# Patient Record
Sex: FEMALE | Race: WHITE | Employment: OTHER | URBAN - METROPOLITAN AREA
[De-identification: names, ages, dates, MRNs, and addresses within clinical notes are randomized per-mention and may not be internally consistent; named-entity substitution may affect disease eponyms.]

---

## 2017-04-19 PROCEDURE — 80175 DRUG SCREEN QUAN LAMOTRIGINE: CPT | Performed by: OTHER

## 2017-04-19 PROCEDURE — 82607 VITAMIN B-12: CPT | Performed by: OTHER

## 2017-04-19 PROCEDURE — 82746 ASSAY OF FOLIC ACID SERUM: CPT | Performed by: OTHER

## 2017-04-20 ENCOUNTER — APPOINTMENT (OUTPATIENT)
Dept: LAB | Facility: HOSPITAL | Age: 47
End: 2017-04-20
Payer: MEDICARE

## 2017-04-20 DIAGNOSIS — K51.80 OTHER ULCERATIVE COLITIS WITHOUT COMPLICATION (HCC): ICD-10-CM

## 2017-04-20 PROCEDURE — 93010 ELECTROCARDIOGRAM REPORT: CPT | Performed by: INTERNAL MEDICINE

## 2017-04-20 PROCEDURE — 93005 ELECTROCARDIOGRAM TRACING: CPT

## 2017-04-25 ENCOUNTER — ANESTHESIA EVENT (OUTPATIENT)
Dept: ENDOSCOPY | Facility: HOSPITAL | Age: 47
End: 2017-04-25

## 2017-04-25 ENCOUNTER — ANESTHESIA (OUTPATIENT)
Dept: ENDOSCOPY | Facility: HOSPITAL | Age: 47
End: 2017-04-25

## 2017-04-25 ENCOUNTER — HOSPITAL ENCOUNTER (OUTPATIENT)
Facility: HOSPITAL | Age: 47
Setting detail: HOSPITAL OUTPATIENT SURGERY
Discharge: HOME | End: 2017-04-25
Attending: INTERNAL MEDICINE | Admitting: INTERNAL MEDICINE
Payer: MEDICARE

## 2017-04-25 ENCOUNTER — SURGERY (OUTPATIENT)
Age: 47
End: 2017-04-25

## 2017-04-25 VITALS
TEMPERATURE: 98 F | WEIGHT: 130 LBS | SYSTOLIC BLOOD PRESSURE: 103 MMHG | BODY MASS INDEX: 23.04 KG/M2 | RESPIRATION RATE: 16 BRPM | DIASTOLIC BLOOD PRESSURE: 58 MMHG | HEART RATE: 72 BPM | OXYGEN SATURATION: 99 % | HEIGHT: 63 IN

## 2017-04-25 DIAGNOSIS — K51.80 OTHER ULCERATIVE COLITIS WITHOUT COMPLICATION (HCC): Primary | ICD-10-CM

## 2017-04-25 PROCEDURE — 88305 TISSUE EXAM BY PATHOLOGIST: CPT | Performed by: INTERNAL MEDICINE

## 2017-04-25 PROCEDURE — 0DBE8ZX EXCISION OF LARGE INTESTINE, VIA NATURAL OR ARTIFICIAL OPENING ENDOSCOPIC, DIAGNOSTIC: ICD-10-PCS | Performed by: INTERNAL MEDICINE

## 2017-04-25 PROCEDURE — 81025 URINE PREGNANCY TEST: CPT | Performed by: INTERNAL MEDICINE

## 2017-04-25 RX ORDER — SODIUM CHLORIDE, SODIUM LACTATE, POTASSIUM CHLORIDE, CALCIUM CHLORIDE 600; 310; 30; 20 MG/100ML; MG/100ML; MG/100ML; MG/100ML
INJECTION, SOLUTION INTRAVENOUS CONTINUOUS
Status: DISCONTINUED | OUTPATIENT
Start: 2017-04-25 | End: 2017-04-25

## 2017-04-25 RX ORDER — NALOXONE HYDROCHLORIDE 0.4 MG/ML
80 INJECTION, SOLUTION INTRAMUSCULAR; INTRAVENOUS; SUBCUTANEOUS AS NEEDED
Status: DISCONTINUED | OUTPATIENT
Start: 2017-04-25 | End: 2017-04-25

## 2017-04-25 RX ORDER — DEXTROSE MONOHYDRATE 25 G/50ML
50 INJECTION, SOLUTION INTRAVENOUS
Status: DISCONTINUED | OUTPATIENT
Start: 2017-04-25 | End: 2017-04-25

## 2017-04-25 NOTE — ANESTHESIA PREPROCEDURE EVALUATION
PRE-OP EVALUATION    Patient Name: Renato Walker    Pre-op Diagnosis: LEFT SIDED ULCERATIVE COLITIS    Procedure(s):  COLONOSCOPY    Surgeon(s) and Role:     Lee Ann Cm MD - Primary    Pre-op vitals reviewed.         Body mass index is 23.03 60 mg by mouth every morning. Disp:  Rfl:    mesalamine (LIALDA) 1.2 G Oral Tab EC Take 2.4 g by mouth 2 (two) times daily. Disp:  Rfl:    BuPROPion HCl ER, XL, (WELLBUTRIN XL) 300 MG Oral Tablet 24 Hr Take 300 mg by mouth daily.  In addition to 150 mg status: Former Smoker     Smokeless tobacco: Never Used    Comment: quit in 2003    Alcohol Use: No       Drug Use: Yes   Special: Cannabis   Comment: Heroin in past     Available pre-op labs reviewed.     Lab Results  Component Value Date   WBC 7.66 04/19/

## 2017-04-25 NOTE — INTERVAL H&P NOTE
Pre-op Diagnosis: LEFT SIDED ULCERATIVE COLITIS    The above referenced H&P was reviewed by Adrian Huerta MD on 4/25/2017, the patient was examined and no significant changes have occurred in the patient's condition since the H&P was performed.   I disc

## 2017-04-25 NOTE — OPERATIVE REPORT
Sary Horner Patient Status:  Hospital Outpatient Surgery    1970 MRN [id number]   [facility] ENDOSCOPY Attending Susan Fermin MD   Hosp Day # 0 PCP Charles Gomez MD     PREOPERATIVE DIAGNOSIS/INDICATION: H/o UC/IBD, precautions, watch for bleeding, infection, perforation, adverse drug reactions   - Follow biopsies.  - Repeat colonoscopy in 1 years.     Eren Aguillon  4/25/2017  11:27 AM

## 2017-04-25 NOTE — INTERVAL H&P NOTE
Pre-op Diagnosis: LEFT SIDED ULCERATIVE COLITIS    The above referenced H&P was reviewed by Wes Butterfield MD on 4/25/2017, the patient was examined and no significant changes have occurred in the patient's condition since the H&P was performed.   I disc

## 2017-04-25 NOTE — ANESTHESIA POSTPROCEDURE EVALUATION
[address] Patient Status:  Hospital Outpatient Surgery   Age/Gender 50year old female MRN [id number]   Location [facility]. Attending Hoang Flores MD   Hosp Day # 0 PCP Tete Price MD       Anesthesia

## 2017-04-26 RX ORDER — RANITIDINE 150 MG/1
150 TABLET ORAL 2 TIMES DAILY
COMMUNITY
End: 2018-03-10

## 2017-04-26 RX ORDER — SODIUM CHLORIDE, SODIUM LACTATE, POTASSIUM CHLORIDE, CALCIUM CHLORIDE 600; 310; 30; 20 MG/100ML; MG/100ML; MG/100ML; MG/100ML
INJECTION, SOLUTION INTRAVENOUS CONTINUOUS
Status: CANCELLED | OUTPATIENT
Start: 2017-04-26

## 2017-04-26 RX ORDER — FLUTICASONE PROPIONATE 50 MCG
2 SPRAY, SUSPENSION (ML) NASAL DAILY
COMMUNITY
End: 2018-03-21

## 2017-05-12 NOTE — IMAGING NOTE
Phoned patient to review plan of care for Monday's procedure.  Pt aware to be NPO for 8 hours, will need to have a  to be released due to anesthesia, , can and should take routine morning medications with sips of water, and to expect recovery for 4-6

## 2017-05-15 ENCOUNTER — ANESTHESIA (OUTPATIENT)
Dept: MRI IMAGING | Facility: HOSPITAL | Age: 47
End: 2017-05-15
Payer: MEDICARE

## 2017-05-15 ENCOUNTER — ANESTHESIA EVENT (OUTPATIENT)
Dept: MRI IMAGING | Facility: HOSPITAL | Age: 47
End: 2017-05-15
Payer: MEDICARE

## 2017-05-15 ENCOUNTER — APPOINTMENT (OUTPATIENT)
Dept: LAB | Facility: HOSPITAL | Age: 47
End: 2017-05-15
Attending: Other
Payer: MEDICARE

## 2017-05-15 ENCOUNTER — HOSPITAL ENCOUNTER (OUTPATIENT)
Dept: MRI IMAGING | Facility: HOSPITAL | Age: 47
Discharge: HOME | End: 2017-05-15
Attending: Other
Payer: MEDICARE

## 2017-05-15 VITALS
WEIGHT: 131 LBS | TEMPERATURE: 98 F | DIASTOLIC BLOOD PRESSURE: 85 MMHG | BODY MASS INDEX: 23.21 KG/M2 | OXYGEN SATURATION: 100 % | HEIGHT: 63 IN | HEART RATE: 66 BPM | SYSTOLIC BLOOD PRESSURE: 132 MMHG | RESPIRATION RATE: 15 BRPM

## 2017-05-15 DIAGNOSIS — R93.0 ABNORMAL MRI OF HEAD: ICD-10-CM

## 2017-05-15 DIAGNOSIS — R53.1 WEAKNESS: ICD-10-CM

## 2017-05-15 PROCEDURE — 70553 MRI BRAIN STEM W/O & W/DYE: CPT | Performed by: OTHER

## 2017-05-15 PROCEDURE — A9575 INJ GADOTERATE MEGLUMI 0.1ML: HCPCS

## 2017-05-15 RX ORDER — NALOXONE HYDROCHLORIDE 0.4 MG/ML
80 INJECTION, SOLUTION INTRAMUSCULAR; INTRAVENOUS; SUBCUTANEOUS AS NEEDED
Status: ACTIVE | OUTPATIENT
Start: 2017-05-15 | End: 2017-05-15

## 2017-05-15 RX ORDER — HYDROMORPHONE HYDROCHLORIDE 1 MG/ML
0.4 INJECTION, SOLUTION INTRAMUSCULAR; INTRAVENOUS; SUBCUTANEOUS EVERY 5 MIN PRN
Status: ACTIVE | OUTPATIENT
Start: 2017-05-15 | End: 2017-05-15

## 2017-05-15 RX ORDER — MIDAZOLAM HYDROCHLORIDE 1 MG/ML
1 INJECTION INTRAMUSCULAR; INTRAVENOUS EVERY 5 MIN PRN
Status: ACTIVE | OUTPATIENT
Start: 2017-05-15 | End: 2017-05-15

## 2017-05-15 RX ORDER — DEXTROSE MONOHYDRATE 25 G/50ML
50 INJECTION, SOLUTION INTRAVENOUS
Status: DISCONTINUED | OUTPATIENT
Start: 2017-05-15 | End: 2017-05-19

## 2017-05-15 RX ORDER — ONDANSETRON 2 MG/ML
4 INJECTION INTRAMUSCULAR; INTRAVENOUS AS NEEDED
Status: ACTIVE | OUTPATIENT
Start: 2017-05-15 | End: 2017-05-15

## 2017-05-15 RX ORDER — DEXAMETHASONE SODIUM PHOSPHATE 4 MG/ML
4 VIAL (ML) INJECTION AS NEEDED
Status: ACTIVE | OUTPATIENT
Start: 2017-05-15 | End: 2017-05-15

## 2017-05-15 RX ORDER — ALBUTEROL SULFATE 2.5 MG/3ML
2.5 SOLUTION RESPIRATORY (INHALATION) AS NEEDED
Status: DISPENSED | OUTPATIENT
Start: 2017-05-15 | End: 2017-05-15

## 2017-05-15 RX ORDER — SODIUM CHLORIDE, SODIUM LACTATE, POTASSIUM CHLORIDE, CALCIUM CHLORIDE 600; 310; 30; 20 MG/100ML; MG/100ML; MG/100ML; MG/100ML
INJECTION, SOLUTION INTRAVENOUS CONTINUOUS
Status: DISCONTINUED | OUTPATIENT
Start: 2017-05-15 | End: 2017-05-19

## 2017-05-15 RX ORDER — METOCLOPRAMIDE HYDROCHLORIDE 5 MG/ML
10 INJECTION INTRAMUSCULAR; INTRAVENOUS AS NEEDED
Status: DISPENSED | OUTPATIENT
Start: 2017-05-15 | End: 2017-05-15

## 2017-05-15 NOTE — ANESTHESIA PREPROCEDURE EVALUATION
PRE-OP EVALUATION    Patient Name[place] [facility]    Pre-op Diagnosis: * No surgery found *    * No surgery found *    * Surgery not found *    Pre-op vitals reviewed. Body mass index is 23.21 kg/(m^2). Current medications reviewed.   Hospita 300 MG Oral Capsule SR 24 Hr Take 1 capsule (300 mg total) by mouth daily.  Disp: 90 capsule Rfl: 1   spironolactone 50 MG Oral Tab Take 2 tabs QAM, 1 tab QPM (Patient taking differently: Take 2 tabs QAM, 1 tab QPM ) Disp: 270 tablet Rfl: 1   Ketoconazole 2 Externally Gel Apply 1 Application topically 2 (two) times daily. Apply to affected area of the face Disp:  Rfl:    acetaminophen (TYLENOL EXTRA STRENGTH) 500 MG Oral Tab Take 1,000 mg by mouth every 4 (four) hours as needed.  Indications: Fever, Pain Disp: Amy Veras MD;  Location: [facility] ENDOSCOPY        Smoking status: Former Smoker     Smokeless tobacco: Never Used    Comment: quit in 2003    Alcohol Use: No       Drug Use: Yes   Special: Cannabis   Comment: Heroin in past     Available pre-op labs re

## 2017-05-15 NOTE — ANESTHESIA POSTPROCEDURE EVALUATION
[address] Patient Status:  Outpatient   Age/Gender 50year old female MRN [id number]   [facility] MRI Attending Erika Gloria MD   [facility] Day # 0 PCP Keyona Hess MD       Anesthesia Post-op Note    * No

## 2017-05-19 PROBLEM — M70.61 TROCHANTERIC BURSITIS, RIGHT HIP: Status: ACTIVE | Noted: 2017-05-19

## 2017-06-22 PROCEDURE — 87624 HPV HI-RISK TYP POOLED RSLT: CPT | Performed by: FAMILY MEDICINE

## 2017-06-22 PROCEDURE — 88175 CYTOPATH C/V AUTO FLUID REDO: CPT | Performed by: FAMILY MEDICINE

## 2017-07-10 PROCEDURE — 82570 ASSAY OF URINE CREATININE: CPT | Performed by: INTERNAL MEDICINE

## 2017-07-10 PROCEDURE — 85610 PROTHROMBIN TIME: CPT | Performed by: INTERNAL MEDICINE

## 2017-07-10 PROCEDURE — 86160 COMPLEMENT ANTIGEN: CPT | Performed by: INTERNAL MEDICINE

## 2017-07-10 PROCEDURE — 86147 CARDIOLIPIN ANTIBODY EA IG: CPT | Performed by: INTERNAL MEDICINE

## 2017-07-10 PROCEDURE — 81003 URINALYSIS AUTO W/O SCOPE: CPT | Performed by: INTERNAL MEDICINE

## 2017-07-10 PROCEDURE — 84156 ASSAY OF PROTEIN URINE: CPT | Performed by: INTERNAL MEDICINE

## 2017-07-10 PROCEDURE — 85613 RUSSELL VIPER VENOM DILUTED: CPT | Performed by: INTERNAL MEDICINE

## 2017-07-10 PROCEDURE — 86376 MICROSOMAL ANTIBODY EACH: CPT | Performed by: INTERNAL MEDICINE

## 2017-07-10 PROCEDURE — 86800 THYROGLOBULIN ANTIBODY: CPT | Performed by: INTERNAL MEDICINE

## 2017-07-10 PROCEDURE — 86146 BETA-2 GLYCOPROTEIN ANTIBODY: CPT | Performed by: INTERNAL MEDICINE

## 2017-08-11 PROCEDURE — 85384 FIBRINOGEN ACTIVITY: CPT | Performed by: INTERNAL MEDICINE

## 2017-11-11 ENCOUNTER — HOSPITAL ENCOUNTER (EMERGENCY)
Facility: HOSPITAL | Age: 47
Discharge: HOME | End: 2017-11-11
Attending: EMERGENCY MEDICINE
Payer: MEDICARE

## 2017-11-11 VITALS
OXYGEN SATURATION: 100 % | BODY MASS INDEX: 22.86 KG/M2 | TEMPERATURE: 98 F | HEIGHT: 63 IN | WEIGHT: 129 LBS | DIASTOLIC BLOOD PRESSURE: 76 MMHG | HEART RATE: 91 BPM | RESPIRATION RATE: 18 BRPM | SYSTOLIC BLOOD PRESSURE: 123 MMHG

## 2017-11-11 DIAGNOSIS — R51.9 NONINTRACTABLE EPISODIC HEADACHE, UNSPECIFIED HEADACHE TYPE: Primary | ICD-10-CM

## 2017-11-11 DIAGNOSIS — S01.81XA CHIN LACERATION, INITIAL ENCOUNTER: ICD-10-CM

## 2017-11-11 PROCEDURE — 12011 RPR F/E/E/N/L/M 2.5 CM/<: CPT

## 2017-11-11 PROCEDURE — 99284 EMERGENCY DEPT VISIT MOD MDM: CPT

## 2017-11-11 PROCEDURE — 96361 HYDRATE IV INFUSION ADD-ON: CPT

## 2017-11-11 PROCEDURE — 96375 TX/PRO/DX INJ NEW DRUG ADDON: CPT

## 2017-11-11 PROCEDURE — 96374 THER/PROPH/DIAG INJ IV PUSH: CPT

## 2017-11-11 RX ORDER — METOCLOPRAMIDE HYDROCHLORIDE 5 MG/ML
10 INJECTION INTRAMUSCULAR; INTRAVENOUS ONCE
Status: COMPLETED | OUTPATIENT
Start: 2017-11-11 | End: 2017-11-11

## 2017-11-11 RX ORDER — SODIUM CHLORIDE 9 MG/ML
125 INJECTION, SOLUTION INTRAVENOUS CONTINUOUS
Status: DISCONTINUED | OUTPATIENT
Start: 2017-11-11 | End: 2017-11-11

## 2017-11-11 RX ORDER — DIPHENHYDRAMINE HYDROCHLORIDE 50 MG/ML
25 INJECTION INTRAMUSCULAR; INTRAVENOUS ONCE
Status: COMPLETED | OUTPATIENT
Start: 2017-11-11 | End: 2017-11-11

## 2017-11-11 NOTE — ED INITIAL ASSESSMENT (HPI)
Patient states she has had vestibular migraines that have been worse than usual the last 3 days. Also slipped about 12:30 pm today and busted her chin open. Patient unable to get here until now because she has had to stay with her mother.

## 2017-11-11 NOTE — ED PROVIDER NOTES
Patient Seen in: [facility] Emergency Department    History   Patient presents with:  Headache (neurologic)  Laceration Abrasion (integumentary)    Stated Complaint: headache and lac     HPI    67-year-old female presents emergency room for evaluati • High cholesterol    • HYPOTHYROIDISM    • Migraine, hemiplegic    • MIGRAINES    • Migraines    • Myalgia and myositis, unspecified    • Organic hypersomnia, unspecified PSG 6-29-14    AHI 2 RDI 2 REM AHI 2 SaO2 doroteo 88 %   • OTHER DISEASES     Crohn external auditory canal swelling, there is no mastoid erythema or tenderness. Scalp is atraumatic. There is a 1.5 cm laceration to the submental chin. No facial bone tenderness or step-off. No mandible tenderness. Oropharynx clear, no dental trauma. symptoms and was discharged good condition.           Disposition and Plan     Clinical Impression:  Nonintractable episodic headache, unspecified headache type  (primary encounter diagnosis)  Chin laceration, initial encounter    Disposition:  Discharge  1

## 2017-12-20 VITALS — HEIGHT: 63 IN | BODY MASS INDEX: 23.57 KG/M2 | WEIGHT: 133 LBS

## 2017-12-20 RX ORDER — LEVOTHYROXINE SODIUM 175 UG/1
175 TABLET ORAL
Status: ON HOLD | COMMUNITY
End: 2018-10-07

## 2017-12-20 RX ORDER — SODIUM CHLORIDE, SODIUM LACTATE, POTASSIUM CHLORIDE, CALCIUM CHLORIDE 600; 310; 30; 20 MG/100ML; MG/100ML; MG/100ML; MG/100ML
INJECTION, SOLUTION INTRAVENOUS CONTINUOUS
Status: CANCELLED | OUTPATIENT
Start: 2017-12-20

## 2018-01-05 ENCOUNTER — HOSPITAL ENCOUNTER (OUTPATIENT)
Dept: MRI IMAGING | Facility: HOSPITAL | Age: 48
Discharge: HOME | End: 2018-01-05
Attending: ORTHOPAEDIC SURGERY
Payer: MEDICARE

## 2018-01-05 ENCOUNTER — APPOINTMENT (OUTPATIENT)
Dept: LAB | Facility: HOSPITAL | Age: 48
End: 2018-01-05
Attending: ORTHOPAEDIC SURGERY
Payer: MEDICARE

## 2018-01-05 ENCOUNTER — APPOINTMENT (OUTPATIENT)
Dept: MRI IMAGING | Facility: HOSPITAL | Age: 48
End: 2018-01-05
Attending: ORTHOPAEDIC SURGERY
Payer: MEDICARE

## 2018-01-09 ENCOUNTER — ANESTHESIA EVENT (OUTPATIENT)
Dept: MRI IMAGING | Facility: HOSPITAL | Age: 48
DRG: 092 | End: 2018-01-09
Payer: MEDICARE

## 2018-01-09 RX ORDER — SODIUM CHLORIDE, SODIUM LACTATE, POTASSIUM CHLORIDE, CALCIUM CHLORIDE 600; 310; 30; 20 MG/100ML; MG/100ML; MG/100ML; MG/100ML
INJECTION, SOLUTION INTRAVENOUS CONTINUOUS
Status: CANCELLED | OUTPATIENT
Start: 2018-01-09

## 2018-01-10 NOTE — IMAGING NOTE
Patient verbalized understanding to instructions given for tomorrow's anesthesia: Arrive 1 hour early, NPO x 8 hours,  to take patient home and recovery time may take up to 4 hours.

## 2018-01-11 ENCOUNTER — HOSPITAL ENCOUNTER (OUTPATIENT)
Dept: MRI IMAGING | Facility: HOSPITAL | Age: 48
Discharge: HOME | DRG: 092 | End: 2018-01-11
Attending: ORTHOPAEDIC SURGERY
Payer: MEDICARE

## 2018-01-11 ENCOUNTER — ANESTHESIA (OUTPATIENT)
Dept: MRI IMAGING | Facility: HOSPITAL | Age: 48
DRG: 092 | End: 2018-01-11
Payer: MEDICARE

## 2018-01-11 VITALS
DIASTOLIC BLOOD PRESSURE: 80 MMHG | HEART RATE: 87 BPM | RESPIRATION RATE: 16 BRPM | OXYGEN SATURATION: 100 % | TEMPERATURE: 98 F | SYSTOLIC BLOOD PRESSURE: 130 MMHG | BODY MASS INDEX: 23.86 KG/M2 | WEIGHT: 133 LBS | HEIGHT: 62.75 IN

## 2018-01-11 DIAGNOSIS — M41.25 OTHER IDIOPATHIC SCOLIOSIS, THORACOLUMBAR REGION: ICD-10-CM

## 2018-01-11 PROCEDURE — 72141 MRI NECK SPINE W/O DYE: CPT | Performed by: ORTHOPAEDIC SURGERY

## 2018-01-11 PROCEDURE — BR30ZZZ MAGNETIC RESONANCE IMAGING (MRI) OF CERVICAL SPINE: ICD-10-PCS | Performed by: RADIOLOGY

## 2018-01-11 RX ORDER — DEXTROSE MONOHYDRATE 25 G/50ML
50 INJECTION, SOLUTION INTRAVENOUS
Status: DISCONTINUED | OUTPATIENT
Start: 2018-01-11 | End: 2018-01-12

## 2018-01-11 RX ORDER — SODIUM CHLORIDE, SODIUM LACTATE, POTASSIUM CHLORIDE, CALCIUM CHLORIDE 600; 310; 30; 20 MG/100ML; MG/100ML; MG/100ML; MG/100ML
INJECTION, SOLUTION INTRAVENOUS CONTINUOUS
Status: DISCONTINUED | OUTPATIENT
Start: 2018-01-11 | End: 2018-01-12

## 2018-01-11 RX ORDER — METOCLOPRAMIDE HYDROCHLORIDE 5 MG/ML
10 INJECTION INTRAMUSCULAR; INTRAVENOUS AS NEEDED
Status: ACTIVE | OUTPATIENT
Start: 2018-01-11 | End: 2018-01-11

## 2018-01-11 RX ORDER — ONDANSETRON 2 MG/ML
4 INJECTION INTRAMUSCULAR; INTRAVENOUS ONCE AS NEEDED
Status: ACTIVE | OUTPATIENT
Start: 2018-01-11 | End: 2018-01-11

## 2018-01-11 RX ORDER — NALOXONE HYDROCHLORIDE 0.4 MG/ML
80 INJECTION, SOLUTION INTRAMUSCULAR; INTRAVENOUS; SUBCUTANEOUS AS NEEDED
Status: ACTIVE | OUTPATIENT
Start: 2018-01-11 | End: 2018-01-11

## 2018-01-11 NOTE — ANESTHESIA POSTPROCEDURE EVALUATION
[address] Patient Status:  Outpatient   Age/Gender 50year old female MRN [id number]   [facility] MRI Attending Virgilio Cuellar MD   Hosp Day # 0 PCP Tayla MD Alexey       Anesthesia Post-op Note    * No pr

## 2018-01-11 NOTE — ANESTHESIA PREPROCEDURE EVALUATION
PRE-OP EVALUATION    Patient Name: [facility]    Pre-op Diagnosis: * No pre-op diagnosis entered *    * No procedures listed *    * No surgeons found in log *    Pre-op vitals reviewed.   Temp: 98 °F (36.7 °C)  Pulse: 87  Resp: 18  BP: 138/94  SpO2 Activated INHALE 1 PUFF INTO THE LUNGS EVERY 12 HOURS Disp: 1 each Rfl: 8   Carisoprodol 350 MG Oral Tab TAKE 1 TABLET BY MOUTH THREE TIMES DAILY AS NEEDED FOR MUSCLE SPASMS Disp: 270 tablet Rfl: 1   VERAPAMIL HCL  MG Oral Capsule SR 24 Hr TAKE 1 CAP MG Oral Tab Take 81 mg by mouth daily. Indications: daily Disp:  Rfl:    DiphenhydrAMINE HCl (BENADRYL) 50 MG Oral Cap Take 50 mg by mouth every 6 (six) hours as needed.  Indications: Migraine Disp:  Rfl:    QUEtiapine Fumarate (SEROQUEL) 300 MG Oral Tab Ta Former tobacco abuse    (+) asthma                     Neuro/Psych  Comment: Bipolar    (+) depression  (+) anxiety  (+) CVA and residual deficits    (+) seizures    (+) headaches (Migraines)  (+) psychiatric history               Past Surgical History:  3

## 2018-01-12 ENCOUNTER — APPOINTMENT (OUTPATIENT)
Dept: GENERAL RADIOLOGY | Facility: HOSPITAL | Age: 48
DRG: 092 | End: 2018-01-12
Attending: EMERGENCY MEDICINE
Payer: MEDICARE

## 2018-01-12 ENCOUNTER — HOSPITAL ENCOUNTER (EMERGENCY)
Facility: HOSPITAL | Age: 48
Discharge: HOME | DRG: 092 | End: 2018-01-12
Attending: EMERGENCY MEDICINE
Payer: MEDICARE

## 2018-01-12 ENCOUNTER — APPOINTMENT (OUTPATIENT)
Dept: CT IMAGING | Facility: HOSPITAL | Age: 48
DRG: 092 | End: 2018-01-12
Attending: EMERGENCY MEDICINE
Payer: MEDICARE

## 2018-01-12 VITALS
HEIGHT: 62 IN | SYSTOLIC BLOOD PRESSURE: 132 MMHG | DIASTOLIC BLOOD PRESSURE: 81 MMHG | RESPIRATION RATE: 16 BRPM | TEMPERATURE: 99 F | OXYGEN SATURATION: 96 % | HEART RATE: 91 BPM | WEIGHT: 133 LBS | BODY MASS INDEX: 24.48 KG/M2

## 2018-01-12 DIAGNOSIS — Z86.59 MENTAL STATUS CHANGE RESOLVED: ICD-10-CM

## 2018-01-12 DIAGNOSIS — E86.0 DEHYDRATION: Primary | ICD-10-CM

## 2018-01-12 LAB
ALBUMIN SERPL-MCNC: 4 G/DL (ref 3.5–4.8)
ALP LIVER SERPL-CCNC: 61 U/L (ref 39–100)
ALT SERPL-CCNC: 26 U/L (ref 14–54)
AST SERPL-CCNC: 19 U/L (ref 15–41)
BASOPHILS # BLD AUTO: 0.06 X10(3) UL (ref 0–0.1)
BASOPHILS NFR BLD AUTO: 0.5 %
BILIRUB SERPL-MCNC: 0.4 MG/DL (ref 0.1–2)
BUN BLD-MCNC: 17 MG/DL (ref 8–20)
CALCIUM BLD-MCNC: 9.6 MG/DL (ref 8.3–10.3)
CHLORIDE: 106 MMOL/L (ref 101–111)
CO2: 23 MMOL/L (ref 22–32)
CREAT BLD-MCNC: 0.93 MG/DL (ref 0.55–1.02)
EOSINOPHIL # BLD AUTO: 0.05 X10(3) UL (ref 0–0.3)
EOSINOPHIL NFR BLD AUTO: 0.4 %
ERYTHROCYTE [DISTWIDTH] IN BLOOD BY AUTOMATED COUNT: 13.1 % (ref 11.5–16)
GLUCOSE BLD-MCNC: 83 MG/DL (ref 70–99)
HCT VFR BLD AUTO: 41.3 % (ref 34–50)
HGB BLD-MCNC: 13.7 G/DL (ref 12–16)
IMMATURE GRANULOCYTE COUNT: 0.06 X10(3) UL (ref 0–1)
IMMATURE GRANULOCYTE RATIO %: 0.5 %
LACTIC ACID: 0.7 MMOL/L (ref 0.5–2)
LYMPHOCYTES # BLD AUTO: 1.21 X10(3) UL (ref 0.9–4)
LYMPHOCYTES NFR BLD AUTO: 10.5 %
M PROTEIN MFR SERPL ELPH: 7 G/DL (ref 6.1–8.3)
MCH RBC QN AUTO: 30.3 PG (ref 27–33.2)
MCHC RBC AUTO-ENTMCNC: 33.2 G/DL (ref 31–37)
MCV RBC AUTO: 91.4 FL (ref 81–100)
MONOCYTES # BLD AUTO: 0.84 X10(3) UL (ref 0.1–0.6)
MONOCYTES NFR BLD AUTO: 7.3 %
NEUTROPHIL ABS PRELIM: 9.28 X10 (3) UL (ref 1.3–6.7)
NEUTROPHILS # BLD AUTO: 9.28 X10(3) UL (ref 1.3–6.7)
NEUTROPHILS NFR BLD AUTO: 80.8 %
PLATELET # BLD AUTO: 362 10(3)UL (ref 150–450)
POTASSIUM SERPL-SCNC: 4.4 MMOL/L (ref 3.6–5.1)
RBC # BLD AUTO: 4.52 X10(6)UL (ref 3.8–5.1)
RED CELL DISTRIBUTION WIDTH-SD: 43.8 FL (ref 35.1–46.3)
SODIUM SERPL-SCNC: 138 MMOL/L (ref 136–144)
WBC # BLD AUTO: 11.5 X10(3) UL (ref 4–13)

## 2018-01-12 PROCEDURE — 96360 HYDRATION IV INFUSION INIT: CPT

## 2018-01-12 PROCEDURE — 96361 HYDRATE IV INFUSION ADD-ON: CPT

## 2018-01-12 PROCEDURE — 70450 CT HEAD/BRAIN W/O DYE: CPT | Performed by: EMERGENCY MEDICINE

## 2018-01-12 PROCEDURE — 85025 COMPLETE CBC W/AUTO DIFF WBC: CPT | Performed by: EMERGENCY MEDICINE

## 2018-01-12 PROCEDURE — 93010 ELECTROCARDIOGRAM REPORT: CPT

## 2018-01-12 PROCEDURE — 93005 ELECTROCARDIOGRAM TRACING: CPT

## 2018-01-12 PROCEDURE — 99285 EMERGENCY DEPT VISIT HI MDM: CPT

## 2018-01-12 PROCEDURE — 83605 ASSAY OF LACTIC ACID: CPT | Performed by: EMERGENCY MEDICINE

## 2018-01-12 PROCEDURE — 71045 X-RAY EXAM CHEST 1 VIEW: CPT | Performed by: EMERGENCY MEDICINE

## 2018-01-12 PROCEDURE — 80053 COMPREHEN METABOLIC PANEL: CPT | Performed by: EMERGENCY MEDICINE

## 2018-01-13 ENCOUNTER — HOSPITAL ENCOUNTER (INPATIENT)
Facility: HOSPITAL | Age: 48
LOS: 6 days | Discharge: HOME HEALTH | DRG: 092 | End: 2018-01-19
Attending: EMERGENCY MEDICINE | Admitting: INTERNAL MEDICINE
Payer: MEDICARE

## 2018-01-13 DIAGNOSIS — E87.2 METABOLIC ACIDOSIS: Primary | ICD-10-CM

## 2018-01-13 DIAGNOSIS — E87.2 STARVATION KETOACIDOSIS: ICD-10-CM

## 2018-01-13 DIAGNOSIS — Z79.899 POLYPHARMACY: ICD-10-CM

## 2018-01-13 DIAGNOSIS — R41.82 ALTERED MENTAL STATUS, UNSPECIFIED ALTERED MENTAL STATUS TYPE: ICD-10-CM

## 2018-01-13 DIAGNOSIS — E86.0 DEHYDRATION: ICD-10-CM

## 2018-01-13 PROBLEM — E87.29 STARVATION KETOACIDOSIS: Status: ACTIVE | Noted: 2018-01-13

## 2018-01-13 PROBLEM — E87.20 METABOLIC ACIDOSIS: Status: ACTIVE | Noted: 2018-01-13

## 2018-01-13 PROBLEM — T73.0XXA STARVATION KETOACIDOSIS: Status: ACTIVE | Noted: 2018-01-13

## 2018-01-13 LAB
ALBUMIN SERPL-MCNC: 4.2 G/DL (ref 3.5–4.8)
ALLENS TEST: POSITIVE
ALP LIVER SERPL-CCNC: 62 U/L (ref 39–100)
ALT SERPL-CCNC: 23 U/L (ref 14–54)
AMMONIA: 14 UMOL/L (ref 11–32)
AMPHETAMINE URINE: NEGATIVE
ARTERIAL BLD GAS O2 SATURATION: 95 % (ref 92–100)
ARTERIAL BLOOD GAS BASE EXCESS: -13.1
ARTERIAL BLOOD GAS HCO3: 11.8 MEQ/L (ref 22–26)
ARTERIAL BLOOD GAS PCO2: 25 MM HG (ref 35–45)
ARTERIAL BLOOD GAS PH: 7.29 (ref 7.35–7.45)
ARTERIAL BLOOD GAS PO2: 93 MM HG (ref 80–105)
AST SERPL-CCNC: 14 U/L (ref 15–41)
ATRIAL RATE: 90 BPM
BASOPHILS # BLD AUTO: 0.08 X10(3) UL (ref 0–0.1)
BASOPHILS NFR BLD AUTO: 0.6 %
BILIRUB SERPL-MCNC: 0.4 MG/DL (ref 0.1–2)
BILIRUB UR QL STRIP.AUTO: NEGATIVE
BUN BLD-MCNC: 18 MG/DL (ref 8–20)
CALCIUM BLD-MCNC: 9.3 MG/DL (ref 8.3–10.3)
CALCULATED O2 SATURATION: 96 % (ref 92–100)
CARBOXYHEMOGLOBIN: 0.7 % SAT (ref 0–3)
CHLORIDE: 110 MMOL/L (ref 101–111)
CLARITY UR REFRACT.AUTO: CLEAR
CO2: 16 MMOL/L (ref 22–32)
COCAINE URINE: NEGATIVE
COLOR UR AUTO: YELLOW
CREAT BLD-MCNC: 0.98 MG/DL (ref 0.55–1.02)
EOSINOPHIL # BLD AUTO: 0.01 X10(3) UL (ref 0–0.3)
EOSINOPHIL NFR BLD AUTO: 0.1 %
ERYTHROCYTE [DISTWIDTH] IN BLOOD BY AUTOMATED COUNT: 13.2 % (ref 11.5–16)
ETHYL ALCOHOL, QUALITATIVE: NEGATIVE
ETHYL ALCOHOL: <3 MG/DL (ref ?–3)
FREE T4: 1.2 NG/DL (ref 0.9–1.8)
GLUCOSE BLD-MCNC: 106 MG/DL (ref 70–99)
GLUCOSE UR STRIP.AUTO-MCNC: NEGATIVE MG/DL
HCT VFR BLD AUTO: 44.1 % (ref 34–50)
HGB BLD-MCNC: 14.2 G/DL (ref 12–16)
HYALINE CASTS #/AREA URNS AUTO: PRESENT /LPF
IMMATURE GRANULOCYTE COUNT: 0.07 X10(3) UL (ref 0–1)
IMMATURE GRANULOCYTE RATIO %: 0.5 %
IONIZED CALCIUM: 1.22 MMOL/L (ref 1.12–1.32)
KETONES UR STRIP.AUTO-MCNC: 80 MG/DL
LACTIC ACID ARTERIAL: <1.3 MMOL/L (ref 0.5–2)
LEUKOCYTE ESTERASE UR QL STRIP.AUTO: NEGATIVE
LITHIUM: 0.9 MMOL/L (ref 0.5–1.3)
LYMPHOCYTES # BLD AUTO: 1.17 X10(3) UL (ref 0.9–4)
LYMPHOCYTES NFR BLD AUTO: 8.3 %
M PROTEIN MFR SERPL ELPH: 7.2 G/DL (ref 6.1–8.3)
MCH RBC QN AUTO: 29.9 PG (ref 27–33.2)
MCHC RBC AUTO-ENTMCNC: 32.2 G/DL (ref 31–37)
MCV RBC AUTO: 92.8 FL (ref 81–100)
METHEMOGLOBIN: 0.7 % SAT (ref 0.4–1.5)
MONOCYTES # BLD AUTO: 0.74 X10(3) UL (ref 0.1–0.6)
MONOCYTES NFR BLD AUTO: 5.2 %
NEUTROPHIL ABS PRELIM: 12.08 X10 (3) UL (ref 1.3–6.7)
NEUTROPHILS # BLD AUTO: 12.08 X10(3) UL (ref 1.3–6.7)
NEUTROPHILS NFR BLD AUTO: 85.3 %
NITRITE UR QL STRIP.AUTO: NEGATIVE
P AXIS: 65 DEGREES
P-R INTERVAL: 136 MS
P/F RATIO: 441.7 MMHG
PATIENT TEMPERATURE: 98.6 F
PCP URINE: NEGATIVE
PH UR STRIP.AUTO: 6 [PH] (ref 4.5–8)
PLATELET # BLD AUTO: 423 10(3)UL (ref 150–450)
POTASSIUM BLOOD GAS: 3.6 MMOL/L (ref 3.6–5.1)
POTASSIUM SERPL-SCNC: 3.9 MMOL/L (ref 3.6–5.1)
PROT UR STRIP.AUTO-MCNC: 30 MG/DL
Q-T INTERVAL: 370 MS
QRS DURATION: 94 MS
QTC CALCULATION (BEZET): 452 MS
R AXIS: 31 DEGREES
RBC # BLD AUTO: 4.75 X10(6)UL (ref 3.8–5.1)
RBC UR QL AUTO: NEGATIVE
RED CELL DISTRIBUTION WIDTH-SD: 45.1 FL (ref 35.1–46.3)
SODIUM BLOOD GAS: 142 MMOL/L (ref 136–144)
SODIUM SERPL-SCNC: 141 MMOL/L (ref 136–144)
SP GR UR STRIP.AUTO: 1.02 (ref 1–1.03)
T AXIS: 26 DEGREES
TOTAL HEMOGLOBIN: 12.2 G/DL (ref 11.7–16)
TSI SER-ACNC: 0.02 MIU/ML (ref 0.35–5.5)
UROBILINOGEN UR STRIP.AUTO-MCNC: <2 MG/DL
VENTRICULAR RATE: 90 BPM
WBC # BLD AUTO: 14.2 X10(3) UL (ref 4–13)

## 2018-01-13 RX ORDER — BUPROPION HYDROCHLORIDE 150 MG/1
150 TABLET, EXTENDED RELEASE ORAL
Status: DISCONTINUED | OUTPATIENT
Start: 2018-01-14 | End: 2018-01-15

## 2018-01-13 RX ORDER — ONDANSETRON 4 MG/1
8 TABLET, ORALLY DISINTEGRATING ORAL EVERY 8 HOURS PRN
Status: DISCONTINUED | OUTPATIENT
Start: 2018-01-13 | End: 2018-01-19

## 2018-01-13 RX ORDER — SODIUM CHLORIDE 9 MG/ML
INJECTION, SOLUTION INTRAVENOUS ONCE
Status: COMPLETED | OUTPATIENT
Start: 2018-01-13 | End: 2018-01-13

## 2018-01-13 RX ORDER — ALBUTEROL SULFATE 2.5 MG/3ML
2.5 SOLUTION RESPIRATORY (INHALATION) EVERY 6 HOURS PRN
Status: DISCONTINUED | OUTPATIENT
Start: 2018-01-13 | End: 2018-01-19

## 2018-01-13 RX ORDER — PRAMIPEXOLE DIHYDROCHLORIDE 0.12 MG/1
0.12 TABLET ORAL 3 TIMES DAILY
Status: DISCONTINUED | OUTPATIENT
Start: 2018-01-13 | End: 2018-01-19

## 2018-01-13 RX ORDER — MONTELUKAST SODIUM 10 MG/1
10 TABLET ORAL NIGHTLY
Status: DISCONTINUED | OUTPATIENT
Start: 2018-01-13 | End: 2018-01-19

## 2018-01-13 RX ORDER — BUPROPION HYDROCHLORIDE 150 MG/1
150 TABLET, EXTENDED RELEASE ORAL DAILY
Status: DISCONTINUED | OUTPATIENT
Start: 2018-01-14 | End: 2018-01-15

## 2018-01-13 RX ORDER — FAMOTIDINE 20 MG/1
20 TABLET ORAL 2 TIMES DAILY
Status: DISCONTINUED | OUTPATIENT
Start: 2018-01-13 | End: 2018-01-17

## 2018-01-13 RX ORDER — ASPIRIN 81 MG/1
81 TABLET ORAL DAILY
Status: DISCONTINUED | OUTPATIENT
Start: 2018-01-13 | End: 2018-01-16 | Stop reason: SDUPTHER

## 2018-01-13 RX ORDER — FLUTICASONE PROPIONATE 50 MCG
2 SPRAY, SUSPENSION (ML) NASAL DAILY
Status: DISCONTINUED | OUTPATIENT
Start: 2018-01-13 | End: 2018-01-19

## 2018-01-13 RX ORDER — ACETAMINOPHEN 325 MG/1
650 TABLET ORAL EVERY 6 HOURS PRN
Status: DISCONTINUED | OUTPATIENT
Start: 2018-01-13 | End: 2018-01-19

## 2018-01-13 RX ORDER — DIPHENHYDRAMINE HCL 50 MG
50 CAPSULE ORAL EVERY 6 HOURS PRN
Status: DISCONTINUED | OUTPATIENT
Start: 2018-01-13 | End: 2018-01-19

## 2018-01-13 RX ORDER — SODIUM CHLORIDE 9 MG/ML
INJECTION, SOLUTION INTRAVENOUS CONTINUOUS
Status: DISCONTINUED | OUTPATIENT
Start: 2018-01-13 | End: 2018-01-16

## 2018-01-13 RX ORDER — QUETIAPINE 100 MG/1
300 TABLET, FILM COATED ORAL NIGHTLY
Status: DISCONTINUED | OUTPATIENT
Start: 2018-01-13 | End: 2018-01-15

## 2018-01-13 RX ORDER — ONDANSETRON 2 MG/ML
4 INJECTION INTRAMUSCULAR; INTRAVENOUS EVERY 6 HOURS PRN
Status: DISCONTINUED | OUTPATIENT
Start: 2018-01-13 | End: 2018-01-19

## 2018-01-13 RX ORDER — DEXTROSE AND SODIUM CHLORIDE 5; .9 G/100ML; G/100ML
INJECTION, SOLUTION INTRAVENOUS ONCE
Status: COMPLETED | OUTPATIENT
Start: 2018-01-13 | End: 2018-01-13

## 2018-01-13 RX ORDER — DIPHENHYDRAMINE HYDROCHLORIDE 50 MG/ML
50 INJECTION INTRAMUSCULAR; INTRAVENOUS ONCE
Status: COMPLETED | OUTPATIENT
Start: 2018-01-13 | End: 2018-01-13

## 2018-01-13 RX ORDER — HYDROMORPHONE HYDROCHLORIDE 2 MG/1
2 TABLET ORAL EVERY 6 HOURS PRN
Status: DISCONTINUED | OUTPATIENT
Start: 2018-01-13 | End: 2018-01-19

## 2018-01-13 RX ORDER — LITHIUM CARBONATE 450 MG
900 TABLET, EXTENDED RELEASE ORAL EVERY EVENING
Status: DISCONTINUED | OUTPATIENT
Start: 2018-01-13 | End: 2018-01-15

## 2018-01-13 RX ORDER — QUETIAPINE 25 MG/1
50 TABLET, FILM COATED ORAL NIGHTLY
Status: DISCONTINUED | OUTPATIENT
Start: 2018-01-13 | End: 2018-01-15

## 2018-01-13 RX ORDER — METOCLOPRAMIDE 10 MG/1
10 TABLET ORAL
Status: DISCONTINUED | OUTPATIENT
Start: 2018-01-13 | End: 2018-01-15

## 2018-01-13 RX ORDER — CLONAZEPAM 0.5 MG/1
0.5 TABLET ORAL NIGHTLY
Status: DISCONTINUED | OUTPATIENT
Start: 2018-01-13 | End: 2018-01-19

## 2018-01-13 RX ORDER — METHYLPHENIDATE HYDROCHLORIDE 10 MG/1
40 TABLET ORAL 2 TIMES DAILY
Status: DISCONTINUED | OUTPATIENT
Start: 2018-01-14 | End: 2018-01-15

## 2018-01-13 RX ORDER — CLONAZEPAM 0.5 MG/1
0.5 TABLET ORAL DAILY
Status: DISCONTINUED | OUTPATIENT
Start: 2018-01-14 | End: 2018-01-15

## 2018-01-13 NOTE — ED PROVIDER NOTES
Patient Seen in: [facility] Emergency Department    History   Patient presents with:  Dizziness (neurologic)    Stated Complaint: foggy    HPI    13-year-old female presents to the emerge department because she is feeling \"cloudy\" patient defers t reflux    • Extrinsic asthma, unspecified    • Gestational diabetes    • HYPOTHYROIDISM    • Migraine, hemiplegic     left side walking deficit, drooping face   • MIGRAINES    • Migraines    • Myalgia and myositis, unspecified    • Organic hypersomnia, uns are normal. Pupils are equal, round, and reactive to light. Neck: Normal range of motion. Neck supple. Cardiovascular: Normal rate, regular rhythm, normal heart sounds and intact distal pulses.     Pulmonary/Chest: Effort normal and breath sounds normal components within normal limits   CBC W/ DIFFERENTIAL - Abnormal; Notable for the following:     WBC 14.2 (*)     Neutrophil Absolute Prelim 12.08 (*)     Neutrophil Absolute 12.08 (*)     Monocyte Absolute 0.74 (*)     All other components within normal l fluid resuscitation and reassessment of her medications. Her drug screen was positive for virtually all drugs.   Further care and treatment will be per the [facility] hospitalist      Admission disposition: 1/13/2018  2:24 PM           Disposition and Plan     Cl

## 2018-01-13 NOTE — ED NOTES
Mother is at the bedside the patient lives with her older son.  Mom said after the MRI she never really came out of it yesterday was pain and disoriented and today the same pain is all over like her body is on fire   She has a mild case of epilepsy and sees

## 2018-01-13 NOTE — ED INITIAL ASSESSMENT (HPI)
Patient was discharge from [facility] last night with a DX of Dehydration per Dr Mary Dejesus today she is here because she feels out of it  Ambulance brought her in She is not answering question she is confused.

## 2018-01-13 NOTE — ED INITIAL ASSESSMENT (HPI)
Patient presents from home by EMS for altered mental status. Per family she had an MRI done yesterday with anesthesia and has been lethargic and altered since yesterday at 1730.  On arrival she knows she is at [facility] but is unsure of why and does not know d

## 2018-01-13 NOTE — ED NOTES
Multiple bruises noted in different stages of healing  She lives with her mother I will be contacting the family

## 2018-01-13 NOTE — ED PROVIDER NOTES
Patient Seen in: [facility] Emergency Department    History   Patient presents with:  Altered Mental Status (neurologic)    Stated Complaint: lethargy, ams    HPI    78-year-old female presents emergency department who has been a little altered sinc Packs/day: 2.00      Years: 10.00        Quit date: 12/20/2003  Smokeless tobacco: Never Used                      Alcohol use:  No                Review of Systems    Positive for stated complaint: let Normal   LACTIC ACID, PLASMA - Normal   CBC WITH DIFFERENTIAL WITH PLATELET    Narrative: The following orders were created for panel order CBC WITH DIFFERENTIAL WITH PLATELET.   Procedure                               Abnormality         Status DO Landon            Xr Chest Ap Portable  (cpt=71045)    Result Date: 1/12/2018  CONCLUSION:  Exam is within normal limits. Dictated by: Daryl Oconnor DO on 1/12/2018 at 20:29     Approved by:  Daryl Oconnor DO              Patient does appear just to ha

## 2018-01-13 NOTE — ED NOTES
Patient went to see Dr Ihsan Shaw on the 7th he increased her Lithium and Lamictal and added a new drug pramipexole.  She was fine when she left She was normal when she had her MRI yesterday on the 12th she was altered as well as today

## 2018-01-13 NOTE — ED NOTES
I informed Dr Jeffry Kaminski that I observed the patient to be some what distonic PLAN Benadryl ordered fluid are infusing assessment in process

## 2018-01-14 LAB
BASOPHILS # BLD AUTO: 0.06 X10(3) UL (ref 0–0.1)
BASOPHILS NFR BLD AUTO: 0.3 %
BUN BLD-MCNC: 9 MG/DL (ref 8–20)
CALCIUM BLD-MCNC: 8.4 MG/DL (ref 8.3–10.3)
CHLORIDE: 118 MMOL/L (ref 101–111)
CO2: 16 MMOL/L (ref 22–32)
CREAT BLD-MCNC: 0.69 MG/DL (ref 0.55–1.02)
EOSINOPHIL # BLD AUTO: 0 X10(3) UL (ref 0–0.3)
EOSINOPHIL NFR BLD AUTO: 0 %
ERYTHROCYTE [DISTWIDTH] IN BLOOD BY AUTOMATED COUNT: 13.5 % (ref 11.5–16)
GLUCOSE BLD-MCNC: 137 MG/DL (ref 70–99)
HCT VFR BLD AUTO: 38.3 % (ref 34–50)
HGB BLD-MCNC: 12.6 G/DL (ref 12–16)
IMMATURE GRANULOCYTE COUNT: 0.11 X10(3) UL (ref 0–1)
IMMATURE GRANULOCYTE RATIO %: 0.6 %
LYMPHOCYTES # BLD AUTO: 1.23 X10(3) UL (ref 0.9–4)
LYMPHOCYTES NFR BLD AUTO: 6.4 %
MCH RBC QN AUTO: 30.6 PG (ref 27–33.2)
MCHC RBC AUTO-ENTMCNC: 32.9 G/DL (ref 31–37)
MCV RBC AUTO: 93 FL (ref 81–100)
MONOCYTES # BLD AUTO: 1.15 X10(3) UL (ref 0.1–0.6)
MONOCYTES NFR BLD AUTO: 6 %
NEUTROPHIL ABS PRELIM: 16.75 X10 (3) UL (ref 1.3–6.7)
NEUTROPHILS # BLD AUTO: 16.75 X10(3) UL (ref 1.3–6.7)
NEUTROPHILS NFR BLD AUTO: 86.7 %
PHENYTOIN (DILANTIN): 20.2 UG/ML (ref 10–20)
PLATELET # BLD AUTO: 348 10(3)UL (ref 150–450)
POTASSIUM SERPL-SCNC: 3.2 MMOL/L (ref 3.6–5.1)
POTASSIUM SERPL-SCNC: 4.1 MMOL/L (ref 3.6–5.1)
RBC # BLD AUTO: 4.12 X10(6)UL (ref 3.8–5.1)
RED CELL DISTRIBUTION WIDTH-SD: 46.3 FL (ref 35.1–46.3)
SODIUM SERPL-SCNC: 146 MMOL/L (ref 136–144)
WBC # BLD AUTO: 19.3 X10(3) UL (ref 4–13)

## 2018-01-14 RX ORDER — DIPHENHYDRAMINE HYDROCHLORIDE 50 MG/ML
25 INJECTION INTRAMUSCULAR; INTRAVENOUS ONCE
Status: COMPLETED | OUTPATIENT
Start: 2018-01-14 | End: 2018-01-14

## 2018-01-14 RX ORDER — METOCLOPRAMIDE HYDROCHLORIDE 5 MG/ML
10 INJECTION INTRAMUSCULAR; INTRAVENOUS ONCE
Status: COMPLETED | OUTPATIENT
Start: 2018-01-14 | End: 2018-01-14

## 2018-01-14 RX ORDER — METOCLOPRAMIDE HYDROCHLORIDE 5 MG/ML
10 INJECTION INTRAMUSCULAR; INTRAVENOUS EVERY 6 HOURS PRN
Status: DISCONTINUED | OUTPATIENT
Start: 2018-01-14 | End: 2018-01-19

## 2018-01-14 RX ORDER — DIPHENHYDRAMINE HYDROCHLORIDE 50 MG/ML
25 INJECTION INTRAMUSCULAR; INTRAVENOUS EVERY 6 HOURS PRN
Status: DISCONTINUED | OUTPATIENT
Start: 2018-01-14 | End: 2018-01-19

## 2018-01-14 RX ORDER — POTASSIUM CHLORIDE 14.9 MG/ML
20 INJECTION INTRAVENOUS ONCE
Status: COMPLETED | OUTPATIENT
Start: 2018-01-14 | End: 2018-01-14

## 2018-01-14 NOTE — PLAN OF CARE
NURSING ADMISSION NOTE      Patient admitted via Cart  Oriented to room. Safety precautions initiated. Bed in low position. Call light in reach. Admission navigators completed. Patient disoriented. Unable to state name, , location, and date.

## 2018-01-14 NOTE — PROGRESS NOTES
Assumed care of patient at [address]. Tele NSR and . VSS  Seizure prec. Unknown if pt is having seizure activity at this time. Neuro consulted.   1x dose of Dilantin IV ordered  Pt is alert and oriented to herself  Cannot remember birthday but can state her n

## 2018-01-14 NOTE — H&P
.  CC: Patient presents with:  Dizziness (neurologic)       PCP: Reyna Valiente MD    History of Present Illness: Patient is a 50year old female with PMH sig for migraines, depression/bipolar, h/o seizures who presented with AMS.  She had a c-spine MRI reading glasses        [address]  Past Surgical History:  3/10: CHOLECYSTECTOMY  2013: COLONOSCOPY      Comment: ulcerative colitis  4/25/2017: COLONOSCOPY N/A      Comment: Procedure: COLONOSCOPY;  Surgeon: Daya Bradley MD;  Location: [facility] EN for Pain.  Disp: 90 tablet Rfl: 1   METOCLOPRAMIDE HCL 10 MG Oral Tab TAKE 1 TABLET BY MOUTH THREE TIMES DAILY AS NEEDED Disp: 30 tablet Rfl: 0   PROMETHAZINE HCL 25 MG Oral Tab TAKE 1 TABLET BY MOUTH EVERY 4 HOURS AS NEEDED FOR NAUSEA Disp: 120 tablet Rfl: ClonazePAM 0.5 MG Oral Tab Take 0.5 mg by mouth daily as needed for Anxiety. Disp:  Rfl:    BuPROPion HCl ER, XL, (WELLBUTRIN XL) 150 MG Oral Tablet 24 Hr Take 150 mg by mouth daily.  In addition to 300 mg  Disp:  Rfl:    Methylphenidate HCl ER, CD, (ME Relation Age of Onset   • Breast Cancer Maternal Grandmother      dx in [de-identified]   • Breast Cancer Other      dx post menopausal   • High Blood Pressure Father    • Colon Cancer Father    • High Blood Pressure Mother    • High Cholesterol Mother        Review performed through the brain. Dose reduction techniques were used. Dose information is transmitted to the ACR [facility] [employer] [facility]) NRDR ([address]) which includes the Dose Index Registry.   PATIENT STATED HISTORY: (As transc There is a mild broad-based disk bulge/osteophyte complex causing mild effacement of the ventral thecal sac without significant central canal stenosis.   There is however evidence of mild to moderate bilateral axillary recess stenosis and neural foraminal s time.    FINDINGS:  The lungs are clear. Cardiomediastinal silhouette and vascularity are unremarkable. No significant osseous abnormalities. CONCLUSION:  Exam is within normal limits. Dictated by:  Laura Velásquez DO on 1/12/2018 at 20:29     Approv TBD.      Slim Jean-Baptiste MD  [facility] Hospitalist  Pager [phone]  Answering Service number: [phone]

## 2018-01-14 NOTE — PLAN OF CARE
Problem: Impaired Swallowing  Goal: Minimize aspiration risk  Interventions:  NPO  SLP to reassess  Outcome: Not Progressing  Initial BSSE completed. Pt unable to initiate a swallow with all po presented. Rec: NPO with re-evaluation as able.  RN and family

## 2018-01-14 NOTE — SLP NOTE
ADULT SWALLOWING EVALUATION    ASSESSMENT    ASSESSMENT/OVERALL IMPRESSION:  Pt seen for BSSE. Pt seen in May of 2016 for an informal communication evaluation:  Order for communication assessment received.   Patient in process of preparing for discharge xochitl without really understanding the question.  reports she has been twitching/tremulous. She was given benadryl in ER due to some concern for dystonia. She had been seen in ER also on 1/12 and it was felt she was dehydrated at that time.        Pt's R have any clear abn.  - cont to monitor kuldeep for signs of airway involvement.     Metabolic acidosis, +mild anion gap- likely 2/2 starvation ketoacidosis  - will monitor, hopefully pt can start to take PO.  Give IVF     Mild leukocytosis- unclear significance liquids  Precautions: Seizure    Patient/Family Goals: For pt to eat    SWALLOWING HISTORY  Current Diet Consistency: NPO  Dysphagia History: none  Imaging Results: CT:  CONCLUSION:  No acute intracranial process.  If there is clinical concern for acute isc

## 2018-01-14 NOTE — CONSULTS
Neuro Consult Note                                                          History of Present Illness: Patient is a 50year old  Female wit dep bipolar hx of seizures admitted with mental status change after [facility]) injection 4 mg 4 mg Intravenous Q6H PRN   Fluticasone Furoate-Vilanterol (BREO ELLIPTA) 200-25 MCG/INH inhaler 1 puff 1 puff Inhalation Daily   albuterol sulfate (VENTOLIN) (2.5 MG/3ML) 0.083% nebulizer solution 2.5 mg 2.5 mg Nebulization Q6H PRN vomiting  Sumatriptan Succina*    Hallucinations    Comment:imitrex  Toradol                     Comment:Gastritis & UC  Triptans                Hallucinations    Comment:Lesions in brain       Social History  Social History   Marital status:   Spo Results  Component Value Date   PT 20.3 (H) 02/06/2014   INR <0.9 (L) 08/11/2017   INR 0.9 04/19/2017   INR 1.03 05/14/2016         ASSESSMENT: Agitation after gen anesthesia for MRI c spine, the latter ok, her  CT brain nl, she has elevated WBC this am on

## 2018-01-14 NOTE — PHYSICAL THERAPY NOTE
PHYSICAL THERAPY EVALUATION - INPATIENT     Room Number: 0641/9760-C  Evaluation Date: 1/14/2018  Type of Evaluation: Initial  Physician Order: PT Eval and Treat    Presenting Problem: Metablic Acidosis  Reason for Therapy: Mobility Dysfunction and Dis Starvation ketoacidosis    Altered mental status, unspecified altered mental status type    Polypharmacy      Past Medical History  Past Medical History:   Diagnosis Date   • Acne    • ALLERGIC RHINITIS    • Anxiety state    • ASTHMA    • Asthma    • Back walkercane. She was driving. She liked to workout. She was working but was unable to state what she does. She did not need help with any ADLs, IADLs. She does not have children but is  and lives with her .      SUBJECTIVE  \"I don't know\"- t bedclothes, sheets and blankets)?: None   -   Sitting down on and standing up from a chair with arms (e.g., wheelchair, bedside commode, etc.): A Little   -   Moving from lying on back to sitting on the side of the bed?: A Little   How much help from [facility] chair;Needs met;Call light within reach;RN aware of session/findings; All patient questions and concerns addressed; Alarm set; Family present; Other (Comment) (Discussed recommendations with RN)    ASSESSMENT   Patient is a 50year old female admitted on 1/13/ conservation;Patient education; Family education;Gait training;Neuromuscular re-educate;Range of motion;Strengthening;Stoop training;Stair training;Transfer training;Balance training  Rehab Potential : Good  Frequency (Obs): 5x/week  Number of Visits to [facility]

## 2018-01-14 NOTE — PROGRESS NOTES
[facility] Hospitalist note    PCP: Tereza Gutierrez MD    Chief Complaint:  Confusion/difficulty swallowing. SUBJECTIVE:  Much better today. More alert, more conversational and appropriate.  However, still having difficulty swallowing (although less tongue Once   • Fluticasone Furoate-Vilanterol  1 puff Inhalation Daily   • aspirin  81 mg Oral Daily   • BuPROPion HCl ER (SR)  150 mg Oral Daily   • BuPROPion HCl ER (SR)  150 mg Oral 2 times per day   • ClonazePAM  0.5 mg Oral Nightly   • ClonazePAM  0.5 mg Or Hospitalist  Pager: [phone]

## 2018-01-15 ENCOUNTER — APPOINTMENT (OUTPATIENT)
Dept: GENERAL RADIOLOGY | Facility: HOSPITAL | Age: 48
DRG: 092 | End: 2018-01-15
Attending: HOSPITALIST
Payer: MEDICARE

## 2018-01-15 ENCOUNTER — APPOINTMENT (OUTPATIENT)
Dept: GENERAL RADIOLOGY | Facility: HOSPITAL | Age: 48
DRG: 092 | End: 2018-01-15
Attending: Other
Payer: MEDICARE

## 2018-01-15 ENCOUNTER — APPOINTMENT (OUTPATIENT)
Dept: CT IMAGING | Facility: HOSPITAL | Age: 48
DRG: 092 | End: 2018-01-15
Attending: Other
Payer: MEDICARE

## 2018-01-15 LAB
BASOPHILS # BLD AUTO: 0.08 X10(3) UL (ref 0–0.1)
BASOPHILS NFR BLD AUTO: 0.6 %
BUN BLD-MCNC: 11 MG/DL (ref 8–20)
CALCIUM BLD-MCNC: 8.1 MG/DL (ref 8.3–10.3)
CHLORIDE: 119 MMOL/L (ref 101–111)
CO2: 15 MMOL/L (ref 22–32)
CREAT BLD-MCNC: 0.6 MG/DL (ref 0.55–1.02)
EOSINOPHIL # BLD AUTO: 0.01 X10(3) UL (ref 0–0.3)
EOSINOPHIL NFR BLD AUTO: 0.1 %
ERYTHROCYTE [DISTWIDTH] IN BLOOD BY AUTOMATED COUNT: 14 % (ref 11.5–16)
GLUCOSE BLD-MCNC: 100 MG/DL (ref 70–99)
HCT VFR BLD AUTO: 39.8 % (ref 34–50)
HGB BLD-MCNC: 13 G/DL (ref 12–16)
IMMATURE GRANULOCYTE COUNT: 0.26 X10(3) UL (ref 0–1)
IMMATURE GRANULOCYTE RATIO %: 1.9 %
LYMPHOCYTES # BLD AUTO: 1.66 X10(3) UL (ref 0.9–4)
LYMPHOCYTES NFR BLD AUTO: 12.2 %
MCH RBC QN AUTO: 30.4 PG (ref 27–33.2)
MCHC RBC AUTO-ENTMCNC: 32.7 G/DL (ref 31–37)
MCV RBC AUTO: 93.2 FL (ref 81–100)
MONOCYTES # BLD AUTO: 0.88 X10(3) UL (ref 0.1–0.6)
MONOCYTES NFR BLD AUTO: 6.5 %
NEUTROPHIL ABS PRELIM: 10.7 X10 (3) UL (ref 1.3–6.7)
NEUTROPHILS # BLD AUTO: 10.7 X10(3) UL (ref 1.3–6.7)
NEUTROPHILS NFR BLD AUTO: 78.7 %
PHENYTOIN (DILANTIN): 20.2 UG/ML (ref 10–20)
PLATELET # BLD AUTO: 338 10(3)UL (ref 150–450)
POTASSIUM SERPL-SCNC: 3.5 MMOL/L (ref 3.6–5.1)
RBC # BLD AUTO: 4.27 X10(6)UL (ref 3.8–5.1)
RED CELL DISTRIBUTION WIDTH-SD: 47.5 FL (ref 35.1–46.3)
SODIUM SERPL-SCNC: 148 MMOL/L (ref 136–144)
WBC # BLD AUTO: 13.6 X10(3) UL (ref 4–13)

## 2018-01-15 PROCEDURE — 70450 CT HEAD/BRAIN W/O DYE: CPT | Performed by: OTHER

## 2018-01-15 PROCEDURE — 71045 X-RAY EXAM CHEST 1 VIEW: CPT | Performed by: OTHER

## 2018-01-15 PROCEDURE — 71045 X-RAY EXAM CHEST 1 VIEW: CPT | Performed by: HOSPITALIST

## 2018-01-15 PROCEDURE — 90792 PSYCH DIAG EVAL W/MED SRVCS: CPT | Performed by: OTHER

## 2018-01-15 RX ORDER — DIPHENHYDRAMINE HYDROCHLORIDE 50 MG/ML
25 INJECTION INTRAMUSCULAR; INTRAVENOUS ONCE
Status: COMPLETED | OUTPATIENT
Start: 2018-01-15 | End: 2018-01-15

## 2018-01-15 RX ORDER — BUPROPION HYDROCHLORIDE 100 MG/1
200 TABLET, EXTENDED RELEASE ORAL 2 TIMES DAILY
Status: DISCONTINUED | OUTPATIENT
Start: 2018-01-15 | End: 2018-01-19

## 2018-01-15 RX ORDER — PREGABALIN 100 MG/1
200 CAPSULE ORAL 2 TIMES DAILY
Status: DISCONTINUED | OUTPATIENT
Start: 2018-01-15 | End: 2018-01-19

## 2018-01-15 RX ORDER — LITHIUM CARBONATE 300 MG/1
600 TABLET, FILM COATED, EXTENDED RELEASE ORAL EVERY EVENING
Status: DISCONTINUED | OUTPATIENT
Start: 2018-01-15 | End: 2018-01-19

## 2018-01-15 RX ORDER — QUETIAPINE 100 MG/1
100 TABLET, FILM COATED ORAL NIGHTLY
Status: DISCONTINUED | OUTPATIENT
Start: 2018-01-15 | End: 2018-01-17

## 2018-01-15 RX ORDER — DIPHENHYDRAMINE HYDROCHLORIDE 50 MG/ML
50 INJECTION INTRAMUSCULAR; INTRAVENOUS
Status: COMPLETED | OUTPATIENT
Start: 2018-01-15 | End: 2018-01-15

## 2018-01-15 NOTE — SLP NOTE
ADULT SWALLOWING EVALUATION    ASSESSMENT    ASSESSMENT/OVERALL IMPRESSION:  Pt seen at bedside this AM for repeat swallow evaluation. Per RN, pt removed NG and requesting evaluation prior to replacing. No family or caregivers present at bedside.  Upon ente absent seizures   • STROKE     2003+ mild R.sided weakness   • Ulcerative colitis, unspecified    • Visual impairment     reading glasses       Prior Living Situation: Independent living  Diet Prior to Admission: Regular; Thin liquids  Precautions: Seizure

## 2018-01-15 NOTE — PROGRESS NOTES
Verbal order from Dr. Nino Saunders to place dobhoff to administer meds. dobhoff placed after multiple attempts and pt uncooperative. Awaiting xray confirmation of placement. Restraint order obtained from Dr. Rohith Matamoros.

## 2018-01-15 NOTE — PROGRESS NOTES
PSYCH CONSULT    Date of Admission: 1/13/18  Date of Consult: 1/15/18  Reason for Consultation: Altered mental status    ADDENDUM: She got the Benadryl 25mg IV x 1 this afternoon.  No significant change regarding her tongue feeling swollen and involuntarily on her psych regimen for over 5 yrs, so Depakote is not needed unless recommended by neurology. Full note to follow.     Dr. Rhonda Conklin

## 2018-01-15 NOTE — PLAN OF CARE
Impaired Swallowing    • Minimize aspiration risk Progressing        NEUROLOGICAL - ADULT    • Achieves stable or improved neurological status Progressing         Alert,responding only to \" yes and no, thank you, I dont know \"  Iv fluids @ 100 ml/hr  Inc

## 2018-01-15 NOTE — CONSULTS
[facility]  Report of Psychiatric Consultation    Maite Collins Patient Status:  Inpatient    1970 MRN [id number]   [facility] 3NE-A Attending Mihai Gates MD   Hosp Day # 2 PCP Keyona Hess MD     Date of Admission: 1 increase Seroquel dose. 3) Monitor for opioid withdrawal since she takes Dialudid 2mg po daily prn pain. 4) AED's per neurology. She has been stable on her psych regimen for over 5 yrs, so Depakote is not needed unless recommended by neurology. she nods Yes. Per mother, she has been stable on her psych regimen for at least 5 yrs with no inpatient psych hospitalizations. Past Psychiatric History: Bipolar disorder, treated by Dr. Khadar Lake at [facility].      Substance Use History: Ex-cigarette smoker Pain    Comment:Tendinitis in multiple joints  Mold                      Mushrooms               Nausea and vomiting  Penicillin G Potass*    Nausea and vomiting  Sulfa Antibiotics       Nausea and vomiting  Sumatriptan Succina*    Hallucinations    Commen tab 10 mg, 10 mg, Oral, TID AC and HS  •  ondansetron (ZOFRAN-ODT) disintegrating tab 8 mg, 8 mg, Oral, Q8H PRN  •  Pramipexole Dihydrochloride (MIRAPEX) tab 0.125 mg, 0.125 mg, Oral, TID  •  QUEtiapine Fumarate (SEROQUEL) tab 300 mg, 300 mg, Oral, Nightly

## 2018-01-15 NOTE — PROGRESS NOTES
Pt pulled dobhoff out for the third time while xray prepared. This rn inserted new dobhoff, xray completed and sitter at bedside.  Xray showed dobhoff needs to be advanced slightly

## 2018-01-15 NOTE — PROGRESS NOTES
Xray was on floor to check tube placement and it was found that pt pulled dobhoff out despite wrist restraints. Sitter at bedside ordered but will not be available until this afternoon. Speech re-evaluated pt and no change since yesterday.  this rn updated

## 2018-01-15 NOTE — PLAN OF CARE
Problem: Impaired Swallowing  Goal: Minimize aspiration risk  Interventions:  NPO  SLP to reassess   Outcome: Not Progressing

## 2018-01-15 NOTE — CM/SW NOTE
SW found pt thru casefinding. HERMINIO l/m for pt's son Rasta Sin and spoke with pt's mother Karley Escoto for assessment and d/c planning. Pt is a 50 y.o female admitted on 01/13 with AMS after anesthesia for MRI.  Pt's extension medical history lists mental health h/o B Independent with ADLs and Mobility Yes   Discharge Needs   Anticipated D/C needs To be determined

## 2018-01-15 NOTE — PROGRESS NOTES
Laurie Murray is a 50year old female.    Patient presents with:  Dizziness (neurologic)    HPI:    Neurology fu note    Pt admitted with agitation after anesthesia  Agitated to the point she was unable to take her po meds for seizure  Awake and able Cancer Maternal Grandmother      dx in [de-identified]   • Breast Cancer Other      dx post menopausal   • High Blood Pressure Father    • Colon Cancer Father    • High Blood Pressure Mother    • High Cholesterol Mother          No current facility-administered medic Powder, Breath Activated INHALE 1 PUFF INTO THE LUNGS EVERY 12 HOURS Disp: 1 each Rfl: 8   Carisoprodol 350 MG Oral Tab TAKE 1 TABLET BY MOUTH THREE TIMES DAILY AS NEEDED FOR MUSCLE SPASMS Disp: 270 tablet Rfl: 1   VERAPAMIL HCL  MG Oral Capsule SR 2 aspirin 81 MG Oral Tab Take 81 mg by mouth daily. Indications: daily Disp:  Rfl:    DiphenhydrAMINE HCl (BENADRYL) 50 MG Oral Cap Take 50 mg by mouth every 6 (six) hours as needed.  Indications: Migraine Disp:  Rfl:    QUEtiapine Fumarate (SEROQUEL) 300 M denies chest pain or RAMIRES; no palpitations   GI: denies nausea, vomiting, constipation, diarrhea; no rectal bleeding; no heartburn  GENITAL/: no dysuria, urgency or frequency;  MUSCULOSKELETAL: no joint complaints upper or lower extremities  PSYCHE:no dep restart her home medications and follow  Seizure precautions  Pt on LTM EEG      EEG reviewed. Though improved mildly, there is still seizure activity  Multiple doses of dilantin do not appear to have made much difference. keppra has made some difference.

## 2018-01-15 NOTE — PROGRESS NOTES
[facility] Hospitalist note    PCP: Tayla Blackburn MD    Chief Complaint:  Confusion/difficulty swallowing. SUBJECTIVE:  Remains alert but confused in conversation. Able to follow commands.      OBJECTIVE:  Temp:  [98.6 °F (37 °C)-98.9 °F (37.2 °C)] 98.6 hours.      Meds:     • potassium chloride 40mEq IVPB (peripheral line)  40 mEq Intravenous Once   • QUEtiapine Fumarate  100 mg Oral Nightly   • Lithium Carbonate ER  600 mg Oral QPM   • BuPROPion HCl ER (SR)  200 mg Oral BID   • levETIRAcetam  1,000 mg I exam. Cont to trend, improving today. Seizures- as above, neuro following.  Getting further EEG today    Bipolar disorder- typically stable on psych regimen per Dr. Silvia Rcih notes.  Keri Ellis MD  [facility] Hospitalist  Pager: [phone]

## 2018-01-16 LAB
BUN BLD-MCNC: 9 MG/DL (ref 8–20)
CALCIUM BLD-MCNC: 7.9 MG/DL (ref 8.3–10.3)
CHLORIDE: 119 MMOL/L (ref 101–111)
CO2: 17 MMOL/L (ref 22–32)
CREAT BLD-MCNC: 0.54 MG/DL (ref 0.55–1.02)
GLUCOSE BLD-MCNC: 70 MG/DL (ref 70–99)
LEVETIRACETAM (KEPPRA): 34 UG/ML
POTASSIUM SERPL-SCNC: 2.9 MMOL/L (ref 3.6–5.1)
POTASSIUM SERPL-SCNC: 3.5 MMOL/L (ref 3.6–5.1)
SODIUM SERPL-SCNC: 148 MMOL/L (ref 136–144)

## 2018-01-16 PROCEDURE — 99232 SBSQ HOSP IP/OBS MODERATE 35: CPT | Performed by: OTHER

## 2018-01-16 RX ORDER — HYDROMORPHONE HYDROCHLORIDE 1 MG/ML
0.5 INJECTION, SOLUTION INTRAMUSCULAR; INTRAVENOUS; SUBCUTANEOUS
Status: DISCONTINUED | OUTPATIENT
Start: 2018-01-16 | End: 2018-01-19

## 2018-01-16 RX ORDER — ASPIRIN 81 MG/1
81 TABLET, CHEWABLE ORAL DAILY
Status: DISCONTINUED | OUTPATIENT
Start: 2018-01-17 | End: 2018-01-19

## 2018-01-16 RX ORDER — BUTALBITAL, ACETAMINOPHEN AND CAFFEINE 50; 325; 40 MG/1; MG/1; MG/1
1 TABLET ORAL EVERY 4 HOURS PRN
Status: DISCONTINUED | OUTPATIENT
Start: 2018-01-16 | End: 2018-01-19

## 2018-01-16 RX ORDER — POTASSIUM CHLORIDE 14.9 MG/ML
20 INJECTION INTRAVENOUS ONCE
Status: COMPLETED | OUTPATIENT
Start: 2018-01-16 | End: 2018-01-16

## 2018-01-16 RX ORDER — DEXTROSE AND SODIUM CHLORIDE 5; .9 G/100ML; G/100ML
INJECTION, SOLUTION INTRAVENOUS CONTINUOUS
Status: DISCONTINUED | OUTPATIENT
Start: 2018-01-16 | End: 2018-01-19

## 2018-01-16 RX ORDER — HYDROMORPHONE HYDROCHLORIDE 2 MG/1
2 TABLET ORAL EVERY 2 HOUR PRN
Status: DISCONTINUED | OUTPATIENT
Start: 2018-01-16 | End: 2018-01-19

## 2018-01-16 NOTE — PLAN OF CARE
Problem: Impaired Swallowing  Goal: Minimize aspiration risk  Pureed and thin liquids  Outcome: Progressing

## 2018-01-16 NOTE — PROGRESS NOTES
[facility] hospitalist daily note  Seen/examined on 1/16/18    S; no chest pain, seen by neuro.  + migraine    medicaitons in EPIC    PE;   01/16/18  0806   BP: 148/91   Pulse: 85   Resp: 20   Temp: 100 °F (37.8 °C)     Gen: awake, alert, no respiratory distress

## 2018-01-16 NOTE — PROGRESS NOTES
Christine Newberry is a 50year old female.    Patient presents with:  Dizziness (neurologic)    HPI:    Neurology fu note    Pt admitted with agitation after anesthesia  Was unable to take her oral meds including AED's  Subsequently was found to be in non 2.00 Packs/day  For 10.00 Years     Quit date: 12/20/2003    Smokeless tobacco: Never Used    Alcohol use No    Drug use: Yes     Other Topics Concern   None on file     Social History Narrative   None on file       Family History   Problem Relation Age of DIAZEPAM 2 MG Oral Tab TAKE 1/2 TABLET BY MOUTH THREE TIMES DAILY AS NEEDED FOR DIZZINESS Disp: 20 tablet Rfl: 0   MECLIZINE HCL 12.5 MG Oral Tab TAKE 1 TABLET BY MOUTH THREE TIMES DAILY AS NEEDED Disp: 30 tablet Rfl: 0   ADVAIR DISKUS 250-50 MCG/DOSE In mouth 2 (two) times daily. Disp:  Rfl:    BuPROPion HCl ER, XL, (WELLBUTRIN XL) 300 MG Oral Tablet 24 Hr Take 300 mg by mouth daily.  In addition to 150 mg  Disp:  Rfl: 6   Multiple Vitamins-Minerals (MULTI-VITAMIN/MINERALS) Oral Tab Take 1 tablet by mout HEALTH: feels well otherwise  SKIN: denies any unusual skin lesions or rashes  EYES: no visual complaints or deficits  HEENT: denies nasal congestion, sinus pain or sore throat; hearing loss negative  RESPIRATORY: denies shortness of breath, wheezing or co mri with anesthesia. Appears to have had an adverse reaction to anesthesia and then was unable to tolerate her medications.    Had been npo and now is having nonconvulsive seizures  Now with DHT, should be on all home oral meds  lyrica 200 bid, lmt 375/day

## 2018-01-16 NOTE — PLAN OF CARE
Patient alert and oriented x3-4. Patient able to state name, birthday, and that she was in the hospital. However, when asked what year it was she was unable to recall. Patient stated \"just give me a little bit. It will eventually come to me. \" Due to yesy

## 2018-01-16 NOTE — PLAN OF CARE
Pt is very talkative this evening- still slightly confused, only answering \"yes, no, I want to feel better\"  On RA  NSR in tele   C/o nausea and pain, PRN Zofran and Tylenol administered per [facility]   Seizure precautions in place  24 hr EEG  Sitter at [facility]

## 2018-01-16 NOTE — SLP NOTE
ADULT SWALLOWING EVALUATION    ASSESSMENT    ASSESSMENT/OVERALL IMPRESSION:  Pt seen at bedside this AM for reevaluation of swallow. Pt demonstrating improved alertness and communication abilities. Pt's vocal quality clear and strong.  Pt self reporting \"d • Myalgia and myositis, unspecified    • Organic hypersomnia, unspecified PSG 6-29-14    AHI 2 RDI 2 REM AHI 2 SaO2 doroteo 88 %   • OTHER DISEASES     Crohn's   • Pneumonia, organism unspecified(486)    • Seizure disorder (HCC)     absent seizures   • STR to rule-out silent aspiration.)    Esophageal Phase of Swallow: No complaints consistent with possible esophageal involvement      GOALS  Goal #1 Reassessment of swallow at bedside In Progress   Goal #2 The patient will tolerate pureed consistency and thin

## 2018-01-16 NOTE — PLAN OF CARE
dobhoff advanced earlier to 60cm and confirmed with chest xray. meds given through dobhoff without difficulty. Second dose of keppra given and dilantin level called to dr. Mary Hogue. Pt family updated. Sitter at bedside. Wrist restraints in place.      Carli Fu

## 2018-01-16 NOTE — PROGRESS NOTES
[facility]  Report of Psychiatric Progress Note    Laurie Murray Patient Status:  Inpatient    1970 MRN [id number]   [facility] 3NE-A Attending Gopal Cabrera MD   Hosp Day # 3 PCP Rosetta Mckeon MD     Date of Admission: was unable to take her psych meds due her her aspiration risk and her spitting out her meds due to her tongue movements.      She is followed by neuro and had a MRI under anesthesia on 1/11/18 to eval for a cause for her severe neck pain and headaches.  The Medical History:   Diagnosis Date   • Acne    • ALLERGIC RHINITIS    • Anxiety state    • ASTHMA    • Asthma    • Back problem    • Bipolar affective ([address]    • Chronic rhinitis    • Colitis    • DEPRESSION     Bipolar   • Depression    • Disorder of thyro and vomiting  Penicillin G Potass*    Nausea and vomiting  Sulfa Antibiotics       Nausea and vomiting  Sumatriptan Succina*    Hallucinations    Comment:imitrex  Toradol                     Comment:Gastritis & UC  Triptans                Hallucinations Daily  •  HYDROmorphone HCl (DILAUDID) tab 2 mg, 2 mg, Oral, Q6H PRN  •  lamoTRIgine (LAMICTAL) tab 375 mg, 375 mg, Oral, QPM  •  Levothyroxine Sodium (SYNTHROID, LEVOTHROID) tab 175 mcg, 175 mcg, Oral, Before breakfast  •  ondansetron (ZOFRAN-ODT) disinte

## 2018-01-17 LAB
BASOPHILS # BLD AUTO: 0.08 X10(3) UL (ref 0–0.1)
BASOPHILS NFR BLD AUTO: 0.8 %
BUN BLD-MCNC: 5 MG/DL (ref 8–20)
CALCIUM BLD-MCNC: 7.5 MG/DL (ref 8.3–10.3)
CHLORIDE: 113 MMOL/L (ref 101–111)
CO2: 22 MMOL/L (ref 22–32)
CREAT BLD-MCNC: 0.57 MG/DL (ref 0.55–1.02)
EOSINOPHIL # BLD AUTO: 0.4 X10(3) UL (ref 0–0.3)
EOSINOPHIL NFR BLD AUTO: 4.1 %
ERYTHROCYTE [DISTWIDTH] IN BLOOD BY AUTOMATED COUNT: 13.8 % (ref 11.5–16)
FOLATE (FOLIC ACID), SERUM: 27.6 NG/ML (ref 8.7–24)
GLUCOSE BLD-MCNC: 85 MG/DL (ref 70–99)
HAV AB SERPL IA-ACNC: 791 PG/ML (ref 193–986)
HAV IGM SER QL: 1.7 MG/DL (ref 1.7–3)
HCT VFR BLD AUTO: 42.4 % (ref 34–50)
HGB BLD-MCNC: 14 G/DL (ref 12–16)
IMMATURE GRANULOCYTE COUNT: 0.07 X10(3) UL (ref 0–1)
IMMATURE GRANULOCYTE RATIO %: 0.7 %
LYMPHOCYTES # BLD AUTO: 3.14 X10(3) UL (ref 0.9–4)
LYMPHOCYTES NFR BLD AUTO: 32.1 %
MCH RBC QN AUTO: 30.6 PG (ref 27–33.2)
MCHC RBC AUTO-ENTMCNC: 33 G/DL (ref 31–37)
MCV RBC AUTO: 92.8 FL (ref 81–100)
MONOCYTES # BLD AUTO: 0.81 X10(3) UL (ref 0.1–0.6)
MONOCYTES NFR BLD AUTO: 8.3 %
NEUTROPHIL ABS PRELIM: 5.28 X10 (3) UL (ref 1.3–6.7)
NEUTROPHILS # BLD AUTO: 5.28 X10(3) UL (ref 1.3–6.7)
NEUTROPHILS NFR BLD AUTO: 54 %
PLATELET # BLD AUTO: 314 10(3)UL (ref 150–450)
POTASSIUM SERPL-SCNC: 3.4 MMOL/L (ref 3.6–5.1)
POTASSIUM SERPL-SCNC: 3.4 MMOL/L (ref 3.6–5.1)
RBC # BLD AUTO: 4.57 X10(6)UL (ref 3.8–5.1)
RED CELL DISTRIBUTION WIDTH-SD: 46.9 FL (ref 35.1–46.3)
SODIUM SERPL-SCNC: 146 MMOL/L (ref 136–144)
WBC # BLD AUTO: 9.8 X10(3) UL (ref 4–13)

## 2018-01-17 PROCEDURE — 99232 SBSQ HOSP IP/OBS MODERATE 35: CPT | Performed by: OTHER

## 2018-01-17 RX ORDER — QUETIAPINE 100 MG/1
300 TABLET, FILM COATED ORAL NIGHTLY
Status: DISCONTINUED | OUTPATIENT
Start: 2018-01-17 | End: 2018-01-19

## 2018-01-17 RX ORDER — MAGNESIUM OXIDE 400 MG (241.3 MG MAGNESIUM) TABLET
400 TABLET ONCE
Status: COMPLETED | OUTPATIENT
Start: 2018-01-17 | End: 2018-01-17

## 2018-01-17 RX ORDER — QUETIAPINE 25 MG/1
50 TABLET, FILM COATED ORAL DAILY
Status: DISCONTINUED | OUTPATIENT
Start: 2018-01-18 | End: 2018-01-19

## 2018-01-17 RX ORDER — PANTOPRAZOLE SODIUM 20 MG/1
20 TABLET, DELAYED RELEASE ORAL
Status: DISCONTINUED | OUTPATIENT
Start: 2018-01-17 | End: 2018-01-19

## 2018-01-17 NOTE — PROGRESS NOTES
[address] [facility]  Neurology  Progress Report    Bellerose Rule Patient Status:  Inpatient    1970 MRN [id number]   [facility] 3NE-A Attending Doroteo Mac MD   [facility] Day # 4 PCP Agustín Ayers MD   Date of Admission:   Comment: Umbilical Hernia Repair  6/2014: PARAGARD, IUD  Family History  Family History   Problem Relation Age of Onset   • Breast Cancer Maternal Grandmother      dx in [de-identified]   • Breast Cancer Other      dx post menopausal   • High Blood Pressure Father (2.5 MG/3ML) 0.083% nebulizer solution 2.5 mg 2.5 mg Nebulization Q6H PRN   ClonazePAM (KLONOPIN) tab 0.5 mg 0.5 mg Oral Nightly   DiphenhydrAMINE HCl (BENADRYL) cap 50 mg 50 mg Oral Q6H PRN   Fluticasone Propionate (FLONASE) 50 MCG/ACT nasal spray 2 spray Oral Tab TAKE 1/2 TABLET BY MOUTH THREE TIMES DAILY AS NEEDED FOR DIZZINESS   MECLIZINE HCL 12.5 MG Oral Tab TAKE 1 TABLET BY MOUTH THREE TIMES DAILY AS NEEDED   ADVAIR DISKUS 250-50 MCG/DOSE Inhalation Aerosol Powder, Breath Activated INHALE 1 PUFF INTO T DiphenhydrAMINE HCl (BENADRYL) 50 MG Oral Cap Take 50 mg by mouth every 6 (six) hours as needed. Indications: Migraine   QUEtiapine Fumarate (SEROQUEL) 300 MG Oral Tab Take 300 mg by mouth nightly.      [place] Carbonate ER (LITHOBID) 300 MG Oral Tab CR T hx of depression  HEMATOLOGIC: no reports of excessive bruising or bleeding  ENDOCRINE: blood sugars under good control  ALL/ASTHMA: denies hx of allergy or asthma; no food allergies   MUSCULOSKELETAL: no joint pain, redness, swelling  NEURO: as above    P 01/17/2018   CO2 22.0 01/17/2018   GLU 85 01/17/2018   CA 7.5 (L) 01/17/2018   ALB 4.2 01/13/2018   ALKPHO 62 01/13/2018   TP 7.2 01/13/2018   AST 14 (L) 01/13/2018   ALT 23 01/13/2018   PTT 23.4 (L) 08/11/2017   INR <0.9 (L) 08/11/2017   PTP 13.8 05/14/20 with the tip at the level of the gastroesophageal junction/proximal stomach. This may be advanced.     Dictated by: Catie Tirado MD on 1/15/2018 at 12:53     Approved by: Catie Tirado MD            Xr Chest Ap Portable  (cpt=71045)    Result Date: 1/12/2018  C

## 2018-01-17 NOTE — PROGRESS NOTES
[facility] hospitalist daily note  Seen/examined on 1/17/18     S; no chest pain, seen by neuro.  + migraine but better     medicaitons in EPIC     PE;   01/17/18  1621   BP: 132/89   Pulse: 86   Resp: 18   Temp: 97.9 °F (36.6 °C)     Gen: awake, alert, no respira

## 2018-01-17 NOTE — PROCEDURES
[address]  [facility], [address] [address]      PATIENT'S NAME: Brandobradrenay Max   ATTENDING PHYSICIAN: Rusty Green M.D.   REQUESTING PHYSICIAN:    PATIENT ACCOUNT #: [de-identified] LOCATION: [address] [place] [address] opening and closing. Patient began to be more alert. Was able to answer questions appropriately. Video recording showed her to be much less confused.   Occasional twitching in the left upper extremity occurred that did not correlate with any type of seiz

## 2018-01-17 NOTE — SLP NOTE
SPEECH DAILY NOTE - INPATIENT    ASSESSMENT & PLAN   ASSESSMENT  Pt seen at bedside this AM for meal follow up and education session. Pt cooperative, pleasant, and alert. Pt reporting improved lingal control. Pt continues to report some lingual swelling.  P

## 2018-01-17 NOTE — PLAN OF CARE
Impaired Swallowing    • Minimize aspiration risk Progressing        NEUROLOGICAL - ADULT    • Achieves stable or improved neurological status Progressing    • Absence of seizures Progressing        Patient is A&Ox4.  No seizure activity observed, seizure p

## 2018-01-17 NOTE — PHYSICAL THERAPY NOTE
Attempted PT treatment this pm. Pt undergoing EEG so unable to mobilize. Will check back at a later date to see if patient able to participate in PT treatment.

## 2018-01-17 NOTE — PROGRESS NOTES
[facility]  Report of Psychiatric Progress Note    Chip Allison Patient Status:  Inpatient    1970 MRN [id number]   [facility] 3NE-A Attending Daniela Hernandez MD   Hosp Day # 4 PCP Berenice Brody MD     Date of Admission: 1/11/18 to eval for a cause for her severe neck pain and headaches. The C-spine MRI showed, \"Multilevel disc disease and facet arthropathy, most notably at C5-6 where there is a broad-based annular disk bulge/osteophyte complex.  There is secondary mild to supportive.        Past Medical History:   Diagnosis Date   • Acne    • ALLERGIC RHINITIS    • Anxiety state    • ASTHMA    • Asthma    • Back problem    • Bipolar affective ([facility] [address]    • Chronic rhinitis    • Colitis    • DEPRESSION     Bipolar   • Depressio Mushrooms               Nausea and vomiting  Penicillin G Potass*    Nausea and vomiting  Sulfa Antibiotics       Nausea and vomiting  Sumatriptan Succina*    Hallucinations    Comment:imitrex  Toradol                     Comment:Gastritis & UC  Triptans PRN  •  Fluticasone Propionate (FLONASE) 50 MCG/ACT nasal spray 2 spray, 2 spray, Each Nare, Daily  •  HYDROmorphone HCl (DILAUDID) tab 2 mg, 2 mg, Oral, Q6H PRN  •  lamoTRIgine (LAMICTAL) tab 375 mg, 375 mg, Oral, QPM  •  Levothyroxine Sodium (SYNTHROID,

## 2018-01-17 NOTE — PLAN OF CARE
A/Ox4- able to answer questions completely.  Having full conversations with staff and family, expressing gratitude to everyone who has helped her get better since her stay  On RA  NSR on tele  Denying any pain at this time  Seizure precautions in place  24h

## 2018-01-18 LAB
HAV IGM SER QL: 1.6 MG/DL (ref 1.7–3)
POTASSIUM SERPL-SCNC: 2.7 MMOL/L (ref 3.6–5.1)
POTASSIUM SERPL-SCNC: 4.4 MMOL/L (ref 3.6–5.1)

## 2018-01-18 RX ORDER — POTASSIUM CHLORIDE 20 MEQ/1
40 TABLET, EXTENDED RELEASE ORAL ONCE
Status: COMPLETED | OUTPATIENT
Start: 2018-01-18 | End: 2018-01-18

## 2018-01-18 RX ORDER — MAGNESIUM OXIDE 400 MG (241.3 MG MAGNESIUM) TABLET
400 TABLET ONCE
Status: DISCONTINUED | OUTPATIENT
Start: 2018-01-18 | End: 2018-01-18

## 2018-01-18 NOTE — PLAN OF CARE
Patient started shift with a \"massive\" headache. That gradually improved through the shift, now that the patient no longer had the eeg monitor on her head. 1970 patient in good spirits because, \"her head feels so much better. \" One pain med given at t

## 2018-01-18 NOTE — PLAN OF CARE
Problem: Impaired Swallowing  Goal: Minimize aspiration risk  Regular diet and thin liquids   Outcome: Progressing

## 2018-01-18 NOTE — PLAN OF CARE
Impaired Functional Mobility    • Achieve highest/safest level of mobility/gait Progressing        Impaired Swallowing    • Minimize aspiration risk Progressing        NEUROLOGICAL - ADULT    • Achieves stable or improved neurological status Progressing

## 2018-01-18 NOTE — CM/SW NOTE
HERMINIO met with pt and pt's boyfriend, pt has much improved and A&O with ability to make own decisions. HERMINIO discussed d/c planning and home health, pt agreeable to home health requesting referral to [facility]. [facility] clinical referral sent.      ADDENDUM:  HERMINIO co

## 2018-01-18 NOTE — PROGRESS NOTES
[facility] hospitalist daily note  Seen/examined on 1/18/18     S; no chest pain, no SOB, no abd pain, migraine better   medicaitons in EPIC     PE;   01/18/18  1155   BP: 113/74   Pulse: 84   Resp: 18   Temp: 98.1 °F (36.7 °C)     Gen: awake, alert, no respirato

## 2018-01-18 NOTE — SLP NOTE
SPEECH DAILY NOTE - INPATIENT    ASSESSMENT & PLAN   ASSESSMENT  Pt seen this AM for meal follow up and education session. Pt cooperative, pleasant, and alert. No family or caregivers present at bedside. Improved alertness and communication noted.  Pt self 1 session(s).  New Goal        FOLLOW UP  Follow Up Needed: Yes  SLP Follow-up Date: 01/19/18  Number of Visits to Meet Established Goals: 1    Session: 2/2, additional session required    If you have any questions, please contact Shari Celestin

## 2018-01-18 NOTE — PHYSICAL THERAPY NOTE
PHYSICAL THERAPY TREATMENT NOTE - INPATIENT    Room Number: 5321/7543-Y     Session: 1   Number of Visits to Meet Established Goals: 4    Presenting Problem: Metablic Acidosis    Problem List  Principal Problem:    Metabolic acidosis  Active Problems: ASSESSMENT   Ratin  Location: denies       BALANCE                                                                                                                     Static Sitting: Good  Dynamic Sitting: Fair +           Static Standing: Jen Clement session. Mobility Guidelines updated in Pt room for nursing staff.          THERAPEUTIC EXERCISES  Lower Extremity Ankle pumps  Knee extension     Upper Extremity Elbow flex/ext and  - open/close     Position Sitting     Repetitions   10   Sets   1

## 2018-01-18 NOTE — PROGRESS NOTES
[address] [place] [address]  Neurology  Progress Report    Cinthia Laws Patient Status:  Inpatient    1970 MRN [id number]   [facility] 3NE-A Attending Anoop Pearson MD   [facility] Day # 5 PCP Navin Waters MD   Date of Admission:   ulcerative colitis  4/25/2017: COLONOSCOPY N/A      Comment: Procedure: COLONOSCOPY;  Surgeon: Maxx Noel MD;  Location: [facility] ENDOSCOPY  1999: COLPOSCOPY, CERVIX W/UPPER ADJACENT VAGINA; W/*  1997: HERNIA SURGERY      Comment: Jordyn Montanez Fluticasone Furoate-Vilanterol (BREO ELLIPTA) 200-25 MCG/INH inhaler 1 puff 1 puff Inhalation Daily   albuterol sulfate (VENTOLIN) (2.5 MG/3ML) 0.083% nebulizer solution 2.5 mg 2.5 mg Nebulization Q6H PRN   ClonazePAM (KLONOPIN) tab 0.5 mg 0.5 mg Oral Ni NEEDED FOR NAUSEA   DICYCLOMINE HCL 10 MG Oral Cap TAKE 1 CAPSULE BY MOUTH EVERY 4 TO 6 HOURS AS NEEDED FOR CRAMPING   DIAZEPAM 2 MG Oral Tab TAKE 1/2 TABLET BY MOUTH THREE TIMES DAILY AS NEEDED FOR DIZZINESS   MECLIZINE HCL 12.5 MG Oral Tab TAKE 1 TABLET (MULTI-VITAMIN/MINERALS) Oral Tab Take 1 tablet by mouth daily. aspirin 81 MG Oral Tab Take 81 mg by mouth daily. Indications: daily   DiphenhydrAMINE HCl (BENADRYL) 50 MG Oral Cap Take 50 mg by mouth every 6 (six) hours as needed.  Indications: Migraine denies heartburn, denies vomiting, constipation,diarrhea,nausea; no rectal bleeding  : no complaint of urinary incontinence  PSYCH: denies depression or anxiety  HEMATOLOGIC: no reports of excessive bruising or bleeding  ENDOCRINE: blood sugars under goo 85 01/17/2018   CA 7.5 (L) 01/17/2018   ALB 4.2 01/13/2018   ALKPHO 62 01/13/2018   TP 7.2 01/13/2018   AST 14 (L) 01/13/2018   ALT 23 01/13/2018   PTT 23.4 (L) 08/11/2017   INR <0.9 (L) 08/11/2017   PTP 13.8 05/14/2016   T4F 1.2 01/13/2018   TSH 0.016 (L) gastroesophageal junction/proximal stomach. This may be advanced.     Dictated by: Shireen Silva MD on 1/15/2018 at 12:53     Approved by: Shireen Silva MD            Xr Chest Ap Portable  (cpt=71045)    Result Date: 1/12/2018  CONCLUSION:  Exam is within normal

## 2018-01-19 VITALS
WEIGHT: 132.94 LBS | DIASTOLIC BLOOD PRESSURE: 88 MMHG | SYSTOLIC BLOOD PRESSURE: 132 MMHG | RESPIRATION RATE: 18 BRPM | HEART RATE: 79 BPM | HEIGHT: 66 IN | OXYGEN SATURATION: 99 % | BODY MASS INDEX: 21.36 KG/M2 | TEMPERATURE: 98 F

## 2018-01-19 LAB
BUN BLD-MCNC: 3 MG/DL (ref 8–20)
CALCIUM BLD-MCNC: 8 MG/DL (ref 8.3–10.3)
CHLORIDE: 113 MMOL/L (ref 101–111)
CO2: 22 MMOL/L (ref 22–32)
CREAT BLD-MCNC: 0.48 MG/DL (ref 0.55–1.02)
GLUCOSE BLD-MCNC: 73 MG/DL (ref 70–99)
HAV IGM SER QL: 2.2 MG/DL (ref 1.7–3)
LAMOTRIGINE: 4.3 UG/ML
POTASSIUM SERPL-SCNC: 3.7 MMOL/L (ref 3.6–5.1)
SODIUM SERPL-SCNC: 141 MMOL/L (ref 136–144)

## 2018-01-19 RX ORDER — BISACODYL 10 MG
10 SUPPOSITORY, RECTAL RECTAL
Status: DISCONTINUED | OUTPATIENT
Start: 2018-01-19 | End: 2018-01-19

## 2018-01-19 RX ORDER — POLYETHYLENE GLYCOL 3350 17 G/17G
17 POWDER, FOR SOLUTION ORAL DAILY
Status: DISCONTINUED | OUTPATIENT
Start: 2018-01-19 | End: 2018-01-19

## 2018-01-19 RX ORDER — POTASSIUM CHLORIDE 20 MEQ/1
40 TABLET, EXTENDED RELEASE ORAL ONCE
Status: COMPLETED | OUTPATIENT
Start: 2018-01-19 | End: 2018-01-19

## 2018-01-19 RX ORDER — MESALAMINE 1.2 G/1
3.6 TABLET, DELAYED RELEASE ORAL 2 TIMES DAILY
Status: DISCONTINUED | OUTPATIENT
Start: 2018-01-19 | End: 2018-01-19

## 2018-01-19 NOTE — PLAN OF CARE
Pt is alert and oriented x4, states bloating to abdomen after dinner, slightly nauseated relieved by zofran. No complaints of pain, pt is able to ambulate well, seizure precautions in place.  Will continue to monitor    Impaired Functional Mobility    • Ach

## 2018-01-19 NOTE — PROGRESS NOTES
Neurology follow up NOTE    SUBJECTIVE:    She has no complaints, she has no more seizure like activities.    OBJECTIVE:   /86 (BP Location: Right arm)   Pulse 81   Temp 98 °F (36.7 °C) (Oral)   Resp 18   Ht 5' 6\" (1.676 m)   Wt 132 lb 15 oz (60.3 kg ASSESSMENT:     Seizure disorder, on Lamictal, Lyrica and added on Vimpat 50mg bid, no more seizures in past 24 hrs. She is doing good, no complaints. PLAN:     Ok to d/c home today with her seizure meds. F/U with Dr. Acevedo Favors as scheduled.  No anders

## 2018-01-19 NOTE — PROGRESS NOTES
NURSING DISCHARGE NOTE    Discharged Home via Wheelchair. Accompanied by Spouse and Support staff  Belongings Taken by patient/family. IV site discontinued.  Discharge instructions, follow up, and medications were discussed with patient and her sign

## 2018-01-20 LAB — PREGABALIN: 3 UG/ML

## 2018-01-21 LAB — VITAMIN B1 (THIAMINE), WHOLE B: 139 NMOL/L

## 2018-01-22 LAB — MMA: <0.1 UMOL/L

## 2018-01-23 NOTE — CM/SW NOTE
Pt d/c 01/19 home with [facility].       01/23/18 1500   Discharge disposition   Discharged to: Home or Self   Name of Facillity/Home Care/Hospice ([facility])   Home services after discharge Skilled home care   Discharge transportation Private car

## 2018-02-21 NOTE — DISCHARGE SUMMARY
General Medicine Discharge Summary     Patient ID:  Luciana Richard  50year old  4/16/1970    Admit date: 1/13/2018    Discharge date and time: 1/19/2018  6:12 PM     Attending Physician: No att.  prov IVF    # Bipolar d/o  - apprec psych recs    Pt to f/u with neurology as OP. Discussed plan of care and patient voiced understanding.        Consults: IP CONSULT TO SPEECH LANGUAGE PATHOLOGY  IP CONSULT TO SOCIAL WORK    Radiology reports:    No results

## 2018-06-17 PROBLEM — M43.6 TORTICOLLIS: Status: ACTIVE | Noted: 2018-06-17

## 2018-06-17 PROBLEM — M47.892 OTHER OSTEOARTHRITIS OF SPINE, CERVICAL REGION: Status: ACTIVE | Noted: 2018-06-17

## 2018-06-19 ENCOUNTER — LAB ENCOUNTER (OUTPATIENT)
Dept: LAB | Facility: HOSPITAL | Age: 48
End: 2018-06-19
Attending: Other
Payer: MEDICARE

## 2018-06-19 DIAGNOSIS — R56.9 SEIZURE (HCC): ICD-10-CM

## 2018-06-19 PROCEDURE — 36415 COLL VENOUS BLD VENIPUNCTURE: CPT

## 2018-06-19 PROCEDURE — 80175 DRUG SCREEN QUAN LAMOTRIGINE: CPT

## 2018-06-19 PROCEDURE — 80053 COMPREHEN METABOLIC PANEL: CPT

## 2018-08-07 ENCOUNTER — LAB ENCOUNTER (OUTPATIENT)
Dept: LAB | Facility: HOSPITAL | Age: 48
End: 2018-08-07
Attending: Other
Payer: MEDICARE

## 2018-08-07 DIAGNOSIS — R56.9 SEIZURE (HCC): ICD-10-CM

## 2018-08-07 DIAGNOSIS — G43.909 MIGRAINE WITHOUT STATUS MIGRAINOSUS, NOT INTRACTABLE, UNSPECIFIED MIGRAINE TYPE: ICD-10-CM

## 2018-08-07 LAB
ALBUMIN SERPL-MCNC: 3.4 G/DL (ref 3.5–4.8)
ALBUMIN/GLOB SERPL: 1.2 {RATIO} (ref 1–2)
ALP LIVER SERPL-CCNC: 57 U/L (ref 39–100)
ALT SERPL-CCNC: 30 U/L (ref 14–54)
ANION GAP SERPL CALC-SCNC: 8 MMOL/L (ref 0–18)
APTT PPP: 26.2 SECONDS (ref 26.1–34.6)
AST SERPL-CCNC: 14 U/L (ref 15–41)
BASOPHILS # BLD AUTO: 0.06 X10(3) UL (ref 0–0.1)
BASOPHILS NFR BLD AUTO: 0.5 %
BILIRUB SERPL-MCNC: 0.3 MG/DL (ref 0.1–2)
BUN BLD-MCNC: 18 MG/DL (ref 8–20)
BUN/CREAT SERPL: 27.3 (ref 10–20)
CALCIUM BLD-MCNC: 9.4 MG/DL (ref 8.3–10.3)
CHLORIDE SERPL-SCNC: 110 MMOL/L (ref 101–111)
CO2 SERPL-SCNC: 24 MMOL/L (ref 22–32)
CREAT BLD-MCNC: 0.66 MG/DL (ref 0.55–1.02)
EOSINOPHIL # BLD AUTO: 0.08 X10(3) UL (ref 0–0.3)
EOSINOPHIL NFR BLD AUTO: 0.7 %
ERYTHROCYTE [DISTWIDTH] IN BLOOD BY AUTOMATED COUNT: 16.6 % (ref 11.5–16)
GLOBULIN PLAS-MCNC: 2.9 G/DL (ref 2.5–3.7)
GLUCOSE BLD-MCNC: 78 MG/DL (ref 70–99)
HCT VFR BLD AUTO: 36.4 % (ref 34–50)
HGB BLD-MCNC: 11.7 G/DL (ref 12–16)
IMMATURE GRANULOCYTE COUNT: 0.05 X10(3) UL (ref 0–1)
IMMATURE GRANULOCYTE RATIO %: 0.4 %
INR BLD: 0.93 (ref 0.9–1.1)
LYMPHOCYTES # BLD AUTO: 1.74 X10(3) UL (ref 0.9–4)
LYMPHOCYTES NFR BLD AUTO: 15.5 %
M PROTEIN MFR SERPL ELPH: 6.3 G/DL (ref 6.1–8.3)
MCH RBC QN AUTO: 29.6 PG (ref 27–33.2)
MCHC RBC AUTO-ENTMCNC: 32.1 G/DL (ref 31–37)
MCV RBC AUTO: 92.2 FL (ref 81–100)
MONOCYTES # BLD AUTO: 0.53 X10(3) UL (ref 0.1–1)
MONOCYTES NFR BLD AUTO: 4.7 %
NEUTROPHIL ABS PRELIM: 8.74 X10 (3) UL (ref 1.3–6.7)
NEUTROPHILS # BLD AUTO: 8.74 X10(3) UL (ref 1.3–6.7)
NEUTROPHILS NFR BLD AUTO: 78.2 %
OSMOLALITY SERPL CALC.SUM OF ELEC: 295 MOSM/KG (ref 275–295)
PLATELET # BLD AUTO: 349 10(3)UL (ref 150–450)
POTASSIUM SERPL-SCNC: 4.2 MMOL/L (ref 3.6–5.1)
PSA SERPL DL<=0.01 NG/ML-MCNC: 12.9 SECONDS (ref 12.4–14.7)
RBC # BLD AUTO: 3.95 X10(6)UL (ref 3.8–5.1)
RED CELL DISTRIBUTION WIDTH-SD: 55.7 FL (ref 35.1–46.3)
SODIUM SERPL-SCNC: 142 MMOL/L (ref 136–144)
WBC # BLD AUTO: 11.2 X10(3) UL (ref 4–13)

## 2018-08-07 PROCEDURE — 36415 COLL VENOUS BLD VENIPUNCTURE: CPT

## 2018-08-07 PROCEDURE — 85730 THROMBOPLASTIN TIME PARTIAL: CPT

## 2018-08-07 PROCEDURE — 85610 PROTHROMBIN TIME: CPT

## 2018-08-07 PROCEDURE — 85025 COMPLETE CBC W/AUTO DIFF WBC: CPT

## 2018-08-07 PROCEDURE — 80053 COMPREHEN METABOLIC PANEL: CPT

## 2018-08-21 ENCOUNTER — LAB ENCOUNTER (OUTPATIENT)
Dept: LAB | Facility: HOSPITAL | Age: 48
End: 2018-08-21
Attending: Other
Payer: MEDICARE

## 2018-08-21 DIAGNOSIS — IMO0002 CHRONIC MIGRAINE: ICD-10-CM

## 2018-08-21 DIAGNOSIS — M43.6 TORTICOLLIS: ICD-10-CM

## 2018-08-21 DIAGNOSIS — R56.9 SEIZURE (HCC): ICD-10-CM

## 2018-08-21 PROCEDURE — 80175 DRUG SCREEN QUAN LAMOTRIGINE: CPT

## 2018-08-23 LAB — LAMOTRIGINE: 9.1 UG/ML

## 2018-09-29 ENCOUNTER — APPOINTMENT (OUTPATIENT)
Dept: CT IMAGING | Facility: HOSPITAL | Age: 48
DRG: 086 | End: 2018-09-29
Attending: EMERGENCY MEDICINE
Payer: MEDICARE

## 2018-09-29 ENCOUNTER — HOSPITAL ENCOUNTER (INPATIENT)
Facility: HOSPITAL | Age: 48
LOS: 8 days | Discharge: NURSING, REHAB OR LONG-TERM CARE | DRG: 086 | End: 2018-10-08
Attending: EMERGENCY MEDICINE | Admitting: HOSPITALIST
Payer: MEDICARE

## 2018-09-29 DIAGNOSIS — G40.909 NONINTRACTABLE EPILEPSY WITHOUT STATUS EPILEPTICUS, UNSPECIFIED EPILEPSY TYPE (HCC): ICD-10-CM

## 2018-09-29 DIAGNOSIS — M43.6 TORTICOLLIS: ICD-10-CM

## 2018-09-29 DIAGNOSIS — S06.5X9A ACUTE SUBDURAL HEMATOMA (HCC): Primary | ICD-10-CM

## 2018-09-29 PROBLEM — S06.5XAA ACUTE SUBDURAL HEMATOMA: Status: ACTIVE | Noted: 2018-09-29

## 2018-09-29 PROBLEM — S06.5XAA ACUTE SUBDURAL HEMATOMA (HCC): Status: ACTIVE | Noted: 2018-09-29

## 2018-09-29 PROCEDURE — 99291 CRITICAL CARE FIRST HOUR: CPT | Performed by: NURSE PRACTITIONER

## 2018-09-29 PROCEDURE — 70450 CT HEAD/BRAIN W/O DYE: CPT | Performed by: EMERGENCY MEDICINE

## 2018-09-29 RX ORDER — ONDANSETRON 2 MG/ML
4 INJECTION INTRAMUSCULAR; INTRAVENOUS EVERY 6 HOURS PRN
Status: DISCONTINUED | OUTPATIENT
Start: 2018-09-29 | End: 2018-10-08

## 2018-09-29 RX ORDER — GARLIC EXTRACT 500 MG
1 CAPSULE ORAL DAILY
Status: DISCONTINUED | OUTPATIENT
Start: 2018-09-29 | End: 2018-10-08

## 2018-09-29 RX ORDER — SPIRONOLACTONE 25 MG/1
100 TABLET ORAL DAILY
Status: DISCONTINUED | OUTPATIENT
Start: 2018-09-29 | End: 2018-09-29 | Stop reason: SDUPTHER

## 2018-09-29 RX ORDER — LAMOTRIGINE 100 MG/1
200 TABLET ORAL 3 TIMES DAILY
Status: DISCONTINUED | OUTPATIENT
Start: 2018-09-29 | End: 2018-10-08

## 2018-09-29 RX ORDER — DOCUSATE SODIUM 100 MG/1
100 CAPSULE, LIQUID FILLED ORAL 2 TIMES DAILY
Status: DISCONTINUED | OUTPATIENT
Start: 2018-09-29 | End: 2018-09-29

## 2018-09-29 RX ORDER — ALBUTEROL SULFATE 2.5 MG/3ML
2.5 SOLUTION RESPIRATORY (INHALATION) EVERY 6 HOURS PRN
Status: DISCONTINUED | OUTPATIENT
Start: 2018-09-29 | End: 2018-10-08

## 2018-09-29 RX ORDER — POLYETHYLENE GLYCOL 3350 17 G/17G
17 POWDER, FOR SOLUTION ORAL DAILY PRN
Status: DISCONTINUED | OUTPATIENT
Start: 2018-09-29 | End: 2018-10-08

## 2018-09-29 RX ORDER — ACETAMINOPHEN 500 MG
1000 TABLET ORAL EVERY 4 HOURS PRN
Status: DISCONTINUED | OUTPATIENT
Start: 2018-09-29 | End: 2018-09-30

## 2018-09-29 RX ORDER — OXCARBAZEPINE 150 MG/1
300 TABLET, FILM COATED ORAL 2 TIMES DAILY
Status: DISCONTINUED | OUTPATIENT
Start: 2018-09-29 | End: 2018-10-08

## 2018-09-29 RX ORDER — LORAZEPAM 2 MG/ML
1 INJECTION INTRAMUSCULAR
Status: DISCONTINUED | OUTPATIENT
Start: 2018-09-29 | End: 2018-10-08

## 2018-09-29 RX ORDER — METOCLOPRAMIDE HYDROCHLORIDE 5 MG/ML
10 INJECTION INTRAMUSCULAR; INTRAVENOUS EVERY 8 HOURS PRN
Status: DISCONTINUED | OUTPATIENT
Start: 2018-09-29 | End: 2018-10-05

## 2018-09-29 RX ORDER — PHENYLEPHRINE HCL IN 0.9% NACL 50MG/250ML
PLASTIC BAG, INJECTION (ML) INTRAVENOUS CONTINUOUS PRN
Status: DISCONTINUED | OUTPATIENT
Start: 2018-09-29 | End: 2018-10-01 | Stop reason: HOSPADM

## 2018-09-29 RX ORDER — FAMOTIDINE 20 MG/1
40 TABLET ORAL 2 TIMES DAILY
Status: DISCONTINUED | OUTPATIENT
Start: 2018-09-29 | End: 2018-10-08

## 2018-09-29 RX ORDER — MORPHINE SULFATE 4 MG/ML
2 INJECTION, SOLUTION INTRAMUSCULAR; INTRAVENOUS EVERY 2 HOUR PRN
Status: DISCONTINUED | OUTPATIENT
Start: 2018-09-29 | End: 2018-09-29

## 2018-09-29 RX ORDER — LITHIUM CARBONATE 450 MG
900 TABLET, EXTENDED RELEASE ORAL EVERY EVENING
Status: DISCONTINUED | OUTPATIENT
Start: 2018-09-29 | End: 2018-10-08

## 2018-09-29 RX ORDER — MORPHINE SULFATE 4 MG/ML
4 INJECTION, SOLUTION INTRAMUSCULAR; INTRAVENOUS EVERY 2 HOUR PRN
Status: DISCONTINUED | OUTPATIENT
Start: 2018-09-29 | End: 2018-09-29

## 2018-09-29 RX ORDER — MORPHINE SULFATE 4 MG/ML
2 INJECTION, SOLUTION INTRAMUSCULAR; INTRAVENOUS EVERY 2 HOUR PRN
Status: DISCONTINUED | OUTPATIENT
Start: 2018-09-29 | End: 2018-10-08

## 2018-09-29 RX ORDER — DICYCLOMINE HYDROCHLORIDE 10 MG/1
10 CAPSULE ORAL EVERY 4 HOURS PRN
Status: DISCONTINUED | OUTPATIENT
Start: 2018-09-29 | End: 2018-10-08

## 2018-09-29 RX ORDER — SODIUM PHOSPHATE, DIBASIC AND SODIUM PHOSPHATE, MONOBASIC 7; 19 G/133ML; G/133ML
1 ENEMA RECTAL ONCE AS NEEDED
Status: DISCONTINUED | OUTPATIENT
Start: 2018-09-29 | End: 2018-10-08

## 2018-09-29 RX ORDER — BUPROPION HYDROCHLORIDE 100 MG/1
200 TABLET, EXTENDED RELEASE ORAL 2 TIMES DAILY
Status: DISCONTINUED | OUTPATIENT
Start: 2018-09-30 | End: 2018-10-04

## 2018-09-29 RX ORDER — METHYLPHENIDATE HYDROCHLORIDE 10 MG/1
30 TABLET ORAL 2 TIMES DAILY
Status: DISCONTINUED | OUTPATIENT
Start: 2018-09-30 | End: 2018-10-04

## 2018-09-29 RX ORDER — LABETALOL HYDROCHLORIDE 5 MG/ML
10 INJECTION, SOLUTION INTRAVENOUS EVERY 10 MIN PRN
Status: DISCONTINUED | OUTPATIENT
Start: 2018-09-29 | End: 2018-10-08

## 2018-09-29 RX ORDER — SODIUM CHLORIDE 9 MG/ML
INJECTION, SOLUTION INTRAVENOUS CONTINUOUS
Status: DISCONTINUED | OUTPATIENT
Start: 2018-09-29 | End: 2018-09-30

## 2018-09-29 RX ORDER — SPIRONOLACTONE 25 MG/1
100 TABLET ORAL DAILY
Status: DISCONTINUED | OUTPATIENT
Start: 2018-09-30 | End: 2018-10-08

## 2018-09-29 RX ORDER — MORPHINE SULFATE 4 MG/ML
1 INJECTION, SOLUTION INTRAMUSCULAR; INTRAVENOUS EVERY 2 HOUR PRN
Status: DISCONTINUED | OUTPATIENT
Start: 2018-09-29 | End: 2018-10-08

## 2018-09-29 RX ORDER — BUPROPION HYDROCHLORIDE 150 MG/1
150 TABLET, EXTENDED RELEASE ORAL
Status: DISCONTINUED | OUTPATIENT
Start: 2018-09-30 | End: 2018-09-29 | Stop reason: ALTCHOICE

## 2018-09-29 RX ORDER — MONTELUKAST SODIUM 10 MG/1
10 TABLET ORAL NIGHTLY
Status: DISCONTINUED | OUTPATIENT
Start: 2018-09-29 | End: 2018-10-08

## 2018-09-29 RX ORDER — ACETAMINOPHEN 325 MG/1
650 TABLET ORAL EVERY 4 HOURS PRN
Status: DISCONTINUED | OUTPATIENT
Start: 2018-09-29 | End: 2018-10-08

## 2018-09-29 RX ORDER — CETIRIZINE HYDROCHLORIDE 10 MG/1
10 TABLET ORAL DAILY
Status: DISCONTINUED | OUTPATIENT
Start: 2018-09-30 | End: 2018-10-08

## 2018-09-29 RX ORDER — BUPROPION HYDROCHLORIDE 150 MG/1
150 TABLET, EXTENDED RELEASE ORAL DAILY
Status: DISCONTINUED | OUTPATIENT
Start: 2018-09-30 | End: 2018-09-29 | Stop reason: ALTCHOICE

## 2018-09-29 RX ORDER — MORPHINE SULFATE 4 MG/ML
1 INJECTION, SOLUTION INTRAMUSCULAR; INTRAVENOUS EVERY 2 HOUR PRN
Status: DISCONTINUED | OUTPATIENT
Start: 2018-09-29 | End: 2018-09-29

## 2018-09-29 RX ORDER — PREGABALIN 100 MG/1
200 CAPSULE ORAL 2 TIMES DAILY
Status: DISCONTINUED | OUTPATIENT
Start: 2018-09-29 | End: 2018-10-08

## 2018-09-29 RX ORDER — BISACODYL 10 MG
10 SUPPOSITORY, RECTAL RECTAL
Status: DISCONTINUED | OUTPATIENT
Start: 2018-09-29 | End: 2018-10-08

## 2018-09-29 RX ORDER — QUETIAPINE 100 MG/1
300 TABLET, FILM COATED ORAL NIGHTLY
Status: DISCONTINUED | OUTPATIENT
Start: 2018-09-29 | End: 2018-10-08

## 2018-09-29 RX ORDER — QUETIAPINE 25 MG/1
50 TABLET, FILM COATED ORAL DAILY
Status: DISCONTINUED | OUTPATIENT
Start: 2018-09-30 | End: 2018-10-04

## 2018-09-29 RX ORDER — SPIRONOLACTONE 25 MG/1
50 TABLET ORAL NIGHTLY
Status: DISCONTINUED | OUTPATIENT
Start: 2018-09-29 | End: 2018-10-08

## 2018-09-29 RX ORDER — ACETAMINOPHEN 650 MG/1
650 SUPPOSITORY RECTAL EVERY 4 HOURS PRN
Status: DISCONTINUED | OUTPATIENT
Start: 2018-09-29 | End: 2018-10-08

## 2018-09-29 RX ORDER — FLUTICASONE PROPIONATE 50 MCG
1 SPRAY, SUSPENSION (ML) NASAL DAILY
Status: DISCONTINUED | OUTPATIENT
Start: 2018-09-30 | End: 2018-10-08

## 2018-09-29 RX ORDER — PRAMIPEXOLE DIHYDROCHLORIDE 0.25 MG/1
0.12 TABLET ORAL 3 TIMES DAILY
Status: DISCONTINUED | OUTPATIENT
Start: 2018-09-29 | End: 2018-09-30

## 2018-09-30 ENCOUNTER — APPOINTMENT (OUTPATIENT)
Dept: CT IMAGING | Facility: HOSPITAL | Age: 48
DRG: 086 | End: 2018-09-30
Attending: NEUROLOGICAL SURGERY
Payer: MEDICARE

## 2018-09-30 PROCEDURE — 70450 CT HEAD/BRAIN W/O DYE: CPT | Performed by: NEUROLOGICAL SURGERY

## 2018-09-30 PROCEDURE — 99223 1ST HOSP IP/OBS HIGH 75: CPT | Performed by: NEUROLOGICAL SURGERY

## 2018-09-30 PROCEDURE — 99291 CRITICAL CARE FIRST HOUR: CPT | Performed by: OTHER

## 2018-09-30 RX ORDER — PRAMIPEXOLE DIHYDROCHLORIDE 0.12 MG/1
0.12 TABLET ORAL EVERY EVENING
COMMUNITY
End: 2019-04-10 | Stop reason: ALTCHOICE

## 2018-09-30 RX ORDER — BUTALBITAL, ACETAMINOPHEN AND CAFFEINE 50; 325; 40 MG/1; MG/1; MG/1
1 TABLET ORAL EVERY 4 HOURS PRN
Status: DISCONTINUED | OUTPATIENT
Start: 2018-09-30 | End: 2018-10-08

## 2018-09-30 RX ORDER — HYDROMORPHONE HYDROCHLORIDE 2 MG/1
2 TABLET ORAL EVERY 6 HOURS PRN
Status: DISCONTINUED | OUTPATIENT
Start: 2018-09-30 | End: 2018-10-08

## 2018-09-30 RX ORDER — PRAMIPEXOLE DIHYDROCHLORIDE 0.12 MG/1
0.12 TABLET ORAL DAILY
Status: DISCONTINUED | OUTPATIENT
Start: 2018-09-30 | End: 2018-10-08

## 2018-09-30 NOTE — PLAN OF CARE
Pt. Follows commands, ambulates to bathroom with standby assistance, no complaints of pain, vitals stable, pt. Transferring to room 9320, all personal belongings with pt., report called to [facility] will continue to monitor.

## 2018-09-30 NOTE — PLAN OF CARE
Some left droop, leans head to left. Ambulates equally, but a little unsteady, no hands on. Neuro assessment at baseline and unchanging overnight.

## 2018-09-30 NOTE — PROGRESS NOTES
Pt transferred from CCU  Pt a & o x 4 vss on room air seizure precautions   nsr on tele   Complaints of headache relieved with PO dilaudid  Pt updated on poc   Will continue to monitor

## 2018-09-30 NOTE — CONSULTS
[facility]  Neurosurgery Consult    Jo Ahmadi Patient Status:  Inpatient    1970 MRN [id number]   [facility] 7NE-A Attending Poonam Mackay MD   Hosp Day # 0 PCP Tete Price MD     REASON FOR CONSULTATION:  William Moore Location: [address] [place] [address] ENDOSCOPY  4/25/2017: COLONOSCOPY; N/A      Comment:  Performed by Helen Fish MD at [address] [place] [address]: COLPOSCOPY, CERVIX W/UPPER ADJACENT VAGINA; W/BIOPSY(S), CERVIX  6/21/2016: ESOPHAGOGASTRODUODENOSCOPY (EGD); N/A      Commen Dihydrochloride 0.125 MG Oral Tab Take 0.125 mg by mouth every evening.  Disp:  Rfl:  9/28/2018 at 2100   OXcarbazepine 150 MG Oral Tab 1 tab at night 1 week then 1 tab BID 1 week then 1 tab in am and 2 tabs in pm 1 week then 2 tabs BID Disp: 120 tablet Rfl DAILY Disp: 180 capsule Rfl: 3 Taking   VERAPAMIL HCL  MG Oral Capsule SR 24 Hr TAKE 1 CAPSULE(300 MG) BY MOUTH DAILY Disp: 90 capsule Rfl: 0 Taking   FLUTICASONE PROPIONATE 50 MCG/ACT Nasal Suspension SHAKE LIQUID AND USE 2 SPRAYS IN EACH NOSTRIL DA daily. In addition to 300 mg  Disp:  Rfl:  Taking   Methylphenidate HCl ER, CD, (METADATE CD) 20 MG Oral Cap CR Take 80 mg by mouth every morning.    Disp:  Rfl:  Taking   BuPROPion HCl ER, XL, (WELLBUTRIN XL) 300 MG Oral Tablet 24 Hr Take 300 mg by mouth d albuterol sulfate (VENTOLIN) (2.5 MG/3ML) 0.083% nebulizer solution 2.5 mg 2.5 mg Nebulization Q6H PRN   carboxymethylcellulose sod PF (THERA TEARS, CELLUVISC) 1 % ophthalmic gel 1 drop 1 drop Both Eyes BID PRN   Dicyclomine HCl (BENTYL) cap 10 mg 10 mg g Oral Daily PRN   bisacodyl (DULCOLAX) rectal suppository 10 mg 10 mg Rectal Daily PRN   FLEET ENEMA (FLEET) 7-19 GM/118ML enema 133 mL 1 enema Rectal Once PRN   ondansetron HCl (ZOFRAN) injection 4 mg 4 mg Intravenous Q6H PRN   Or      Metoclopramide HCl 3 mm posterior right parietal SDH without significant mass effect, no skull fracture. Stable on repeat scan    ASSESSMENT:  45 yo F with migraines, seizure d/o with small SDH after mechanical fall.       PLAN:  -ok for floor  -continue current AEDs per neur

## 2018-09-30 NOTE — PROGRESS NOTES
09/30/18 1520   Clinical Encounter Type   Visited With Patient   Routine Visit Introduction   Continue Visiting No   Patient Spiritual Encounters   Spiritual Needs Patient presents spiritual strength   Spiritual Interventions  provided spiritual

## 2018-09-30 NOTE — SLP NOTE
ADULT SWALLOWING EVALUATION    ASSESSMENT    ASSESSMENT/OVERALL IMPRESSION:  Patient was admitted on 9/29/18 with SDH after fall.   PMH pertinent to this evaluation is bipolar disorder, asthma, seizure disorder, torticollis, migraines, reflux disease and st Medical History:   Diagnosis Date   • Acne    • ALLERGIC RHINITIS    • Anxiety state    • ASTHMA    • Asthma    • Back problem    • Bipolar affective ([address]    • Chronic rhinitis    • Colitis    • DEPRESSION     Bipolar   • Depression    • Disorder of thyro liquids;Puree;Hard solid  Method of Presentation: Self presentation  Patient Positioning: Leaning to left(baseline due to torticollis)    Oral Phase of Swallow: Impaired  Bolus Retrieval: Intact  Bilabial Seal: Intact  Bolus Formation: Intact  Bolus Propul

## 2018-09-30 NOTE — ED PROVIDER NOTES
Patient Seen in: [facility] Emergency Department    History   Patient presents with:  Fall (musculoskeletal, neurologic)    Stated Complaint: c/o head pain post fall, denies LOC    HPI    Patient has a history of seizures.   She is taking Lamictal 20 absent seizures   • STROKE     2003+ mild R.sided weakness   • Ulcerative colitis, unspecified    • Visual impairment     reading glasses       Past Surgical History:  3/10: CHOLECYSTECTOMY  2013: COLONOSCOPY      Comment:  ulcerative colitis  4/25/2017: C She holds her head flexed to the left at about 45 degrees. Several old appearing bruises on the forehead. A more recent bruise noted at the lateral orbit of the left eye.   A fresh large hematoma noted on the right forehead above the right eyebrow with a DRAW GOLD                [facility]   Patient has a partial skin thickness well approximated laceration over the right forehead that does not require suturing. She cannot recall her last tetanus sensation being less than 5 years. He was updated.   Wound was kiah

## 2018-09-30 NOTE — ED INITIAL ASSESSMENT (HPI)
Pt to ED S/P fall at 1900 tonight. Pt states that she was walking her dog, tripped and hit the sidewalk. Pt denies LOC. Pt presents with hematoma to right eye and abrasion. Pt also has bruising and hematoma to left eye from a previous fall.  Pt arrives A/A/

## 2018-09-30 NOTE — CONSULTS
[facility]  Neurocritical Care       Name: Sarthak Mark  MRN: [id number]  Admission Date/Time: 9/29/2018  7:54 PM  Primary Care Provider:  Day Goins MD        Reason for Consultation:   SDH    HPI:   Patient is a pleasant bursitis, right hip         Date Noted: 05/19/2017      Dehydration         Date Noted: 05/14/2016      At risk for falling         Date Noted: 05/14/2016      Other idiopathic scoliosis, thoracolumbar region         Date Noted: 08/21/2014      Epilepsy (H ENDOSCOPY  4/25/2017: COLONOSCOPY; N/A      Comment:  Performed by Sanjay Stewart MD at [address] [place] [address]: COLPOSCOPY, CERVIX W/UPPER ADJACENT VAGINA; W/BIOPSY(S), CERVIX  6/21/2016: ESOPHAGOGASTRODUODENOSCOPY (EGD); N/A      Comment:  Performed by Me Taking   lamoTRIgine (LAMICTAL) 200 MG Oral Tab Take 1 tablet (200 mg total) by mouth 3 (three) times daily. 200 mg am- 100 mg noon- 200 mg pm (Patient taking differently: Take 200 mg by mouth 3 (three) times daily.  200 mg am- 200 mg noon- 200 mg pm ) Disp NEEDED Disp: 30 tablet Rfl: 0 Taking   ADVAIR DISKUS 250-50 MCG/DOSE Inhalation Aerosol Powder, Breath Activated INHALE 1 PUFF INTO THE LUNGS EVERY 12 HOURS Disp: 1 each Rfl: 8 Taking   Probiotic Product (PROBIOTIC DAILY OR) Take 1 capsule by mouth daily. EVERY 4 HOURS AS NEEDED FOR NAUSEA (Patient taking differently: Take 25 mg by mouth every 4 (four) hours as needed.  ) Disp: 120 tablet Rfl: 3 Taking   CLONAZEPAM 0.5 MG OR TABS Take 0.5 mg by mouth nightly.  Disp:  Rfl:  Taking       Depakote [Valproic * on file    Family History   Problem Relation Age of Onset   • Breast Cancer Maternal Grandmother         dx in [de-identified]   • Breast Cancer Other         dx post menopausal   • High Blood Pressure Father    • Colon Cancer Father    • High Blood Pressure Mother (ALDACTONE) tab 100 mg 100 mg Oral Daily   0.9%  NaCl infusion  Intravenous Continuous   acetaminophen (TYLENOL) tab 650 mg 650 mg Oral Q4H PRN   Or      acetaminophen (TYLENOL) 650 MG rectal suppository 650 mg 650 mg Rectal Q4H PRN   morphINE sulfate (PF) currently breastfeeding. Body mass index is 23.82 kg/m².     Intake/Output:    Intake/Output Summary (Last 24 hours) at 9/30/2018 0838  Last data filed at 9/30/2018 0600  Gross per 24 hour   Intake 867 ml   Output 950 ml   Net -83 ml       HEENT- NC/AT  Kimmy is no midline shift. There are no intraparenchymal brain abnormalities. There is nothing specific for acute infarct. There is no  mass lesion. SINUSES:           No sign of acute sinusitis. MASTOIDS:          No sign of acute inflammation.  SKULL: appropriate  - ICH Order Set.  - Nicardipine IV Drip to SBP Goal 100-150mm Hg. Can start/restart oral meds.   - IV Fluids NS at 75ml/hr  - Continue Statin, goal Keep LDL < 70 and HDL > or around 40.  - Seizure - Continue home seizure medication - Lamictal a

## 2018-09-30 NOTE — PROGRESS NOTES
[facility]  Neurocritical Care APRN Progress Note    NAME: Olivia Cabral - ROOM: 8970/8926-Z - MRN: [id number] - Age: 46year old - :1970    History Of Present Illness:  Olivia Cabral is a 46year old female with PMHx absent seizures   • STROKE     2003+ mild R.sided weakness   • Ulcerative colitis, unspecified    • Visual impairment     reading glasses       Social Hx:  Social History    Tobacco Use      Smoking status: Former Smoker        Packs/day: 2.00        Years 3+ brisk bilaterally. EOMs intact. Visual fields are full. Tongue is midline. Face is asymmetrical with left facial droop and slightly decreased sensation. Uvula and palate elevate symmetrically. Shrug shoulders normal bilaterally.   The rest of the cra consulted    2. Seizure disorder  -Lamictal and Trileptal  -Seizure precautions    3. Chronic Migraines  -dilaudid PO PRN  -Fioricet PRN    4. Bipolar disorder - home meds  5.  Asthma - no exacerbation, home meds    F/E/N:  General  Proph:  -No a/c at this

## 2018-09-30 NOTE — H&P
[facility] Hospitalist H&P       CC: Patient presents with:  Fall (musculoskeletal, neurologic)       PCP: Abiodun Mercedes MD    History of Present Illness:  Pt is a 51y/o F with hx seizure disorder, bipolar affective d/o, torticollis, migraine headaches, IBD W/UPPER ADJACENT VAGINA; W/BIOPSY(S), CERVIX  6/21/2016: ESOPHAGOGASTRODUODENOSCOPY (EGD); N/A      Comment:  Performed by Yogesh Garza MD at [facility]: HERNIA SURGERY      Comment:  Umbilical Hernia Repair  6/21/2016: OTHER SURGICAL HISTOR Systems  Comprehensive ROS reviewed and negative except for what's stated above. Including negative for chest pain, shortness of breath, abd pain.       OBJECTIVE:   09/30/18  0500 09/30/18  0515 09/30/18  0600 09/30/18  0700   BP: (!) 84/53 102/61 (!) 87/ [employer]) NRDR ([address]) which includes the Dose Index Registry. PATIENT STATED HISTORY: (As transcribed by Technologist)  Patient states that she has a migraine.  She fell today on concrete and hit the right frontal ar Trochanteric bursitis, right hip     Metabolic acidosis     Starvation ketoacidosis     Altered mental status, unspecified altered mental status type     Polypharmacy     Acute delirium     Extrapyramidal syndrome     Depressive disorder     Torticollis

## 2018-10-01 RX ORDER — SCOLOPAMINE TRANSDERMAL SYSTEM 1 MG/1
1 PATCH, EXTENDED RELEASE TRANSDERMAL
Status: DISCONTINUED | OUTPATIENT
Start: 2018-10-01 | End: 2018-10-08

## 2018-10-01 RX ORDER — LORAZEPAM 1 MG/1
1 TABLET ORAL EVERY 4 HOURS PRN
Status: COMPLETED | OUTPATIENT
Start: 2018-10-01 | End: 2018-10-02

## 2018-10-01 RX ORDER — MECLIZINE HCL 12.5 MG/1
12.5 TABLET ORAL 3 TIMES DAILY PRN
Status: DISCONTINUED | OUTPATIENT
Start: 2018-10-01 | End: 2018-10-08

## 2018-10-01 RX ORDER — DIAZEPAM 2 MG/1
1 TABLET ORAL EVERY 8 HOURS PRN
Status: DISCONTINUED | OUTPATIENT
Start: 2018-10-01 | End: 2018-10-08

## 2018-10-01 NOTE — PROGRESS NOTES
[facility]  Neurosurgery Progress Note    Lluvia Lewis Patient Status:  Inpatient    1970 MRN [id number]   [facility] 7NE-A Attending Renay Raines MD   Hosp Day # 1 PCP Lela Bocanegra MD     Subjective:  Pt examined

## 2018-10-01 NOTE — SLP NOTE
SPEECH/LANGUAGE/COGNITIVE EVALUATION - INPATIENT    Admission Date: 9/29/2018  Evaluation Date: 10/01/18    Reason for Referral: (SDH from fall)    ASSESSMENT & PLAN   ASSESSMENT & IMPRESSION  Patient is a 45 yo female who reportedly was independent before time constraints of 1 minute (normal= 15+), unable to state 3-4 digits backward, task abandonment with patient unable to perform simple problem solving to basic math problem related to money management.   Patient often stated \"I am so confused\" and \"I wa overt signs or symptoms of aspiration with 95 % accuracy over 1 session(s).   Met   Goal #2 Cognitive-communication evaluation per protocol    Met   Goal #3 Patient will perform visual/auditory selective and alternating attention tasks with 80% acc given mo questions please contact Cathie Guy

## 2018-10-01 NOTE — SLP NOTE
SPEECH DAILY NOTE - INPATIENT    ASSESSMENT & PLAN   ASSESSMENT  Pt seen for dysphagia tx to assess tolerance with recommended diet, ensure proper utilization of aspiration precautions and provide pt education.   Patient tolerating regular diet/thin liquids

## 2018-10-01 NOTE — PLAN OF CARE
Pt A/O X4. Neuro checks q4hrs. RA. NSR on tele. VSS. Headache- PO Dilaudid given. Pt is sleeping comfortably. Will continue to monitor.      Impaired Swallowing    • Minimize aspiration risk Progressing        PAIN - ADULT    • Verbalizes/displays adequate

## 2018-10-01 NOTE — PROGRESS NOTES
[facility] Hospitalist Progress Note                                                                   [address]  4/16/1970    CC: f/u SDH    SUBJECTIVE:    Complains of dizziness, states Oral BID   • pregabalin  200 mg Oral BID   • Acidophilus/Pectin  1 capsule Oral Daily   • QUEtiapine Fumarate  300 mg Oral Nightly   • QUEtiapine Fumarate  50 mg Oral Daily   • famoTIDine  40 mg Oral BID   • Verapamil HCl ER  360 mg Oral Nightly   • Isma zafirliukast     # Allergic rhinitis  - flonase  - ceitirizine for levocetirizine     # Hypothyroidism  - home synthroid     # GERD  - famotidine for ranitidine          DVT ppx: SCDs    Dispo: Symone Giron MD  [facility] Hospitalist  Pager: [phone]

## 2018-10-01 NOTE — PHYSICAL THERAPY NOTE
PHYSICAL THERAPY EVALUATION - INPATIENT     Room Number: 8326/1329-L  Evaluation Date: 10/1/2018  Type of Evaluation: Initial  Physician Order: PT Eval and Treat    Presenting Problem: fall, SDH  Reason for Therapy: Mobility Dysfunction and Discharge ENDOSCOPY  4/25/2017: COLONOSCOPY; N/A      Comment:  Performed by Lorena Varela MD at [address] [place] [address]: COLPOSCOPY, CERVIX W/UPPER ADJACENT VAGINA; W/BIOPSY(S), CERVIX  6/21/2016: ESOPHAGOGASTRODUODENOSCOPY (EGD); N/A      Comment:  Performed by Me Planning: intact  · Perseveration: not present  · Safety Judgement:  decreased awareness of need for assistance and decreased awareness of need for safety  · Awareness of Errors:  assistance required to correct errors made  · Awareness of Deficits:  decrea Skilled Therapy Provided: Pt performed supine to sit with CGA, extra time for transition due to sleepiness. Pt performed sit to stand with CGA, Attempted ambulation sans AD, noted unsteady gait, wide NANCY, increased laterl away.  Pt gait trained with RECOMMENDATIONS  PT Discharge Recommendations: Home;Outpatient PT    PLAN  PT Treatment Plan: Patient education;Gait training;Neuromuscular re-educate;Stair training;Transfer training;Balance training  Rehab Potential : Good  Frequency (Obs): 5x/week  Numb

## 2018-10-01 NOTE — OCCUPATIONAL THERAPY NOTE
OCCUPATIONAL THERAPY EVALUATION - INPATIENT     Room Number: 4217/7859-L  Evaluation Date: 10/1/2018  Type of Evaluation: Initial  Presenting Problem: SDH, fall    Physician Order: IP Consult to Occupational Therapy  Reason for Therapy: ADL/IADL Dysfunctio • Seizure disorder [facility])     absent seizures   • STROKE     2003+ mild R.sided weakness   • Ulcerative colitis, unspecified    • Visual impairment     reading glasses       Past Surgical History  Past Surgical History:  3/10: CHOLECYSTECTOMY  2013: COLON difficulty dividing attention  Orientation Level:  oriented to situation and oriented to person  Following Commands:  follows multi-step commands inconsistently  Safety Judgement:  decreased awareness of need for safety  Awareness of Errors:  decreased sunil upper body clothing?: None  -   Taking care of personal grooming such as brushing teeth?: None  -   Eating meals?: None    AM-PAC Score:  Score: 21  Approx Degree of Impairment: 32.79%  Standardized Score (AM-PAC Scale): 44.27  CMS Modifier (G-Code): CJ impairment. Research supports that patient with this level of impairment often benefit from discharging to HOME. Recommend OUTPATIENT OT  FOR VISUAL PERCEPTION ASSESSMENT AND POSSIBLE COGNITION. Educated the patient about the recommendation.  Verbalize complete  test.  Pt will recall at least 3 home safety recommendation ideas.

## 2018-10-02 NOTE — OCCUPATIONAL THERAPY NOTE
OCCUPATIONAL THERAPY TREATMENT NOTE - INPATIENT     Room Number: 0888/7134-S  Session: 1   Number of Visits to Meet Established Goals: 5    Presenting Problem: SDH, fall    History related to current admission: Pt was admitted on 9/29 from home after falli colitis, unspecified    • Visual impairment     reading glasses       Past Surgical History  Past Surgical History:  3/10: CHOLECYSTECTOMY  2013: COLONOSCOPY      Comment:  ulcerative colitis  4/25/2017: COLONOSCOPY; N/A      Comment:  Procedure: Eunice Ross ASSESSMENT  Supine to Sit : Not tested  Sit to Stand: Minimum assistance    Skilled Therapy Provided: Pt is motivated to work with OT. BP within the parameter. CGA to stand from the chair and min A to ambulate to bathroom with RW.  Pt required repeated, m impairment. Research supports that patient with this level of impairment often benefit from discharging to [facility].      ** Changed recommendation from home with outpatient OT to NAA.   Based on today's session, patient will benefit from participating in 1-2

## 2018-10-02 NOTE — PLAN OF CARE
Pt A/O X4. RA. NSR on tele. VSS. Nausea- Scopolamine patch applied behind RT ear. Mild headache- refused pain med. Pt is sleeping comfortably. Will continue to monitor.      Impaired Activities of Daily Living    • Achieve highest/safest level of independen

## 2018-10-02 NOTE — SLP NOTE
SPEECH DAILY NOTE - INPATIENT    ASSESSMENT & PLAN   ASSESSMENT  Pt was seen for f/u dysphagia tx to assess with diet recommendation for safety and tolerance. Pt observed with tastes of thin and a solid consistency.   Pt appears to be tolerating diet with n 3    If you have any questions, please contact Brandy Stewart

## 2018-10-02 NOTE — CM/SW NOTE
Pt is a 45 yo female admitted for fall. Pt is  and lives in a third story condo with her disabled son Lauryn Myers. Pt is also disabled with biploar disorder and torticollis. Pt also has a son Sherlyn Banks who lives in [place].   Pt's mother Sue Rivas is also i

## 2018-10-02 NOTE — PLAN OF CARE
Assumed patient care 0730. Patient alert. Neuros q4hrs. NSR per tele. PT/Ot worked with pt today. Patient with C/o dizziness and nausea following therapy. Small episode of emesis during shift, unable to measure.  PRN Meclizine and Ativan given per eMAR orde

## 2018-10-02 NOTE — PHYSICAL THERAPY NOTE
PHYSICAL THERAPY TREATMENT NOTE - INPATIENT    Room Number: 0945/2626-I     Session: 1   Number of Visits to Meet Established Goals: 3    Presenting Problem: fall, SDH    Problem List  Principal Problem:    Acute subdural hematoma ([address]  Active Problems: HISTORY; N/A      Comment:  Procedure: ESOPHAGOGASTRODUODENOSCOPY (EGD);   Surgeon:                Luis Daniel Sanabria MD;  Location: [facility] ENDOSCOPY  6/2014: PARAGARD, IUD    SUBJECTIVE  \"I do have stairs at home\" - Pt repeated the same phrase 2-3x, and per based on dept protocol    Skilled Therapy Provided:     Pt presented in supine upon PT arrival. Pt willing to participate in session, working towards increased functional mobility and independence.   Pt performed supine<>sit transfer with c SBA, and up heidi difficulty with gait/transfers resulting in downgrade of overall functional mobility. Due to above deficits, Pt will benefit from continued IP PT, so that patient may achieve highest functional independence/return to baseline.  Recommend OP PT upon Brittney Gandara

## 2018-10-02 NOTE — PROGRESS NOTES
Andria Don is a 46year old female. Patient presents with:  Fall (musculoskeletal, neurologic)    HPI:    Neuro DMG eval    Pt well-known to service  S/p traumatic SDH  H/o chronic migraines x years.  On botox, uses narcotics and fioricet prn tano 2003        Years since quittin.7      Smokeless tobacco: Never Used    Alcohol use: No    Drug use: Yes      Types: Cannabis      Comment: Heroin in past      Past Surgical History:   Procedure Laterality Date   • Cholecystectomy  3/10   • [place] pm 1 week then 2 tabs BID Disp: 120 tablet Rfl: 6   POLYETHYLENE GLYCOL 3350 Oral Powder DISSOLVE 1 CAPFUL IN LIQIUD AND DRINK DAILY Disp: 510 g Rfl: 2   CARISOPRODOL 350 MG Oral Tab TAKE 1 TABLET BY MOUTH THREE TIMES DAILY AS NEEDED FOR MUSCLE SPASMS Disp DIHYDROCHLORIDE 5 MG Oral Tab TAKE 1 TABLET(5 MG) BY MOUTH EVERY EVENING Disp: 90 tablet Rfl: 3   HYDROmorphone HCl (DILAUDID) 2 MG Oral Tab 1 po q 6 prn, can fill 7/1/18 Disp: 45 tablet Rfl: 0   PROMETHAZINE HCL 25 MG Oral Tab TAKE 1 TABLET BY MOUTH EVERY Oral Tab Take 81 mg by mouth daily. Indications: daily Disp:  Rfl:    DiphenhydrAMINE HCl (BENADRYL) 50 MG Oral Cap Take 50 mg by mouth every 6 (six) hours as needed.  Indications: Migraine Disp:  Rfl:    QUEtiapine Fumarate (SEROQUEL) 300 MG Oral Tab Take No sign of acute inflammation. SKULL:             No evidence for fracture or osseous abnormality.   OTHER:             Scalp hematoma in the right supraorbital region with focal hyperdense hematoma measuring 16 x 9 mm, with surrounding stranding and c and anxiety, controlled per pt  NEURO: denies/dizziness  no incontinence,   Rest of systems reviewed and negative      PHYSICAL EXAM:   /85 (BP Location: Right arm)   Pulse 91   Temp 97.8 °F (36.6 °C) (Oral)   Resp 10   Ht 5' 3\" (1.6 m)   Wt 130 lb migraines. Worsened due to trauma  Lyrica, verapamil, botox for prevention  Prn fioricet, dilaudid, zofran    PT following.  Will probably need NAA        EN#[id number]

## 2018-10-02 NOTE — PHYSICAL THERAPY NOTE
PHYSICAL THERAPY TREATMENT NOTE - INPATIENT    Room Number: 0498/0663-S     Session: 1   Number of Visits to Meet Established Goals: 3    Presenting Problem: fall, SDH    Problem List  Principal Problem:    Acute subdural hematoma ([facility] [address]  Active Problems: HISTORY; N/A      Comment:  Procedure: ESOPHAGOGASTRODUODENOSCOPY (EGD);   Surgeon:                Loretta Early MD;  Location: [place] [facility] ENDOSCOPY  6/2014: PARAGARD, IUD    SUBJECTIVE  \"I do have stairs at home\" - Pt repeated the same phrase 2-3x, and per based on dept protocol    Skilled Therapy Provided:     Pt presented in supine upon PT arrival. Pt willing to participate in session, working towards increased functional mobility and independence.   Pt performed supine<>sit transfer with c SBA, and up heidi difficulty with gait/transfers resulting in downgrade of overall functional mobility. Due to above deficits, Pt will benefit from continued IP PT, so that patient may achieve highest functional independence/return to baseline.      Due to consistent balanc

## 2018-10-02 NOTE — PLAN OF CARE
Assumed care at 0700. Patient is alert and oriented. No neuro changes. Vitals stable, NSR per tele. Up with standby assist and walker. This afternoon patient complaining of nausea prn Zofran and Reglan given, with little relief.  1800 patient small green em

## 2018-10-03 RX ORDER — LEVOTHYROXINE SODIUM 0.15 MG/1
150 TABLET ORAL
Status: DISCONTINUED | OUTPATIENT
Start: 2018-10-04 | End: 2018-10-08

## 2018-10-03 NOTE — PROGRESS NOTES
10/03/18 1845   Clinical Encounter Type   Visited With Patient and family together  (sister Lavonne Pradhan)   Continue Visiting No   Patient Spiritual Encounters   Spiritual Interventions  provided support as pt. shared many health challenges in her lif

## 2018-10-03 NOTE — PHYSICAL THERAPY NOTE
Attempted to see Pt this AM - RN aware of attempt. Pt currently occupied with PAS screener. Will f/u later today if time permits, after all other patients are attempted per tentative schedule.

## 2018-10-03 NOTE — PROGRESS NOTES
10/03/18 1848   Clinical Encounter Type   Visited With Patient and family together  (sister Gertrudis Driscoll)   Continue Visiting No   Patient Spiritual Encounters   Spiritual Interventions  provided support as pt. shared many health challenges in her lif

## 2018-10-03 NOTE — PROGRESS NOTES
[facility]  Neurology  Hospital Progress Report    Can Orianatasneem Patient Status:  Inpatient    1970 MRN [id number]   [facility] 7NE-A Attending Bre Garcias MD   Hosp Day # 3 PCP David Garcia MD   Date of Admiss ENDOSCOPY  4/25/2017: COLONOSCOPY; N/A      Comment:  Performed by Earl Walter MD at [address] [place] [address]: COLPOSCOPY, CERVIX W/UPPER ADJACENT VAGINA; W/BIOPSY(S), CERVIX  6/21/2016: ESOPHAGOGASTRODUODENOSCOPY (EGD); N/A      Comment:  Performed by Me Q6H PRN   carboxymethylcellulose sod PF (THERA TEARS, CELLUVISC) 1 % ophthalmic gel 1 drop 1 drop Both Eyes BID PRN   Dicyclomine HCl (BENTYL) cap 10 mg 10 mg Oral Q4H PRN   Fluticasone Propionate (FLONASE) 50 MCG/ACT nasal spray 1 spray 1 spray Each Nare LORazepam (ATIVAN) injection 1 mg 1 mg Intravenous Q15 Min PRN       Medications Prior to Admission:  Pramipexole Dihydrochloride 0.125 MG Oral Tab Take 0.125 mg by mouth 3 (three) times daily.    Pramipexole Dihydrochloride 0.125 MG Oral Tab Take 0.125 m LEVOCETIRIZINE DIHYDROCHLORIDE 5 MG Oral Tab TAKE 1 TABLET(5 MG) BY MOUTH EVERY EVENING   HYDROmorphone HCl (DILAUDID) 2 MG Oral Tab 1 po q 6 prn, can fill 7/1/18   PROMETHAZINE HCL 25 MG Oral Tab TAKE 1 TABLET BY MOUTH EVERY 4 HOURS AS NEEDED FOR NAUSEA 300 MG Oral Tab CR Take 900 mg by mouth every evening. Dapsone (ACZONE) 5 % Apply Externally Gel Apply 1 Application topically 2 (two) times daily.  Apply to affected area of the face   acetaminophen (TYLENOL EXTRA STRENGTH) 500 MG Oral Tab Take 1,000 m sugars under good control  MUSCULOSKELETAL: no joint pain, redness, swelling  NEURO: as above    Physical Exam:   Physical Exam:  /83 (BP Location: Left arm)   Pulse 85   Temp 98.5 °F (36.9 °C) (Oral)   Resp 18   Ht 63\"   Wt 130 lb 8.2 oz (59.2 kg) 09/29/2018    INR 0.93 09/29/2018    PTP 12.9 09/29/2018    T4F 0.9 10/03/2018    TSH 0.015 (L) 10/03/2018    ESRML 7 07/10/2017    CRP 0.22 07/10/2017    MG 2.0 10/01/2018    TROP <0.046 09/29/2018    B12 791 01/17/2018    ETOH <3 01/13/2018    POCGLU 68 repeat examinations and review of pertinent data. The reader is asked to contact this examiner following review the report question or comments. Thank you for allowing me to participate in the care of your patient.     Lexx Spencer M.D.  10/3/2018

## 2018-10-03 NOTE — PLAN OF CARE
Assumed care at [address] [facility]. Pt a/ox4. Neuro assessment unchanged. SBP w/in parameters. NSR per tele. RA. Denies pain. Meds given per MAR. Call light in reach. Needs attended to. Will continue to monitor. NAA at dc.   Impaired Swallowing    • Minimize aspiration r

## 2018-10-03 NOTE — CM/SW NOTE
MSW spoke with the patient's mother Nata Cramer in regards to choice of NAA. The patient's mother informed MSW that the patient's son is in school to be a paramedic and works for an ambulance company.   She is waiting for him to finish class today to obtain nam

## 2018-10-03 NOTE — PROGRESS NOTES
[facility] Hospitalist Progress Note                                                                   [address]  4/16/1970    CC: f/u SDH    SUBJECTIVE:    Late entry    Pt states she ha QUEtiapine Fumarate  50 mg Oral Daily   • famoTIDine  40 mg Oral BID   • Verapamil HCl ER  360 mg Oral Nightly   • Montelukast Sodium  10 mg Oral Nightly   • spironolactone  50 mg Oral Nightly   • BuPROPion HCl ER (SR)  200 mg Oral BID   • spironolactone flonase  - ceitirizine for levocetirizine     # Hypothyroidism  - home synthroid     # GERD  - famotidine for ranitidine          DVT ppx: SCDs    Dispo: CTU, PT recommending Can Stanford MD  [facility] Hospitalist  Pager: [phone]

## 2018-10-03 NOTE — PLAN OF CARE
Assumed care at 0730  A&Ox3, drowsy, VSS, NSR on tele  C/O headache and nausea.  Some relief with PO dilaudid and reglan  Denies blurred vision  Seizure precautions maintained  Dr. Gabrielle Guzman notified of new consult  Poor PO intake  Awaiting NAA placement  Pt and

## 2018-10-04 ENCOUNTER — APPOINTMENT (OUTPATIENT)
Dept: CT IMAGING | Facility: HOSPITAL | Age: 48
DRG: 086 | End: 2018-10-04
Attending: Other
Payer: MEDICARE

## 2018-10-04 PROCEDURE — 70450 CT HEAD/BRAIN W/O DYE: CPT | Performed by: OTHER

## 2018-10-04 PROCEDURE — 90792 PSYCH DIAG EVAL W/MED SRVCS: CPT | Performed by: OTHER

## 2018-10-04 RX ORDER — QUETIAPINE 25 MG/1
50 TABLET, FILM COATED ORAL ONCE
Status: COMPLETED | OUTPATIENT
Start: 2018-10-04 | End: 2018-10-04

## 2018-10-04 RX ORDER — CALCIUM POLYCARBOPHIL 625 MG 625 MG/1
625 TABLET ORAL DAILY
Status: DISCONTINUED | OUTPATIENT
Start: 2018-10-04 | End: 2018-10-08

## 2018-10-04 RX ORDER — METHYLPHENIDATE HYDROCHLORIDE 10 MG/1
10 TABLET ORAL
Status: DISCONTINUED | OUTPATIENT
Start: 2018-10-05 | End: 2018-10-08

## 2018-10-04 RX ORDER — POLYETHYLENE GLYCOL 3350 17 G/17G
17 POWDER, FOR SOLUTION ORAL DAILY
Status: DISCONTINUED | OUTPATIENT
Start: 2018-10-04 | End: 2018-10-08

## 2018-10-04 RX ORDER — BUPROPION HYDROCHLORIDE 100 MG/1
100 TABLET, EXTENDED RELEASE ORAL 2 TIMES DAILY
Status: DISCONTINUED | OUTPATIENT
Start: 2018-10-04 | End: 2018-10-08

## 2018-10-04 RX ORDER — METHYLPHENIDATE HYDROCHLORIDE 10 MG/1
20 TABLET ORAL DAILY
Status: DISCONTINUED | OUTPATIENT
Start: 2018-10-05 | End: 2018-10-08

## 2018-10-04 NOTE — OCCUPATIONAL THERAPY NOTE
Attempted to see the patient for OT session this morning. Pt appeared to be presenting with visual hallucination. Pt was whispering to OT, \"Look, they are there. You need to be quiet. \"  Pending psych consultation. OT will follow-up tomorrow.

## 2018-10-04 NOTE — CONSULTS
[facility]  Report of Psychiatric Consultation    Olivia Misha Patient Status:  Inpatient    1970 MRN [id number]   [facility] 7NE-A Attending Gifty Angel MD   Hosp Day # 4 PCP Romeo Mantilla MD     Date of Admission: 9 about her safety and her mother will continue to provide emotional support with her visits. 8) If she develops somnolence or her delirium and psychosis do not improve in the next 48 hrs, consider getting EEG.  She has a hx of non-convulsive status in 1/ Medical History:   Diagnosis Date   • Acne    • ALLERGIC RHINITIS    • Anxiety state    • ASTHMA    • Asthma    • Back problem    • Bipolar affective ([address]    • Chronic rhinitis    • Colitis    • DEPRESSION     Bipolar   • Depression    • Disorder of thyro • High Cholesterol Mother       reports that she quit smoking about 14 years ago. She has a 20.00 pack-year smoking history. she has never used smokeless tobacco. She reports that she uses drugs. Drug: Cannabis.  She reports that she does not drink alcoho CELLUVISC) 1 % ophthalmic gel 1 drop, 1 drop, Both Eyes, BID PRN  •  Dicyclomine HCl (BENTYL) cap 10 mg, 10 mg, Oral, Q4H PRN  •  Fluticasone Propionate (FLONASE) 50 MCG/ACT nasal spray 1 spray, 1 spray, Each Nare, Daily  •  lamoTRIgine (LAMICTAL) tab 200 injection 1 mg, 1 mg, Intravenous, Q15 Min PRN    Review of Systems   Constitutional: Positive for malaise/fatigue. Psychiatric/Behavioral: Positive for hallucinations. The patient is nervous/anxious and has insomnia.       Psychiatry Review Systems: No e

## 2018-10-04 NOTE — PROGRESS NOTES
[facility]  Neurology  Hospital Progress Report    Cinthia Laws Patient Status:  Inpatient    1970 MRN [id number]   [facility] 7NE-A Attending Leticia Francis MD   Hosp Day # 4 PCP Navin Waters MD   Date of Admissio impairment     reading glasses     Past Surgical History  Past Surgical History:  3/10: CHOLECYSTECTOMY  2013: COLONOSCOPY      Comment:  ulcerative colitis  4/25/2017: COLONOSCOPY; N/A      Comment:  Procedure: COLONOSCOPY;  Surgeon: Susan Fermin MD Dihydrochloride (MIRAPEX) tab 0.125 mg 0.125 mg Oral Daily   influenza vaccine split quad (FLULAVAL) ages 6 months to 65 years inj 0.5ml 0.5 mL Intramuscular Prior to discharge   Fluticasone Furoate-Vilanterol (BREO ELLIPTA) 200-25 MCG/INH inhaler 1 puff 1 (DULCOLAX) rectal suppository 10 mg 10 mg Rectal Daily PRN   FLEET ENEMA (FLEET) 7-19 GM/118ML enema 133 mL 1 enema Rectal Once PRN   ondansetron HCl (ZOFRAN) injection 4 mg 4 mg Intravenous Q6H PRN   Or      Metoclopramide HCl (REGLAN) injection 10 mg 10 DAILY   pregabalin (LYRICA) 200 MG Oral Cap TAKE 1 CAPSULE BY MOUTH TWICE DAILY   VERAPAMIL HCL  MG Oral Capsule SR 24 Hr TAKE 1 CAPSULE(300 MG) BY MOUTH DAILY   FLUTICASONE PROPIONATE 50 MCG/ACT Nasal Suspension SHAKE LIQUID AND USE 2 SPRAYS IN [facility] by mouth daily. Indications: daily   DiphenhydrAMINE HCl (BENADRYL) 50 MG Oral Cap Take 50 mg by mouth every 6 (six) hours as needed. Indications: Migraine   QUEtiapine Fumarate (SEROQUEL) 300 MG Oral Tab Take 300 mg by mouth nightly.      Lithium Carbonate pain on exertion; no palpitations  GI: denies abdominal pain, denies heartburn, denies vomiting, constipation,diarrhea,nausea; no rectal bleeding  : no complaint of urinary incontinence  PSYCH: As above  HEMATOLOGIC: no reports of excessive bruising or b 09/30/2018    CA 8.2 (L) 09/30/2018    ALB 3.7 09/29/2018    ALKPHO 66 09/29/2018    TP 6.8 09/29/2018    AST 22 09/29/2018    ALT 32 09/29/2018    PTT 26.3 09/29/2018    INR 0.93 09/29/2018    PTP 12.9 09/29/2018    T4F 0.9 10/03/2018    TSH 0.015 (L) 10/ The reader is asked to contact this examiner following review the report question or comments. Thank you for allowing me to participate in the care of your patient.     Abi Pavon M.D.  10/4/2018

## 2018-10-04 NOTE — PHYSICAL THERAPY NOTE
PHYSICAL THERAPY TREATMENT NOTE - INPATIENT    Room Number: 9202/7641-Y     Session: 2   Number of Visits to Meet Established Goals: 3    Presenting Problem: fall, SDH    Problem List  Principal Problem:    Acute subdural hematoma ([facility] [address]  Active Problems: HISTORY; N/A      Comment:  Procedure: ESOPHAGOGASTRODUODENOSCOPY (EGD);   Surgeon:                Praveen Leos MD;  Location: [facility] ENDOSCOPY  6/2014: PARAGARD, IUD    SUBJECTIVE  \"I see a few of you\" Lana Sanchez is there so much crap on the floor\" - floo inconsistent)  Stoop/Curb Assistance: Not tested  Comment : above scores based on dept protocol    Skilled Therapy Provided:     Pt presented in supine upon PT arrival. Pt willing to participate in session, working towards increased functional mobility and assessment.     DISCHARGE RECOMMENDATIONS  PT Discharge Recommendations: Sub-acute rehabilitation(estimated LOS 7 - 10 days)     PLAN  PT Treatment Plan: Patient education;Gait training;Neuromuscular re-educate;Stair training;Transfer training;Balance train

## 2018-10-04 NOTE — CM/SW NOTE
Pt's mother Drew Can called today to say that she would like pt to go to [facility]. Referral made to [facility] via ecin. DON and PAS screen have been completed. Waiting for a response from [facility].

## 2018-10-04 NOTE — PROGRESS NOTES
PSYCH CONSULT    Date of Admission: 9/29/18  Date of Consult: 10/4/18  Reason for Consultation: Altered mental status, polypharmacy    Impression:  Primary Psychiatric Diagnoses:  Acute delirium. Paranoid ideation. Visual hallucinations.      The cause of h hx of non-convulsive status in 1/2018.     9) She is too paranoid to go to [facility] [place] at this time. Hopefully in the next 2-3 days as her delirium/psychosis resolve. 10) If she has agitation with combativeness, can give Ativan 1 or 2 mg IV x 1.  Haldol is NOT r

## 2018-10-04 NOTE — PLAN OF CARE
Assumed patient care 0730. Patient alert, answering questions appropriately, forgetful. At times patient having some delusions. Dr. Andrew Acuna saw pt today, see note, repeat CT. Seizure and fall precautions maintained. Patient family updated on POC.  Due medicati

## 2018-10-04 NOTE — PLAN OF CARE
Assumed care at [address]. Pt a/ox4, forgetful. VSS, NST/ST per tele. Roomair, applied O2 @ 2L/nc per order. Denies pain. Meclizine prn given for c/o dizziness. Meds given per MAR.   At 2130 Pt agitated, restless and hallucinating, VSS at that time, BS jenae

## 2018-10-04 NOTE — PROGRESS NOTES
[facility] Hospitalist Progress Note                                                                   [address]  4/16/1970    CC: f/u SDH    SUBJECTIVE:  Had a rough time earlier, jarrod Fluticasone Propionate  1 spray Each Nare Daily   • lamoTRIgine  200 mg Oral TID   • cetirizine  10 mg Oral Daily   • Lithium Carbonate ER  900 mg Oral QPM   • OXcarbazepine  300 mg Oral BID   • pregabalin  200 mg Oral BID   • Acidophilus/Pectin  1 capsule multifactorial from vertigo and migraines  -Scopolamine patch  -PRN antiemetics     # Bipolar d/o  # Depression/anxiety  - Dr. Yvon Velasquez making adjustments as outlined per his note: seroquel back to nightly home dose, ritalin decreased to home dose, wellb

## 2018-10-05 PROCEDURE — 99232 SBSQ HOSP IP/OBS MODERATE 35: CPT | Performed by: OTHER

## 2018-10-05 RX ORDER — BUPROPION HYDROCHLORIDE 100 MG/1
TABLET, EXTENDED RELEASE ORAL
Qty: 25 TABLET | Refills: 0 | Status: SHIPPED | OUTPATIENT
Start: 2018-10-05 | End: 2018-10-26 | Stop reason: ALTCHOICE

## 2018-10-05 RX ORDER — METHYLPHENIDATE HYDROCHLORIDE 10 MG/1
10 TABLET ORAL
Qty: 1 TABLET | Refills: 0 | Status: SHIPPED | COMMUNITY
Start: 2018-10-06 | End: 2018-10-08

## 2018-10-05 RX ORDER — METHYLPHENIDATE HYDROCHLORIDE 20 MG/1
20 TABLET ORAL DAILY
Qty: 1 TABLET | Refills: 0 | Status: SHIPPED | COMMUNITY
Start: 2018-10-06 | End: 2018-10-08

## 2018-10-05 RX ORDER — SODIUM CHLORIDE 9 MG/ML
INJECTION, SOLUTION INTRAVENOUS CONTINUOUS
Status: DISCONTINUED | OUTPATIENT
Start: 2018-10-05 | End: 2018-10-07

## 2018-10-05 RX ORDER — METOCLOPRAMIDE HYDROCHLORIDE 5 MG/ML
10 INJECTION INTRAMUSCULAR; INTRAVENOUS EVERY 6 HOURS PRN
Status: DISCONTINUED | OUTPATIENT
Start: 2018-10-05 | End: 2018-10-08

## 2018-10-05 NOTE — CM/SW NOTE
Spoke with Srikanth Boudreaux from [place] who says they will accept pt once she is restraint free and stable on her psych meds.

## 2018-10-05 NOTE — PLAN OF CARE
Assumed care at [address]. Pt A&Ox4, forgetful . Pt pleasant and cooperative, pt's mom did say she is still delusional.  VSS, NSR per tele. Denies pain. Pt sleeping well after PM meds administered. Bed alarm on, 1 assist with ambulation.    Will continue t

## 2018-10-05 NOTE — OCCUPATIONAL THERAPY NOTE
OCCUPATIONAL THERAPY TREATMENT NOTE - INPATIENT     Room Number: 4343/4997-H  Session: 2   Number of Visits to Meet Established Goals: 5    Presenting Problem: SDH, fall    History related to current admission: Pt was admitted on 9/29 from home after falli Pneumonia, organism unspecified(486)    • Seizure disorder ([facility] [address]     absent seizures   • STROKE     2003+ mild R.sided weakness   • Ulcerative colitis, unspecified    • Visual impairment     reading glasses       Past Surgical History  Past Surgical Histor 38.32%  Standardized Score (AM-PAC Scale): 42.03  CMS Modifier (G-Code): CJ    FUNCTIONAL TRANSFER ASSESSMENT  Supine to Sit : Not tested  Sit to Stand: Minimum assistance(CGA)    Skilled Therapy Provided: Pt was seen earlier this morning: much improved me vision perception skills. Subacute rehab is recommended for 12 to 14 days. After this period of rehabilitation patient should achieve modified independent level in ADL.   Pt verbalized understanding about NAA.      ** Clock Drawing - Pt was able to write

## 2018-10-05 NOTE — PHYSICAL THERAPY NOTE
IP PT attempted. Per RN, pt not appropriate to participate at this time 2/2 vomiting. Will re-attempt as appropriate and as schedule permits.

## 2018-10-05 NOTE — PROGRESS NOTES
Neurology follow up NOTE    SUBJECTIVE:  She has no more confusion, or hallucination. She stated she has n/v, dizziness when moving her head. She stated her headache is her baseline migraine headache and plus post concussion headaches. Still has neck pain. COMPARISON:  [facility] , CT BRAIN OR HEAD ([id number]), 9/29/2018, 20:24. [facility] , CT BRAIN OR HEAD ([id number]), 9/30/2018, 13:43. INDICATIONS:  Eval for worsening SDH. Acute psychosis since last night.   TECHNIQUE:  Noncontrast CT scanning is performed through the b Brain Or Head ([id number])    Result Date: 9/29/2018  CONCLUSION:   Probable very thin right temporal subdural hemorrhage measuring 2-3 mm without mass effect. Critical findings discussed with Dr. Vasyl Rodgers at 2055 hr on 9/29/2018 with read.   Followup imaging would

## 2018-10-05 NOTE — PROGRESS NOTES
[facility] Hospitalist Progress Note                                                                   [address]  4/16/1970    CC: f/u SDH    SUBJECTIVE:    Seen this afternoon, having na QPM   • OXcarbazepine  300 mg Oral BID   • pregabalin  200 mg Oral BID   • Acidophilus/Pectin  1 capsule Oral Daily   • QUEtiapine Fumarate  300 mg Oral Nightly   • famoTIDine  40 mg Oral BID   • Verapamil HCl ER  360 mg Oral Nightly   • Montelukast Sodium migraines  -Scopolamine patch  -PRN antiemetics, continue to adjust     # Bipolar d/o  # Depression/anxiety  - Dr. Ilia Pierce making adjustments as outlined per his note: seroquel back to nightly home dose, ritalin decreased to home dose, wellbutrin being

## 2018-10-05 NOTE — PLAN OF CARE
Assumed patient care 0730. Patient alert/orientated. No observed delusions. Thinking more clearly this AM per pt and family report. C/o nausea and dizziness. Emesis x2 following breakfast this AM. PRN nausea medication given per eMAR.  Up to bathroom with a

## 2018-10-06 RX ORDER — ASPIRIN 81 MG/1
81 TABLET, CHEWABLE ORAL DAILY
Status: DISCONTINUED | OUTPATIENT
Start: 2018-10-06 | End: 2018-10-08

## 2018-10-06 NOTE — PROGRESS NOTES
[facility] Hospitalist Progress Note                                                                   [address]  4/16/1970    CC: f/u SDH    SUBJECTIVE:    Seen in AM, feeling improved w mg Oral Daily   • Lithium Carbonate ER  900 mg Oral QPM   • OXcarbazepine  300 mg Oral BID   • pregabalin  200 mg Oral BID   • Acidophilus/Pectin  1 capsule Oral Daily   • QUEtiapine Fumarate  300 mg Oral Nightly   • famoTIDine  40 mg Oral BID   • Verapami diazepam PRN    # Nausea  -Likely multifactorial from vertigo and migraines  -Scopolamine patch  -PRN antiemetics, continue to adjust     # Bipolar d/o  # Depression/anxiety  - Dr. Ely Jones making adjustments as outlined per his note: seroquel back to ni

## 2018-10-06 NOTE — PLAN OF CARE
Assumed care at [address]. Pt a/ox4, forgetful. VSS, NSR/ST per tele, RA. Po dilaudid given for c/o headache per order w/ relief. Meds given pe MAR. Pt updated w/ POC. IVF infusing per order. Sleeping comfortably all night. Call light in reach.   Needs at

## 2018-10-06 NOTE — PROGRESS NOTES
Yuliana Ventura is a 46year old female.    Patient presents with:  Fall (musculoskeletal, neurologic)    HPI:    Neuro fu note    Pt stable overnight    Past Medical History:   Diagnosis Date   • Acne    • ALLERGIC RHINITIS    • Anxiety state    • ASTH Comment:Lesions in brain  Kdc:Olanzapine          SWELLING  Latex                   RASH, Dyspnea  Levaquin [Levofloxa*    PAIN    Comment:Tendinitis in multiple joints  Mold                      Mushrooms               NAUSEA AND VOMITING  Penicillin G Po Appearance: well nourished, well developed, no acute distress. Head-Spine: No tenderness.      BP: Normal     Pulse/Respiration: Normal     Carotid: No carotid bruits     Heart: RRR     Extremities: No edema     Lungs CTA     abd soft nontender     Sk

## 2018-10-06 NOTE — PLAN OF CARE
Received pt a/ox4, RA, NSR on tele at 0700  Pt forgetful at times, complaining of nausea and dizziness, meds givens per [facility]  Safety and seizure precautions maintained  Neuros Q4, no new deficits  Pt feeling dehydrated and dizzy today, possible d/c to [facility]

## 2018-10-07 RX ORDER — LIDOCAINE 50 MG/G
2 PATCH TOPICAL DAILY
Status: DISCONTINUED | OUTPATIENT
Start: 2018-10-07 | End: 2018-10-08

## 2018-10-07 RX ORDER — LIDOCAINE 50 MG/G
2 PATCH TOPICAL DAILY
Qty: 10 PATCH | Refills: 0 | Status: SHIPPED | OUTPATIENT
Start: 2018-10-07 | End: 2018-10-31 | Stop reason: ALTCHOICE

## 2018-10-07 RX ORDER — LEVOTHYROXINE SODIUM 0.15 MG/1
150 TABLET ORAL
Qty: 30 TABLET | Refills: 0 | Status: SHIPPED | OUTPATIENT
Start: 2018-10-08 | End: 2018-10-26

## 2018-10-07 RX ORDER — SCOLOPAMINE TRANSDERMAL SYSTEM 1 MG/1
1 PATCH, EXTENDED RELEASE TRANSDERMAL
Qty: 5 PATCH | Refills: 0 | Status: SHIPPED | OUTPATIENT
Start: 2018-10-07 | End: 2018-10-31 | Stop reason: ALTCHOICE

## 2018-10-07 RX ORDER — ONDANSETRON 2 MG/ML
8 INJECTION INTRAMUSCULAR; INTRAVENOUS EVERY 6 HOURS PRN
Status: DISCONTINUED | OUTPATIENT
Start: 2018-10-07 | End: 2018-10-08

## 2018-10-07 RX ORDER — ONDANSETRON 4 MG/1
8 TABLET, ORALLY DISINTEGRATING ORAL EVERY 6 HOURS PRN
Status: DISCONTINUED | OUTPATIENT
Start: 2018-10-07 | End: 2018-10-08

## 2018-10-07 NOTE — PROGRESS NOTES
[facility] Hospitalist Progress Note                                                                   [address]  4/16/1970    CC: f/u SDH    SUBJECTIVE:    Pt with some nausea this AM bu Fluticasone Propionate  1 spray Each Nare Daily   • lamoTRIgine  200 mg Oral TID   • cetirizine  10 mg Oral Daily   • Lithium Carbonate ER  900 mg Oral QPM   • OXcarbazepine  300 mg Oral BID   • pregabalin  200 mg Oral BID   • Acidophilus/Pectin  1 capsule dilaudid PRN  - fioricet PRN     # Torticollis  - per neuro    #vertigo/dizziness  -Meclizine, diazepam PRN    # Nausea  -Likely multifactorial from vertigo and migraines  -Scopolamine patch  -PRN antiemetics, continue to adjust     # Bipolar d/o  # Depres

## 2018-10-07 NOTE — PLAN OF CARE
Impaired Cognition    • Patient will exhibit improved attention, thought processing and/or memory Adequate for Discharge        Impaired Swallowing    • Minimize aspiration risk Adequate for Discharge        PAIN - ADULT    • Verbalizes/displays adequate c

## 2018-10-07 NOTE — PLAN OF CARE
Pt A/O X4. Forgetful and anxious at times. Seizure precautions. RA. NSR on tele. VSS. Nausea- Zofran given. Pt is sleeping comfortably. Will continue to monitor.      Delirium    • Minimize duration of delirium Progressing        Impaired Activities of [place]

## 2018-10-07 NOTE — PROGRESS NOTES
Cinthia Laws is a 46year old female.    Patient presents with:  Fall (musculoskeletal, neurologic)    HPI:    Neuro fu note    Pt stable overnight      Has some dizziness this am a/w her neck pain and headache    Past Medical History:   Diagnosis Da Take 1 tablet (20 mg total) by mouth daily. , Disp: 1 tablet, Rfl: 0  •  methylphenidate 10 MG Oral Tab, Take 1 tablet (10 mg total) by mouth After lunch., Disp: 1 tablet, Rfl: 0        Allergies:    Depakote [Valproic *    OTHER (SEE COMMENTS)    Comment:S palpitations   GI: denies nausea, vomiting, constipation, diarrhea; no rectal bleeding; no heartburn  GENITAL/: no dysuria, urgency or frequency;  MUSCULOSKELETAL: no joint complaints upper or lower extremities  PSYCHE:no depression or anxiety  NEURO: ha repeated on 10/4  Ok to resume asa 81 mg  2) seizure hx.  None noted here  3) hx of significant chronic migraine, now with postconcussive ha. fioricet prn  4) postconcussive vertigo/dizziness along with baseline imbalance from torticollis which caused the f

## 2018-10-08 VITALS
TEMPERATURE: 98 F | HEIGHT: 63 IN | OXYGEN SATURATION: 100 % | RESPIRATION RATE: 13 BRPM | WEIGHT: 130 LBS | DIASTOLIC BLOOD PRESSURE: 71 MMHG | BODY MASS INDEX: 23.04 KG/M2 | HEART RATE: 92 BPM | SYSTOLIC BLOOD PRESSURE: 121 MMHG

## 2018-10-08 RX ORDER — METHYLPHENIDATE HYDROCHLORIDE 10 MG/1
10 TABLET ORAL
Qty: 3 TABLET | Refills: 0 | Status: SHIPPED | OUTPATIENT
Start: 2018-10-08 | End: 2019-04-10 | Stop reason: DRUGHIGH

## 2018-10-08 RX ORDER — LAMOTRIGINE 200 MG/1
200 TABLET ORAL 3 TIMES DAILY
COMMUNITY
End: 2018-10-09

## 2018-10-08 RX ORDER — MECLIZINE HCL 12.5 MG/1
12.5 TABLET ORAL
Status: DISCONTINUED | OUTPATIENT
Start: 2018-10-08 | End: 2018-10-08

## 2018-10-08 RX ORDER — MECLIZINE HCL 12.5 MG/1
12.5 TABLET ORAL
Qty: 30 TABLET | Refills: 0 | Status: SHIPPED | OUTPATIENT
Start: 2018-10-08 | End: 2019-05-21

## 2018-10-08 RX ORDER — METHYLPHENIDATE HYDROCHLORIDE 20 MG/1
20 TABLET ORAL DAILY
Qty: 3 TABLET | Refills: 0 | Status: SHIPPED | OUTPATIENT
Start: 2018-10-08 | End: 2019-05-23 | Stop reason: ALTCHOICE

## 2018-10-08 RX ORDER — OXCARBAZEPINE 150 MG/1
300 TABLET, FILM COATED ORAL 2 TIMES DAILY
COMMUNITY
End: 2019-04-30

## 2018-10-08 RX ORDER — HYDROMORPHONE HYDROCHLORIDE 2 MG/1
2 TABLET ORAL EVERY 6 HOURS PRN
Qty: 15 TABLET | Refills: 0 | Status: SHIPPED | OUTPATIENT
Start: 2018-10-08 | End: 2018-12-13

## 2018-10-08 NOTE — PLAN OF CARE
Pt A/O X4. Neuro checks q4hrs. Seizure precautions. RA. NSR on tele. VSS. Intermittent dizziness. Denies nausea. Pt is resting in bed. Will continue to monitor.      Impaired Activities of Daily Living    • Achieve highest/safest level of independence in se

## 2018-10-08 NOTE — PROGRESS NOTES
Emmanuel Royal is a 46year old female. Patient presents with:  Fall (musculoskeletal, neurologic)    HPI:    Neuro fu note    Pt stable overnight    Did not go to rehab due to emesis yesterday.  She feels that while the dizziness is improving, it is External Patch, Place 2 patches onto the skin daily. , Disp: 10 patch, Rfl: 0  •  BuPROPion HCl ER, SR, 100 MG Oral Tablet 12 Hr, Take 1 tab twice a day x 1 week, then 1/2 tab twice a day x 1 wk, then 1/2 tab daily x 1 week, then stop., Disp: 25 tablet, Rfl lunch. Disp: 1 tablet Rfl: 0          ROS:   GENERAL HEALTH: feels well otherwise  SKIN: denies any unusual skin lesions or rashes  EYES: no visual complaints or deficits  HEENT: denies nasal congestion, sinus pain or sore throat; hearing loss negative  RE NL     Soft Touch: NL     Vibration: NL     Position: NL    CEREBELLUM:     Upper Extremities: NL    LE not tested    GAIT & STATION:    deferred     SPEECH/LANGUAGE:     Articulation: NL     Rhythm: NL    REFLEXES:     RUE: 2+/4     RLE: 2+/4     LUE: 2+/

## 2018-10-08 NOTE — PHYSICAL THERAPY NOTE
PHYSICAL THERAPY TREATMENT NOTE - INPATIENT    Room Number: 3678/3779-P     Session: 3 - add 3 sessions to attempt stairs   Number of Visits to Meet Established Goals: 3    Presenting Problem: fall, SDH    Problem List  Principal Problem:    Acute subdura 4012    Umbilical Hernia Repair   • OTHER SURGICAL HISTORY N/A 6/21/2016    Procedure: ESOPHAGOGASTRODUODENOSCOPY (EGD);   Surgeon: Vik Castillo MD;  Location: [facility] ENDOSCOPY   • PARAGARD, IUD  6/2014       SUBJECTIVE  \"only for you Mariaa\" - Pt initi inconsistent)  Stoop/Curb Assistance: Not tested  Comment : above scores based on dept protocol    Skilled Therapy Provided:     Pt presented in supine upon PT arrival. Pt willing to participate in session, working towards increased functional mobility and Patient is able to ambulate 200 feet with assist device: walker - rolling at assistance level: modified independent      Goal #4 Pt will negotiate 1 flight of stairs, Mod I.    Goal #5 Pt will participate in Modified Mao Balance Assessment - met 10/2/2018

## 2018-10-08 NOTE — PLAN OF CARE
Assumed patient care at 0730. Vital signs stable   Fall risk precautions maintained- reminded to call for assistance   Patient called staff about having focal seizures. Able to follow commands, no changes in speech.  Eyes fixed up, but able to move upon com

## 2018-10-08 NOTE — OCCUPATIONAL THERAPY NOTE
Attempted to see the patient for OT session. Pt told OT, \"I think I am getting over seizure now. Don't worry, it's almost over. \"  Both eyes were rolling up several times in 10 seconds when pt was talking to this therapist. Informed RN.   She is paging savanna

## 2018-10-08 NOTE — OCCUPATIONAL THERAPY NOTE
OCCUPATIONAL THERAPY TREATMENT NOTE - INPATIENT     Room Number: 1483/9501-I  Session: 3   Number of Visits to Meet Established Goals: 5    Presenting Problem: SDH, fall    History related to current admission: Pt was admitted on 9/29 from home after falli unspecified(486)    • Seizure disorder (HCC)     absent seizures   • STROKE     2003+ mild R.sided weakness   • Ulcerative colitis, unspecified    • Visual impairment     reading glasses       Past Surgical History  Past Surgical History:   Procedure Later Stand: Minimum assistance    Skilled Therapy Provided: Per nursing, pt was given medication for nausea and dizziness. Pt completed supine to sit, CGA. Educated the patient about gaze stabilization and neck positioning during mobility.  Pt was able to eliana g to her prior level of function. OT Discharge Recommendations: Sub-acute rehabilitation(12 to 14 days)       PLAN  OT Treatment Plan: Balance activities; Energy conservation/work simplification techniques;ADL training;Functional transfer training;UE stren

## 2018-10-08 NOTE — PROGRESS NOTES
Okay for discharge, patient cooperative and agreeable.    Report given to Esdras Mckinley RN   Medication and follow up instructions provided   Waiting for medicar for transport

## 2018-10-08 NOTE — CM/SW NOTE
Pt is ready for d/c today. Pt will go to [facility]. Called Kiarra at [facility] to coordinate d/c time. RN to call report to [phone]. Arranged [facility] (which will be a ter[facility] for let ambulance) to  pt at 6:30pm today.   RN to notify family o

## 2018-10-10 NOTE — CM/SW NOTE
10/10/18 0900   Discharge disposition   Expected discharge disposition Skilled Nurs   Name of Hayden Tim   Patient is Discharged to a [address] [place] [address] Yes   Discharge transportation [facility]

## 2018-10-23 NOTE — DISCHARGE SUMMARY
General Medicine Discharge Summary     Patient ID:  Laurie Murray  46year old  4/16/1970    Admit date: 9/29/2018    Discharge date and time: 10/8/2018  7:00 PM     Attending Physician: Janelle att.  prov affective d/o, torticollis, migraine headaches, IBD, hypothyroidism who presented with SDH after a fall. She tripped on the sidewalk while walking her dog and hit her head. PT has frequent falls due to her torticollis.  No LOC, was able to get up after the CONSULT TO CARDIAC REHAB  IP CONSULT TO SPIRITUAL CARE  IP CONSULT TO PHYSICAL THERAPY  IP CONSULT TO OCCUPATIONAL THERAPY  IP CONSULT TO SPEECH LANGUAGE PATHOLOGY  IP CONSULT TO SOCIAL WORK  IP CONSULT TO CASE MANAGEMENT  IP CONSULT TO PSYCHIATRY  IP CONS mg by mouth every evening., Historical    POLYETHYLENE GLYCOL 3350 Oral Powder  DISSOLVE 1 CAPFUL IN LIQIUD AND DRINK DAILY, Normal, Disp-510 g, R-2    ONDANSETRON 8 MG Oral Tablet Dispersible  DISSOLVE 1 TABLET ON THE TONGUE EVERY 8 HOURS AS NEEDED FOR NA R-1    METOCLOPRAMIDE HCL 10 MG Oral Tab  TAKE 1 TABLET BY MOUTH THREE TIMES DAILY AS NEEDED, Normal, Disp-30 tablet, R-0    ADVAIR DISKUS 250-50 MCG/DOSE Inhalation Aerosol Powder, Breath Activated  INHALE 1 PUFF INTO THE LUNGS EVERY 12 HOURS, Normal, Dis clubbing/cyanosis  Neuro: moving all 4 extremities      Total time coordinating care for discharge: 40 minutes    Shanta Pemberton MD  [facility] Hospitalist  [phone]

## 2018-12-13 PROCEDURE — 80175 DRUG SCREEN QUAN LAMOTRIGINE: CPT | Performed by: OTHER

## 2019-03-11 PROCEDURE — 80175 DRUG SCREEN QUAN LAMOTRIGINE: CPT | Performed by: OTHER

## 2019-03-11 PROCEDURE — 80183 DRUG SCRN QUANT OXCARBAZEPIN: CPT | Performed by: OTHER

## 2019-04-18 PROBLEM — Z88.9 H/O MULTIPLE ALLERGIES: Status: ACTIVE | Noted: 2019-04-18

## 2019-04-18 PROBLEM — G40.209 EPILEPSY WITH PARTIAL COMPLEX SEIZURES, WITHOUT STATUS EPILEPTICUS (HCC): Status: ACTIVE | Noted: 2019-04-18

## 2019-04-18 PROBLEM — Z91.89 H/O MULTIPLE ALLERGIES: Status: ACTIVE | Noted: 2019-04-18

## 2019-04-18 PROBLEM — G40.109 FOCAL AND PARTIAL SEIZURES (HCC): Status: ACTIVE | Noted: 2019-04-18

## 2019-05-23 ENCOUNTER — APPOINTMENT (OUTPATIENT)
Dept: LAB | Age: 49
End: 2019-05-23
Attending: PHYSICIAN ASSISTANT
Payer: MEDICARE

## 2019-05-23 PROCEDURE — 87205 SMEAR GRAM STAIN: CPT | Performed by: PHYSICIAN ASSISTANT

## 2019-05-23 PROCEDURE — 87186 SC STD MICRODIL/AGAR DIL: CPT | Performed by: PHYSICIAN ASSISTANT

## 2019-05-23 PROCEDURE — 87070 CULTURE OTHR SPECIMN AEROBIC: CPT | Performed by: PHYSICIAN ASSISTANT

## 2019-05-23 PROCEDURE — 87077 CULTURE AEROBIC IDENTIFY: CPT | Performed by: PHYSICIAN ASSISTANT

## 2019-06-19 ENCOUNTER — ORDER TRANSCRIPTION (OUTPATIENT)
Dept: ADMINISTRATIVE | Facility: HOSPITAL | Age: 49
End: 2019-06-19

## 2019-06-19 DIAGNOSIS — R56.9 SEIZURE (HCC): Primary | ICD-10-CM

## 2019-09-17 ENCOUNTER — APPOINTMENT (OUTPATIENT)
Dept: CT IMAGING | Facility: HOSPITAL | Age: 49
End: 2019-09-17
Attending: EMERGENCY MEDICINE
Payer: MEDICARE

## 2019-09-17 ENCOUNTER — HOSPITAL ENCOUNTER (EMERGENCY)
Facility: HOSPITAL | Age: 49
Discharge: HOME | End: 2019-09-17
Attending: EMERGENCY MEDICINE
Payer: MEDICARE

## 2019-09-17 VITALS
DIASTOLIC BLOOD PRESSURE: 70 MMHG | TEMPERATURE: 99 F | RESPIRATION RATE: 14 BRPM | OXYGEN SATURATION: 99 % | HEART RATE: 79 BPM | SYSTOLIC BLOOD PRESSURE: 106 MMHG

## 2019-09-17 DIAGNOSIS — F07.81 POST CONCUSSION SYNDROME: ICD-10-CM

## 2019-09-17 DIAGNOSIS — S09.90XA INJURY OF HEAD, INITIAL ENCOUNTER: Primary | ICD-10-CM

## 2019-09-17 PROCEDURE — 99284 EMERGENCY DEPT VISIT MOD MDM: CPT

## 2019-09-17 PROCEDURE — 96374 THER/PROPH/DIAG INJ IV PUSH: CPT

## 2019-09-17 PROCEDURE — 96375 TX/PRO/DX INJ NEW DRUG ADDON: CPT

## 2019-09-17 PROCEDURE — 70450 CT HEAD/BRAIN W/O DYE: CPT | Performed by: EMERGENCY MEDICINE

## 2019-09-17 RX ORDER — ONDANSETRON 2 MG/ML
4 INJECTION INTRAMUSCULAR; INTRAVENOUS ONCE
Status: COMPLETED | OUTPATIENT
Start: 2019-09-17 | End: 2019-09-17

## 2019-09-17 RX ORDER — HYDROMORPHONE HYDROCHLORIDE 1 MG/ML
0.5 INJECTION, SOLUTION INTRAMUSCULAR; INTRAVENOUS; SUBCUTANEOUS ONCE
Status: COMPLETED | OUTPATIENT
Start: 2019-09-17 | End: 2019-09-17

## 2019-09-17 NOTE — ED INITIAL ASSESSMENT (HPI)
Patient states she had a fall a couple days ago, hx of subdural hematoma and concussion one year ago. Patient with bruising and hematoma to left forehead. Takes ASA 81 mg daily for hx of CVA. Patient is unsure how she fell or what she hit her head on.  She

## 2019-09-17 NOTE — ED PROVIDER NOTES
Patient Seen in: [facility] Emergency Department      History   Patient presents with:  Head Neck Injury (neurologic, musculoskeletal)    Stated Complaint: fall and hit head 2 days ago    HPI    78-year-old female presents to the emergency departmen hypersomnia, unspecified PSG 6-29-14    AHI 2 RDI 2 REM AHI 2 SaO2 doroteo 88 %   • OTHER DISEASES     Crohn's   • Pain in joints    • Pneumonia, organism unspecified(486)    • Seizure disorder (HCC)     absent seizures   • Shortness of breath    • Sleep apn Normocephalic. There is a healing several centimeters cephalohematoma in the left frontal area. Pupils equal round reactive to light. Extraocular movements are intact. Facies are symmetrical.  Tongue is midline.   Oral mucosa is moist.  Tympanic membran EpicACT:ED_HEARTSCORE_SF_POPUP,RunParamsURLEncoded:701%7C

## 2019-10-17 ENCOUNTER — APPOINTMENT (OUTPATIENT)
Dept: CT IMAGING | Facility: HOSPITAL | Age: 49
End: 2019-10-17
Attending: NURSE PRACTITIONER
Payer: MEDICARE

## 2019-10-17 ENCOUNTER — HOSPITAL ENCOUNTER (EMERGENCY)
Facility: HOSPITAL | Age: 49
Discharge: HOME | End: 2019-10-17
Attending: EMERGENCY MEDICINE
Payer: MEDICARE

## 2019-10-17 VITALS
RESPIRATION RATE: 20 BRPM | HEART RATE: 98 BPM | HEIGHT: 63 IN | TEMPERATURE: 98 F | WEIGHT: 140 LBS | OXYGEN SATURATION: 98 % | SYSTOLIC BLOOD PRESSURE: 131 MMHG | DIASTOLIC BLOOD PRESSURE: 76 MMHG | BODY MASS INDEX: 24.8 KG/M2

## 2019-10-17 DIAGNOSIS — S81.812A LACERATION OF LEFT LOWER EXTREMITY, INITIAL ENCOUNTER: ICD-10-CM

## 2019-10-17 DIAGNOSIS — N39.0 ACUTE URINARY TRACT INFECTION: Primary | ICD-10-CM

## 2019-10-17 PROCEDURE — 82962 GLUCOSE BLOOD TEST: CPT

## 2019-10-17 PROCEDURE — 85025 COMPLETE CBC W/AUTO DIFF WBC: CPT | Performed by: NURSE PRACTITIONER

## 2019-10-17 PROCEDURE — 87086 URINE CULTURE/COLONY COUNT: CPT | Performed by: NURSE PRACTITIONER

## 2019-10-17 PROCEDURE — 96365 THER/PROPH/DIAG IV INF INIT: CPT

## 2019-10-17 PROCEDURE — 99284 EMERGENCY DEPT VISIT MOD MDM: CPT

## 2019-10-17 PROCEDURE — 81001 URINALYSIS AUTO W/SCOPE: CPT | Performed by: NURSE PRACTITIONER

## 2019-10-17 PROCEDURE — 96361 HYDRATE IV INFUSION ADD-ON: CPT

## 2019-10-17 PROCEDURE — 80178 ASSAY OF LITHIUM: CPT | Performed by: NURSE PRACTITIONER

## 2019-10-17 PROCEDURE — 99285 EMERGENCY DEPT VISIT HI MDM: CPT

## 2019-10-17 PROCEDURE — 70450 CT HEAD/BRAIN W/O DYE: CPT | Performed by: NURSE PRACTITIONER

## 2019-10-17 PROCEDURE — 80053 COMPREHEN METABOLIC PANEL: CPT | Performed by: NURSE PRACTITIONER

## 2019-10-17 RX ORDER — CEPHALEXIN 500 MG/1
500 CAPSULE ORAL 2 TIMES DAILY
Qty: 20 CAPSULE | Refills: 0 | Status: SHIPPED | OUTPATIENT
Start: 2019-10-17 | End: 2019-10-27

## 2019-10-17 RX ORDER — SODIUM CHLORIDE 9 MG/ML
INJECTION, SOLUTION INTRAVENOUS CONTINUOUS
Status: DISCONTINUED | OUTPATIENT
Start: 2019-10-17 | End: 2019-10-17

## 2019-10-17 RX ORDER — CEPHALEXIN 500 MG/1
500 CAPSULE ORAL 4 TIMES DAILY
Qty: 40 CAPSULE | Refills: 0 | Status: SHIPPED | OUTPATIENT
Start: 2019-10-17 | End: 2019-10-17 | Stop reason: CLARIF

## 2019-10-17 NOTE — ED INITIAL ASSESSMENT (HPI)
Patient had an accident with a screw  on Saturday on her left leg. Wound was checked at urgent care on Monday. Dressing changes have been being completed inconsistently by family.  Family reports patient lives alone has confusion due to a TBI from las

## 2019-10-17 NOTE — ED PROVIDER NOTES
Patient Seen in: [facility] Emergency Department      History   Patient presents with:  Laceration Abrasion (integumentary)  Altered Mental Status (neurologic)    Stated Complaint: wound to leg from saturday; still bleeding.      22-year-old female p • Constipation    • DEPRESSION     Bipolar   • Depression    • Diarrhea, unspecified    • Disorder of thyroid     hypothyroid   • Dizziness    • Easy bruising    • Esophageal reflux    • Extrinsic asthma, unspecified    • Fatigue    • Feeling lonely    • past             Review of Systems    Positive for stated complaint: wound to leg from saturday; still bleeding. Other systems are as noted in HPI. Constitutional and vital signs reviewed.       All other systems reviewed and negative except as noted abo other components within normal limits   CBC W/ DIFFERENTIAL - Abnormal; Notable for the following components:    WBC 15.9 (*)     RDW-SD 47.1 (*)     Neutrophil Absolute Prelim 13.08 (*)     Neutrophil Absolute 13.08 (*)     All other components within nor Approved by: Valdez Rangel MD on 10/17/2019 at 18:10                MDM     Presented today with laceration to the left lower leg which she sustained 1 week ago. Son was concerned that the leg was starting to get infected.   Had not been taking the anti Prescribed:  Current Discharge Medication List    START taking these medications    !! cephALEXin 500 MG Oral Cap  Take 1 capsule (500 mg total) by mouth 2 (two) times daily for 10 days.   Qty: 20 capsule Refills: 0    !! - Potential duplicate medications f

## 2019-10-17 NOTE — ED NOTES
I reviewed that chart and discussed the case. I have examined the patient and noted complaints of altered mental status, laceration to the left leg. The patient has had no fevers no chills.   Was seen as an outpatient facility where it was too late for he wound to leg from saturday; still bleeding. TECHNIQUE:  Noncontrast CT scanning is performed through the brain. Dose reduction techniques were used.  Dose information is transmitted to the ACR ([facility]) Holly Pastor [address] [facility] Radiology Data R

## 2019-10-26 ENCOUNTER — APPOINTMENT (OUTPATIENT)
Dept: CT IMAGING | Facility: HOSPITAL | Age: 49
End: 2019-10-26
Attending: EMERGENCY MEDICINE
Payer: MEDICARE

## 2019-10-26 ENCOUNTER — HOSPITAL ENCOUNTER (EMERGENCY)
Facility: HOSPITAL | Age: 49
Discharge: HOME | End: 2019-10-26
Attending: EMERGENCY MEDICINE
Payer: MEDICARE

## 2019-10-26 VITALS
DIASTOLIC BLOOD PRESSURE: 72 MMHG | HEART RATE: 79 BPM | OXYGEN SATURATION: 95 % | RESPIRATION RATE: 21 BRPM | SYSTOLIC BLOOD PRESSURE: 116 MMHG | BODY MASS INDEX: 25 KG/M2 | WEIGHT: 140 LBS

## 2019-10-26 DIAGNOSIS — S09.90XA INJURY OF HEAD, INITIAL ENCOUNTER: Primary | ICD-10-CM
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
## 2019-10-26 LAB
ALBUMIN SERPL-MCNC: 4.2 G/DL (ref 3.4–5)
ALBUMIN/GLOB SERPL: 1.3 {RATIO} (ref 1–2)
ALP LIVER SERPL-CCNC: 76 U/L (ref 41–108)
ALT SERPL-CCNC: 21 U/L (ref 13–56)
ANION GAP SERPL CALC-SCNC: 7 MMOL/L (ref 0–18)
AST SERPL-CCNC: 11 U/L (ref 15–37)
BASOPHILS # BLD AUTO: 0.06 X10(3) UL (ref 0–0.2)
BASOPHILS NFR BLD AUTO: 0.6 %
BILIRUB SERPL-MCNC: 0.2 MG/DL (ref 0.1–2)
BUN BLD-MCNC: 10 MG/DL (ref 7–18)
BUN/CREAT SERPL: 11.9 (ref 10–20)
CALCIUM BLD-MCNC: 9.9 MG/DL (ref 8.5–10.1)
CHLORIDE SERPL-SCNC: 106 MMOL/L (ref 98–112)
CO2 SERPL-SCNC: 25 MMOL/L (ref 21–32)
CREAT BLD-MCNC: 0.84 MG/DL (ref 0.55–1.02)
DEPRECATED RDW RBC AUTO: 46.3 FL (ref 35.1–46.3)
EOSINOPHIL # BLD AUTO: 0.1 X10(3) UL (ref 0–0.7)
EOSINOPHIL NFR BLD AUTO: 1.1 %
ERYTHROCYTE [DISTWIDTH] IN BLOOD BY AUTOMATED COUNT: 13.6 % (ref 11–15)
GLOBULIN PLAS-MCNC: 3.2 G/DL (ref 2.8–4.4)
GLUCOSE BLD-MCNC: 107 MG/DL (ref 70–99)
HCT VFR BLD AUTO: 43.9 % (ref 35–48)
HGB BLD-MCNC: 14.1 G/DL (ref 12–16)
IMM GRANULOCYTES # BLD AUTO: 0.04 X10(3) UL (ref 0–1)
IMM GRANULOCYTES NFR BLD: 0.4 %
LYMPHOCYTES # BLD AUTO: 1.73 X10(3) UL (ref 1–4)
LYMPHOCYTES NFR BLD AUTO: 18.3 %
M PROTEIN MFR SERPL ELPH: 7.4 G/DL (ref 6.4–8.2)
MCH RBC QN AUTO: 29.4 PG (ref 26–34)
MCHC RBC AUTO-ENTMCNC: 32.1 G/DL (ref 31–37)
MCV RBC AUTO: 91.6 FL (ref 80–100)
MONOCYTES # BLD AUTO: 0.73 X10(3) UL (ref 0.1–1)
MONOCYTES NFR BLD AUTO: 7.7 %
NEUTROPHILS # BLD AUTO: 6.78 X10 (3) UL (ref 1.5–7.7)
NEUTROPHILS # BLD AUTO: 6.78 X10(3) UL (ref 1.5–7.7)
NEUTROPHILS NFR BLD AUTO: 71.9 %
OSMOLALITY SERPL CALC.SUM OF ELEC: 286 MOSM/KG (ref 275–295)
PLATELET # BLD AUTO: 338 10(3)UL (ref 150–450)
POTASSIUM SERPL-SCNC: 4 MMOL/L (ref 3.5–5.1)
RBC # BLD AUTO: 4.79 X10(6)UL (ref 3.8–5.3)
SODIUM SERPL-SCNC: 138 MMOL/L (ref 136–145)
WBC # BLD AUTO: 9.4 X10(3) UL (ref 4–11)

## 2019-10-26 PROCEDURE — 99284 EMERGENCY DEPT VISIT MOD MDM: CPT

## 2019-10-26 PROCEDURE — 96361 HYDRATE IV INFUSION ADD-ON: CPT

## 2019-10-26 PROCEDURE — 80053 COMPREHEN METABOLIC PANEL: CPT | Performed by: EMERGENCY MEDICINE

## 2019-10-26 PROCEDURE — 96360 HYDRATION IV INFUSION INIT: CPT

## 2019-10-26 PROCEDURE — 85025 COMPLETE CBC W/AUTO DIFF WBC: CPT | Performed by: EMERGENCY MEDICINE

## 2019-10-26 PROCEDURE — 70450 CT HEAD/BRAIN W/O DYE: CPT | Performed by: EMERGENCY MEDICINE

## 2019-10-26 PROCEDURE — 72125 CT NECK SPINE W/O DYE: CPT | Performed by: EMERGENCY MEDICINE

## 2019-10-26 RX ORDER — SODIUM CHLORIDE 9 MG/ML
125 INJECTION, SOLUTION INTRAVENOUS CONTINUOUS
Status: DISCONTINUED | OUTPATIENT
Start: 2019-10-26 | End: 2019-10-26

## 2019-10-26 NOTE — ED NOTES
Pt has wrapped bandage on L lower leg. Pt was evaluated by her primary care MD for this recently and is being referred to wound clinic. Pt has large skin tear like wound under bandage. No redness or swelling surrounding wound.

## 2019-10-26 NOTE — ED INITIAL ASSESSMENT (HPI)
Patient presents after fall last night. Patient reportedly hit her head last night on a dresser and was put back in bed. Per family's report to EMS patient is altered and her speech is impaired today.  Level II Trauma called 1210 prior to patient's arrival.

## 2019-10-26 NOTE — ED PROVIDER NOTES
Patient Seen in: [facility] Emergency Department      History   Patient presents with:  Trauma 1 & 2 (cardiovascular, musculoskeletal)    Stated Complaint: Level II Trauma, Altered after Fall last night    HPI    47-year-old female with a history of Pain in joints    • Pneumonia, organism unspecified(486)    • Seizure disorder (HCC)     absent seizures   • Shortness of breath    • Sleep apnea    • Sleep disturbance    • Stress    • STROKE     2003+ mild R.sided weakness   • Syncope    • Ulcerative col reactive to light. Nose and oral cavity are atraumatic. Tympanic membrane's are normal.  Neck is nontender without adenopathy or thyromegaly. Lungs are clear to auscultation. Heart exam: Normal S1-S2 without extra sounds or murmurs.   Regular rate and r acute fracture of the cervical spine. Overall mild degenerative changes are present. If there is persistent clinical concern then recommend MRI.    Dictated by: Desirae Askew MD on 10/26/2019 at 14:36     Approved by: eDsirae Askew MD on 10/26/2019 at 14:38

## 2019-10-30 ENCOUNTER — APPOINTMENT (OUTPATIENT)
Dept: CT IMAGING | Facility: HOSPITAL | Age: 49
End: 2019-10-30
Attending: EMERGENCY MEDICINE
Payer: MEDICARE

## 2019-10-30 PROBLEM — R45.1 AGITATION: Status: ACTIVE | Noted: 2019-10-30

## 2019-10-30 PROCEDURE — 70450 CT HEAD/BRAIN W/O DYE: CPT | Performed by: EMERGENCY MEDICINE

## 2019-10-30 NOTE — ED INITIAL ASSESSMENT (HPI)
According to Dr Autumn Dunbar note she does not live with her mother she lives with her son. She presented with a bandage on her left ankle we removed the old dressing noted a skin tear. New dressing applied.

## 2019-10-30 NOTE — ED PROVIDER NOTES
Patient Seen in: [facility] Emergency Department      History   Patient presents with:  Eval-P (psychiatric)    Stated Complaint:     HPI    Patient presents with altered mental status/agitation. Medics were called by the patient's mother.   The garret • STROKE     2003+ mild R.sided weakness   • Syncope    • Ulcerative colitis, unspecified    • Visual impairment     reading glasses   • Wears glasses    • Wheezing               Past Surgical History:   Procedure Laterality Date   • CHOLECYSTECTOMY  3/1 tenderness. Extremities: No CCE. Skin: Subacute appearing skin tear laceration to left shin. Neurologic: Cranial nerves intact. Strength equal in all extremities, patient will intermittently follow commands.     ED Course     Labs Reviewed   COMP METABO -----------         ------                     CBC W/ DIFFERENTIAL[[id number]]          Abnormal            Final result                 Please view results for these tests on the individual orders.    CANNABINOID CONFIRMATION, UR   RAINBOW DRAW B New Bag 10/30/19 1909)     The patient required sedation with Haldol and Ativan due to her agitation. She is calm at this point. She has been given IV fluids as her mucous memories appear dry.      MDM     The patient is agitated and delusional.  She appe

## 2019-10-30 NOTE — ED INITIAL ASSESSMENT (HPI)
Patient lives with her mom patient is bipolar depression TBI   Mom brought out a cabinet full of medication not sure if she is taking her medication  I received her swearing, screaming and is not making any sense she is repetitive

## 2019-10-30 NOTE — ED NOTES
SECURITY CALLED TO LOCK UP BELONGINGS. ITEMS INCLUDE: 1 SOCK, SHIRT, SWEAT PANTS, AND UNDERWEAR.  BAG #[id number]

## 2019-10-31 PROBLEM — Z91.14 NONCOMPLIANCE WITH MEDICATION REGIMEN: Status: ACTIVE | Noted: 2019-10-31

## 2019-10-31 PROBLEM — Z91.148 NONCOMPLIANCE WITH MEDICATION REGIMEN: Status: ACTIVE | Noted: 2019-10-31

## 2019-10-31 PROBLEM — F22 DELUSION (HCC): Status: ACTIVE | Noted: 2019-10-31

## 2019-10-31 NOTE — CM/SW NOTE
MSW met with patient at bedside, she wants to be evaluated for [facility] acute rehab but PT/OT rec is OP PT/OT at [facility]. Pt states she has been doing that any ways and will continue that if she can not get into Acute. Pt states hx of NAA but does not want to return.

## 2019-10-31 NOTE — H&P
[facility] Hospitalist Team  History and Physical       Assessment/Plan:     #Agitation/delusions  #H/o TBI and Bipolar   -psych on consult  -SW to determine plan for dc may need placement  -PT/OT consults  -CT brain no acute findings    #Seizure Disorder -cont A • Migraines    • Muscular torticollis     Head orientation favors left, can overcome   • Myalgia and myositis, unspecified    • Nausea    • Organic hypersomnia, unspecified PSG 6-29-14    AHI 2 RDI 2 REM AHI 2 SaO2 doroteo 88 %   • OTHER DISEASES     Crohn Riskin)  Sulfa Antibiotics       NAUSEA AND VOMITING  Sumatriptan Succina*    HALLUCINATION    Comment:imitrex  Toradol                     Comment:Gastritis & UC     No current outpatient medications on file.       Social History    Tobacco Use      Smokin midline   Lungs:   Clear to auscultation bilaterally. Normal effort   Chest wall:  No tenderness or deformity   Heart:  Regular rate and rhythm, S1, S2 normal, no murmur, rub or gallop appreciated   Abdomen:   Soft, non-tender.  Bowel sounds normal. No mass acute inflammation. SKULL:             No evidence for fracture or osseous abnormality. OTHER:             None.       CONCLUSION:  Negative   Dictated by: Mee Ridley MD on 10/30/2019 at 20:40     Approved by: Mee Ridley MD on 10/30/2019 at 20:41 Noncontrast CT scanning is performed through the brain. Dose reduction techniques were used. Dose information is transmitted to the [facility] of Radiology) NRDR ([address]) which includes the Dose Index Registry.   PATIENT stenosis at this level due to a posterior disc osteophyte complex. The paravertebral soft tissues are unremarkable in appearance. CONCLUSION:  No evidence of acute fracture of the cervical spine. Overall mild degenerative changes are present.   If th

## 2019-10-31 NOTE — CONSULTS
[facility]  Report of Psychiatric Consultation    Andria Don Patient Status:  Observation    1970 MRN [id number]   [facility] 3NE-A Attending Michoacano Covarrubias DO   Hosp Day # 0 PCP Rudi Greer MD     Date of Admiss Benadryl prn, Phenergan prn, or Reglan prn at home anymore. She will use Zofran PRN nausea at home. 8) Keep in hospital today. If she continues to have improvement in her delirium, she can be discharged home tomorrow.  She will be staying with her young Currently, she feels tired and has mild anxiety and depressed mood. She c/o what she calls \"aphasia\", finding it hard to speak and find the right words. She says she understands what others are saying to her.  She denies hopelessness or suicidal ideat face   • MIGRAINES    • Migraines    • Muscular torticollis     Head orientation favors left, can overcome   • Myalgia and myositis, unspecified    • Nausea    • Organic hypersomnia, unspecified PSG 6-29-14    AHI 2 RDI 2 REM AHI 2 SaO2 doroteo 88 %   • OTHE gastritis  Phenytoin               ANXIETY, CONFUSION, DIZZINESS,                            HALLUCINATION  Triptans                HALLUCINATION    Comment:Lesions in brain  Kdc:Olanzapine          SWELLING  Latex                   RASH, Dyspnea  [facility] Psychiatric/Behavioral: The patient is nervous/anxious.       Mental Status Exam:     Risk Assessment  Suicidal ideation: no suicidal ideation     Objective       10/31/19  1130   BP: 135/75   Pulse: 86   Resp: 16   Temp: 98.3 °F (36.8 °C)     Appearance: size.    INTRACRANIAL:  There are no abnormal extraaxial fluid collections. There is no midline shift. There are no intraparenchymal brain abnormalities. There is nothing specific for acute infarct. There is no hemorrhage or mass lesion.     SINUSES:

## 2019-10-31 NOTE — CM/SW NOTE
Spoke with patient's son and his fiance who are at bedside. Patient is rolling around in the bed and repeating \"I want the prescriptions. \"  Patient has a TBI a year ago. Went to [place] after. Now receiving outpatient PT/OT at [facility].  Pt to ED with dougie

## 2019-10-31 NOTE — ED PROVIDER NOTES
Per discussion with crisis, patient is not a candidate for inpatient psychiatric treatment at [facility]. She states that the psychiatrist recommended nursing home placement.     Patient was evaluated by case management, patient is writhing around, yelling out no

## 2019-10-31 NOTE — PLAN OF CARE
Patient is a&ox3. Patient is confused at times. Expressive aphasia is present. No agitation this shift. PT/OT evaluation completed. Denies any pain. Up with standby and walker. Psych consult placed per Hospitalist. Staff will continue to monitor patient.

## 2019-10-31 NOTE — PLAN OF CARE
Patient admitted for acute agitation. Currently she is participating with care but continues to be confused and hyperverbal. Patient is A&O x2-3.  Hx of CVA and TBI- at her baseline she has some expressive aphasia and has difficultly \"finding the right wor Goal  Description  Patient's Short Term Goal: remain free from falls    Interventions:   - Fall and seizure precautions   -bed alarm and frequency of staff checking on the patient  -toileting offered  - See additional Care Plan goals for specific intervent

## 2019-10-31 NOTE — PROGRESS NOTES
PSYCH CONSULT    Date of Admission: 10/30/19  Date of Consult: 10/31/19  Reason for Consultation: Altered mental status     Impression:  Primary Psychiatric Diagnosis:  Acute delirium/encephalopathy, possibly from polypharmacy and taking meds incorrectly d will be staying with her younger son Rasta Sin and his girlfriend now. Full note to follow.     Dr. Myrna Osullivan

## 2019-10-31 NOTE — OCCUPATIONAL THERAPY NOTE
OCCUPATIONAL THERAPY QUICK EVALUATION - INPATIENT    Room Number: 3683/1456-N  Evaluation Date: 10/31/2019     Type of Evaluation: Quick Eval  Presenting Problem: agitation    Physician Order: IP Consult to Occupational Therapy  Reason for Therapy:  ADL/IA in joints    • Pneumonia, organism unspecified(486)    • Seizure disorder (HCC)     absent seizures   • Shortness of breath    • Sleep apnea    • Sleep disturbance    • Stress    • STROKE     2003+ mild R.sided weakness   • Syncope    • Ulcerative colitis, How much help from another person does the patient currently need…  -   Putting on and taking off regular lower body clothing?: A Little(supervision)   -   Bathing (including washing, rinsing, drying)?: A Little(set-up)  -   Toileting, which includes usi Specific performance deficits impacting engagement in ADL/IADL  LOW  1 - 3 performance deficits    Client Assessment/Performance Deficits  LOW - No comorbidities nor modifications of tasks    Clinical Decision Making  LOW - Analysis of occupational profi

## 2019-10-31 NOTE — PROGRESS NOTES
NURSING ADMISSION NOTE      Patient admitted via Cart  Oriented to room. Safety precautions initiated. Bed in low position. Call light in reach. Oriented to call light. Admission navigator completed. Patient's son/POA, Nahun Chavez, at bedside.    MD

## 2019-10-31 NOTE — ED NOTES
I spoke with her son Elaz Adam on the phone. He is on his way he explained that she had a TBI last year after a fall And sustained a subdural hematoma. she does have bouts of confusion and frequent falls. She is currently in [facility] outpatient rehab .  The farrukh

## 2019-10-31 NOTE — CM/SW NOTE
12:33pm  MSW called Pt's son Andres Robles. He states [facility], they provide transportation for appointments. Son thinks  rehab is helpful. Pt lives with her eldest son [facility] and has a housing voucher.  MSW discussed pt hitesh

## 2019-10-31 NOTE — PHYSICAL THERAPY NOTE
PHYSICAL THERAPY EVALUATION - INPATIENT     Room Number: 7506/9208-P  Evaluation Date: 10/31/2019  Type of Evaluation: Initial  Physician Order: PT Eval and Treat    Presenting Problem: agitation  Reason for Therapy: Mobility Dysfunction and Discharg 2003+ mild R.sided weakness   • Syncope    • Ulcerative colitis, unspecified    • Visual impairment     reading glasses   • Wears glasses    • Wheezing        Past Surgical History  Past Surgical History:   Procedure Laterality Date   • CHOLECYSTECTOMY  3/ difficulty does the patient currently have. ..  -   Turning over in bed (including adjusting bedclothes, sheets and blankets)?: None   -   Sitting down on and standing up from a chair with arms (e.g., wheelchair, bedside commode, etc.): None   -   Moving fr this evaluation, patient's clinical presentation is stable and overall the evaluation complexity is considered moderate. These impairments and comorbidities manifest themselves as functional limitations in independent bed mobility, transfers, and gait.   Gabbi Pablo

## 2019-10-31 NOTE — ED NOTES
Her son, Aries Mae and her mother are now at the bedside. She continues to cry and swearing at her mother.

## 2019-11-01 NOTE — DISCHARGE SUMMARY
General Medicine Discharge Summary     Patient ID:  Maite Collins  44year old  4/16/1970    Admit date: 10/30/2019    Discharge date and time: 11/1/19    Attending Physician: Amanda Lange MORNING AND 1 TABLET EVERY EVENING    Butalbital-APAP-Caffeine -40 MG Oral Tab  1 po q 6 hr prn    LAMOTRIGINE 200 MG Oral Tab  TAKE 1 TABLET BY MOUTH THREE TIMES DAILY    CLONAZEPAM 1 MG Oral Tab  TAKE 1 TABLET BY MOUTH THREE TIMES DAILY    LEVOCETI Pain.    Carisoprodol 350 MG Oral Tab  TAKE 1 TABLET BY MOUTH THREE TIMES DAILY AS NEEDED FOR MUSCLE SPASMS    fluticasone-salmeterol (ADVAIR DISKUS) 250-50 MCG/DOSE Inhalation Aerosol Powder, Breath Activated  INHALE 1 PUFF INTO THE LUNGS EVERY 12 HOURS

## 2019-11-01 NOTE — PLAN OF CARE
A/O x 3-4. Calm and cooperative. C/O headache- tylenol ordered PO PRN  Seizure precautions per protocol  Room air. No tele monitoring.   C/O some loss of appetite and nausea, MD notified and zofran ordered PRN  Bedpan or 1-2 assist with walker.  Jose Gates Impaired Functional Mobility  Goal: Achieve highest/safest level of mobility/gait  Description  Interventions:  - Assess patient's functional ability and stability  - Promote increasing activity/tolerance for mobility and gait  - Educate and engage patient

## 2019-11-01 NOTE — PLAN OF CARE
NURSING DISCHARGE NOTE    Discharged Other, (see nursing note) via Wheelchair. Accompanied by Family member and Support staff  Belongings Taken by patient/family   Patient was given and explaained discharge paperwork. She verbalized understanding.  All

## 2019-11-01 NOTE — PROGRESS NOTES
[facility]  Report of Psychiatric Progress Note    Luciana Hilary Patient Status:  Observation    1970 MRN [id number]   [facility] 3NE-A Attending Osito Florian, [address] [facility] Day # 2 PCP Mahnaz Noriega MD     Date of Admis nausea at home. 7) She can go home today from the psych perspective. She will be staying with her younger son Serjio Ruiz and his girlfriend now. She will continue outpatient speech therapy and PT at [facility].  She will follow up with her outpatient neurol and has mild anxiety and depressed mood. She c/o what she calls \"aphasia\", finding it hard to speak and find the right words. She says she understands what others are saying to her. She denies hopelessness or suicidal ideation. No paranoid ideation.  No v Hemorrhoids    • History of depression    • History of mental disorder    • HYPOTHYROIDISM    • Migraine, hemiplegic     left side walking deficit, drooping face   • MIGRAINES    • Migraines    • Muscular torticollis     Head orientation favors left, can o COMMENTS)    Comment:Must take Nsaids sparingly due to history of             gastritisMust take Nsaids sparingly due to history             of gastritis  Phenytoin               ANXIETY, CONFUSION, DIZZINESS,                            HALLUCINATION  Trip Nightly  •  verapamil HCl ER (CALAN-SR) CR tab 360 mg, 360 mg, Oral, Nightly  •  Montelukast Sodium (SINGULAIR) tab 10 mg, 10 mg, Oral, Nightly  •  OXcarbazepine (TRILEPTAL) tab 300 mg, 300 mg, Oral, BID  •  NON FORMULARY 350 mg, 350 mg, Oral, TID  •  acet VENTRICLES/SULCI:  Ventricles and sulci are normal in size. INTRACRANIAL:  There are no abnormal extraaxial fluid collections. There is no midline shift. There are no intraparenchymal brain abnormalities.   There is nothing specific for acute infarct

## 2019-11-16 ENCOUNTER — APPOINTMENT (OUTPATIENT)
Dept: GENERAL RADIOLOGY | Facility: HOSPITAL | Age: 49
DRG: 100 | End: 2019-11-16
Attending: EMERGENCY MEDICINE
Payer: MEDICARE

## 2019-11-16 ENCOUNTER — HOSPITAL ENCOUNTER (INPATIENT)
Facility: HOSPITAL | Age: 49
LOS: 5 days | Discharge: NURSING, REHAB OR LONG-TERM CARE | DRG: 100 | End: 2019-11-21
Attending: EMERGENCY MEDICINE | Admitting: INTERNAL MEDICINE
Payer: MEDICARE

## 2019-11-16 ENCOUNTER — APPOINTMENT (OUTPATIENT)
Dept: CT IMAGING | Facility: HOSPITAL | Age: 49
DRG: 100 | End: 2019-11-16
Attending: EMERGENCY MEDICINE
Payer: MEDICARE

## 2019-11-16 DIAGNOSIS — E87.6 HYPOKALEMIA: ICD-10-CM

## 2019-11-16 DIAGNOSIS — R94.31 EKG ABNORMALITIES: ICD-10-CM

## 2019-11-16 DIAGNOSIS — R77.8 ELEVATED TROPONIN: ICD-10-CM

## 2019-11-16 DIAGNOSIS — R41.82 ALTERED MENTAL STATUS, UNSPECIFIED ALTERED MENTAL STATUS TYPE: Primary | ICD-10-CM

## 2019-11-16 DIAGNOSIS — D72.829 LEUKOCYTOSIS, UNSPECIFIED TYPE: ICD-10-CM

## 2019-11-16 PROBLEM — R79.89 ELEVATED TROPONIN: Status: ACTIVE | Noted: 2019-11-16

## 2019-11-16 PROCEDURE — 71045 X-RAY EXAM CHEST 1 VIEW: CPT | Performed by: EMERGENCY MEDICINE

## 2019-11-16 PROCEDURE — 70450 CT HEAD/BRAIN W/O DYE: CPT | Performed by: EMERGENCY MEDICINE

## 2019-11-16 RX ORDER — LEVOFLOXACIN 5 MG/ML
500 INJECTION, SOLUTION INTRAVENOUS ONCE
Status: DISCONTINUED | OUTPATIENT
Start: 2019-11-16 | End: 2019-11-16

## 2019-11-16 RX ORDER — ONDANSETRON 2 MG/ML
4 INJECTION INTRAMUSCULAR; INTRAVENOUS EVERY 6 HOURS PRN
Status: DISCONTINUED | OUTPATIENT
Start: 2019-11-16 | End: 2019-11-21

## 2019-11-16 RX ORDER — HEPARIN SODIUM AND DEXTROSE 10000; 5 [USP'U]/100ML; G/100ML
12 INJECTION INTRAVENOUS ONCE
Status: COMPLETED | OUTPATIENT
Start: 2019-11-16 | End: 2019-11-17

## 2019-11-16 RX ORDER — SODIUM CHLORIDE 9 MG/ML
INJECTION, SOLUTION INTRAVENOUS CONTINUOUS
Status: DISCONTINUED | OUTPATIENT
Start: 2019-11-16 | End: 2019-11-18

## 2019-11-16 RX ORDER — LITHIUM CARBONATE 300 MG/1
900 TABLET, FILM COATED, EXTENDED RELEASE ORAL EVERY EVENING
Status: DISCONTINUED | OUTPATIENT
Start: 2019-11-16 | End: 2019-11-18

## 2019-11-16 RX ORDER — LORAZEPAM 2 MG/ML
1 INJECTION INTRAMUSCULAR EVERY 6 HOURS PRN
Status: DISCONTINUED | OUTPATIENT
Start: 2019-11-16 | End: 2019-11-21

## 2019-11-16 RX ORDER — LORAZEPAM 2 MG/ML
0.5 INJECTION INTRAMUSCULAR ONCE
Status: COMPLETED | OUTPATIENT
Start: 2019-11-16 | End: 2019-11-16

## 2019-11-16 RX ORDER — CYCLOBENZAPRINE HCL 10 MG
10 TABLET ORAL 3 TIMES DAILY
Status: DISCONTINUED | OUTPATIENT
Start: 2019-11-16 | End: 2019-11-18

## 2019-11-16 RX ORDER — HEPARIN SODIUM AND DEXTROSE 10000; 5 [USP'U]/100ML; G/100ML
INJECTION INTRAVENOUS CONTINUOUS
Status: DISCONTINUED | OUTPATIENT
Start: 2019-11-17 | End: 2019-11-18

## 2019-11-16 RX ORDER — HEPARIN SODIUM 5000 [USP'U]/ML
60 INJECTION INTRAVENOUS; SUBCUTANEOUS ONCE
Status: COMPLETED | OUTPATIENT
Start: 2019-11-16 | End: 2019-11-16

## 2019-11-16 RX ORDER — CETIRIZINE HYDROCHLORIDE 10 MG/1
10 TABLET ORAL DAILY
Status: DISCONTINUED | OUTPATIENT
Start: 2019-11-16 | End: 2019-11-21

## 2019-11-16 RX ORDER — CLONAZEPAM 0.5 MG/1
1 TABLET ORAL 3 TIMES DAILY
Status: DISCONTINUED | OUTPATIENT
Start: 2019-11-16 | End: 2019-11-17

## 2019-11-16 RX ORDER — FLUTICASONE PROPIONATE 50 MCG
1 SPRAY, SUSPENSION (ML) NASAL DAILY
Status: DISCONTINUED | OUTPATIENT
Start: 2019-11-16 | End: 2019-11-21

## 2019-11-16 RX ORDER — PREGABALIN 100 MG/1
200 CAPSULE ORAL 2 TIMES DAILY
Status: DISCONTINUED | OUTPATIENT
Start: 2019-11-16 | End: 2019-11-21

## 2019-11-16 RX ORDER — ALBUTEROL SULFATE 2.5 MG/3ML
2.5 SOLUTION RESPIRATORY (INHALATION) EVERY 6 HOURS PRN
Status: DISCONTINUED | OUTPATIENT
Start: 2019-11-16 | End: 2019-11-21

## 2019-11-16 RX ORDER — LORAZEPAM 2 MG/ML
0.5 INJECTION INTRAMUSCULAR EVERY 6 HOURS PRN
Status: DISCONTINUED | OUTPATIENT
Start: 2019-11-16 | End: 2019-11-21

## 2019-11-16 RX ORDER — LAMOTRIGINE 100 MG/1
200 TABLET ORAL 3 TIMES DAILY
Status: DISCONTINUED | OUTPATIENT
Start: 2019-11-16 | End: 2019-11-21

## 2019-11-16 RX ORDER — FAMOTIDINE 20 MG/1
40 TABLET ORAL DAILY
Status: DISCONTINUED | OUTPATIENT
Start: 2019-11-16 | End: 2019-11-17

## 2019-11-16 RX ORDER — DICYCLOMINE HYDROCHLORIDE 10 MG/1
10 CAPSULE ORAL EVERY 4 HOURS PRN
Status: DISCONTINUED | OUTPATIENT
Start: 2019-11-16 | End: 2019-11-21

## 2019-11-16 RX ORDER — ONDANSETRON 2 MG/ML
4 INJECTION INTRAMUSCULAR; INTRAVENOUS EVERY 6 HOURS PRN
Status: DISCONTINUED | OUTPATIENT
Start: 2019-11-16 | End: 2019-11-17

## 2019-11-16 RX ORDER — MONTELUKAST SODIUM 10 MG/1
10 TABLET ORAL NIGHTLY
Status: DISCONTINUED | OUTPATIENT
Start: 2019-11-16 | End: 2019-11-21

## 2019-11-16 RX ORDER — METOPROLOL TARTRATE 5 MG/5ML
5 INJECTION INTRAVENOUS EVERY 4 HOURS PRN
Status: DISCONTINUED | OUTPATIENT
Start: 2019-11-16 | End: 2019-11-21

## 2019-11-16 RX ORDER — OXCARBAZEPINE 300 MG/1
300 TABLET, FILM COATED ORAL 2 TIMES DAILY
Status: DISCONTINUED | OUTPATIENT
Start: 2019-11-16 | End: 2019-11-17

## 2019-11-16 RX ORDER — MESALAMINE 400 MG/1
1200 CAPSULE, DELAYED RELEASE ORAL
Status: DISCONTINUED | OUTPATIENT
Start: 2019-11-17 | End: 2019-11-21

## 2019-11-16 NOTE — ED INITIAL ASSESSMENT (HPI)
Pt h/o  TBI, per her ex  the patient has been becoming increasingly confused over the past week, found her lying on the floor on Monday, not eating or taking her medications.   [facility] has an appointment on 11/20 with neurologist.

## 2019-11-16 NOTE — ED NOTES
Attempted to obtain urine but patient was incontinent prior to this RN obtaining specimen. Entire linen change to bed. Will wait approximately 20 minutes prior to retry.

## 2019-11-16 NOTE — ED PROVIDER NOTES
Patient Seen in: [facility] Emergency Department      History   Patient presents with:  Altered Mental Status (neurologic)    Stated Complaint: AMS, hx TBI, psych hx, today non verbal and cant walk, last seen normal was a w*    HPI    23-year-old fe • OTHER DISEASES     Crohn's   • Pain in joints    • Pneumonia, organism unspecified(486)    • Seizure disorder (HCC)     absent seizures   • Shortness of breath    • Sleep apnea    • Sleep disturbance    • Stress    • STROKE     2003+ mild R.sided weakn No murmurs, regular rate and rhythm  Lungs: Clear to auscultation bilaterally  Abdomen: Soft nontender nondistended normal active bowel sounds without rebound, guarding or masses noted  Back nontender without CVA tenderness  Extremity no clubbing, cyanosis DIFFERENTIAL[[id number]]          Abnormal            Final result                 Please view results for these tests on the individual orders.    SCAN SLIDE   TROPONIN I   PTT, ACTIVATED   RAINBOW DRAW BLUE   RAINBOW DRAW LAVENDER   RAINBOW DRAW LIGHT GREE through the brain. Dose reduction techniques were used. Dose information is transmitted to the [facility] of Radiology) NRDR ([address]) which includes the Dose Index Registry.   PATIENT STATED HISTORY: (As transcribed by T MASTOIDS:          No sign of acute inflammation. SKULL:             No evidence for fracture or osseous abnormality. OTHER:             None. CONCLUSION:  No acute intracranial abnormality is seen.   If there is persistent clinical concern then recomm Chest Ap Portable  (cpt=71045)    Result Date: 11/16/2019  PROCEDURE:  XR CHEST AP PORTABLE  (CPT=71045)  TECHNIQUE:  AP chest radiograph was obtained. COMPARISON:  [facility] , XR, XR CHEST AP PORTABLE  (CPT=71045), 1/15/2018, 14:21.   [facility] , XR, XR CHEST A heparin and started on the patient patient be placed in the NICU at this time.         MDM     As above  Admission disposition: 11/16/2019  9:09 PM         Critical Care Note:  The patient arrived with a condition with significant morbidity and mortality as

## 2019-11-17 ENCOUNTER — APPOINTMENT (OUTPATIENT)
Dept: GENERAL RADIOLOGY | Facility: HOSPITAL | Age: 49
DRG: 100 | End: 2019-11-17
Attending: Other
Payer: MEDICARE

## 2019-11-17 PROCEDURE — 71045 X-RAY EXAM CHEST 1 VIEW: CPT | Performed by: OTHER

## 2019-11-17 RX ORDER — POTASSIUM CHLORIDE 1.5 G/1.77G
40 POWDER, FOR SOLUTION ORAL EVERY 4 HOURS
Status: COMPLETED | OUTPATIENT
Start: 2019-11-17 | End: 2019-11-17

## 2019-11-17 RX ORDER — OXCARBAZEPINE 300 MG/5ML
300 SUSPENSION ORAL 2 TIMES DAILY
Status: DISCONTINUED | OUTPATIENT
Start: 2019-11-17 | End: 2019-11-21

## 2019-11-17 RX ORDER — POTASSIUM CHLORIDE 14.9 MG/ML
20 INJECTION INTRAVENOUS ONCE
Status: COMPLETED | OUTPATIENT
Start: 2019-11-17 | End: 2019-11-17

## 2019-11-17 RX ORDER — LORAZEPAM 2 MG/ML
1 INJECTION INTRAMUSCULAR EVERY 4 HOURS PRN
Status: DISCONTINUED | OUTPATIENT
Start: 2019-11-17 | End: 2019-11-21

## 2019-11-17 NOTE — PLAN OF CARE
11/16/2019 2300: Pt transferred from ED to bed 6618. Pt being admitted for AMS, hypokalemia, NSTEMI. Per report patient has been increasingly confused over the last week, found lying on the floor. Pt is not eating or taking medications.  Per patients POA (

## 2019-11-17 NOTE — CONSULTS
[facility]  Report of Consultation    Yuliana Ventura Patient Status:  Inpatient    1970 MRN [id number]   [facility] 6NE-A Attending Roby Lewis MD   Hosp Day # 1 PCP Tete Price MD     Reason fo absent seizures   • Shortness of breath    • Sleep apnea     does not use CPAP   • Sleep disturbance    • Stress    • STROKE     2003+ mild R.sided weakness   • Syncope    • Ulcerative colitis, unspecified    • Visual impairment     reading glasses   • 30 tablet, Rfl: 0  ONDANSETRON 8 MG Oral Tablet Dispersible, DISSOLVE 1 TABLET ON THE TONGUE EVERY 8 HOURS AS NEEDED FOR NAUSEA, Disp: 60 tablet, Rfl: 0  LAMOTRIGINE 200 MG Oral Tab, TAKE 1 TABLET BY MOUTH THREE TIMES DAILY, Disp: 270 tablet, Rfl: 0  CLONA 1  Probiotic Product (PROBIOTIC DAILY OR), Take 1 capsule by mouth daily. , Disp: , Rfl:   Carboxymethylcellulose Sodium (REFRESH TEARS OP), Apply 1 drop to eye as needed. , Disp: , Rfl:   NON FORMULARY, [facility], Disp: , Rfl:   aspirin 81 MG Oral T [Levofloxa*    PAIN    Comment:Tendinitis in multiple joints  Mold                      Mushrooms               NAUSEA AND VOMITING  Penicillin G Potass*    NAUSEA AND VOMITING  Phenothiazines          OTHER (SEE COMMENTS)    Comment:Extrapyramidal syndrom 126*   BUN 26* 16   CREATSERUM 0.80 0.53*   GFRAA 99 127   GFRNAA 86 110   CA 9.8 8.7   ALB 4.2 3.6    148*   K 2.8* 2.9*    119*   CO2 19.0* 18.0*   ALKPHO 88 74   AST 40* 36   ALT 23 24   BILT 0.6 0.5   TP 8.0 6.5       Recent Labs   Lab 11/1

## 2019-11-17 NOTE — PLAN OF CARE
Assumed care of patient at 0700. Patient very drowsy. Alert to self only. Verbal, speech very slurred and at times incomprehensible. When asked questions patient repeats her name . Spontaneously DUARTE. Pupils equal and reactive. Cont.  EEG ordered per  interventions as ordered  Outcome: Progressing  Goal: Absence of seizures  Description  INTERVENTIONS  - Monitor for seizure activity  - Administer anti-seizure medications as ordered  - Monitor neurological status  Outcome: Progressing  Goal: Remains free range  Description  INTERVENTIONS:  - Monitor Blood Glucose as ordered  - Assess for signs and symptoms of hyperglycemia and hypoglycemia  - Administer ordered medications to maintain glucose within target range  - Assess barriers to adequate nutritional i development  - Assess and document skin integrity  - Assess and document dressing/incision, wound bed, drain sites and surrounding tissue  - Implement wound care per orders  - Initiate isolation precautions as appropriate  - Initiate Pressure Ulcer prevent

## 2019-11-17 NOTE — SLP NOTE
Order received for BSSE. Pt known to our service from previous admits from 2018, most recently from October 2018. Soft and thin liquids was recommended at that time. Pt admitted currently with AMS and declining overall status from home.  Pt currently on s

## 2019-11-17 NOTE — DIETARY NOTE
[facility]    NUTRITION ASSESSMENT    Pt  malnutrition criteria. NUTRITION DIAGNOSIS/PROBLEM:    Inadequate oral intake related to physiological / psychological causes as evidenced by NPO status    NUTRITION INTERVENTION:       1.  Meal and Snacks - 0%  Intake Meeting Needs: No  Food Allergies: mushrooms  Cultural/Ethnic/Samaritan Preferences Addresses: Yes    NUTRITION RELATED PHYSICAL FINDINGS:     1. Body Fat/Muscle Mass: not assessed at this time per visual exam.     2. Fluid Accumulation: none pe

## 2019-11-17 NOTE — H&P
General Medicine H&P     Patient presents with:  Altered Mental Status (neurologic)       PCP: Jeremy Harman MD    History of Present Illness: Patient is a 44year old female with PMH including but not limited to asthma, anxiety, bipolar, depression, T STROKE     2003+ mild R.sided weakness   • Syncope    • Ulcerative colitis, unspecified    • Visual impairment     reading glasses   • Wears glasses    • Wheezing       Past Surgical History:   Procedure Laterality Date   • CHOLECYSTECTOMY  3/10   • COLONO mg, Daily  Lithium Carbonate ER, 900 mg, QPM  QUEtiapine Fumarate, 150 mg, Nightly  Verapamil HCl ER, 360 mg, Nightly  Fluticasone Propionate, 1 spray, Daily  mesalamine, 1,200 mg, BID AC  Cyclobenzaprine HCl, 10 mg, TID  piperacillin-tazobactam, 3.375 g, (12896)  COMPARISON:  EDHOMERO , CT, CT BRAIN OR HEAD (24290), 10/30/2019, 20:30. EDWARD , CT, CT BRAIN OR HEAD (52097), 10/26/2019, 14:10.   INDICATIONS:  AMS, hx TBI, psych hx, today non verbal and cant walk, last seen normal was a week ago  TECHNIQUE:  No hemorrhage or mass lesion. SINUSES:           No sign of acute sinusitis. MASTOIDS:          No sign of acute inflammation. SKULL:             No evidence for fracture or osseous abnormality. OTHER:             None.       CONCLUSION:  Negative   Dictated Noncontrast CT scanning of the cervical spine is performed from the skull base through C7. Multiplanar reconstructions are generated. Dose reduction techniques were used.  Dose information is transmitted to the ACR [employer] of Radiology) NRDR (Na process. Normal pulmonary vasculature. Normal cardiomediastinal silhouette. CONCLUSION:  1. NG tube appears within the proximal stomach/GE junction region and should be advanced for more adequate positioning. 2. No acute process.  3. The preliminary AED      #Asthma- cont Singulair,inhaler       #UC- on mesalamine     #H/o CVA- on asa     Proph  -hep gtt    Outpatient records or previous hospital records reviewed.  D/w RN Prosper    [facility] patient while in [address] [place] [address]

## 2019-11-18 ENCOUNTER — APPOINTMENT (OUTPATIENT)
Dept: CT IMAGING | Facility: HOSPITAL | Age: 49
DRG: 100 | End: 2019-11-18
Attending: INTERNAL MEDICINE
Payer: MEDICARE

## 2019-11-18 ENCOUNTER — APPOINTMENT (OUTPATIENT)
Dept: CV DIAGNOSTICS | Facility: HOSPITAL | Age: 49
DRG: 100 | End: 2019-11-18
Attending: INTERNAL MEDICINE
Payer: MEDICARE

## 2019-11-18 PROCEDURE — 93306 TTE W/DOPPLER COMPLETE: CPT | Performed by: INTERNAL MEDICINE

## 2019-11-18 PROCEDURE — 71260 CT THORAX DX C+: CPT | Performed by: INTERNAL MEDICINE

## 2019-11-18 PROCEDURE — 74177 CT ABD & PELVIS W/CONTRAST: CPT | Performed by: INTERNAL MEDICINE

## 2019-11-18 PROCEDURE — 99291 CRITICAL CARE FIRST HOUR: CPT | Performed by: OTHER

## 2019-11-18 PROCEDURE — 90792 PSYCH DIAG EVAL W/MED SRVCS: CPT | Performed by: OTHER

## 2019-11-18 PROCEDURE — 95951 EEG MONITORING/VIDEORECORD: CPT | Performed by: OTHER

## 2019-11-18 RX ORDER — MAGNESIUM SULFATE HEPTAHYDRATE 40 MG/ML
2 INJECTION, SOLUTION INTRAVENOUS ONCE
Status: COMPLETED | OUTPATIENT
Start: 2019-11-18 | End: 2019-11-18

## 2019-11-18 RX ORDER — LACTULOSE 10 G/15ML
30 SOLUTION ORAL 3 TIMES DAILY
Status: DISCONTINUED | OUTPATIENT
Start: 2019-11-18 | End: 2019-11-18

## 2019-11-18 RX ORDER — DEXTROSE MONOHYDRATE 50 MG/ML
INJECTION, SOLUTION INTRAVENOUS CONTINUOUS
Status: DISCONTINUED | OUTPATIENT
Start: 2019-11-18 | End: 2019-11-21

## 2019-11-18 RX ORDER — LITHIUM CARBONATE 300 MG/1
300 CAPSULE ORAL
Status: DISCONTINUED | OUTPATIENT
Start: 2019-11-18 | End: 2019-11-21

## 2019-11-18 RX ORDER — ASPIRIN 81 MG/1
81 TABLET, CHEWABLE ORAL DAILY
Status: DISCONTINUED | OUTPATIENT
Start: 2019-11-18 | End: 2019-11-21

## 2019-11-18 RX ORDER — POTASSIUM CHLORIDE 1.5 G/1.77G
40 POWDER, FOR SOLUTION ORAL ONCE
Status: COMPLETED | OUTPATIENT
Start: 2019-11-18 | End: 2019-11-18

## 2019-11-18 RX ORDER — SODIUM CHLORIDE, SODIUM LACTATE, POTASSIUM CHLORIDE, CALCIUM CHLORIDE 600; 310; 30; 20 MG/100ML; MG/100ML; MG/100ML; MG/100ML
INJECTION, SOLUTION INTRAVENOUS CONTINUOUS
Status: DISCONTINUED | OUTPATIENT
Start: 2019-11-18 | End: 2019-11-18

## 2019-11-18 NOTE — CONSULTS
[address]    PATIENT'S NAME: Mychal MALIK   ATTENDING PHYSICIAN: Ana Ley. ANNA Lindo Ro: Maite Avalos M.D.    PATIENT ACCOUNT#:   [de-identified]    LOCATION:  [address] [place] [address]  MEDICAL RECORD #:   [id number] levothyroxine 150 mcg daily; aspirin; acetaminophen; verapamil; estradiol vaginal tab, Flonase; Lyrica; ondansetron; lamotrigine; clonazepam; ranitidine; dicyclomine; cyclobenzaprine; Trileptal; albuterol p.r.n. Delzicol; ketoconazole p.r.n.; Lithobid;  Ser medicine as well. It is not likely that she has also developed Graves disease. Will repeat thyroid laboratory in the next couple of days. Once able, will resume thyroid therapy and will continue to follow with you. 2.   Traumatic brain injury.   Managem

## 2019-11-18 NOTE — PROGRESS NOTES
[facility]  Neurocritical Care       Subjective: Josi Mercado is a(n) 44year old female admitted yesterday with NSTEMI and AMS, also has h/o of seizures, missed couple doses, was loaded with meds as per  form [facility] savanna normal was a week ago  TECHNIQUE:  Noncontrast CT scanning is performed through the brain. Dose reduction techniques were used.  Dose information is transmitted to the ACR ([facility] [employer] of Radiology) Holly Pastor 35 ([address]) which include CONCLUSION:  Negative   Dictated by: Kyle Tinajero MD on 10/30/2019 at 20:40     Approved by: Kyle Tinajero MD on 10/30/2019 at 20:41          Ct Brain Or Head (64671)    Result Date: 10/26/2019  PROCEDURE:  CT BRAIN OR HEAD (03534)  COMPARISON:  E ([facility]) NRDR ([address]) which includes the Dose Index Registry. PATIENT STATED HISTORY: (As transcribed by Technologist)  Patient fell last night and had altered mental status. Hit head on a dresser.   Nause 2. No acute process. 3. The preliminary report was reviewed. Dictated by: Isaias Ortiz DO on 11/17/2019 at 8:24     Approved by:  Isaias Ortiz DO on 11/17/2019 at 8:26          Xr Chest Ap Portable  (cpt=71045)    Result Date: 11/16/2019  PROCEDURE:  X --     148*  --   --  153*   K 2.8* 2.9* 3.1* 4.1 3.6    119*  --   --  128*   CO2 19.0* 18.0*  --   --  22.0   ALKPHO 88 74  --   --   --    AST 40* 36  --   --   --    ALT 23 24  --   --   --    BILT 0.6 0.5  --   --   --    TP 8.0 6.5  -- HCl (BENTYL) cap 10 mg, 10 mg, Oral, Q4H PRN  metoprolol Tartrate (LOPRESSOR) 5 MG/5ML injection 5 mg, 5 mg, Intravenous, Q4H PRN  heparin (PORCINE) drip 46943ntato/250mL infusion CONTINUOUS, 200-3,000 Units/hr, Intravenous, Continuous  Piperacillin Sod-Ta and clinical staff. Thank you for allowing me to participate in the care of this patient. Varinder Rod MD  Medical Director - Stroke and Neurocritical Care  [facility] - In affiliation with [facility].   Board Lei

## 2019-11-18 NOTE — CONSULTS
INFECTIOUS DISEASE [address] Patient Status:  Inpatient    1970 MRN [id number]   [facility] 6NE-A Attending Tanika Meza MD   [facility] Day # 2 PCP Vanita Lebron night    • Migraine, hemiplegic     left side walking deficit, drooping face   • MIGRAINES    • Migraines    • Muscular torticollis     Head orientation favors left, can overcome   • Myalgia and myositis, unspecified    • Nausea    • Organic hypersomnia, u gastritisMust take Nsaids sparingly due to history             of gastritis  Phenytoin               ANXIETY, CONFUSION, DIZZINESS,                            HALLUCINATION  Triptans                HALLUCINATION    Comment:Lesions in brain  Kdtejinder:Kurt mg, Oral, TID  •  pregabalin (LYRICA) cap 200 mg, 200 mg, Oral, BID  •  cetirizine (ZYRTEC) tab 10 mg, 10 mg, Oral, Daily  •  [place] Carbonate ER (LITHOBID) CR tab 900 mg, 900 mg, Oral, QPM  •  QUEtiapine Fumarate (SEROQUEL) tab 150 mg, 150 mg, Oral, Nigh Disp: 30 capsule, Rfl: 0  LEVOCETIRIZINE DIHYDROCHLORIDE 5 MG Oral Tab, TAKE 1 TABLET(5 MG) BY MOUTH EVERY EVENING, Disp: 90 tablet, Rfl: 0  HYDROmorphone HCl 2 MG Oral Tab, Take 1 tablet (2 mg total) by mouth every 4 (four) hours as needed for Pain., Disp Gel, Apply 1 Application topically 2 (two) times daily. Apply to affected area of the face, Disp: , Rfl:   acetaminophen (TYLENOL EXTRA STRENGTH) 500 MG Oral Tab, Take 1,000 mg by mouth every 4 (four) hours as needed.  Indications: Fever, Pain, Disp: , Rfl: --  15   CREATSERUM 0.80 0.53*  --   --  0.42*   GFRAA 99 127  --   --  137   GFRNAA 86 110  --   --  119   CA 9.8 8.7  --   --  7.9*   ALB 4.2 3.6  --   --   --     148*  --   --  153*   K 2.8* 2.9* 3.1* 4.1 3.6    119*  --   --  128*   CO2 1 Noncompliance with medication regimen     Bipolar 2 disorder (HCC)     Hypokalemia     Leukocytosis, unspecified type     EKG abnormalities     Elevated troponin      ASSESSMENT/PLAN:  1.  Seizure disorder, ho TBA  Encephalopathy, CT negative for acute quiles

## 2019-11-18 NOTE — SLP NOTE
ADULT SWALLOWING EVALUATION    ASSESSMENT    ASSESSMENT/OVERALL IMPRESSION:  Patient seen for swallowing assessment per protocol. Patient admitted with increasing confusion, not eating or taking medications. She does have a history of TBI.   She is alert, • Acne    • ALLERGIC RHINITIS    • Anxiety state    • Arthritis    • ASTHMA    • Asthma    • Back pain    • Back problem     scoliosis    • Bad breath    • Bipolar affective (HCC)    • Chronic rhinitis    • Colitis    • Constipation    • DEPRESSION     B positioning. 2. No acute process. 3. The preliminary report was reviewed. Dictated by: Noah Beckwith DO on 11/17/2019 at 8:24       Approved by:  Noah Beckwith DO on 11/17/2019 at 8:26       SUBJECTIVE       OBJECTIVE   ORAL MOTOR EXAMINATION  D

## 2019-11-18 NOTE — PROCEDURES
VIDEO ELECTROENCEPHALOGRAM REPORT    Patient Name: Shiloh [address]  MR#:  [id number]    Ordering Physician: Efe Serna    Start Time: 11/17/2019 @ 11.15  End Time: 11/18/2019 @ 10.35    DX:ALTERED MENTAL STATUS  HX:PT IS A 44YEAR OLD FEMAILE WITH A HISTO slowing in delta-theta frequency with significantly reduced sharps and patient becoming more awake, alert and appropriately responsive. PUSH BUTTON EVENTS:  There were No button events that correlate with the patient’s documentation.   The patient did no

## 2019-11-18 NOTE — PLAN OF CARE
2019 @ Jo 1521 care of patient. Pt is on monitor, continuous EEG monitoring, heparin gtt, NS @100, IV keppra & antibiotics. Pt spontaneously opening eyes. Pt is stating name, , and place (intermittently).   Pts speech more clear with intermit quality of pulses, skin color and temperature  - Assess for signs of decreased coronary artery perfusion - ex.  Angina  - Evaluate fluid balance, assess for edema, trend weights  Outcome: Progressing

## 2019-11-18 NOTE — PROGRESS NOTES
[facility] Hospitalist Progress Note     PCP: Brittney Ya MD    Chief Complaint: follow-up    Overnight/Interim Events:      SUBJECTIVE:  Pt \"best she can be\" currently. Oriented to [facility], Nov 2019. Answering questions today, more alert. On heparin gtt.  Re --  122*       Recent Labs   Lab 11/16/19  1547 11/17/19 0317   ALT 23 24   AST 40* 36   ALB 4.2 3.6       No results for input(s): PGLU in the last 168 hours.     Recent Labs   Lab 11/16/19  1547 11/16/19 2125 11/17/19 0317   TROP 6.490* 6.710* 4.940* follow     # Hypothyroidism  -cont home levothyroxine as able  -TSH <0.005, free T4 1.1; likely sick euthyroid syndrome, apprec endo recs   -NOT contributing to MS     #H/o TBI and Bipolar, delerium  -psych saw previous admit - adjusted meds   -apprec

## 2019-11-18 NOTE — PROGRESS NOTES
[facility]  Progress Note    Luciana Richard Patient Status:  Inpatient    1970 MRN [id number]   [facility] 6NE-A Attending Tanika Meza MD   Hosp Day # 2 PCP Mahnaz Noriega MD     Subjective:  No specific complaint mental status, unspecified altered mental status type     Polypharmacy     Extrapyramidal syndrome     Depressive disorder     Torticollis     Other osteoarthritis of spine, cervical region     Acute subdural hematoma (HCC)     Nonintractable epilepsy with

## 2019-11-18 NOTE — PROGRESS NOTES
Cardiology follow-up  Echocardiogram shows mild LVH without regional wall motion abnormality. This is not consistent with significant myocardial infarction nor Takotsubo syndrome.   Therefore most likely urology of troponin elevation is related to demand i

## 2019-11-18 NOTE — PROGRESS NOTES
[facility] Cardiology Progress Note - Helen Calle Patient Status:  Inpatient    1970 MRN [id number]   [facility] 6NE-A Attending Deepti Haynes MD   Hosp Day # 2 PCP Rosetta Mckeon MD     Terrance without status epilepticus ([facility]     Major depression, recurrent ([facility]     Insomnia     H/O multiple allergies     Anxiety     Alopecia areata     ADD (attention deficit disorder)     Agitation     Delusion ([facility]     Noncompliance with medication regimen 11/18/19  0419    148*  --   --  153*   K 2.8* 2.9* 3.1* 4.1 3.6    119*  --   --  128*   CO2 19.0* 18.0*  --   --  22.0   BUN 26* 16  --   --  15   CREATSERUM 0.80 0.53*  --   --  0.42*   CA 9.8 8.7  --   --  7.9*   MG  --  1.9  --   --  1.8 QAM  oxcarbazepine (TRILEPTAL) suspension 300 mg, 300 mg, Per G Tube, BID  ondansetron HCl (ZOFRAN) injection 4 mg, 4 mg, Intravenous, Q6H PRN  LORazepam (ATIVAN) injection 0.5 mg, 0.5 mg, Intravenous, Q6H PRN    Or  LORazepam (ATIVAN) injection 1 mg, 1 mg symptoms of depression or anxiety  Hematology: denies bruising or excessive bleeding  Endocrine: no history of diabetes  Allergy: see above drug allergies    Nadia Marie MD  11/18/2019  11:14 AM

## 2019-11-18 NOTE — PLAN OF CARE
Problem: Impaired Swallowing  Goal: Minimize aspiration risk  Description  Interventions:  -NPO  -Oral Care   Outcome: Progressing

## 2019-11-18 NOTE — PHYSICAL THERAPY NOTE
Physical therapy order received, chart reviewed. Attempted to see pt at this time, but pt is on bedrest for 24 hour EEG. Will plan to re-attempt to see pt again once EEG is completed.

## 2019-11-18 NOTE — PROGRESS NOTES
PSYCH CONSULT    Date of Admission: 11/16/19  Date of Consult: 11/18/19  Reason for Consultation: Altered mental status    Impression:  Primary Psychiatric Diagnosis:  Acute delirium, likely multi-factorial from suspected seizures (EEG was abnormal with sp

## 2019-11-18 NOTE — CONSULTS
[facility]  Report of Psychiatric Consultation    Ananth Pearson Patient Status:  Inpatient    1970 MRN [id number]   [facility] 6NE-A Attending John Killian MD   Hosp Day # 2 PCP Bettye Schroeder MD     Date of Admis nightly for bipolar disorder    5) Continue to hold Soma 350mg TID and Dialudid 2mg PRN pain.      6) Continue Lactulose for elevated ammonia level    Heber Mcgovern  11/18/2019  1:02 PM    History of Present Illness:  43 yo WF with hx bipolar 2 disorder and s for monitoring. She had a EEG that showed spikes consistent with a post-ictal state. She was loaded with keppra for seizure prevention. Due to her lethargy, her klonipin dose was restarted on 11/17 at a lower dose of 0.25mg TID.      Today, she is less drow disorder    • Heartburn    • Hemorrhoids    • History of depression    • History of mental disorder    • HYPOTHYROIDISM    • Incontinence     at night    • Migraine, hemiplegic     left side walking deficit, drooping face   • MIGRAINES    • Migraines    • COMMENTS)    Comment:Suicidal ideation  Nsaids                  OTHER (SEE COMMENTS)    Comment:Must take Nsaids sparingly due to history of             gastritisMust take Nsaids sparingly due to history             of gastritis  Phenytoin               AN 1 puff, 1 puff, Inhalation, Daily  •  Montelukast Sodium (SINGULAIR) tab 10 mg, 10 mg, Oral, Nightly  •  lamoTRIgine (LAMICTAL) tab 200 mg, 200 mg, Oral, TID  •  pregabalin (LYRICA) cap 200 mg, 200 mg, Oral, BID  •  cetirizine (ZYRTEC) tab 10 mg, 10 mg, Or 3.6 11/18/2019     11/18/2019    CO2 22.0 11/18/2019     11/18/2019    CA 7.9 11/18/2019    PTT 84.1 11/18/2019    MG 1.8 11/18/2019       STUDIES:    24 HR EEG 11/17/19-11/18/19  INTERPRETATION:  Abnormal EEG with presence of slowing in delta

## 2019-11-18 NOTE — OCCUPATIONAL THERAPY NOTE
OT order received. Pt having 24 hour EEG done at this time and not ready for OT eval. Will f/u after EEG as appropriate and as time permits. RN in agreement with this plan.

## 2019-11-18 NOTE — CONSULTS
[address]    PATIENT'S NAME: Sravanthi MALIK   ATTENDING PHYSICIAN: Mary Sahu. ANNA Peterson [place]: Aaron Perez M.D.    PATIENT ACCOUNT#:   [de-identified]    LOCATION:  [address] [place] [address]  MEDICAL RECORD #:   [id number]       DA Blood pressure 140s over 90s. HEENT:  Pupils are mid range and reactive. Roving eye movements. HEART:  Regular rate and rhythm. ABDOMEN:  Soft and nontender. NEUROLOGIC:  Today, patient has an NG tube in. She is very somnolent.   She will wake up

## 2019-11-19 PROCEDURE — 99233 SBSQ HOSP IP/OBS HIGH 50: CPT | Performed by: OTHER

## 2019-11-19 RX ORDER — CLONAZEPAM 0.5 MG/1
0.25 TABLET ORAL 3 TIMES DAILY
Status: DISCONTINUED | OUTPATIENT
Start: 2019-11-19 | End: 2019-11-21

## 2019-11-19 RX ORDER — POTASSIUM CHLORIDE 1.5 G/1.77G
40 POWDER, FOR SOLUTION ORAL EVERY 4 HOURS
Status: COMPLETED | OUTPATIENT
Start: 2019-11-19 | End: 2019-11-19

## 2019-11-19 RX ORDER — FAMOTIDINE 20 MG/1
40 TABLET ORAL DAILY
Status: DISCONTINUED | OUTPATIENT
Start: 2019-11-20 | End: 2019-11-21

## 2019-11-19 RX ORDER — LEVETIRACETAM 500 MG/1
500 TABLET ORAL 2 TIMES DAILY
Status: DISCONTINUED | OUTPATIENT
Start: 2019-11-19 | End: 2019-11-21

## 2019-11-19 RX ORDER — VERAPAMIL HYDROCHLORIDE 240 MG/1
240 TABLET, FILM COATED, EXTENDED RELEASE ORAL NIGHTLY
Status: DISCONTINUED | OUTPATIENT
Start: 2019-11-19 | End: 2019-11-21

## 2019-11-19 RX ORDER — HEPARIN SODIUM 5000 [USP'U]/ML
5000 INJECTION, SOLUTION INTRAVENOUS; SUBCUTANEOUS EVERY 8 HOURS SCHEDULED
Status: DISCONTINUED | OUTPATIENT
Start: 2019-11-19 | End: 2019-11-21

## 2019-11-19 NOTE — PLAN OF CARE
Assumed care at [address]. Pt. Drowsy but fully able to complete neuro exam.  After pt. Received nightly meds of lamictal, seroquel, trileptal and lyrica pt. Very sleepy, requiring stimulation to stay awake. BP 78/52.   Neuro APN notified and 500cc LR bolus gi

## 2019-11-19 NOTE — PLAN OF CARE
Pt. Follows commands, no complaints of pain, vitals stable, pt. Passed swallow eval today, NG discontinued, pt. Able to make her needs known, pt. Can transfer to 3432, report called to floor R.N., all personal belongings with patient.

## 2019-11-19 NOTE — PROGRESS NOTES
[facility]  Neurocritical Care       Subjective: Morgan Stroud is a(n) 44year old female admitted yesterday with NSTEMI and AMS, also has h/o of seizures, missed couple doses, was loaded with meds as per  form [facility] savanna hx, today non verbal and cant walk, last seen normal was a week ago  TECHNIQUE:  Noncontrast CT scanning is performed through the brain. Dose reduction techniques were used.  Dose information is transmitted to the [facility] of Radiology) Holly Pastor 35 (N osseous abnormality. OTHER:             None.       CONCLUSION:  Negative   Dictated by: Darryl Del Angel MD on 10/30/2019 at 20:40     Approved by: Darryl Del Angel MD on 10/30/2019 at 20:41          Ct Brain Or Head (03304)    Result Date: 10/26/2019  PROCE Dose information is transmitted to the [facility] [employer] [facility] Radiology) NRDR ([address]) which includes the Dose Index Registry.   PATIENT STATED HISTORY: (As transcribed by Technologist)  Patient fell last night and had altered me be advanced for more adequate positioning. 2. No acute process. 3. The preliminary report was reviewed. Dictated by: Yarelis Skinner DO on 11/17/2019 at 8:24     Approved by:  Yarelis Skinner DO on 11/17/2019 at 8:26          Xr Chest Ap Portable  (cpt=[id number] 11/18/19  0100 11/18/19  0419 11/19/19  0620   * 126*  --   --  122* 104*   BUN 26* 16  --   --  15 7   CREATSERUM 0.80 0.53*  --   --  0.42* 0.46*   GFRAA 99 127  --   --  137 133   GFRNAA 86 110  --   --  119 116   CA 9.8 8.7  --   --  7.9* 8.4* Inhalation, Daily  Montelukast Sodium (SINGULAIR) tab 10 mg, 10 mg, Oral, Nightly  lamoTRIgine (LAMICTAL) tab 200 mg, 200 mg, Oral, TID  pregabalin (LYRICA) cap 200 mg, 200 mg, Oral, BID  cetirizine (ZYRTEC) tab 10 mg, 10 mg, Oral, Daily  QUEtiapine Perdue Conklin breakdown. DVT Prophylaxis:  - SCD’s  - Heparin 5000U SQ BID  Goals of the Day: Seizure precautions, continue to adjust her seizure meds, supportive care. Ok to transfer to floor. Thank you for allowing me to participate in the care of this patient.

## 2019-11-19 NOTE — PROGRESS NOTES
[facility]                INFECTIOUS DISEASE PROGRESS NOTE    Cinthia Laws Patient Status:  Inpatient    1970 MRN [id number]   [facility] 6NE-A Attending Ismael Lee MD   Hosp Day # 3 PCP Navin Waters MD 8.0 6.5  --   --   --  5.2*    < > = values in this interval not displayed. No results found for: [facility] Encounter on 11/16/19   1.  BLOOD CULTURE     Status: None (Preliminary result)    Collection Time: 11/16/19  4:05 PM   R abx      Beulah Cooper MD  [facility]  [phone]

## 2019-11-19 NOTE — PHYSICAL THERAPY NOTE
PHYSICAL THERAPY EVALUATION - INPATIENT     Room Number: 8133/2771-V  Evaluation Date: 11/19/2019  Type of Evaluation: Initial  Physician Order: PT Eval and Treat    Presenting Problem: Altered Mental Status  Reason for Therapy: Mobility Dysfunction and • History of depression    • History of mental disorder    • HYPOTHYROIDISM    • Incontinence     at night    • Migraine, hemiplegic     left side walking deficit, drooping face   • MIGRAINES    • Migraines    • Muscular torticollis     Head orientation dresser. SUBJECTIVE  \"I've been out of it for a few days. \"    Patient self-stated goal is to \"be independent. \"  Pt also states that she would like to \"go to full-time [inpatient]at [facility] [employer]. \"    OBJECTIVE  Precautions: Seizure  Fall Risk: High fa room?: A Little   -   Climbing 3-5 steps with a railing?: A Little       AM-PAC Score:  Raw Score: 18   Approx Degree of Impairment: 46.58%   Standardized Score (AM-PAC Scale): 43.63   CMS Modifier (G-Code): CK    FUNCTIONAL ABILITY STATUS  Gait Assessment evaluation, the patient presents with the following impairments: 1) impaired cognition, 2) impaired static and dynamic sitting and standing balance, 3) impaired muscular endurance, 4) impaired aerobic capacity, and 5) Torticollis.   These impairments and co

## 2019-11-19 NOTE — CM/SW NOTE
PT recommends AR. Physiatry consult order obtained. MJ has been notifie dvia ECIN. SW to follow for dc planning.      Raven William RN, [facility]    [phone]

## 2019-11-19 NOTE — SLP NOTE
ADULT SWALLOWING EVALUATION    ASSESSMENT    ASSESSMENT/OVERALL IMPRESSION:  Patient seen to reassess swallow function. She is alert and up in bed. Her speech is much more clear this morning though with persistent mild dysarthria.       SLP presented ice affective ([facility]    • Chronic rhinitis    • Colitis    • Constipation    • DEPRESSION     Bipolar   • Depression    • Diarrhea, unspecified    • Disorder of thyroid     hypothyroid   • Dizziness    • Easy bruising    • Esophageal reflux    • Extrinsic asthm calcification. PLEURA:  No mass or effusion. CHEST WALL:  No mass or axillary adenopathy. AORTA:  No aneurysm or dissection. VASCULATURE:  No visible pulmonary arterial thrombus or attenuation.        ABDOMEN/PELVIS:  LIVER:  No enlargement, atrop retained feces in the rectal vault measuring 9.3 cm. No misty colonic wall thickening or colitis is identified. Please correlate clinically for   fecal impaction. 3. Bibasilar atelectasis versus scarring.   New line bilateral nonobstructing nephrolithiasi consistency and NA liquids without overt signs or symptoms of aspiration with 100 % accuracy over 1-2 session(s).   In Progress     FOLLOW UP  Treatment Plan/Recommendations: Dysphagia therapy  Number of Visits to Meet Established Goals: 5  Follow Up Needed

## 2019-11-19 NOTE — OCCUPATIONAL THERAPY NOTE
OCCUPATIONAL THERAPY EVALUATION - INPATIENT     Room Number: 3312/8382-X  Evaluation Date: 11/19/2019  Type of Evaluation: Initial  Presenting Problem: AMS, seizure    Physician Order: IP Consult to Occupational Therapy  Reason for Therapy: ADL/IADL Dysfun Hemorrhoids    • History of depression    • History of mental disorder    • HYPOTHYROIDISM    • Incontinence     at night    • Migraine, hemiplegic     left side walking deficit, drooping face   • MIGRAINES    • Migraines    • Muscular torticollis     Head cleaning. Pt denies any recent falls, but per medical record pt fell in October and hit her head on a dresser. SUBJECTIVE   Pt stated, \"I have been not mentally here for a few days. \"    Patient self-stated goal is to be inpatient at [facility].      O Taking care of personal grooming such as brushing teeth?: A Lot  -   Eating meals?: A Little    AM-PAC Score:  Score: 13  Approx Degree of Impairment: 63.03%  Standardized Score (AM-PAC Scale): 32.03  CMS Modifier (G-Code): CL    FUNCTIONAL TRANSFER ASSESS - 5 performance deficits   Client Assessment/Performance Deficits MODERATE - Comorbidities and min to mod modifications of tasks    Clinical Decision Making MODERATE - Analysis of occupational profile, detailed assessments, several treatment options    Ove

## 2019-11-19 NOTE — PROGRESS NOTES
[facility] Hospitalist Progress Note     PCP: Xavier Venegas MD    Chief Complaint: follow-up    Overnight/Interim Events:   Transferred out of ICU to the floor. SUBJECTIVE:  Pt seen and examined. States she is feeling better, now more lucid. No F/C. 2.1   * 126*  --   --  122* 104*       Recent Labs   Lab 11/16/19  1547 11/17/19  0317 11/19/19  0620   ALT 23 24 21   AST 40* 36 15   ALB 4.2 3.6 2.7*       No results for input(s): PGLU in the last 168 hours.     Recent Labs   Lab 11/16/19  1547 11 stress test as outpt per cards    # hypokalemia  -replete per protocol, 2.8     Hypernatremia  -148 to 153, now to 144  -cont D5W, follow     # Hypothyroidism  -cont home levothyroxine as able  -TSH <0.005, free T4 1.1; likely sick euthyroid syndrome, appr

## 2019-11-19 NOTE — PROGRESS NOTES
[facility]  Progress Note    Andria Don Patient Status:  Inpatient    1970 MRN [id number]   [facility] 6NE-A Attending Donaldo Tim MD   Hosp Day # 3 PCP Rudi Greer MD     Assessment/Plan:  Patient Active P hyperthyroidism. Will continue to follow lab. Thyroid is not contributing to pt's current MS changes.     2. Elevated WBC  Low grade temp  On antibx per primary service     3. NSTEMI  Elevated troponins  Per cards    4.  Hypoglycemia  Lab for FBS, Insulin

## 2019-11-19 NOTE — PLAN OF CARE
Assumed patient care this am. Patient more alert and oriented today. Patient following commands, generalized weakness. Denies pain. Tolerating tube feeds. Patient did not pass bedside swallow exam. Oral contrast given via NGT for CT scan.  Lactulose given t

## 2019-11-19 NOTE — PROGRESS NOTES
[facility] Cardiology Progress Note - Maya Landeros Patient Status:  Inpatient    1970 MRN [id number]   [facility] 6NE-A Attending Roby Lewis MD   [facility] Day # 3 PCP Tete Price MD     [facility] epilepsy type ([address]     Visual hallucination     Paranoid delusion ([address]     Focal and partial seizures ([address]     Epilepsy with partial complex seizures, without status epilepticus ([address]     Major depression, recurrent ([address]     Insomnia     H/O multiple a 11/17/19  0317 11/18/19  0420 11/19/19  0626   WBC 24.9* 19.1* 7.3 7.3   HGB 15.6 13.5 10.3* 12.2   MCV 85.5 87.7 91.7 89.4   .0 353.0 258.0 272.0       Recent Labs   Lab 11/16/19  1547 11/17/19  0317 11/17/19  1232 11/18/19  0100 11/18/19  0419 11/ Hydroxide-Simeth (MAALOX) 30 mL, zinc oxide (DESITIN) 40 % 30 g  Magic butt cream, , Topical, Daily PRN  clonazePAM (KLONOPIN) 0.1mg/ml oral suspension 0.25 mg, 0.25 mg, Oral, TID  levETIRAcetam (KEPPRA) 500 mg in sodium chloride 0.9% 100 mL IVPB, 500 mg, mL ADD-vantage, 3.375 g, Intravenous, Q8H        ROS:  General Health: otherwise feels well, weight stable  Constitutiona: no recent fevers  Skin: denies any unusual skin lesions or rashes  Eyes: no visual complaints or deficits  HEENT: denies nasal conges

## 2019-11-20 PROCEDURE — 99232 SBSQ HOSP IP/OBS MODERATE 35: CPT | Performed by: OTHER

## 2019-11-20 RX ORDER — LITHIUM CARBONATE 300 MG/1
300 CAPSULE ORAL
Qty: 180 CAPSULE | Refills: 0 | Status: SHIPPED | OUTPATIENT
Start: 2019-11-20 | End: 2020-01-20

## 2019-11-20 RX ORDER — LACTULOSE 20 G/30ML
10 SOLUTION ORAL ONCE
Status: DISCONTINUED | OUTPATIENT
Start: 2019-11-20 | End: 2019-11-21

## 2019-11-20 RX ORDER — VERAPAMIL HYDROCHLORIDE 240 MG/1
240 TABLET, FILM COATED, EXTENDED RELEASE ORAL NIGHTLY
Qty: 90 TABLET | Refills: 0 | Status: SHIPPED | OUTPATIENT
Start: 2019-11-20 | End: 2020-01-14 | Stop reason: ALTCHOICE

## 2019-11-20 RX ORDER — MESALAMINE 400 MG/1
1200 CAPSULE, DELAYED RELEASE ORAL
Qty: 540 CAPSULE | Refills: 3 | Status: SHIPPED | COMMUNITY
Start: 2019-11-20 | End: 2019-12-20

## 2019-11-20 RX ORDER — OXCARBAZEPINE 300 MG/5ML
300 SUSPENSION ORAL 2 TIMES DAILY
Qty: 1 BOTTLE | Refills: 0 | Status: SHIPPED | OUTPATIENT
Start: 2019-11-20 | End: 2019-12-20

## 2019-11-20 RX ORDER — LEVETIRACETAM 500 MG/1
500 TABLET ORAL 2 TIMES DAILY
Qty: 180 TABLET | Refills: 0 | Status: SHIPPED | OUTPATIENT
Start: 2019-11-20 | End: 2020-02-21

## 2019-11-20 RX ORDER — POTASSIUM CHLORIDE 20 MEQ/1
40 TABLET, EXTENDED RELEASE ORAL EVERY 4 HOURS
Status: COMPLETED | OUTPATIENT
Start: 2019-11-20 | End: 2019-11-20

## 2019-11-20 RX ORDER — CLONAZEPAM 0.5 MG/1
0.25 TABLET ORAL 3 TIMES DAILY
Qty: 90 TABLET | Refills: 0 | Status: SHIPPED | COMMUNITY
Start: 2019-11-20 | End: 2020-01-06

## 2019-11-20 NOTE — PLAN OF CARE
Assumed care at [address] [place] [address] Road. Denies pain/discomfort. Neuro checks q4h. See charting for full assessment. Head leans to the left which is baseline for the patient. Seizure precautions maintained.      Problem: Safety Risk - Non-Violent Restraints  Goal: Patie any seizure activity  - Instruct patient/family to call for assistance with activity based on assessment  Outcome: Progressing  Goal: Achieves maximal functionality and self care  Description  INTERVENTIONS  - Monitor swallowing and airway patency with pat consult as needed  - Instruct patient on self management of diabetes  Outcome: Progressing  Goal: Electrolytes maintained within normal limits  Description  INTERVENTIONS:  - Monitor labs and rhythm and assess patient for signs and symptoms of electrolyte Progressing  Goal: Oral mucous membranes remain intact  Description  INTERVENTIONS  - Assess oral mucosa and hygiene practices  - Implement preventative oral hygiene regimen  - Implement oral medicated treatments as ordered  Outcome: Progressing     Proble

## 2019-11-20 NOTE — PROGRESS NOTES
[facility]  Progress Note    Ananth Pearson Patient Status:  Inpatient    1970 MRN [id number]   [facility] 7NE-A Attending Chip Lehman, DO   Hosp Day # 4 PCP Bettye Schroeder MD     Subjective:  No specific complaints unspecified altered mental status type     Polypharmacy     Extrapyramidal syndrome     Depressive disorder     Torticollis     Other osteoarthritis of spine, cervical region     Acute subdural hematoma (HCC)     Nonintractable epilepsy without status epil

## 2019-11-20 NOTE — DISCHARGE SUMMARY
General Medicine Discharge Summary     Patient ID:  Andria Don  44year old  4/16/1970    Admit date: 11/16/2019    Discharge date and time: 11/21/2019    Attending Physician: Nilsa Feliciano TBI and Bipolar, delerium  -psych saw previous admit - adjusted meds   -apprec Dr. Albaro Lennon, cont trileptal and lamictal for sz/bipolar  -cont seroquel/lithium  -holding pain meds for now      #Seizure Disorder -cont AED, keppra 500mg q12 and trileptal an this  · reasons to take this  · additional instructions     clonazePAM 0.5 MG Tabs  Commonly known as:  Ben Neff  What changed:    · medication strength  · how much to take        CONTINUE taking these medications    acetaminophen 500 MG Tabs  Commonly kno known as:  ZANTAC  TAKE 1 TABLET BY MOUTH 1 TO 2 TIMES DAILY AS NEEDED     REFRESH TEARS OP     Zafirlukast 20 MG Tabs  Commonly known as:  ACCOLATE  TAKE 1 TABLET(20 MG) BY MOUTH TWICE DAILY        STOP taking these medications    Carisoprodol 350 MG Tabs lesions  Neuro: unable to fully assess    Total time coordinating care for discharge: Greater than 30 minutes    [facility] Hospitalist

## 2019-11-20 NOTE — PROGRESS NOTES
[facility] Hospitalist Progress Note     PCP: Brittney Ya MD    Chief Complaint: follow-up    Overnight/Interim Events:   Intermittent confusion overnight. SUBJECTIVE:  Pt seen and examined.   States she is feeling well this AM.  No F/C, N/V.     OBJ 9.8 8.7  --   --  7.9* 8.4*  --  8.7   MG  --  1.9  --   --  1.8 2.1  --   --    * 126*  --   --  122* 104*  --  77    < > = values in this interval not displayed.        Recent Labs   Lab 11/16/19  1547 11/17/19  0317 11/19/19  0620   ALT 23 24 21 reviewed  -blood cxs NG x 2d  -no clear source currently but improving on abx     # Elevated troponin, suspect demand ischemia   -cards following, apprec; 2/2 demand ischemia?   -heparin gtt off, cont asa  -echo unremarkable  -may need stress test as outpt

## 2019-11-20 NOTE — CONSULTS
PHYSICAL MEDICINE AND REHABILITATION CONSULTATION     CC: Impaired mobility and ADL dysfunction secondary to encephalopathy    HPI: This is a 78-year-old female with a history of subdural hematoma, seizures and migraine headaches who was admitted with conf incontinence PM&R has now been consulted in order to assess patient's functional status and make recommendations for rehabilitation. ROS:  A full 10 point review of systems was conducted and is negative except those listed in HPI.     Current medications chloride 0.9% 250 mL IVPB, 40 mEq, Intravenous, Once    Followed by  [COMPLETED] potassium chloride IVPB premix 20 mEq, 20 mEq, Intravenous, Once  [COMPLETED] potassium chloride (KLOR-CON) powder packet 40 mEq, 40 mEq, Per NG Tube, Q4H  [COMPLETED] sodium Once        Allergies:    Depakote [Valproic *    OTHER (SEE COMMENTS)    Comment:Suicidal ideation  Nsaids                  OTHER (SEE COMMENTS)    Comment:Must take Nsaids sparingly due to history of             gastritisMust take Nsaids sparingly due to orientation favors left, can overcome   • Myalgia and myositis, unspecified    • Nausea    • Organic hypersomnia, unspecified PSG 6-29-14    AHI 2 RDI 2 REM AHI 2 SaO2 doroteo 88 %   • OTHER DISEASES     Crohn's   • Pain in joints    • Pneumonia, organism un CODE    OBJECTIVE:    /75 (BP Location: Left arm)   Pulse 76   Temp 98.1 °F (36.7 °C) (Oral)   Resp 15   Wt 131 lb 9.8 oz (59.7 kg)   LMP 03/15/2016   SpO2 100%   BMI 21.90 kg/m²   General: Awake alert and oriented x3.   Short-term memory 03 at 5 jannie strength, endurance, self care, transfers, hygiene    RECOMMENDATIONS:    Based on patient's current functional status, pt would benefit from acute inpatient   rehabilitation, working with PT/OT/SLP to upgrade bed mobility, transfers, gait, dressing, bathi

## 2019-11-20 NOTE — PROGRESS NOTES
[facility] Cardiology Progress Note - Feliciano Saunders Patient Status:  Inpatient    1970 MRN [id number]   [facility] 7NE-A Attending Amos Winslow, DO   Hosp Day # 4 PCP Taylablake Blackburn MD     Subjective Delirium      Objective:   Temp: 97.9 °F (36.6 °C)  Pulse: 67  Resp: 16  BP: 102/60    Intake/Output:     Intake/Output Summary (Last 24 hours) at 11/20/2019 1705  Last data filed at 11/20/2019 1224  Gross per 24 hour   Intake 360 ml   Output 900 ml   Net --    BUN 26* 16  --  15 7  --  3*  --    CREATSERUM 0.80 0.53*  --  0.42* 0.46*  --  0.47*  --    CA 9.8 8.7  --  7.9* 8.4*  --  8.7  --    MG  --  1.9  --  1.8 2.1  --   --   --    * 126*  --  122* 104*  --  77  --     < > = values in this interva BID  Heparin Sodium (Porcine) 5000 UNIT/ML injection 5,000 Units, 5,000 Units, Subcutaneous, Q8H DUNIA  iohexol (OMNIPAQUE) 350 MG/ML injection 61 mL, 61 mL, Intravenous, ONCE PRN  dextrose 5% infusion, , Intravenous, Continuous  metoprolol tartrate (LOPRESS Genital/: no dysuria,   Musculoskeletal: no joint complaints upper or lower extremities  Neuro: no sensory or motor complaint  Psyche: no symptoms of depression or anxiety  Hematology: denies bruising or excessive bleeding  Endocrine: no history of shayy

## 2019-11-20 NOTE — PROGRESS NOTES
Patient restless. Repeating the words \"rush\" and \"wrong words\" but answering orientation questions appropriately. Constantly apologizing saying Betty Cordon i'm saying the wrong words\" and \"it's making me mad. \" [facility] notified.  Will continue to monitor for

## 2019-11-20 NOTE — PROGRESS NOTES
Haily Betancourt is a 44year old female.    Patient presents with:  Altered Mental Status (neurologic)    HPI:    Neuro [facility] fu note    Pt tx to floor from neuro ICU  Stable much less confusion from admission  Denies any seizure that she could tell  kepp chloride 0.9% IV bolus 500 mL, 500 mL, Intravenous, Once  lactulose (ENULOSE) solution 10 g, 10 g, Oral, Once  Potassium Chloride ER (K-DUR M20) CR tab 40 mEq, 40 mEq, Oral, Q4H  [COMPLETED] lactated ringers IV bolus 500 mL, 500 mL, Intravenous, Once  [COM premix 20 mEq, 20 mEq, Intravenous, Once  [COMPLETED] potassium chloride (KLOR-CON) powder packet 40 mEq, 40 mEq, Per NG Tube, Q4H  [COMPLETED] sodium chloride 0.9% IV bolus 1,000 mL, 1,000 mL, Intravenous, Once  [COMPLETED] potassium chloride 40 mEq in so CT BRAIN OR HEAD ([id number]), 10/26/2019, 14:10. INDICATIONS:  AMS, hx TBI, psych hx, today non verbal and cant walk, last seen normal was a week ago     TECHNIQUE:  Noncontrast CT scanning is performed through the brain.  Dose reduction techniques were use Antibiotics       NAUSEA AND VOMITING  Sumatriptan Succina*    HALLUCINATION    Comment:imitrex  Toradol                     Comment:Gastritis & UC   Current Meds:  No current outpatient medications on file.           ROS:   GENERAL HEALTH: feels well other

## 2019-11-20 NOTE — PHYSICAL THERAPY NOTE
PHYSICAL THERAPY TREATMENT NOTE - INPATIENT    Room Number: 3495/7986-Y     Session: 1   Number of Visits to Meet Established Goals: 5    Presenting Problem: Altered Mental Status     History related to current admission: Pt is 44year old female admitted at night    • Migraine, hemiplegic     left side walking deficit, drooping face   • MIGRAINES    • Migraines    • Muscular torticollis     Head orientation favors left, can overcome   • Myalgia and myositis, unspecified    • Nausea    • Organic hypersom Turning over in bed (including adjusting bedclothes, sheets and blankets)?: A Little   -   Sitting down on and standing up from a chair with arms (e.g., wheelchair, bedside commode, etc.): A Little   -   Moving from lying on back to sitting on the side of questions and concerns addressed; Family present    ASSESSMENT     Patient is a 44year old female admitted on 11/16/2019 for AMS and NSTEMI.   Pertinent comorbidities and personal factors impacting therapy include acute delirium, seizures, bipolar disorder,

## 2019-11-20 NOTE — PROGRESS NOTES
[facility]  Report of Psychiatric Progress Note    Ananth Pearson Patient Status:  Inpatient    1970 MRN [id number]   [facility] 6NE-A Attending John Killian MD   Hosp Day # 4 PCP Bettye Schroeder MD     Date of Admi nightly for bipolar disorder    7) Continue to hold Soma 350mg TID and Dialudid 2mg PRN pain. Recommend staying OFF THESE MEDS if possible to decrease delirium risk.      Ministerio Harrison    History of Present Illness:  45 yo WF with hx bipolar 2 disorder and se monitoring. She had a EEG that showed spikes consistent with a post-ictal state. She was loaded with keppra for seizure prevention. Due to her lethargy, her klonipin dose was restarted on 11/17 at a lower dose of 0.25mg TID.      Today, she is less drowsy a of thyroid     hypothyroid   • Dizziness    • Easy bruising    • Esophageal reflux    • Extrinsic asthma, unspecified    • Fatigue    • Feeling lonely    • Flatulence/gas pain/belching    • Gestational diabetes    • Headache disorder    • Heartburn    • He She has a 20.00 pack-year smoking history. She has never used smokeless tobacco. She reports current drug use. Drug: Cannabis. She reports that she does not drink alcohol.     Allergies:    Depakote [Valproic *    OTHER (SEE COMMENTS)    Comment:Suicidal id (LOPRESSOR) partial tablet 12.5 mg, 12.5 mg, Per NG Tube, Daily Beta Blocker  •  aspirin chewable tab 81 mg, 81 mg, Per NG Tube, Daily  •  Lithium Carbonate cap 300 mg, 300 mg, Oral, TID CC  •  LORazepam (ATIVAN) injection 1 mg, 1 mg, Intravenous, Q4H PRN Knowledge: Able to recite name of [place] president    Insight: fair today  Judgment: fair today    Laboratory Data:  Lab Results   Component Value Date    CREATSERUM 0.47 11/20/2019    BUN 3 11/20/2019     11/20/2019    K 3.5 11/20/2019     11/20/2

## 2019-11-20 NOTE — PLAN OF CARE
Assumed care of patient when transferred from ICU. Pt A/O x 3. VSS, NSR per tele. RA. Neuro assessments q 4 hrs, no new deficits noted. Pt participated with PT/OT. Patient tolerating chopped diet after passing swallow eval this morning.   Seizure, duration and description of seizure to MD/LIP  - If seizure occurs, turn patient to side and suction secretions as needed  - Reorient patient post seizure  - Seizure pads on all 4 side rails  - Instruct patient/family to notify RN of any seizure activity Glucose as ordered  - Assess for signs and symptoms of hyperglycemia and hypoglycemia  - Administer ordered medications to maintain glucose within target range  - Assess barriers to adequate nutritional intake and initiate nutrition consult as needed  - In Assess and document dressing/incision, wound bed, drain sites and surrounding tissue  - Implement wound care per orders  - Initiate isolation precautions as appropriate  - Initiate Pressure Ulcer prevention bundle as indicated  Outcome: Progressing  Goal: memory  Description  Interventions:    Outcome: Progressing     Problem: Impaired Communication  Goal: Patient will achieve maximal communication potential  Description  Interventions:  }  Outcome: Progressing

## 2019-11-20 NOTE — PROGRESS NOTES
[facility]                INFECTIOUS DISEASE PROGRESS NOTE    Paz Colorado Patient Status:  Inpatient    1970 MRN [id number]   [facility] 6NE-A Attending Jenn Newby MD   Hosp Day # 4 PCP Reyna Valiente MD 0.6 0.5  --   --  0.3  --   --    TP 8.0 6.5  --   --  5.2*  --   --     < > = values in this interval not displayed. No results found for: [facility] Encounter on 11/16/19   1.  BLOOD CULTURE     Status: None (Preliminary result) source for infection    Stable off abx, will sign off      Marilu Woodruff MD  [facility]  [phone]

## 2019-11-20 NOTE — DIETARY NOTE
[facility]    NUTRITION ASSESSMENT    Pt  Does not meet malnutrition criteria.     NUTRITION DIAGNOSIS/PROBLEM:    Inadequate oral intake related to physiological / psychological causes as evidenced by NPO status - resolved, discontinue    Unintentiona 94 % of IBW  BMI: Body mass index is 21.9 kg/m².   IBW: 56.8 kg    WEIGHT HISTORY:   Wt Readings from Last 12 Encounters:  11/19/19 : 59.7 kg (131 lb 9.8 oz)  10/31/19 : 60.8 kg (134 lb)  10/26/19 : 63.5 kg (139 lb 15.9 oz)  10/17/19 : 63.5 kg (140 lb)  10/

## 2019-11-21 ENCOUNTER — APPOINTMENT (OUTPATIENT)
Dept: CV DIAGNOSTICS | Facility: HOSPITAL | Age: 49
DRG: 100 | End: 2019-11-21
Attending: INTERNAL MEDICINE
Payer: MEDICARE

## 2019-11-21 VITALS
DIASTOLIC BLOOD PRESSURE: 53 MMHG | OXYGEN SATURATION: 100 % | WEIGHT: 131.63 LBS | BODY MASS INDEX: 22 KG/M2 | HEART RATE: 75 BPM | TEMPERATURE: 98 F | RESPIRATION RATE: 12 BRPM | SYSTOLIC BLOOD PRESSURE: 97 MMHG

## 2019-11-21 PROCEDURE — 78452 HT MUSCLE IMAGE SPECT MULT: CPT | Performed by: INTERNAL MEDICINE

## 2019-11-21 PROCEDURE — 93018 CV STRESS TEST I&R ONLY: CPT | Performed by: INTERNAL MEDICINE

## 2019-11-21 PROCEDURE — 93017 CV STRESS TEST TRACING ONLY: CPT | Performed by: INTERNAL MEDICINE

## 2019-11-21 NOTE — PROGRESS NOTES
Paz Colorado is a 44year old female.    Patient presents with:  Altered Mental Status (neurologic)    HPI:    Neuro [facility] fu note    Pt in BR,   Stable this am, to have lexiscan this am   Denies any seizure that she could tell  keppra reduced from 100 500 mL, 500 mL, Intravenous, Once  lactulose (ENULOSE) solution 10 g, 10 g, Oral, Once  [COMPLETED] Potassium Chloride ER (K-DUR M20) CR tab 40 mEq, 40 mEq, Oral, Q4H  [COMPLETED] lactated ringers IV bolus 500 mL, 500 mL, Intravenous, Once  [COMPLETED] pot 20 mEq, Intravenous, Once  [COMPLETED] potassium chloride (KLOR-CON) powder packet 40 mEq, 40 mEq, Per NG Tube, Q4H  [COMPLETED] sodium chloride 0.9% IV bolus 1,000 mL, 1,000 mL, Intravenous, Once  [COMPLETED] potassium chloride 40 mEq in sodium chloride 0 HEAD ([id number]), 10/26/2019, 14:10. INDICATIONS:  AMS, hx TBI, psych hx, today non verbal and cant walk, last seen normal was a week ago     TECHNIQUE:  Noncontrast CT scanning is performed through the brain. Dose reduction techniques were used.  Dose info NAUSEA AND VOMITING  Sumatriptan Succina*    HALLUCINATION    Comment:imitrex  Toradol                     Comment:Gastritis & UC   Current Meds:  Current Outpatient Medications   Medication Sig Dispense Refill   • levETIRAcetam 500 MG Oral Tab Take 1 tabl fluent  Follows commands  Less dysarthria today  o x 3, slow to respond, mildly slurred  Appropriate responses  Ambulatory w assist          ASSESSMENT/ PLAN:   1) pt with hx SDH with residual memory changes  2) hx seizures for years that predate the TB I.

## 2019-11-21 NOTE — PLAN OF CARE
Preliminary nuclear stress test results as called to me by radiology:  Negative for perfusion abnormalities and LVEF 51%. Stable for discharge from cardiology perspective.       LYDIA Wallace

## 2019-11-21 NOTE — PROGRESS NOTES
[facility] Cardiology Progress Note - Pedro Lockwood Patient Status:  Inpatient    1970 MRN [id number]   [facility] 7NE-A Attending Neris Gregory, DO   Hosp Day # 5 PCP Vincent Nunn MD     Subjective abnormalities     Elevated troponin     Delirium      Objective:   Temp: 97.8 °F (36.6 °C)  Pulse: 54  Resp: 16  BP: 93/55    Intake/Output:     Intake/Output Summary (Last 24 hours) at 11/21/2019 1059  Last data filed at 11/21/2019 0600  Gross per 24 hour CREATSERUM 0.53*  --  0.42* 0.46*  --  0.47*  --  0.62   CA 8.7  --  7.9* 8.4*  --  8.7  --  9.0   MG 1.9  --  1.8 2.1  --   --   --   --    *  --  122* 104*  --  77  --  124*    < > = values in this interval not displayed.        Recent Labs   Lab injection 5,000 Units, 5,000 Units, Subcutaneous, Q8H DUNIA  iohexol (OMNIPAQUE) 350 MG/ML injection 61 mL, 61 mL, Intravenous, ONCE PRN  dextrose 5% infusion, , Intravenous, Continuous  metoprolol tartrate (LOPRESSOR) partial tablet 12.5 mg, 12.5 mg, Per NG joint complaints upper or lower extremities  Neuro: no sensory or motor complaint  Psyche: no symptoms of depression or anxiety  Hematology: denies bruising or excessive bleeding  Endocrine: no history of diabetes  Allergy: see above drug allergies    Jen Romero

## 2019-11-21 NOTE — PLAN OF CARE
Assumed care at 1. Denies pain/discomfort. Neuro checks q4h. See charting for full assessment. Seizure precautions. Elevated potassium around 1630. Repeat labs WNL.   Plan: nuclear stress test then probably d/c to [facility] for acute rehab     Pro 4 side rails  - Instruct patient/family to notify RN of any seizure activity  - Instruct patient/family to call for assistance with activity based on assessment  Outcome: Progressing  Goal: Achieves maximal functionality and self care  Description  Kim Bowman to adequate nutritional intake and initiate nutrition consult as needed  - Instruct patient on self management of diabetes  Outcome: Progressing  Goal: Electrolytes maintained within normal limits  Description  INTERVENTIONS:  - Monitor labs and rhythm and Pressure Ulcer prevention bundle as indicated  Outcome: Progressing  Goal: Oral mucous membranes remain intact  Description  INTERVENTIONS  - Assess oral mucosa and hygiene practices  - Implement preventative oral hygiene regimen  - Implement oral medicate potential  Description  Interventions:  Outcome: Progressing

## 2019-11-21 NOTE — PROGRESS NOTES
[facility]  Progress Note    Savilla Ester Patient Status:  Inpatient    1970 MRN [id number]   [facility] 7NE-A Attending Parris Carlos, DO   Hosp Day # 5 PCP Jeremy Harman MD           Objective/Physical Exam:  [facility] epilepsy type ([address]     Visual hallucination     Paranoid delusion ([address]     Focal and partial seizures ([address]     Epilepsy with partial complex seizures, without status epilepticus ([address]     Major depression, recurrent ([address]     Insomnia     H/O multiple a

## 2019-11-21 NOTE — PLAN OF CARE
Assumed care of patient at 07:00 am.  Pt A/O x 3. VSS, NSR per tele. RA. Neuro assessments q 4 hrs, no new deficits noted. Patient tolerating chopped diet. Seizure, aspiration and safety precautions in place. Pt denies any pain or discomfort.    Nuc administer oxygen as ordered  - Monitor patient for seizure activity, document and report duration and description of seizure to MD/LIP  - If seizure occurs, turn patient to side and suction secretions as needed  - Reorient patient post seizure  - Seizure Glucose maintained within prescribed range  Description  INTERVENTIONS:  - Monitor Blood Glucose as ordered  - Assess for signs and symptoms of hyperglycemia and hypoglycemia  - Administer ordered medications to maintain glucose within target range  - Asse risk factors for pressure ulcer development  - Assess and document skin integrity  - Assess and document dressing/incision, wound bed, drain sites and surrounding tissue  - Implement wound care per orders  - Initiate isolation precautions as appropriate  - Impaired Cognition  Goal: Patient will exhibit improved attention, thought processing and/or memory  Description  Interventions:    Outcome: Progressing     Problem: Impaired Communication  Goal: Patient will achieve maximal communication potential  Descri

## 2019-11-21 NOTE — CM/SW NOTE
Pt is ready for d/c today. Pt will go to . Called Candido Crews at [facility] who said pt will go into Room 2332. RN to call report to [phone].   Pt's mother is going to drive her to [facility] today at 5:30pm.

## 2019-11-21 NOTE — PROGRESS NOTES
[facility] Hospitalist Progress Note     PCP: Reyna Valiente MD    Chief Complaint: follow-up    Overnight/Interim Events:  Pt went for nuclear stress test today. SUBJECTIVE:  Pt seen and examined. Sleepy, denies chest pain or SOB.   Understands we are w 0.47*  --  0.62   CA 8.7  --  7.9* 8.4*  --  8.7  --  9.0   MG 1.9  --  1.8 2.1  --   --   --   --    *  --  122* 104*  --  77  --  124*    < > = values in this interval not displayed.        Recent Labs   Lab 11/16/19  1547 11/17/19  0317 11/19/19 NG x 2d  -no clear source currently but improving on abx     # Elevated troponin, suspect demand ischemia, although cannot r/o NSTEMI    -cards following, apprec; 2/2 demand ischemia?   -heparin gtt off, cont asa  -echo unremarkable  -nuclear stress test pe

## 2019-11-22 NOTE — PLAN OF CARE
NURSING DISCHARGE NOTE    Discharged to [facility] via Wheelchair. Accompanied by Support staff  Belongings Taken by patient/family.     Discharge instructions, medications, prescriptions, and follow up appointments discussed with patient report called t

## 2019-11-22 NOTE — CM/SW NOTE
11/22/19 0900   Discharge disposition   Expected discharge disposition [facility] [address]   Name of Facillity/Home Care/Hospice [facility]   Patient is Discharged to a [address] [place] [address] Yes   Discharge transportation Private car

## 2020-04-14 NOTE — PROGRESS NOTES
----- Message from Malka Carpio NP sent at 4/14/2020  7:56 AM CDT -----  Venessa is anemic, likely contributing to her dizziness, fatigue.  Referral to GI for same ( has seen them back in 2017 and did have appt in March cx'd due to death in the family ).   Need stool for occult blood.   Need to start on oral iron AFTER she provides stool sample.   Ferrous sulfate 325 mg po bid + Vitamin C 500 mg daily.   May need colace 100 mg BID I the iron is making her constipated.   She does have history of gastritis, which may be leading to this chronically and now catching up with her.  Any issues with heavy menses?    Electrolytes/potassium are ok.   B12/folate are normal.     A   [facility] Hospitalist Progress Note                                                                   [address]  4/16/1970    CC: f/u [facility]    SUBJECTIVE:    Pt states nausea is improved. capsule Oral Daily   • QUEtiapine Fumarate  300 mg Oral Nightly   • QUEtiapine Fumarate  50 mg Oral Daily   • famoTIDine  40 mg Oral BID   • Verapamil HCl ER  360 mg Oral Nightly   • Montelukast Sodium  10 mg Oral Nightly   • spironolactone  50 mg Oral Nig Asthma  - continue home meds: montelukast for zafirliukast     # Allergic rhinitis  - flonase  - ceitirizine for levocetirizine     # Hypothyroidism  - home synthroid dose of 175 mcg daily  - TSH low at 0.015, Free T4 WNL  - will reduce dose of synthroid t

## 2020-09-15 ENCOUNTER — APPOINTMENT (OUTPATIENT)
Dept: CT IMAGING | Facility: HOSPITAL | Age: 50
End: 2020-09-15
Attending: EMERGENCY MEDICINE
Payer: MEDICARE

## 2020-09-15 ENCOUNTER — HOSPITAL ENCOUNTER (OUTPATIENT)
Facility: HOSPITAL | Age: 50
Setting detail: OBSERVATION
Discharge: HOME | End: 2020-09-18
Attending: EMERGENCY MEDICINE | Admitting: HOSPITALIST
Payer: MEDICARE

## 2020-09-15 DIAGNOSIS — N39.0 URINARY TRACT INFECTION WITH HEMATURIA, SITE UNSPECIFIED: ICD-10-CM

## 2020-09-15 DIAGNOSIS — Z91.14 HISTORY OF MEDICATION NONCOMPLIANCE: ICD-10-CM

## 2020-09-15 DIAGNOSIS — R41.82 ALTERED MENTAL STATUS, UNSPECIFIED ALTERED MENTAL STATUS TYPE: ICD-10-CM

## 2020-09-15 DIAGNOSIS — B37.3 YEAST VAGINITIS: ICD-10-CM

## 2020-09-15 DIAGNOSIS — R31.9 URINARY TRACT INFECTION WITH HEMATURIA, SITE UNSPECIFIED: ICD-10-CM

## 2020-09-15 DIAGNOSIS — R41.0 DELIRIUM: Primary | ICD-10-CM

## 2020-09-15 DIAGNOSIS — Z74.09 IMPAIRED MOBILITY AND ADLS: ICD-10-CM

## 2020-09-15 DIAGNOSIS — Z78.9 IMPAIRED MOBILITY AND ADLS: ICD-10-CM

## 2020-09-15 PROBLEM — Z91.148 HISTORY OF MEDICATION NONCOMPLIANCE: Status: ACTIVE | Noted: 2020-09-15

## 2020-09-15 PROBLEM — B37.31 YEAST VAGINITIS: Status: ACTIVE | Noted: 2020-09-15

## 2020-09-15 PROCEDURE — 70450 CT HEAD/BRAIN W/O DYE: CPT | Performed by: EMERGENCY MEDICINE

## 2020-09-15 PROCEDURE — 74176 CT ABD & PELVIS W/O CONTRAST: CPT | Performed by: EMERGENCY MEDICINE

## 2020-09-15 RX ORDER — FLUCONAZOLE 150 MG/1
150 TABLET ORAL ONCE
Qty: 1 TABLET | Refills: 0 | Status: SHIPPED | OUTPATIENT
Start: 2020-09-15 | End: 2020-09-18

## 2020-09-15 RX ORDER — SODIUM CHLORIDE 9 MG/ML
INJECTION, SOLUTION INTRAVENOUS CONTINUOUS
Status: DISCONTINUED | OUTPATIENT
Start: 2020-09-15 | End: 2020-09-18

## 2020-09-15 RX ORDER — ONDANSETRON 2 MG/ML
4 INJECTION INTRAMUSCULAR; INTRAVENOUS EVERY 6 HOURS PRN
Status: DISCONTINUED | OUTPATIENT
Start: 2020-09-15 | End: 2020-09-18

## 2020-09-15 RX ORDER — LORAZEPAM 2 MG/ML
1 INJECTION INTRAMUSCULAR ONCE
Status: COMPLETED | OUTPATIENT
Start: 2020-09-15 | End: 2020-09-15

## 2020-09-15 RX ORDER — CEPHALEXIN 500 MG/1
500 CAPSULE ORAL 4 TIMES DAILY
Qty: 28 CAPSULE | Refills: 0 | Status: SHIPPED | OUTPATIENT
Start: 2020-09-15 | End: 2020-09-18

## 2020-09-15 RX ORDER — MORPHINE SULFATE 2 MG/ML
0.5 INJECTION, SOLUTION INTRAMUSCULAR; INTRAVENOUS EVERY 4 HOURS PRN
Status: DISCONTINUED | OUTPATIENT
Start: 2020-09-15 | End: 2020-09-18

## 2020-09-15 RX ORDER — CEPHALEXIN 500 MG/1
500 CAPSULE ORAL ONCE
Status: COMPLETED | OUTPATIENT
Start: 2020-09-15 | End: 2020-09-15

## 2020-09-15 NOTE — BH LEVEL OF CARE ASSESSMENT
Level of Care Assessment Note    General Questions  Why are you here?: Becaause I have been having trouble with uterus with pain and discomfort unrinating is painful and burning I don't know what happen I don'r know if I fell or passed out.   Precipitating History or Personal Lived Experience of Loss or Near Loss by Suicide: Denies    Danger to Others/Property  Have you harmed someone or had thoughts about wanting someone harmed or killed in the past 30 days?: No  Have you harmed someone or had thoughts abou Weight Loss: No  Unplanned Weight Gain: No  History of Eating Disorder: No  Active Eating Disorder: No    IBW Calculations  Weight: 175 lb  BMI (Calculated): 30  IBW LBS [place]: 120 LBS  IBW %: 145.83 %  IBW + 10%: 132 LBS  IBW - 10%: 108 LBS  SCOFF Antony Independent  Toileting  Patient Incontinence: None  Special Considerations  Patient has pressures sores, surgical wounds, bandages/dressings?: No  Does the patient need a BiPAP or CPAP?: No            Current/Previous MH/CD Providers  Hospitalizations, Juanis Been Harmed by a Partner/Caregiver?: No  Health Concerns r/t Abuse: No  Possible Abuse Reportable to[de-identified] Not appropriate for reporting to authorities    General Appearance  Characteristics: Appropriate clothing  Eye Contact: Direct  Psychomotor [place] has limited finances and financial debt due to medical bills. She has one son age 32 who lives with her who as asergers disorder. She has a nother son who helps out but works full time and recent had a baby.  Family has been assisting as needed with her med

## 2020-09-15 NOTE — ED NOTES
Spoke to son rOly Oneill, pt usually a/o x4 90% of the time, usually confused when UTI present, and goes with son when she is sick but son has new born at home and cannot take mother.  Son suggests if a home health aide is possible can benefit pt greatly to pro

## 2020-09-15 NOTE — ED NOTES
Resumed care of patient, report received from Holly Rashid. CM on case, waiting to hear update on patient.

## 2020-09-15 NOTE — ED PROVIDER NOTES
I assumed care of the patient from the previous EDMD.  At change of shift, patient was awaiting evaluation by crisis  Which happened right at that time.   Per discussion with previous EDMD, if she is cleared by crisis, and her CT of her head is reassuring, evidence of acute intracranial hemorrhage. SINUSES:  No significant inflammatory changes. MASTOIDS:  The mastoids are clear.          SKULL:  No depressed calvarial fracture.                               =====    CONCLUSION:  No CT evidenc BILIARY:  No biliary ductal dilatation. PANCREAS:  There is respiratory motion. Possible peripancreatic     inflammatory stranding surrounding the head and uncinate process of the     pancreas.   Recommend clinical correlation to exclude pancreat herself. So she will be admitted for observation overnight. I discussed the patient with the on-call [facility] hospitalist and she was noted in stable condition.

## 2020-09-15 NOTE — ED NOTES
Spoke with  about pt needing assistance at home. Possibly a home health aid and meals on wheels. Patient lives with her 32year old son who has Asperger's disorder.  Patient struggles with physical and cognitive issues due to a stroke in 2003 with l

## 2020-09-15 NOTE — ED NOTES
RN ATTEMPTED TO GIVE PT ORAL MEDICATION, PT WAS UNABLE TO SWALLOW PILL, PT STATED \"I'M SORRY, I JUST CAN'T MAKE MY MOUTH SWALLOW IT.\" PT WAS UNABLE TO HOLD WATER IN MOUTH THAT SHE TOOK WITH MEDICATION.  RN NOTED WATER SPILLING OUT OF MOUTH, PT UNAWARE ARTURO

## 2020-09-15 NOTE — ED PROVIDER NOTES
Patient Seen in: [facility] Emergency Department      History   Patient presents with:  Altered Mental Status    Stated Complaint: AMS     HPI    12-year-old female presents emergency department from home after altered mental status apparently for t [facility])     absent seizures   • Shortness of breath    • Sleep apnea     does not use CPAP   • Sleep disturbance    • Stress    • STROKE     2003+ mild R.sided weakness   • Syncope    • Ulcerative colitis, unspecified    • Visual impairment     reading glass equipment including droplet mask, eye protection, and gloves were worn throughout the duration of the exam.  Handwashing was performed prior to and after the exam.  Stethoscope and any equipment used during my examination was cleaned with super sani-cloth other components within normal limits    Narrative:     Results of the Urine Drug Screen should be used only for medical purposes.    LIPASE - Abnormal; Notable for the following components:    Lipase 49 (*)     All other components within normal limits   T WITH PLATELET.   Procedure                               Abnormality         Status                     ---------                               -----------         ------                     CBC W/ DIFFERENTIAL[[id number]]          Abnormal            Final mental status type R41.82 1/13/2018     History of medication noncompliance Z91.14 9/15/2020     Impaired mobility and ADLs Z74.09, Z78.9 9/15/2020     Urinary tract infection with hematuria, site unspecified N39.0, R31.9 9/15/2020     Yeast vaginitis B37.

## 2020-09-15 NOTE — ED INITIAL ASSESSMENT (HPI)
PT TO ED BY EMS FROM HOME AFTER C/O AMS X2 DAYS, UPON MEDICS ARRIVAL PT WAS YELLING FOR HELP, MEDICS FORCED ENTRY TO FINDING PT ON FLOOR. PT IS CONFUSED AS TO WHAT HAPPENED, HX OF UTI'S AND BIPOLAR.  PT UNSURE OF MEDICATIONS OR IF SHE HAS BEEN TAKING HER ME

## 2020-09-15 NOTE — CM/SW NOTE
Spoke with sister, May Dolye, and son, Ander Costa. Both in agreement that they can come up with a schedule to make sure patient is taking her medications on a daily basis. Patient's mom will also try to help.     Per Ander Costa, some one will come pick patient up an

## 2020-09-16 RX ORDER — HYDRALAZINE HYDROCHLORIDE 20 MG/ML
10 INJECTION INTRAMUSCULAR; INTRAVENOUS EVERY 6 HOURS PRN
Status: DISCONTINUED | OUTPATIENT
Start: 2020-09-16 | End: 2020-09-18

## 2020-09-16 RX ORDER — LORAZEPAM 0.5 MG/1
TABLET ORAL EVERY 4 HOURS PRN
Status: DISCONTINUED | OUTPATIENT
Start: 2020-09-16 | End: 2020-09-16 | Stop reason: SDUPTHER

## 2020-09-16 RX ORDER — PREGABALIN 100 MG/1
200 CAPSULE ORAL 2 TIMES DAILY
Status: DISCONTINUED | OUTPATIENT
Start: 2020-09-16 | End: 2020-09-18

## 2020-09-16 RX ORDER — SPIRONOLACTONE 100 MG/1
100 TABLET, FILM COATED ORAL DAILY
Status: DISCONTINUED | OUTPATIENT
Start: 2020-09-17 | End: 2020-09-18

## 2020-09-16 RX ORDER — CETIRIZINE HYDROCHLORIDE 10 MG/1
10 TABLET ORAL DAILY
Status: DISCONTINUED | OUTPATIENT
Start: 2020-09-16 | End: 2020-09-18

## 2020-09-16 RX ORDER — LORAZEPAM 2 MG/ML
1 INJECTION INTRAMUSCULAR EVERY 6 HOURS PRN
Status: DISCONTINUED | OUTPATIENT
Start: 2020-09-16 | End: 2020-09-18

## 2020-09-16 RX ORDER — MESALAMINE 400 MG/1
1200 CAPSULE, DELAYED RELEASE ORAL
Status: DISCONTINUED | OUTPATIENT
Start: 2020-09-17 | End: 2020-09-18

## 2020-09-16 RX ORDER — LEVOTHYROXINE SODIUM 0.15 MG/1
150 TABLET ORAL
Status: DISCONTINUED | OUTPATIENT
Start: 2020-09-17 | End: 2020-09-17

## 2020-09-16 RX ORDER — MONTELUKAST SODIUM 10 MG/1
10 TABLET ORAL NIGHTLY
Status: DISCONTINUED | OUTPATIENT
Start: 2020-09-16 | End: 2020-09-18

## 2020-09-16 RX ORDER — ENOXAPARIN SODIUM 100 MG/ML
40 INJECTION SUBCUTANEOUS DAILY
Status: DISCONTINUED | OUTPATIENT
Start: 2020-09-16 | End: 2020-09-18

## 2020-09-16 RX ORDER — DICYCLOMINE HYDROCHLORIDE 10 MG/1
10 CAPSULE ORAL EVERY 4 HOURS PRN
Status: DISCONTINUED | OUTPATIENT
Start: 2020-09-16 | End: 2020-09-18

## 2020-09-16 RX ORDER — SPIRONOLACTONE 25 MG/1
50 TABLET ORAL NIGHTLY
Status: DISCONTINUED | OUTPATIENT
Start: 2020-09-16 | End: 2020-09-18

## 2020-09-16 RX ORDER — LORAZEPAM 0.5 MG/1
0.5 TABLET ORAL EVERY 4 HOURS PRN
Status: DISCONTINUED | OUTPATIENT
Start: 2020-09-16 | End: 2020-09-18

## 2020-09-16 RX ORDER — CLONAZEPAM 0.5 MG/1
0.25 TABLET ORAL 3 TIMES DAILY
Status: DISCONTINUED | OUTPATIENT
Start: 2020-09-16 | End: 2020-09-18

## 2020-09-16 RX ORDER — LAMOTRIGINE 100 MG/1
200 TABLET ORAL 3 TIMES DAILY
Status: DISCONTINUED | OUTPATIENT
Start: 2020-09-16 | End: 2020-09-18

## 2020-09-16 RX ORDER — ALBUTEROL SULFATE 2.5 MG/3ML
2.5 SOLUTION RESPIRATORY (INHALATION) EVERY 6 HOURS PRN
Status: DISCONTINUED | OUTPATIENT
Start: 2020-09-16 | End: 2020-09-18

## 2020-09-16 RX ORDER — ASPIRIN 81 MG/1
81 TABLET ORAL DAILY
Status: DISCONTINUED | OUTPATIENT
Start: 2020-09-17 | End: 2020-09-18

## 2020-09-16 RX ORDER — LORAZEPAM 2 MG/ML
0.5 INJECTION INTRAMUSCULAR EVERY 6 HOURS PRN
Status: DISCONTINUED | OUTPATIENT
Start: 2020-09-16 | End: 2020-09-18

## 2020-09-16 RX ORDER — LITHIUM CARBONATE 450 MG
900 TABLET, EXTENDED RELEASE ORAL NIGHTLY
Status: DISCONTINUED | OUTPATIENT
Start: 2020-09-16 | End: 2020-09-18

## 2020-09-16 RX ORDER — LORAZEPAM 1 MG/1
1 TABLET ORAL EVERY 4 HOURS PRN
Status: DISCONTINUED | OUTPATIENT
Start: 2020-09-16 | End: 2020-09-18

## 2020-09-16 RX ORDER — PANTOPRAZOLE SODIUM 40 MG/1
40 TABLET, DELAYED RELEASE ORAL
Status: DISCONTINUED | OUTPATIENT
Start: 2020-09-17 | End: 2020-09-18

## 2020-09-16 RX ORDER — LEVETIRACETAM 500 MG/1
500 TABLET ORAL 2 TIMES DAILY
Status: DISCONTINUED | OUTPATIENT
Start: 2020-09-16 | End: 2020-09-17

## 2020-09-16 RX ORDER — QUETIAPINE 100 MG/1
200 TABLET, FILM COATED ORAL NIGHTLY
Status: DISCONTINUED | OUTPATIENT
Start: 2020-09-16 | End: 2020-09-17

## 2020-09-16 NOTE — HOME CARE LIAISON
Received referral from ED CM. Unable to accept for [facility] at this time. Offered to re-refer, per SW hold off on re-referring at this time.  HERMINIO Roland.

## 2020-09-16 NOTE — H&P
General Medicine H&P     Patient presents with:  Altered Mental Status       PCP: Abiodun Mercedes MD    History of Present Illness: Patient is a 44year old female with PMH including but not limited to asthma, bipolar, GERD who p/t [facility] ED c UTI and inabil • Syncope    • Ulcerative colitis, unspecified    • Visual impairment     reading glasses   • Wears glasses    • Wheezing       Past Surgical History:   Procedure Laterality Date   • CHOLECYSTECTOMY  3/10   • COLONOSCOPY  2013    ulcerative colitis   • C Years since quittin.7      Smokeless tobacco: Never Used    Alcohol use: Yes      Comment: socially       Fam Hx  Family History   Problem Relation Age of Onset   • Breast Cancer Maternal Grandmother         dx in [de-identified]   • Breast Cancer Other (As transcribed by Technologist)  PT TO ED BY EMS FROM HOME AFTER C/O AMS X2 DAYS, UPON MEDICS ARRIVAL PT WAS YELLING FOR HELP, MEDICS FORCED ENTRY TO FINDING PT ON FLOOR. PT IS CONFUSED AS TO WHAT HAPPENED, HX OF UTI'S AND BIPOLAR.  PT UNSURE OF MEDICATION motion. 2 mm nonobstructing calculus inferior pole right kidney. 1-2 mm nonobstructing calculi superior and inferior poles left kidney. Both kidneys are negative for hydronephrosis. There is no CT evidence for obstructing urinary calculus.  ADRENALS:  N true infection, may be contributing to AMS    # Metabolic acidosis  -gentle IVF    # HTN  -awaiting home med list from sister, prn hydralazine for now, SBP > 170s     # AMS, hx Bipolar, Generalized anxiety d/o  -cont home meds once verify   -prn ativan  -p

## 2020-09-16 NOTE — CM/SW NOTE
Discussed, at length, with sister, Michi Norman regarding [address] patient tonight. Family cannot arrange to have someone stay with her for the next 24 hours. For safety reasons, patient will need to be admitted.   Patient urinated and had BM on herself while on t

## 2020-09-16 NOTE — PLAN OF CARE
Assumed care of patient at [address] [place] [address]. Patient A&Ox1-2. Oriented to person, waxing/waning to place, situation, & time. Anxious & impulsive at times. Tearful at times. On RA. No telemetry. C/O back & neck pain. Medication per [facility] & non-pharmacologics provided.

## 2020-09-16 NOTE — PROGRESS NOTES
Received pt from ER via transport and pts sister  Vs wnl, RA  Pt A&O to name,  and place  Admit navigator completed with pt and pts sister, PTA meds unable to complete d/t list being at home, she states she will get the list to RN in am  Per pt and sist

## 2020-09-16 NOTE — DIETARY NOTE
Claribel Kerr 83     Admitting diagnosis:  Delirium [R41.0]  Yeast vaginitis [B37.3]  History of medication noncompliance [Z91.14]  Impaired mobility and ADLs [Z74.09, Z78.9]  Urinary tract infection with hematuri

## 2020-09-16 NOTE — CM/SW NOTE
09/16/20 1400   CM/SW Referral Data   Referral Source Physician;Social Work (self-referral)   Reason for Referral Discharge planning   Informant   (Sister)   Patient Info   Patient's Mental Status Alert;Confused   Patient lives with Children  (Adult son

## 2020-09-16 NOTE — PHYSICAL THERAPY NOTE
PHYSICAL THERAPY EVALUATION - INPATIENT     Room Number: 2805/8198-I  Evaluation Date: 9/16/2020  Type of Evaluation: Initial  Physician Order: PT Eval and Treat    Presenting Problem: AMS  Reason for Therapy: Mobility Dysfunction and Discharge Plann does not use CPAP   • Sleep disturbance    • Stress    • STROKE     2003+ mild R.sided weakness   • Syncope    • Ulcerative colitis, unspecified    • Visual impairment     reading glasses   • Wears glasses    • Wheezing        Past Surgical History  Pas 4/5  Right Knee extension  3+/5  Left Knee extension  4+/5  Right Dorsiflexion  4-/5  Left Dorsiflexion  4+/5    BALANCE  Static Sitting: Fair -  Dynamic Sitting: Poor  Static Standing: Poor  Dynamic Standing: Poor    ADDITIONAL TESTS SPT bed to chair to left with mod a for balance and controlled lowering, constant cueing. SPT chair to bed with mod a. Patient able to stand with B HHA  With min a and sidestep to [facility] with min a. Sit to supine with min a.   Discussed with patient current is able to ambulate 15 feet with assist device: walker - rolling at assistance level: minimum assistance     Goal #4    Goal #5    Goal #6    Goal Comments: Goals established on 9/16/2020

## 2020-09-16 NOTE — ED NOTES
Pts sister arrived to ED to take pt home. When RN and tech attempted to get pt into wheelchair, pt was having difficulty getting up on her own and unable to hold herself up. Pt soiled herself in the stretcher. Pt was cleaned up.  Notified charge RN, DANIEL WATTERS,

## 2020-09-16 NOTE — SLP NOTE
ADULT SWALLOWING EVALUATION    ASSESSMENT    ASSESSMENT/OVERALL IMPRESSION:  Patient seen for swallowing evaluation due to report of difficulty swallowing and concern for aspiration risk. Patient admitted due to AMS. She has a history of TBI and anxiety. List  Principal Problem:    Delirium  Active Problems:    Altered mental status, unspecified altered mental status type    History of medication noncompliance    Urinary tract infection with hematuria, site unspecified    Yeast vaginitis    Impaired mobili HISTORY  Current Diet Consistency: NPO  Dysphagia History: as above  Imaging Results:   Brain CT form 9/15/2020 revealed:  CONCLUSION:  No CT evidence for acute intracranial process.      Dictated by (CST): Nela Lazaro MD on 9/15/2020 at 5:40 PM       Fi consistent with possible esophageal involvement            GOALS  Goal #1 Patient will participate in reassessment in swallow function  In Progress     FOLLOW UP  Treatment Plan/Recommendations: SLP to reassess  Number of Visits to Meet Established Goals:

## 2020-09-17 ENCOUNTER — APPOINTMENT (OUTPATIENT)
Dept: GENERAL RADIOLOGY | Facility: HOSPITAL | Age: 50
End: 2020-09-17
Attending: HOSPITALIST
Payer: MEDICARE

## 2020-09-17 PROCEDURE — 71045 X-RAY EXAM CHEST 1 VIEW: CPT | Performed by: HOSPITALIST

## 2020-09-17 PROCEDURE — 90792 PSYCH DIAG EVAL W/MED SRVCS: CPT | Performed by: OTHER

## 2020-09-17 NOTE — PHYSICAL THERAPY NOTE
PHYSICAL THERAPY TREATMENT NOTE - INPATIENT    Room Number: 5907/2350-N     Session: 1   Number of Visits to Meet Established Goals: 5    Presenting Problem: AMS    Problem List  Principal Problem:    Delirium  Active Problems:    Altered mental status, u Past Surgical History  Past Surgical History:   Procedure Laterality Date   • CHOLECYSTECTOMY  3/10   • COLONOSCOPY  2013    ulcerative colitis   • COLONOSCOPY N/A 4/25/2017    Performed by Rashmi Powers MD at [facility] Gait Assistance: Minimum assistance  Distance (ft): 150  Assistive Device: Rolling walker;None  Pattern: Shuffle;R Foot drag;Scissoring  Stoop/Curb Assistance: Not tested  Comment : above scores based on dept protocol    Skilled Therapy Provided:     Pt mobility, transfers, gait training and BLE strengthening. Pt with improved activity tolerance this session, when compared to previous session.   At this time, Pt. presents with decreased balance, impaired strength, difficulty with gait/transfers resulting

## 2020-09-17 NOTE — PLAN OF CARE
Pt. A&Ox1/2- very confused this am, rambles randomly, doesn't make much sense. Patient took am meds and she became a/ox4 later in the shift. Patient worked with st and diet ordered. She did well with ordered meals, appetite coming back.   Sister visited p

## 2020-09-17 NOTE — PROGRESS NOTES
[address]  Hospitalist Progress Note                                                                   [address]  4/16/1970    CC: FU confusion    Interval History:  - Feels better today, joints  Mushrooms               NAUSEA AND VOMITING  Penicillin G Potass*    NAUSEA AND VOMITING  Phenytoin               ANXIETY, CONFUSION, DIZZINESS,                            HALLUCINATION  Sulfa Antibiotics       NAUSEA AND VOMITING  Sumatriptan Succ acidosis  -gentle IVF- repeat BMP today     # HTN  - Home meds monitor     # AMS, hx Bipolar, Generalized anxiety d/o  -cont home meds once verify   -prn ativan  -psych c/s- med recs appreciated     # hypokalemia  -replete per protocol     # Low TSH but lo

## 2020-09-17 NOTE — CONSULTS
[facility]  Report of Psychiatric Consultation    Porfirio Norman Patient Status:  Observation    1970 MRN [id number]   [facility] 3NE-A Attending Yonathan De La Torre,*   Hosp Day # 2 PCP Jeremy Harman MD     Date of A a few times per week. 3) Continue Lithium and Lamictal and Trileptal and low dose Klonipin. 4) Did not push strongly to continue vaping or to stop vaping the cannabis. Without more data, I do not know what to recommend.      5) No need for neuropsyc incoherent and disorganized. She misses some dose of her AEDs. She had garbled speech, a low K of 2.8 and elevated troponin of 6.5 and WBC of 25. She was treated with IVF's and antibiotics for possible sepsis and admitted to the neuro ICU for monitoring.  [employer] part of a grocery store in the past.     Past Medical History:   Diagnosis Date   • Abdominal pain    • Acne    • ALLERGIC RHINITIS    • Anxiety state    • Arthritis    • ASTHMA    • Asthma    • Back pain    • Back problem     scoliosis    • Bad tano Hernia Repair   • PARAGARD, IUD  6/2014     Family History   Problem Relation Age of Onset   • Breast Cancer Maternal Grandmother         dx in [de-identified]   • Breast Cancer Other         dx post menopausal   • High Blood Pressure Father    • Colon Cancer Father LORazepam (ATIVAN) injection 0.5 mg, 0.5 mg, Intravenous, Q6H PRN **OR** LORazepam (ATIVAN) injection 1 mg, 1 mg, Intravenous, Q6H PRN  •  LORazepam (ATIVAN) tab 0.5 mg, 0.5 mg, Oral, Q4H PRN **OR** LORazepam (ATIVAN) tab 1 mg, 1 mg, Oral, Q4H PRN  •  hydr Positive for depression. Negative for hallucinations, substance abuse and suicidal ideas. The patient is nervous/anxious.       Mental Status Exam:     Objective       09/17/20  1335   BP: 153/83   Pulse: 67   Resp: 18   Temp: 98.7 °F (37.1 °C)     Appearan evidence of acute intracranial hemorrhage. SINUSES:  No significant inflammatory changes. MASTOIDS:  The mastoids are clear. SKULL:  No depressed calvarial fracture.     =====  CONCLUSION:  No CT evidence for acute intracranial process.

## 2020-09-17 NOTE — PLAN OF CARE
Pt. A&Ox1/2- Confused; tiny petersen  RA;VSS  Tele- NSR  Seizure precautions  . 9 @ 100  Incontinent  Med Rec completed  NPO- sips with meds, attempting pt to take meds- continued to spit medication out. MD aware. Pharmacy to switch possible meds to IV. gait  - Educate and engage patient/family in tolerated activity level and precautions  - Recommend use of  RW for transfers  - ambulation to be assessed as able   Outcome: Progressing

## 2020-09-17 NOTE — SLP NOTE
ADULT SWALLOWING EVALUATION    ASSESSMENT    ASSESSMENT/OVERALL IMPRESSION:  Patient seen to reassess swallow function. She is alert and up in bed, much more calm today. She remains tangential in her speech but is quite pleasant and cooperative.   She is Bipolar   • Depression    • Diarrhea, unspecified    • Disorder of thyroid     hypothyroid   • Dizziness    • Easy bruising    • Esophageal reflux    • Extrinsic asthma, unspecified    • Fatigue    • Feeling lonely    • Flatulence/gas pain/belching    • Ge 9/15/2020 at 5:42 PM       Finalized by (CST): Ben Parada MD on 9/15/2020 at 5:47 PM       Brain CT from 9/15/2020 revealed:  CONCLUSION:  No CT evidence for acute intracranial process.      Dictated by (CST): Ben Parada MD on 9/15/2020 at 5:40 PM Yes  SLP Follow-up Date: 09/18/20    Thank you for your referral.   If you have any questions, please contact Jan Bardales 92  Pager [phone]

## 2020-09-17 NOTE — PLAN OF CARE
Message   Recorded as Task   Date: 06/27/2017 10:33 AM, Created By: Madeleine Morin   Task Name: 4. Patient Message   Assigned To: MOISES SANCHEZ   Regarding Patient: JASWINDER PEREZ, Status: Active   Comment:    Madeleine Morin - 27 Jun 2017 10:33 AM     TASK CREATED  PATIENT'S DIABETIC MEDICATIONS ARE VERY EXPENSIVE, PATIENT DOES NOT HAVE PRESCRIPTION DRUG COVERAGE...EZEQUIEL (DAUGHTER) CALLED #[phone], SHE WOULD LIKE A RETURN CALL.   Rosemary Singh - 27 Jun 2017 10:43 AM     TASK REASSIGNED: Previously Assigned To Rosemary Singh  I left vm with daughter that we would try to adjust meds and call her back   MOISES SANCHEZ - 27 Jun 2017 1:10 PM     TASK EDITED  Called daughter, diabetes meds cost over $400 each. Patient has been on glipizide in the past, will order at [facility] pharmacy. Patient will also see dietician, then follow up.        Plan   1. GlipiZIDE 10 MG Oral Tablet; TAKE 1 TABLET EVERY MORNING DAILY BEFORE   BREAKFAST    Signatures   Electronically signed by : MOISES SANCHEZ M.D.; Jun 27 2017  1:10PM CST     Problem: Impaired Swallowing  Goal: Minimize aspiration risk  Description  Interventions:  - Patient should be alert and upright for all feedings (90 degrees preferred)  - Offer food and liquids at a slow rate  - Encourage small bites of food and small s

## 2020-09-17 NOTE — OCCUPATIONAL THERAPY NOTE
OCCUPATIONAL THERAPY EVALUATION - INPATIENT     Room Number: 2766/4814-Z  Evaluation Date: 9/17/2020  Type of Evaluation: Initial  Presenting Problem: delirium    Physician Order: IP Consult to Occupational Therapy  Reason for Therapy: ADL/IADL Dysfunction breath    • Sleep apnea     does not use CPAP   • Sleep disturbance    • Stress    • STROKE     2003+ mild R.sided weakness   • Syncope    • Ulcerative colitis, unspecified    • Visual impairment     reading glasses   • Wears glasses    • Wheezing        P made  Awareness of Deficits:  decreased awareness of deficits    VISION  Current Vision: wears glasses only for reading    PERCEPTION  Left Right Discrimination:   impaired    SENSATION  Light touch:  intact    Communication: Pt able to communicate basic w brief management, johnny-care set-up for toilet paper. Pt facilitated in functional mobility within hallways requiring CGA utilizing RW. Pt reports typically not oriented to year or month, oriented this day to place and person.     Pt returned to seated p options    Overall Complexity LOW     OT Discharge Recommendations: Sub-acute rehabilitation(14-17 days)  OT Device Recommendations: TBD    PLAN  OT Treatment Plan: Balance activities; Energy conservation/work simplification techniques;ADL training;IADL tra

## 2020-09-18 VITALS
HEIGHT: 64 IN | BODY MASS INDEX: 25.1 KG/M2 | OXYGEN SATURATION: 100 % | RESPIRATION RATE: 10 BRPM | WEIGHT: 147 LBS | TEMPERATURE: 98 F | HEART RATE: 66 BPM | DIASTOLIC BLOOD PRESSURE: 72 MMHG | SYSTOLIC BLOOD PRESSURE: 116 MMHG

## 2020-09-18 PROCEDURE — 99225 SUBSEQUENT OBSERVATION CARE: CPT | Performed by: OTHER

## 2020-09-18 RX ORDER — POTASSIUM CHLORIDE 20 MEQ/1
40 TABLET, EXTENDED RELEASE ORAL EVERY 4 HOURS
Status: COMPLETED | OUTPATIENT
Start: 2020-09-18 | End: 2020-09-18

## 2020-09-18 RX ORDER — HYDROMORPHONE HYDROCHLORIDE 2 MG/1
2 TABLET ORAL EVERY 4 HOURS PRN
COMMUNITY
End: 2020-10-19

## 2020-09-18 RX ORDER — QUETIAPINE 50 MG/1
150 TABLET, FILM COATED ORAL NIGHTLY
Qty: 30 TABLET | Refills: 0 | Status: SHIPPED | OUTPATIENT
Start: 2020-09-18 | End: 2021-01-05

## 2020-09-18 NOTE — CM/SW NOTE
8:48am  MSW called pt's sister who states she feels her sister was more clear headed when she talked to her last night. Pt sister does not want guille. She wants referral to [address] Patient.   Sister is aware she has to schedule this herself and agreeab

## 2020-09-18 NOTE — PROGRESS NOTES
[facility]  Report of Psychiatric Progress Note    Can Mccarthy Patient Status:  Observation    1970 MRN [id number]   [facility] 3NE-A Attending Adeola Chamberlain,*   Hosp Day # 3 PCP David Garcia MD     Date of it a few times per week. 3) Continue Lithium and Lamictal and Trileptal and low dose Klonipin. 4) Did not push strongly to continue vaping or to stop vaping the cannabis. Without more data, I do not know what to recommend.      5) No need for neurop disorganized. She misses some dose of her AEDs. She had garbled speech, a low K of 2.8 and elevated troponin of 6.5 and WBC of 25. She was treated with IVF's and antibiotics for possible sepsis and admitted to the neuro ICU for monitoring.  She had a EEG th daily. .      Psych Family History: None for bipolar disorder     Social and Developmental History: . She lives with her older son at this time.  She worked in the [employer] in the past.     Past Medical History:   Diagnosis Chan W/BIOPSY(S), CERVIX  1999   • ESOPHAGOGASTRODUODENOSCOPY (EGD) N/A 6/21/2016    Performed by Veronica Victor MD at [place] [address]    Umbilical Hernia Repair   • PARAGARD, IUD  6/2014     Family History   Problem Relation Age of O M20) CR tab 40 mEq, 40 mEq, Oral, Q4H  •  levETIRAcetam (KEPPRA) 500 mg in sodium chloride 0.9% 100 mL IVPB, 500 mg, Intravenous, Q12H  •  QUEtiapine Fumarate (SEROQUEL) tab 150 mg, 150 mg, Oral, Nightly  •  LORazepam (ATIVAN) injection 0.5 mg, 0.5 mg, Int Negative for hallucinations, substance abuse and suicidal ideas. The patient is nervous/anxious.       Mental Status Exam:     Objective       09/18/20  0511   BP: 138/77   Pulse: 54   Resp: 17   Temp: 98.2 °F (36.8 °C)     Appearance: fair grooming  [employer] VENTRICLES/SULCI:   Ventricles and sulci are normal in size. INTRACRANIAL:  There are no abnormal extraaxial fluid collections. There is no midline shift. No evidence of acute intracranial hemorrhage.       SINUSES:  No significant inflammatory leonides

## 2020-09-18 NOTE — PLAN OF CARE
Assumed pt care @ 0730. Alert & oriented x 4. VSS. On room air. NSR on telemetry. Tolerating diet. Voiding well. Up to bathroom with standby assist. Ethyl Rust by all services for d/c.  Awaiting transport for d/c    Problem: Patient/Family Goals  Goal: Patient grooming, and bathing  - Educate and encourage patient/family in tolerated functional activity level and precautions during self-care     Outcome: Adequate for Discharge

## 2020-09-18 NOTE — CONSULTS
Chart reviewed and pt examined. Consult dictated. Impression and Plan:  45 yo with bipolar disorder along with cognitive disorder and TBI admitted with UTI. Lab demonstrated abnormal TFT's. Pt presented with similar pattern 1 year ago.     1. Abnormal

## 2020-09-18 NOTE — CM/SW NOTE
Care Progression Note:  Active Acute Medical Issue:   Delirium     Other Contributing Medical Factors/Dx. Asthma, bipolar, HTN    Length of stay: 0  Avoidable Delays: None at this time.    Discharge Barriers: pending endocrinology  Expected discharge date:

## 2020-09-18 NOTE — PLAN OF CARE
NURSING DISCHARGE NOTE    Discharged Home via Wheelchair. Accompanied by Family member and Support staff  Belongings Taken by patient/family. Discharge paperwork given to & discussed with pt & sister(per pt's request) PTD.  Pt verbalized understandi

## 2020-09-18 NOTE — SLP NOTE
SPEECH DAILY NOTE - INPATIENT    ASSESSMENT & PLAN   ASSESSMENT  Patient seen for dysphagia therapy as f/u to initial clinical bedside swallow evaluation. RN reports patient is tolerating current prescribed diet textures without difficulty.   Patient also

## 2020-09-18 NOTE — PLAN OF CARE
A/O x 4. Takes pills w applesauce  Room air. Tele NSR  Reg diet- help w ordering food  [facility]  R shin dressing  PT rec NAA- needs placement  Will continue to monitor.     Problem: Delirium  Goal: Minimize duration functional activity level and precautions during self-care     Outcome: Progressing

## 2020-09-18 NOTE — PROGRESS NOTES
[facility] Hospitalist Progress Note                                                                   [address]  4/16/1970    CC: FU confusion    Interval History:  - Doing well again to VOMITING  Penicillin G Potass*    NAUSEA AND VOMITING  Phenytoin               ANXIETY, CONFUSION, DIZZINESS,                            HALLUCINATION  Sulfa Antibiotics       NAUSEA AND VOMITING  Sumatriptan Succina*    HALLUCINATION    Comment:[email or web address]  T clear     # Metabolic acidosis  -gentle IVF- repeat BMP - resolved     # HTN  - Home meds monitor     # AMS, hx Bipolar, Generalized anxiety d/o  # Hx of TBI  -cont home meds once verify   -prn ativan  -psych c/s- med recs appreciated     # hypokalemia  -r

## 2020-09-19 NOTE — CONSULTS
[address]    PATIENT'S NAME: Yuriy MALIK   ATTENDING PHYSICIAN: Emeka Bullock. ANNA Padillais: Dat Yates M.D.    PATIENT ACCOUNT#:   [de-identified]    LOCATION:  [address] [place] [address]  MEDICAL RECORD #:   [id number] allergies. Please see allergy list with 15 medications. SOCIAL HISTORY:  Former smoker. Admits to alcohol use as well as marijuana.      FAMILY HISTORY:  Significant for mother with hypertension and hyperlipidemia, father with history of hypertension a normal limits. Recommend TFTs in 6 weeks. Lab orders in the system. Patient aware and she wants to follow up with her family doctor. We will make sure that Dr. Juan Novak is aware that followup lab is necessary.      Dictated By Vilma Allison M.D.

## 2020-09-28 NOTE — DISCHARGE SUMMARY
General Medicine Discharge Summary     Patient ID:  Can Mccarthy  44year old  4/16/1970    Admit date: 9/15/2020    Discharge date and time:  9/18/20    Attending Physician: No att. providers fou seen by Glo for same about 1 year ago- at the time quite ill and thought to be 2/2 sick euthyroid. I don't see repeat labs since that time. - She is on synthroid 150mcg daily currently. Given ongoing abnl in thyroid function will ask Gol to see again. daily., Normal, Disp-180 tablet, R-0    Lithium Carbonate  MG Oral Tab CR  TAKE 2 TABLETS BY MOUTH EVERY NIGHT. SWALLOW WHOLE, Historical    Prenatal 27-1 MG Oral Tab  Take 1 tablet by mouth daily. , Historical    Levocetirizine Dihydrochloride (XYZAL R-0**Patient requests 90 days supply**    Polyethylene Glycol 3350 Oral Powd Pack  Take 17 g by mouth daily. , Historical    Methylcellulose, Laxative, (CITRUCEL OR)  Take by mouth., Historical    Meclizine HCl 12.5 MG Oral Tab  Take 1 tablet (12.5 mg total [phone]

## 2021-03-08 PROBLEM — I69.351 HEMIPLEGIA AND HEMIPARESIS FOLLOWING CEREBRAL INFARCTION AFFECTING RIGHT DOMINANT SIDE (HCC): Status: ACTIVE | Noted: 2021-03-08

## 2021-03-10 PROBLEM — R41.840 ATTENTION AND CONCENTRATION DEFICIT: Status: ACTIVE | Noted: 2019-11-30

## 2021-03-10 PROBLEM — R26.89 IMPAIRED GAIT AND MOBILITY: Status: ACTIVE | Noted: 2020-11-11

## 2021-03-10 PROBLEM — R42 DIZZINESS: Status: ACTIVE | Noted: 2020-11-11

## 2021-03-10 PROBLEM — F07.81 POST CONCUSSION SYNDROME: Status: ACTIVE | Noted: 2020-11-11

## 2021-03-10 PROBLEM — Z86.73 HISTORY OF STROKE: Status: ACTIVE | Noted: 2019-11-26

## 2021-03-10 PROBLEM — F43.22 ADJUSTMENT REACTION WITH ANXIETY: Status: ACTIVE | Noted: 2019-11-25

## 2021-05-21 RX ORDER — MULTIVIT WITH CALCIUM,IRON,MIN 18MG-0.4MG
1 TABLET ORAL DAILY
COMMUNITY

## 2021-06-12 ENCOUNTER — LAB ENCOUNTER (OUTPATIENT)
Dept: LAB | Facility: HOSPITAL | Age: 51
End: 2021-06-12
Attending: INTERNAL MEDICINE
Payer: MEDICARE

## 2021-06-12 DIAGNOSIS — K51.90 ULCERATIVE COLITIS (HCC): ICD-10-CM

## 2021-06-14 ENCOUNTER — ANESTHESIA (OUTPATIENT)
Dept: ENDOSCOPY | Facility: HOSPITAL | Age: 51
End: 2021-06-14
Payer: MEDICARE

## 2021-06-14 ENCOUNTER — ANESTHESIA EVENT (OUTPATIENT)
Dept: ENDOSCOPY | Facility: HOSPITAL | Age: 51
End: 2021-06-14
Payer: MEDICARE

## 2021-06-14 ENCOUNTER — HOSPITAL ENCOUNTER (OUTPATIENT)
Facility: HOSPITAL | Age: 51
Setting detail: HOSPITAL OUTPATIENT SURGERY
Discharge: HOME | End: 2021-06-14
Attending: INTERNAL MEDICINE | Admitting: INTERNAL MEDICINE
Payer: MEDICARE

## 2021-06-14 VITALS
SYSTOLIC BLOOD PRESSURE: 143 MMHG | BODY MASS INDEX: 28.7 KG/M2 | HEIGHT: 63 IN | DIASTOLIC BLOOD PRESSURE: 72 MMHG | OXYGEN SATURATION: 99 % | HEART RATE: 58 BPM | RESPIRATION RATE: 20 BRPM | WEIGHT: 162 LBS | TEMPERATURE: 99 F

## 2021-06-14 DIAGNOSIS — K51.90 ULCERATIVE COLITIS (HCC): Primary | ICD-10-CM

## 2021-06-14 DIAGNOSIS — K51.919 ULCERATIVE COLITIS WITH COMPLICATION, UNSPECIFIED LOCATION (HCC): ICD-10-CM

## 2021-06-14 PROCEDURE — 88305 TISSUE EXAM BY PATHOLOGIST: CPT | Performed by: INTERNAL MEDICINE

## 2021-06-14 PROCEDURE — 0DBB8ZX EXCISION OF ILEUM, VIA NATURAL OR ARTIFICIAL OPENING ENDOSCOPIC, DIAGNOSTIC: ICD-10-PCS | Performed by: INTERNAL MEDICINE

## 2021-06-14 PROCEDURE — 36410 VNPNXR 3YR/> PHY/QHP DX/THER: CPT

## 2021-06-14 PROCEDURE — 0DBK8ZX EXCISION OF ASCENDING COLON, VIA NATURAL OR ARTIFICIAL OPENING ENDOSCOPIC, DIAGNOSTIC: ICD-10-PCS | Performed by: INTERNAL MEDICINE

## 2021-06-14 PROCEDURE — 0DBM8ZX EXCISION OF DESCENDING COLON, VIA NATURAL OR ARTIFICIAL OPENING ENDOSCOPIC, DIAGNOSTIC: ICD-10-PCS | Performed by: INTERNAL MEDICINE

## 2021-06-14 PROCEDURE — 0DBL8ZX EXCISION OF TRANSVERSE COLON, VIA NATURAL OR ARTIFICIAL OPENING ENDOSCOPIC, DIAGNOSTIC: ICD-10-PCS | Performed by: INTERNAL MEDICINE

## 2021-06-14 PROCEDURE — 76937 US GUIDE VASCULAR ACCESS: CPT

## 2021-06-14 PROCEDURE — 0DBP8ZX EXCISION OF RECTUM, VIA NATURAL OR ARTIFICIAL OPENING ENDOSCOPIC, DIAGNOSTIC: ICD-10-PCS | Performed by: INTERNAL MEDICINE

## 2021-06-14 PROCEDURE — 0DBH8ZX EXCISION OF CECUM, VIA NATURAL OR ARTIFICIAL OPENING ENDOSCOPIC, DIAGNOSTIC: ICD-10-PCS | Performed by: INTERNAL MEDICINE

## 2021-06-14 PROCEDURE — 0DBN8ZX EXCISION OF SIGMOID COLON, VIA NATURAL OR ARTIFICIAL OPENING ENDOSCOPIC, DIAGNOSTIC: ICD-10-PCS | Performed by: INTERNAL MEDICINE

## 2021-06-14 RX ORDER — SODIUM CHLORIDE, SODIUM LACTATE, POTASSIUM CHLORIDE, CALCIUM CHLORIDE 600; 310; 30; 20 MG/100ML; MG/100ML; MG/100ML; MG/100ML
INJECTION, SOLUTION INTRAVENOUS CONTINUOUS
Status: DISCONTINUED | OUTPATIENT
Start: 2021-06-14 | End: 2021-06-14

## 2021-06-14 RX ORDER — LIDOCAINE HYDROCHLORIDE 10 MG/ML
INJECTION, SOLUTION EPIDURAL; INFILTRATION; INTRACAUDAL; PERINEURAL AS NEEDED
Status: DISCONTINUED | OUTPATIENT
Start: 2021-06-14 | End: 2021-06-14 | Stop reason: SURG

## 2021-06-14 RX ORDER — NALOXONE HYDROCHLORIDE 0.4 MG/ML
80 INJECTION, SOLUTION INTRAMUSCULAR; INTRAVENOUS; SUBCUTANEOUS AS NEEDED
Status: DISCONTINUED | OUTPATIENT
Start: 2021-06-14 | End: 2021-06-14

## 2021-06-14 RX ORDER — DEXTROSE MONOHYDRATE 25 G/50ML
50 INJECTION, SOLUTION INTRAVENOUS
Status: DISCONTINUED | OUTPATIENT
Start: 2021-06-14 | End: 2021-06-14

## 2021-06-14 RX ADMIN — LIDOCAINE HYDROCHLORIDE 100 MG: 10 INJECTION, SOLUTION EPIDURAL; INFILTRATION; INTRACAUDAL; PERINEURAL at 15:06:00

## 2021-06-14 NOTE — ANESTHESIA POSTPROCEDURE EVALUATION
[address] Patient Status:  Hospital Outpatient Surgery   Age/Gender 46year old female MRN [id number]   Location [id number] [place] [address] Attending Antoine Arora MD   Hosp Day # 0 PCP Tereza Gutierrez MD

## 2021-06-14 NOTE — H&P
Nancie Patient Status:  [facility] Outpatient Surgery    1970 MRN [id number]   Location [address] [place] [address] Attending Jennifer Akbar MD   [address] Day # 0 PCP Romeo Mantilla, In bed occasionally   • Leg swelling 2017    Left leg worst   • Migraine, hemiplegic     left side walking deficit, drooping face   • MIGRAINES    • Migraines     triggers; weather changes   • Multiple lesions on computed tomography of brain and spine Pressure Father    • Colon Cancer Father         61   • Colon Polyps Father    • Hypertension Father    • High Blood Pressure Mother    • High Cholesterol Mother    • Hypertension Mother    • Mental Disorder Mother         Depression   • Heart Disorder Sis Comment:Lesions in brain  Erythromycin            OTHER (SEE COMMENTS)    No current facility-administered medications on file as of .       Current Outpatient Medications:   •  Multiple Vitamins-Minerals (QC WOMENS DAILY MULTIVITAMIN) Oral Tab, Take 1 tab every evening., Disp: 90 tablet, Rfl: 1  •  OXcarbazepine 300 MG Oral Tab, Take 1.5 tablets (450 mg total) by mouth 2 (two) times daily. , Disp: 180 tablet, Rfl: 1  •  Ketoconazole 2 % External Shampoo, Use to wash scalp 2-3 times per week., Disp: 120 mL, R EXCEED 3 TABLETS PER WEEK TO AVOID REBOUND, Disp: 30 tablet, Rfl: 0  •  HYDROmorphone HCl 2 MG Oral Tab, Take 1 tablet (2 mg total) by mouth every 4 (four) hours as needed for Pain., Disp: 45 tablet, Rfl: 0  •  PEG 3350-KCl-NaBcb-NaCl-NaSulf (PEG 3350/ELEC complaints: Denies endometriosis, painful menstrual periods, heavy menstrual periods. Patient is not pregnant.   Psychosocial: Denies history of mental illness, denies usually feeling lonely or depressed, denies history of depression, anxiety, history of p procedures. The patient indicates understanding of these issues and agrees to proceed with the scheduled procedure.    Rohan Age  6/14/2021  12:07 PM

## 2021-06-14 NOTE — OPERATIVE REPORT
Operative Report-Colonoscopy    PREOPERATIVE DIAGNOSIS/INDICATION: Ulcerative colitis     POSTOPERTATIVE DIAGNOSIS: Ulcerative colitis, hemorrhoids, diverticulosis     PROCEDURE PERFORMED: COLONOSCOPY    INF biopsies.    - Repeat colonoscopy in 1 year with 2 day prep.     Kristie Huber  6/14/2021  3:10 PM

## 2021-06-14 NOTE — ANESTHESIA PREPROCEDURE EVALUATION
PRE-OP EVALUATION    Patient Name: Cinthia Laws    Admit Diagnosis: Ulcerative colitis with complication, unspecified location [facility]) [C49.657]    Pre-op Diagnosis: Ulcerative colitis with complication, unspecified location ([facility].) Jo Bui by physician., Disp: 4000 mL, Rfl: 0, 6/14/2021 at Unknown time  Metoclopramide HCl (REGLAN) 10 MG Oral Tab, , Disp: , Rfl: , Past Month at Unknown time  Promethazine HCl 25 MG Oral Tab, TK 1 T PO Q 4 H PRF NAUSEA, Disp: , Rfl: , Past Month at Unknown time time  METOPROLOL TARTRATE 25 MG Oral Tab, TAKE 1/2 TABLET BY MOUTH DAILY, Disp: 45 tablet, Rfl: 3, 6/13/2021 at Unknown time  Meclizine HCl 12.5 MG Oral Tab, Take 1 tablet (12.5 mg total) by mouth 3 (three) times daily as needed. , Disp: 90 tablet, Rfl: 3, Mushrooms; Penicillin G Potassium; Phenytoin; Sulfa Antibiotics; Toradol; Trees, [place]; Triptans; and Erythromycin      Anesthesia Evaluation    Patient summary reviewed.     Anesthetic Complications           GI/Hepatic/Renal      (+) GERD patient                Present on Admission:  **None**

## 2021-12-31 ENCOUNTER — APPOINTMENT (OUTPATIENT)
Dept: GENERAL RADIOLOGY | Facility: HOSPITAL | Age: 51
DRG: 917 | End: 2021-12-31
Attending: EMERGENCY MEDICINE
Payer: MEDICARE

## 2021-12-31 ENCOUNTER — HOSPITAL ENCOUNTER (INPATIENT)
Facility: HOSPITAL | Age: 51
LOS: 2 days | Discharge: HOME HEALTH | DRG: 917 | End: 2022-01-03
Attending: EMERGENCY MEDICINE | Admitting: HOSPITALIST
Payer: MEDICARE

## 2021-12-31 ENCOUNTER — APPOINTMENT (OUTPATIENT)
Dept: CT IMAGING | Facility: HOSPITAL | Age: 51
DRG: 917 | End: 2021-12-31
Attending: EMERGENCY MEDICINE
Payer: MEDICARE

## 2021-12-31 DIAGNOSIS — T56.891A LITHIUM TOXICITY, ACCIDENTAL OR UNINTENTIONAL, INITIAL ENCOUNTER: ICD-10-CM

## 2021-12-31 DIAGNOSIS — R41.82 ALTERED MENTAL STATUS, UNSPECIFIED ALTERED MENTAL STATUS TYPE: ICD-10-CM

## 2021-12-31 DIAGNOSIS — U07.1 COVID-19: Primary | ICD-10-CM

## 2021-12-31 LAB
ALBUMIN SERPL-MCNC: 3.9 G/DL (ref 3.4–5)
ALBUMIN/GLOB SERPL: 1.3 {RATIO} (ref 1–2)
ALP LIVER SERPL-CCNC: 76 U/L
ALT SERPL-CCNC: 21 U/L
ANION GAP SERPL CALC-SCNC: 10 MMOL/L (ref 0–18)
AST SERPL-CCNC: 19 U/L (ref 15–37)
BASOPHILS # BLD AUTO: 0.04 X10(3) UL (ref 0–0.2)
BASOPHILS NFR BLD AUTO: 0.4 %
BILIRUB SERPL-MCNC: 0.5 MG/DL (ref 0.1–2)
BUN BLD-MCNC: 12 MG/DL (ref 7–18)
CALCIUM BLD-MCNC: 9.6 MG/DL (ref 8.5–10.1)
CHLORIDE SERPL-SCNC: 111 MMOL/L (ref 98–112)
CO2 SERPL-SCNC: 18 MMOL/L (ref 21–32)
CREAT BLD-MCNC: 0.71 MG/DL
EOSINOPHIL # BLD AUTO: 0.09 X10(3) UL (ref 0–0.7)
EOSINOPHIL NFR BLD AUTO: 0.9 %
ERYTHROCYTE [DISTWIDTH] IN BLOOD BY AUTOMATED COUNT: 12.9 %
ETHANOL SERPL-MCNC: <3 MG/DL (ref ?–3)
GLOBULIN PLAS-MCNC: 3.1 G/DL (ref 2.8–4.4)
GLUCOSE BLD-MCNC: 114 MG/DL (ref 70–99)
HCT VFR BLD AUTO: 39.8 %
HGB BLD-MCNC: 13.7 G/DL
IMM GRANULOCYTES # BLD AUTO: 0.04 X10(3) UL (ref 0–1)
IMM GRANULOCYTES NFR BLD: 0.4 %
LACTATE SERPL-SCNC: 0.9 MMOL/L (ref 0.4–2)
LITHIUM SERPL-SCNC: 1.5 MMOL/L (ref 0.6–1.2)
LITHIUM SERPL-SCNC: 1.7 MMOL/L (ref 0.6–1.2)
LYMPHOCYTES # BLD AUTO: 1.37 X10(3) UL (ref 1–4)
LYMPHOCYTES NFR BLD AUTO: 14 %
MAGNESIUM SERPL-MCNC: 2.1 MG/DL (ref 1.6–2.6)
MCH RBC QN AUTO: 29.9 PG (ref 26–34)
MCHC RBC AUTO-ENTMCNC: 34.4 G/DL (ref 31–37)
MCV RBC AUTO: 86.9 FL
MONOCYTES # BLD AUTO: 0.73 X10(3) UL (ref 0.1–1)
MONOCYTES NFR BLD AUTO: 7.4 %
NEUTROPHILS # BLD AUTO: 7.53 X10 (3) UL (ref 1.5–7.7)
NEUTROPHILS # BLD AUTO: 7.53 X10(3) UL (ref 1.5–7.7)
NEUTROPHILS NFR BLD AUTO: 76.9 %
OSMOLALITY SERPL CALC.SUM OF ELEC: 289 MOSM/KG (ref 275–295)
PLATELET # BLD AUTO: 224 10(3)UL (ref 150–450)
POTASSIUM SERPL-SCNC: 3.2 MMOL/L (ref 3.5–5.1)
PROT SERPL-MCNC: 7 G/DL (ref 6.4–8.2)
RBC # BLD AUTO: 4.58 X10(6)UL
SARS-COV-2 RNA RESP QL NAA+PROBE: DETECTED
SODIUM SERPL-SCNC: 139 MMOL/L (ref 136–145)
TROPONIN I HIGH SENSITIVITY: 12 NG/L
WBC # BLD AUTO: 9.8 X10(3) UL (ref 4–11)

## 2021-12-31 PROCEDURE — 80053 COMPREHEN METABOLIC PANEL: CPT | Performed by: EMERGENCY MEDICINE

## 2021-12-31 PROCEDURE — 99285 EMERGENCY DEPT VISIT HI MDM: CPT

## 2021-12-31 PROCEDURE — 85025 COMPLETE CBC W/AUTO DIFF WBC: CPT | Performed by: EMERGENCY MEDICINE

## 2021-12-31 PROCEDURE — 93010 ELECTROCARDIOGRAM REPORT: CPT | Performed by: INTERNAL MEDICINE

## 2021-12-31 PROCEDURE — 93010 ELECTROCARDIOGRAM REPORT: CPT

## 2021-12-31 PROCEDURE — 80178 ASSAY OF LITHIUM: CPT | Performed by: EMERGENCY MEDICINE

## 2021-12-31 PROCEDURE — 96365 THER/PROPH/DIAG IV INF INIT: CPT

## 2021-12-31 PROCEDURE — 93005 ELECTROCARDIOGRAM TRACING: CPT

## 2021-12-31 PROCEDURE — 96366 THER/PROPH/DIAG IV INF ADDON: CPT

## 2021-12-31 PROCEDURE — 96368 THER/DIAG CONCURRENT INF: CPT

## 2021-12-31 PROCEDURE — 96375 TX/PRO/DX INJ NEW DRUG ADDON: CPT

## 2021-12-31 PROCEDURE — 83605 ASSAY OF LACTIC ACID: CPT | Performed by: EMERGENCY MEDICINE

## 2021-12-31 PROCEDURE — 82077 ASSAY SPEC XCP UR&BREATH IA: CPT | Performed by: EMERGENCY MEDICINE

## 2021-12-31 PROCEDURE — 83735 ASSAY OF MAGNESIUM: CPT | Performed by: EMERGENCY MEDICINE

## 2021-12-31 PROCEDURE — 70498 CT ANGIOGRAPHY NECK: CPT | Performed by: EMERGENCY MEDICINE

## 2021-12-31 PROCEDURE — 71045 X-RAY EXAM CHEST 1 VIEW: CPT | Performed by: EMERGENCY MEDICINE

## 2021-12-31 PROCEDURE — 70496 CT ANGIOGRAPHY HEAD: CPT | Performed by: EMERGENCY MEDICINE

## 2021-12-31 PROCEDURE — 84484 ASSAY OF TROPONIN QUANT: CPT | Performed by: EMERGENCY MEDICINE

## 2021-12-31 RX ORDER — SODIUM CHLORIDE 9 MG/ML
INJECTION, SOLUTION INTRAVENOUS CONTINUOUS
Status: DISCONTINUED | OUTPATIENT
Start: 2021-12-31 | End: 2022-01-03

## 2021-12-31 RX ORDER — POTASSIUM CHLORIDE 14.9 MG/ML
20 INJECTION INTRAVENOUS ONCE
Status: DISCONTINUED | OUTPATIENT
Start: 2021-12-31 | End: 2021-12-31

## 2021-12-31 RX ORDER — LORAZEPAM 2 MG/ML
1 INJECTION INTRAMUSCULAR ONCE
Status: COMPLETED | OUTPATIENT
Start: 2021-12-31 | End: 2021-12-31

## 2021-12-31 RX ORDER — HALOPERIDOL 5 MG/ML
5 INJECTION INTRAMUSCULAR ONCE
Status: COMPLETED | OUTPATIENT
Start: 2021-12-31 | End: 2021-12-31

## 2021-12-31 RX ORDER — MAGNESIUM SULFATE 1 G/100ML
1 INJECTION INTRAVENOUS ONCE
Status: COMPLETED | OUTPATIENT
Start: 2021-12-31 | End: 2022-01-01

## 2021-12-31 RX ORDER — POTASSIUM CHLORIDE 20 MEQ/1
40 TABLET, EXTENDED RELEASE ORAL ONCE
Status: DISCONTINUED | OUTPATIENT
Start: 2021-12-31 | End: 2021-12-31

## 2021-12-31 RX ORDER — POTASSIUM CHLORIDE 14.9 MG/ML
20 INJECTION INTRAVENOUS ONCE
Status: COMPLETED | OUTPATIENT
Start: 2021-12-31 | End: 2022-01-01

## 2021-12-31 NOTE — ED QUICK NOTES
Radha Metzklaura - pts sister - [phone]    Per Jocelyn Whitaker we can call if any questions. Pts POA is pts son who is at work right now.

## 2021-12-31 NOTE — ED PROVIDER NOTES
Patient Seen in: [facility] Emergency Department      History   Patient presents with:  Altered Mental Status    Stated Complaint: AMS    Subjective:   HPI    This is a 49-year-old female presents with altered mental status.   Patient has a extensive reflux    • Extrinsic asthma, unspecified    • Fatigue    • Feeling lonely    • Fibromyalgia    • Flatulence/gas pain/belching    • Food intolerance 1977    Mushrooms   • Frequent urination 1983    Drink lots of water   • Frequent use of laxatives 2016 colitis   • COLONOSCOPY N/A 4/25/2017    Procedure: COLONOSCOPY;  Surgeon: Erik Tavarez MD;  Location: [facility] ENDOSCOPY   • COLONOSCOPY N/A 6/14/2021    Procedure: COLONOSCOPY with biopsy,;  Surgeon: Concepcion Ortiz MD;  Location: [facility] ENDOSCOPY   • CO Clear to auscultation bilaterally with no rales, no retractions, and no wheezing. HEART:  Regular rate and rhythm. S1 and S2. No murmurs, no rubs or gallops. ABDOMEN: Soft, nontender and nondistended. Normoactive bowel sounds. No rebound. No guarding. (CPT=71045), 9/17/2020, 2:54 PM.  INDICATIONS:  AMS  PATIENT STATED HISTORY: (As transcribed by Technologist)  Patient offered no additional history at this time. FINDINGS:  The patient is rotated to the left midline.   There is a small left pleural effu intracranial hemorrhage or extra-axial fluid collection. No evidence of acute territorial infarction. There is no evident fracture. Minimal ethmoid and trace maxillary mucosal thickening. The mastoid air cells are unremarkable.   Degenerative changes in above for further details.    Dictated by (CST): Willard Everett MD on 12/31/2021 at 7:46 PM     Finalized by (CST): Willard Everett MD on 12/31/2021 at 8:05 PM         MDM        Patient placed on cardiac monitor, continuous pulse oximetry and IV line was

## 2021-12-31 NOTE — ED QUICK NOTES
Pts sister called and states that she was taking care of pt up until last week. Pts sister states that she had to stop taking care of her because she was diagnosed with covid on 12/25. Pts sister states that pt lives with her 27year old son.  pts sister re

## 2021-12-31 NOTE — ED INITIAL ASSESSMENT (HPI)
Pt denies any pain at this time. Per medics mom last saw pt 3 days ago and patient was her normal. Upon arrival pt repetitively stating \"okay, I don't know\" per medics, pts mother reports this behavior is normal but that pt does seem to be more confused.

## 2022-01-01 PROBLEM — T56.891A LITHIUM TOXICITY, ACCIDENTAL OR UNINTENTIONAL, INITIAL ENCOUNTER: Status: ACTIVE | Noted: 2022-01-01

## 2022-01-01 LAB
ALBUMIN SERPL-MCNC: 3.8 G/DL (ref 3.4–5)
ALBUMIN/GLOB SERPL: 1.3 {RATIO} (ref 1–2)
ALP LIVER SERPL-CCNC: 78 U/L
ALT SERPL-CCNC: 22 U/L
AMMONIA PLAS-MCNC: 37 UMOL/L (ref 11–32)
ANION GAP SERPL CALC-SCNC: 9 MMOL/L (ref 0–18)
AST SERPL-CCNC: 9 U/L (ref 15–37)
BASOPHILS # BLD AUTO: 0.05 X10(3) UL (ref 0–0.2)
BASOPHILS NFR BLD AUTO: 0.4 %
BILIRUB SERPL-MCNC: 0.4 MG/DL (ref 0.1–2)
BILIRUB UR QL STRIP.AUTO: NEGATIVE
BUN BLD-MCNC: 10 MG/DL (ref 7–18)
CALCIUM BLD-MCNC: 8.7 MG/DL (ref 8.5–10.1)
CHLORIDE SERPL-SCNC: 116 MMOL/L (ref 98–112)
CLARITY UR REFRACT.AUTO: CLEAR
CO2 SERPL-SCNC: 18 MMOL/L (ref 21–32)
COLOR UR AUTO: YELLOW
CREAT BLD-MCNC: 0.86 MG/DL
EOSINOPHIL # BLD AUTO: 0.11 X10(3) UL (ref 0–0.7)
EOSINOPHIL NFR BLD AUTO: 0.9 %
ERYTHROCYTE [DISTWIDTH] IN BLOOD BY AUTOMATED COUNT: 13.1 %
GLOBULIN PLAS-MCNC: 3 G/DL (ref 2.8–4.4)
GLUCOSE BLD-MCNC: 129 MG/DL (ref 70–99)
GLUCOSE UR STRIP.AUTO-MCNC: NEGATIVE MG/DL
HCT VFR BLD AUTO: 39.8 %
HGB BLD-MCNC: 12.8 G/DL
IMM GRANULOCYTES # BLD AUTO: 0.07 X10(3) UL (ref 0–1)
IMM GRANULOCYTES NFR BLD: 0.6 %
KETONES UR STRIP.AUTO-MCNC: 80 MG/DL
LEUKOCYTE ESTERASE UR QL STRIP.AUTO: NEGATIVE
LITHIUM SERPL-SCNC: 1.2 MMOL/L (ref 0.6–1.2)
LYMPHOCYTES # BLD AUTO: 1.25 X10(3) UL (ref 1–4)
LYMPHOCYTES NFR BLD AUTO: 10 %
MCH RBC QN AUTO: 29.2 PG (ref 26–34)
MCHC RBC AUTO-ENTMCNC: 32.2 G/DL (ref 31–37)
MCV RBC AUTO: 90.7 FL
MONOCYTES # BLD AUTO: 0.88 X10(3) UL (ref 0.1–1)
MONOCYTES NFR BLD AUTO: 7 %
NEUTROPHILS # BLD AUTO: 10.14 X10 (3) UL (ref 1.5–7.7)
NEUTROPHILS # BLD AUTO: 10.14 X10(3) UL (ref 1.5–7.7)
NEUTROPHILS NFR BLD AUTO: 81.1 %
NITRITE UR QL STRIP.AUTO: NEGATIVE
OSMOLALITY SERPL CALC.SUM OF ELEC: 297 MOSM/KG (ref 275–295)
PH UR STRIP.AUTO: 7 [PH] (ref 5–8)
PLATELET # BLD AUTO: 293 10(3)UL (ref 150–450)
POTASSIUM SERPL-SCNC: 3.2 MMOL/L (ref 3.5–5.1)
PROT SERPL-MCNC: 6.8 G/DL (ref 6.4–8.2)
PROT UR STRIP.AUTO-MCNC: 30 MG/DL
RBC # BLD AUTO: 4.39 X10(6)UL
RBC UR QL AUTO: NEGATIVE
SODIUM SERPL-SCNC: 143 MMOL/L (ref 136–145)
SP GR UR STRIP.AUTO: 1.02 (ref 1–1.03)
TSI SER-ACNC: 1.3 MIU/ML (ref 0.36–3.74)
UROBILINOGEN UR STRIP.AUTO-MCNC: <2 MG/DL
WBC # BLD AUTO: 12.5 X10(3) UL (ref 4–11)

## 2022-01-01 PROCEDURE — 81001 URINALYSIS AUTO W/SCOPE: CPT | Performed by: INTERNAL MEDICINE

## 2022-01-01 PROCEDURE — 93005 ELECTROCARDIOGRAM TRACING: CPT

## 2022-01-01 PROCEDURE — 92610 EVALUATE SWALLOWING FUNCTION: CPT

## 2022-01-01 PROCEDURE — 84443 ASSAY THYROID STIM HORMONE: CPT | Performed by: STUDENT IN AN ORGANIZED HEALTH CARE EDUCATION/TRAINING PROGRAM

## 2022-01-01 PROCEDURE — 82140 ASSAY OF AMMONIA: CPT | Performed by: INTERNAL MEDICINE

## 2022-01-01 PROCEDURE — 80178 ASSAY OF LITHIUM: CPT | Performed by: EMERGENCY MEDICINE

## 2022-01-01 PROCEDURE — 97161 PT EVAL LOW COMPLEX 20 MIN: CPT

## 2022-01-01 PROCEDURE — 85025 COMPLETE CBC W/AUTO DIFF WBC: CPT | Performed by: STUDENT IN AN ORGANIZED HEALTH CARE EDUCATION/TRAINING PROGRAM

## 2022-01-01 PROCEDURE — 97530 THERAPEUTIC ACTIVITIES: CPT

## 2022-01-01 PROCEDURE — 80053 COMPREHEN METABOLIC PANEL: CPT | Performed by: STUDENT IN AN ORGANIZED HEALTH CARE EDUCATION/TRAINING PROGRAM

## 2022-01-01 PROCEDURE — 93010 ELECTROCARDIOGRAM REPORT: CPT | Performed by: INTERNAL MEDICINE

## 2022-01-01 PROCEDURE — 92526 ORAL FUNCTION THERAPY: CPT

## 2022-01-01 RX ORDER — ACETAMINOPHEN 325 MG/1
650 TABLET ORAL EVERY 6 HOURS PRN
Status: DISCONTINUED | OUTPATIENT
Start: 2022-01-01 | End: 2022-01-03

## 2022-01-01 RX ORDER — DEXTROSE AND SODIUM CHLORIDE 5; .45 G/100ML; G/100ML
INJECTION, SOLUTION INTRAVENOUS CONTINUOUS
Status: ACTIVE | OUTPATIENT
Start: 2022-01-01 | End: 2022-01-01

## 2022-01-01 RX ORDER — SODIUM PHOSPHATE, DIBASIC AND SODIUM PHOSPHATE, MONOBASIC 7; 19 G/133ML; G/133ML
1 ENEMA RECTAL ONCE AS NEEDED
Status: DISCONTINUED | OUTPATIENT
Start: 2022-01-01 | End: 2022-01-03

## 2022-01-01 RX ORDER — PANTOPRAZOLE SODIUM 40 MG/1
40 TABLET, DELAYED RELEASE ORAL
Status: DISCONTINUED | OUTPATIENT
Start: 2022-01-01 | End: 2022-01-03

## 2022-01-01 RX ORDER — LAMOTRIGINE 100 MG/1
200 TABLET ORAL 3 TIMES DAILY
Status: DISCONTINUED | OUTPATIENT
Start: 2022-01-01 | End: 2022-01-03

## 2022-01-01 RX ORDER — DICYCLOMINE HYDROCHLORIDE 10 MG/1
10 CAPSULE ORAL 4 TIMES DAILY
Status: DISCONTINUED | OUTPATIENT
Start: 2022-01-01 | End: 2022-01-03

## 2022-01-01 RX ORDER — MONTELUKAST SODIUM 10 MG/1
10 TABLET ORAL NIGHTLY
Status: DISCONTINUED | OUTPATIENT
Start: 2022-01-01 | End: 2022-01-03

## 2022-01-01 RX ORDER — MESALAMINE 400 MG/1
1200 CAPSULE, DELAYED RELEASE ORAL
Status: DISCONTINUED | OUTPATIENT
Start: 2022-01-01 | End: 2022-01-03

## 2022-01-01 RX ORDER — BISACODYL 10 MG
10 SUPPOSITORY, RECTAL RECTAL
Status: DISCONTINUED | OUTPATIENT
Start: 2022-01-01 | End: 2022-01-03

## 2022-01-01 RX ORDER — ONDANSETRON 2 MG/ML
4 INJECTION INTRAMUSCULAR; INTRAVENOUS EVERY 6 HOURS PRN
Status: DISCONTINUED | OUTPATIENT
Start: 2022-01-01 | End: 2022-01-03

## 2022-01-01 RX ORDER — LEVETIRACETAM 500 MG/1
500 TABLET ORAL 2 TIMES DAILY
Status: DISCONTINUED | OUTPATIENT
Start: 2022-01-01 | End: 2022-01-03

## 2022-01-01 RX ORDER — POLYETHYLENE GLYCOL 3350 17 G/17G
17 POWDER, FOR SOLUTION ORAL DAILY PRN
Status: DISCONTINUED | OUTPATIENT
Start: 2022-01-01 | End: 2022-01-03

## 2022-01-01 RX ORDER — LEVOTHYROXINE SODIUM 0.12 MG/1
125 TABLET ORAL
Status: DISCONTINUED | OUTPATIENT
Start: 2022-01-01 | End: 2022-01-03

## 2022-01-01 RX ORDER — ASPIRIN 81 MG/1
81 TABLET, CHEWABLE ORAL DAILY
Status: DISCONTINUED | OUTPATIENT
Start: 2022-01-01 | End: 2022-01-03

## 2022-01-01 RX ORDER — CLONAZEPAM 0.25 MG/1
0.25 TABLET, ORALLY DISINTEGRATING ORAL 3 TIMES DAILY
Status: DISCONTINUED | OUTPATIENT
Start: 2022-01-01 | End: 2022-01-03

## 2022-01-01 RX ORDER — FLUTICASONE FUROATE AND VILANTEROL 200; 25 UG/1; UG/1
1 POWDER RESPIRATORY (INHALATION) DAILY
Refills: 0 | Status: DISCONTINUED | OUTPATIENT
Start: 2022-01-01 | End: 2022-01-03

## 2022-01-01 RX ORDER — POTASSIUM CHLORIDE 20 MEQ/1
40 TABLET, EXTENDED RELEASE ORAL ONCE
Status: COMPLETED | OUTPATIENT
Start: 2022-01-01 | End: 2022-01-01

## 2022-01-01 RX ORDER — SODIUM CHLORIDE 9 MG/ML
INJECTION, SOLUTION INTRAVENOUS CONTINUOUS
Status: ACTIVE | OUTPATIENT
Start: 2022-01-01 | End: 2022-01-01

## 2022-01-01 RX ORDER — ENOXAPARIN SODIUM 100 MG/ML
40 INJECTION SUBCUTANEOUS DAILY
Status: DISCONTINUED | OUTPATIENT
Start: 2022-01-01 | End: 2022-01-03

## 2022-01-01 RX ORDER — SENNOSIDES 8.6 MG
17.2 TABLET ORAL NIGHTLY PRN
Status: DISCONTINUED | OUTPATIENT
Start: 2022-01-01 | End: 2022-01-03

## 2022-01-01 RX ORDER — PREGABALIN 100 MG/1
200 CAPSULE ORAL 2 TIMES DAILY
Status: DISCONTINUED | OUTPATIENT
Start: 2022-01-01 | End: 2022-01-03

## 2022-01-01 NOTE — PROGRESS NOTES
Spoke with glenda from poison control, updated labs, administered medications, recent EKG. Per poison control goal Qtc <500. Repeat EKG suggested.

## 2022-01-01 NOTE — PHYSICAL THERAPY NOTE
PHYSICAL THERAPY EVALUATION - INPATIENT     Room Number: 504/924-H  Evaluation Date: 1/1/2022  Type of Evaluation: Initial  Physician Order: PT Eval and Treat    Presenting Problem: AMS  Co-Morbidities : TBI seizures UTI bipolar  Reason for Therapy: Stand at assistance level: supervision  To rw   Goal #3 Patient is able to ambulate 50 feet with assist device: walker - rolling at assistance level: supervision     Goal #4    Goal #5    Goal #6    Goal Comments: Goals established on 1/1/2022    HOME SITU FINDINGS  Neurological Findings: Sensation           Sensation: light touch grossly intact BLEs         AM-PAC '6-Clicks' INPATIENT SHORT FORM - BASIC MOBILITY  How much difficulty does the patient currently have. ..   Patient Difficulty: Turning over in bed aware.      Exercise/Education Provided:  Bed mobility  Gait training  Transfer training  discharge planning    Patient End of Session: In bed; With [address] [facility] staff;Needs met;Call light within reach;RN aware of session/findings; All patient questions and concerns a

## 2022-01-01 NOTE — ED PROVIDER NOTES
Asked to check repeat ekg by my partner to track qt. EKG    Rate, intervals and axes as noted on EKG Report. Rate: 84  Rhythm: Sinus Rhythm  Reading: Sinus rhythm, ST changes unchanged from previous.   QTc still slightly prolonged but improved slightly

## 2022-01-01 NOTE — ED QUICK NOTES
Called poison control to update on the lithium level. They state to redraw another level in 4 hours and repeat ekg after electrolytes finish infusing.

## 2022-01-01 NOTE — PLAN OF CARE
COVID-19 Daily Discharge Readiness-Nursing    O2 Sat at Rest:  SPO2% on Room Air at Rest: 96  %   O2 Sat with Exertion:    % on    liters   Temperature max from last 24 hrs: Temp (24hrs), Av.8 °F (37.1 °C), Min:98.5 °F (36.9 °C), Max:99.3 °F (37.4 °C)

## 2022-01-01 NOTE — PROGRESS NOTES
Spoke with sister Isa Santos, son janes(POA) no answer. Completed navigator and home meds with her assistance. MD notified of home med completed.

## 2022-01-01 NOTE — ED QUICK NOTES
Writer spoke to poison control regarding elevated lithium level. Poison control recommends 1g magnesium, potassium through the IV, and repeat lithium level.  MD made aware

## 2022-01-01 NOTE — ED QUICK NOTES
Orders for admission, patient is aware of plan and ready to go upstairs. Any questions, please call ED KONSTANTIN Hill at extension [phone]    Vaccinated?  yes  Type of COVID test sent: rapid  COVID Suspicion level: Low/High      Titratable drug(s) infusing:  Rate:

## 2022-01-01 NOTE — SLP NOTE
ADULT SWALLOWING EVALUATION    ASSESSMENT    ASSESSMENT/OVERALL IMPRESSION:  Order received for bedside swallow evaluation to r/o aspiration. Pt presented to [facility] ER with AMS.  Pmhx includes anxiety, bipolar dz, hypothyroidism, migraine HA, TBI/CVA, chron significant oral pocketing observed with soft & chopped diet. Will continue to follow as per plan. RECOMMENDATIONS   Diet Recommendations - Solids: Mechanical soft chopped/ Soft & Bite Sized  Diet Recommendations - Liquids:  Thin Liquids • Fatigue    • Feeling lonely    • Fibromyalgia    • Flatulence/gas pain/belching    • Food intolerance 1977    Mushrooms   • Frequent urination 1983    Drink lots of water   • Frequent use of laxatives 2016    Daily   • Gestational diabetes    • Headach to formulate at this time    SWALLOWING HISTORY  Current Diet Consistency: NPO  Dysphagia History: see above  Imaging Results: CXR 12/31/21  CONCLUSION:  Small left pleural effusion with minimal atelectasis/scarring in the lower lungs.    SUBJECTIVE       O assistance 100 % of the time across 2 sessions. In Progress                              FOLLOW UP  Treatment Plan/Recommendations: Dysphagia therapy; Aspiration precautions  Number of Visits to Meet Established Goals: 2  Follow Up Needed (Documentation Re

## 2022-01-01 NOTE — COVID NURSING ASSESSMENT
COVID-19 Daily Discharge Readiness-Nursing    O2 Sat at Rest:     99%  RA  O2 Sat with Exertion:    % on    liters   Temperature max from last 24 hrs: Temp (24hrs), Av.6 °F (37 °C), Min:98.6 °F (37 °C), Max:98.6 °F (37 °C)    Inflammatory Markers: No r

## 2022-01-01 NOTE — PLAN OF CARE
Problem: Patient/Family Goals  Goal: Patient/Family Long Term Goal  Description: Patient's Long Term Goal: return to baseline    Interventions:  -   - See additional Care Plan goals for specific interventions  Outcome: Progressing  Goal: Patient/Family S

## 2022-01-01 NOTE — ED PROVIDER NOTES
Patient Seen in: [facility] Emergency Department      History   Patient presents with:  Altered Mental Status    Stated Complaint: AMS    Subjective:   HPI    This is a 20-year-old female presents with altered mental status.   Patient has a extensive reflux    • Extrinsic asthma, unspecified    • Fatigue    • Feeling lonely    • Fibromyalgia    • Flatulence/gas pain/belching    • Food intolerance 1977    Mushrooms   • Frequent urination 1983    Drink lots of water   • Frequent use of laxatives 2016 colitis   • COLONOSCOPY N/A 4/25/2017    Procedure: COLONOSCOPY;  Surgeon: Yobani Higgins MD;  Location: [address] [place] [address] ENDOSCOPY   • COLONOSCOPY N/A 6/14/2021    Procedure: COLONOSCOPY with biopsy,;  Surgeon: Kvng Hanna MD;  Location: [address] [place] [address] ENDOSCOPY   • CO reactive to light. Conjuctiva clear. Oropharynx is clear and moist.   Lungs: Clear to auscultation bilaterally with no rales, no retractions, and no wheezing. HEART:  Regular rate and rhythm. S1 and S2. No murmurs, no rubs or gallops.    ABDOMEN: Soft, no obtained. COMPARISON:  EDWARD , XR, XR CHEST AP PORTABLE  (CPT=71045), 9/17/2020, 2:54 PM.  INDICATIONS:  AMS  PATIENT STATED HISTORY: (As transcribed by Technologist)  Patient offered no additional history at this time.     FINDINGS:  The patient is rotat gray-white matter differentiation is intact. There is no acute intracranial hemorrhage or extra-axial fluid collection. No evidence of acute territorial infarction. There is no evident fracture. Minimal ethmoid and trace maxillary mucosal thickening. significant carotid stenosis by NASCET criteria. Please see above for further details.    Dictated by (CST): Graciela Martinez MD on 12/31/2021 at 7:46 PM     Finalized by (CST): Graciela Martinez MD on 12/31/2021 at 8:05 PM       MDM      Patient placed on ca

## 2022-01-01 NOTE — H&P
[facility]      Patient presents with:  Altered Mental Status       PCP: Alberto Taylor MD      History of Present Illness: Patient is a 46year old female with PMH sig for anxiety, bipolar, hypothyroidism, migraine HA, ho TB History of depression    • History of mental disorder    • HYPOTHYROIDISM    • Incontinence     at night    • Indigestion 02/01/1977    Estimated date, anxiety, mental health   • Irregular bowel habits    • Leaking of urine 2010    In bed occasionally   • Hernia Repair   • OTHER SURGICAL HISTORY N/A 6/21/2016    Procedure: ESOPHAGOGASTRODUODENOSCOPY (EGD);   Surgeon: Noreen Zapata MD;  Location: [facility] ENDOSCOPY   • PARAGARD, IUD  6/2014        ALL:    Depakote [Valproic *    OTHER (SEE COMMENTS)    Commen Maternal Grandmother         80   • Breast Cancer Other         dx post menopausal   • High Blood Pressure Father    • Colon Cancer Father         61   • Colon Polyps Father    • Hypertension Father    • High Blood Pressure Mother    • High Cholesterol Mot 01/01/2022    CREATSERUM 0.86 01/01/2022    BUN 10 01/01/2022     01/01/2022    K 3.2 01/01/2022     01/01/2022    CO2 18.0 01/01/2022     01/01/2022    CA 8.7 01/01/2022    ALB 3.8 01/01/2022    ALKPHO 78 01/01/2022    BILT 0.4 01/01/20 and vertebral arteries. All measurements obtained in this exam were performed using NASCET criteria. Dose reduction techniques were used.  Dose information is transmitted to the [facility] Radiology) Holly Pastor [address] ([address]) wh but otherwise widely patent. The vertebral arteries originate from the subclavian arteries. The origins of the vertebral arteries are patent. The cervical vertebral arteries are widely patent. The left vertebral artery is mildly dominant.              C

## 2022-01-02 LAB
ANION GAP SERPL CALC-SCNC: 5 MMOL/L (ref 0–18)
ATRIAL RATE: 78 BPM
ATRIAL RATE: 78 BPM
ATRIAL RATE: 82 BPM
ATRIAL RATE: 84 BPM
BUN BLD-MCNC: 8 MG/DL (ref 7–18)
CALCIUM BLD-MCNC: 9.4 MG/DL (ref 8.5–10.1)
CHLORIDE SERPL-SCNC: 115 MMOL/L (ref 98–112)
CO2 SERPL-SCNC: 22 MMOL/L (ref 21–32)
CREAT BLD-MCNC: 0.64 MG/DL
GLUCOSE BLD-MCNC: 135 MG/DL (ref 70–99)
OSMOLALITY SERPL CALC.SUM OF ELEC: 294 MOSM/KG (ref 275–295)
P AXIS: 36 DEGREES
P AXIS: 43 DEGREES
P AXIS: 45 DEGREES
P AXIS: 46 DEGREES
P AXIS: 47 DEGREES
P AXIS: 49 DEGREES
P-R INTERVAL: 128 MS
P-R INTERVAL: 136 MS
P-R INTERVAL: 142 MS
P-R INTERVAL: 144 MS
P-R INTERVAL: 146 MS
P-R INTERVAL: 150 MS
POTASSIUM SERPL-SCNC: 3.1 MMOL/L (ref 3.5–5.1)
Q-T INTERVAL: 380 MS
Q-T INTERVAL: 424 MS
Q-T INTERVAL: 438 MS
Q-T INTERVAL: 444 MS
Q-T INTERVAL: 446 MS
Q-T INTERVAL: 472 MS
QRS DURATION: 104 MS
QRS DURATION: 90 MS
QRS DURATION: 92 MS
QRS DURATION: 94 MS
QTC CALCULATION (BEZET): 449 MS
QTC CALCULATION (BEZET): 483 MS
QTC CALCULATION (BEZET): 517 MS
QTC CALCULATION (BEZET): 518 MS
QTC CALCULATION (BEZET): 527 MS
QTC CALCULATION (BEZET): 538 MS
R AXIS: 11 DEGREES
R AXIS: 17 DEGREES
R AXIS: 18 DEGREES
R AXIS: 27 DEGREES
R AXIS: 3 DEGREES
R AXIS: 3 DEGREES
SODIUM SERPL-SCNC: 142 MMOL/L (ref 136–145)
T AXIS: -14 DEGREES
T AXIS: -4 DEGREES
T AXIS: -7 DEGREES
T AXIS: -8 DEGREES
T AXIS: -9 DEGREES
T AXIS: 23 DEGREES
VENTRICULAR RATE: 78 BPM
VENTRICULAR RATE: 78 BPM
VENTRICULAR RATE: 82 BPM
VENTRICULAR RATE: 84 BPM

## 2022-01-02 PROCEDURE — 80048 BASIC METABOLIC PNL TOTAL CA: CPT | Performed by: INTERNAL MEDICINE

## 2022-01-02 PROCEDURE — 94640 AIRWAY INHALATION TREATMENT: CPT

## 2022-01-02 RX ORDER — LITHIUM CARBONATE 450 MG
450 TABLET, EXTENDED RELEASE ORAL NIGHTLY
Status: DISCONTINUED | OUTPATIENT
Start: 2022-01-02 | End: 2022-01-03

## 2022-01-02 RX ORDER — POTASSIUM CHLORIDE 20 MEQ/1
40 TABLET, EXTENDED RELEASE ORAL ONCE
Status: COMPLETED | OUTPATIENT
Start: 2022-01-02 | End: 2022-01-02

## 2022-01-02 RX ORDER — DIAPER,BRIEF,INFANT-TODD,DISP
EACH MISCELLANEOUS 3 TIMES DAILY PRN
Status: DISCONTINUED | OUTPATIENT
Start: 2022-01-02 | End: 2022-01-03

## 2022-01-02 NOTE — PROGRESS NOTES
[facility] Progress Note     Stefani Armenta Patient Status:  Inpatient    1970 MRN [id number]   [facility] 5NW-A Attending Chica Su MD   Hosp Day # 1 PCP Maria C Cobos MD     Chief Complaint: AMS ROYAL, LDH, DDIMER in the last 168 hours. Imaging: Imaging data reviewed in Epic.     Medications:   • Lithium Carbonate ER  450 mg Oral Nightly   • enoxaparin  40 mg Subcutaneous Daily   • aspirin  81 mg Oral Daily   • dicyclomine  10 mg Oral QID   • flut

## 2022-01-02 NOTE — PLAN OF CARE
COVID-19 Daily Discharge Readiness-Nursing    O2 Sat at Rest:  SPO2% on Room Air at Rest: 96  %   O2 Sat with Exertion:    % on    liters   Temperature max from last 24 hrs: Temp (24hrs), Av.4 °F (36.9 °C), Min:97.7 °F (36.5 °C), Max:98.9 °F (37.2 °C)

## 2022-01-03 VITALS
WEIGHT: 171 LBS | TEMPERATURE: 98 F | RESPIRATION RATE: 18 BRPM | BODY MASS INDEX: 30.3 KG/M2 | HEIGHT: 63 IN | OXYGEN SATURATION: 100 % | DIASTOLIC BLOOD PRESSURE: 90 MMHG | HEART RATE: 79 BPM | SYSTOLIC BLOOD PRESSURE: 144 MMHG

## 2022-01-03 LAB — POTASSIUM SERPL-SCNC: 3.9 MMOL/L (ref 3.5–5.1)

## 2022-01-03 PROCEDURE — 97530 THERAPEUTIC ACTIVITIES: CPT

## 2022-01-03 PROCEDURE — 84132 ASSAY OF SERUM POTASSIUM: CPT | Performed by: INTERNAL MEDICINE

## 2022-01-03 PROCEDURE — 97116 GAIT TRAINING THERAPY: CPT

## 2022-01-03 RX ORDER — LITHIUM CARBONATE 450 MG
450 TABLET, EXTENDED RELEASE ORAL NIGHTLY
Qty: 30 TABLET | Refills: 0 | Status: SHIPPED | OUTPATIENT
Start: 2022-01-03

## 2022-01-03 NOTE — PHYSICAL THERAPY NOTE
PHYSICAL THERAPY TREATMENT NOTE - INPATIENT    Room Number: 756/754-A     Session: 1     Number of Visits to Meet Established Goals: 5    Presenting Problem: AMS  Co-Morbidities : TBI seizures UTI bipolar  ASSESSMENT     Pt progressing toward functional Static Sitting: Fair  Dynamic Sitting: Fair           Static Standing: Fair  Dynamic Standing: Fair -    ACTIVITY TOLERANCE                         O2 WALK         AM-PAC '6-Clicks' INPATIENT SHORT FORM - BASIC MOBILITY  How much difficulty does the yesy there are. Pt also states hospital is \"providing a ride\" home, although CM note states son will pick pt up. Pt expressing concerns re: not having a coat to return home. CM and RN made aware.            Patient End of Session: In bed;Needs met;Call light

## 2022-01-03 NOTE — DISCHARGE SUMMARY
General Medicine Discharge Summary     Patient ID:  Man Graf  46year old  4/16/1970    Admit date: 12/31/2021    Discharge date and time: 1/3/2022    Attending Physician: Maya Sinclair have NOT CHANGED    PANTOPRAZOLE 40 MG Oral Tab EC  TAKE 1 TABLET(40 MG) BY MOUTH EVERY MORNING BEFORE BREAKFAST    OXCARBAZEPINE 300 MG Oral Tab  TAKE 1 AND 1/2 TABLETS(450 MG) BY MOUTH TWICE DAILY    carisoprodol 350 MG Oral Tab  Take 1 tablet (350 mg to patch    FLUTICASONE PROPIONATE 50 MCG/ACT Nasal Suspension  SHAKE LIQUID AND USE 2 SPRAYS IN EACH NOSTRIL DAILY    PEG 3350-KCl-NaBcb-NaCl-NaSulf (PEG 3350/ELECTROLYTES) 240 g Oral Recon Soln  Take as directed by physician.    [facility]ram[facility] 10 [facility]  [phone]

## 2022-01-03 NOTE — COVID NURSING ASSESSMENT
COVID-19 Daily Discharge Readiness-Nursing    O2 Sat at Rest:  SPO2% on Room Air at Rest: 96  %   O2 Sat with Exertion:    % on    liters   Temperature max from last 24 hrs: Temp (24hrs), Av.3 °F (36.8 °C), Min:97.7 °F (36.5 °C), Max:98.9 °F (37.2 °C)

## 2022-01-03 NOTE — COVID NURSING ASSESSMENT
COVID-19 Daily Discharge Readiness-Nursing    O2 Sat at Rest:  SPO2% on Room Air at Rest: 96  %   O2 Sat with Exertion:    95% on  room air   Temperature max from last 24 hrs: Temp (24hrs), Av °F (36.7 °C), Min:97.5 °F (36.4 °C), Max:98.2 °F (36.8 °C)

## 2022-01-03 NOTE — CM/SW NOTE
Care Progression Note:  Active Acute Medical Issue:   COVID-19     Other Contributing Medical Factors/Dx:  Lithium toxicity, AMS, seizure, hypothyroidism, UC, h/o CVA    Length of stay: 2  GMLOS: 4.2  Avoidable Delays: none  Discharge Barriers: Discharging

## 2022-01-03 NOTE — PROGRESS NOTES
Multidisciplinary Discharge Rounds held 1/3/2022. Treatment team members present today include , , Charge Nurse, Nurse, RT, PT and Pharmacy caring for [facility].      Readmission Risk:     [] Low     [] Medium     [x] High

## 2022-01-04 NOTE — PROGRESS NOTES
NURSING DISCHARGE NOTE    Discharged Home via Wheelchair. Accompanied by Support staff  Belongings Taken by patient/family. Discharge instructions, prescribed medications, and follow-up instructions were discussed with pt.  Pt verbalized understandi

## 2022-01-24 ENCOUNTER — LAB ENCOUNTER (OUTPATIENT)
Dept: LAB | Facility: HOSPITAL | Age: 52
End: 2022-01-24
Attending: INTERNAL MEDICINE
Payer: MEDICARE

## 2022-01-24 DIAGNOSIS — T56.891A LITHIUM TOXICITY, ACCIDENTAL OR UNINTENTIONAL, INITIAL ENCOUNTER: ICD-10-CM

## 2022-01-24 LAB — LITHIUM SERPL-SCNC: 0.5 MMOL/L (ref 0.6–1.2)

## 2022-01-24 PROCEDURE — 36415 COLL VENOUS BLD VENIPUNCTURE: CPT

## 2022-01-24 PROCEDURE — 80178 ASSAY OF LITHIUM: CPT

## 2022-02-14 PROBLEM — S93.492A SPRAIN OF ANTERIOR TALOFIBULAR LIGAMENT OF LEFT ANKLE, INITIAL ENCOUNTER: Status: ACTIVE | Noted: 2022-02-14

## 2022-02-14 PROBLEM — M22.2X1 PATELLOFEMORAL SYNDROME OF RIGHT KNEE: Status: ACTIVE | Noted: 2022-02-14

## 2023-07-17 ENCOUNTER — HOSPITAL ENCOUNTER (EMERGENCY)
Facility: HOSPITAL | Age: 53
Discharge: HOME | End: 2023-07-18
Attending: EMERGENCY MEDICINE
Payer: MEDICARE

## 2023-07-17 ENCOUNTER — APPOINTMENT (OUTPATIENT)
Dept: CT IMAGING | Facility: HOSPITAL | Age: 53
End: 2023-07-17
Attending: EMERGENCY MEDICINE
Payer: MEDICARE

## 2023-07-17 DIAGNOSIS — G43.411 INTRACTABLE HEMIPLEGIC MIGRAINE WITH STATUS MIGRAINOSUS: Primary | ICD-10-CM

## 2023-07-17 DIAGNOSIS — I10 HYPERTENSION, UNSPECIFIED TYPE: ICD-10-CM

## 2023-07-17 LAB
ALBUMIN SERPL-MCNC: 4.3 G/DL (ref 3.4–5)
ALBUMIN/GLOB SERPL: 1.3 {RATIO} (ref 1–2)
ALP LIVER SERPL-CCNC: 110 U/L
ALT SERPL-CCNC: 36 U/L
ANION GAP SERPL CALC-SCNC: 6 MMOL/L (ref 0–18)
AST SERPL-CCNC: 42 U/L (ref 15–37)
BASOPHILS # BLD AUTO: 0.06 X10(3) UL (ref 0–0.2)
BASOPHILS NFR BLD AUTO: 0.6 %
BILIRUB SERPL-MCNC: 0.3 MG/DL (ref 0.1–2)
BUN BLD-MCNC: 16 MG/DL (ref 7–18)
CALCIUM BLD-MCNC: 9.1 MG/DL (ref 8.5–10.1)
CHLORIDE SERPL-SCNC: 106 MMOL/L (ref 98–112)
CO2 SERPL-SCNC: 28 MMOL/L (ref 21–32)
CREAT BLD-MCNC: 0.88 MG/DL
EOSINOPHIL # BLD AUTO: 0.25 X10(3) UL (ref 0–0.7)
EOSINOPHIL NFR BLD AUTO: 2.5 %
ERYTHROCYTE [DISTWIDTH] IN BLOOD BY AUTOMATED COUNT: 15.1 %
GFR SERPLBLD BASED ON 1.73 SQ M-ARVRAT: 78 ML/MIN/1.73M2 (ref 60–?)
GLOBULIN PLAS-MCNC: 3.2 G/DL (ref 2.8–4.4)
GLUCOSE BLD-MCNC: 78 MG/DL (ref 70–99)
HCT VFR BLD AUTO: 40.9 %
HGB BLD-MCNC: 13 G/DL
IMM GRANULOCYTES # BLD AUTO: 0.02 X10(3) UL (ref 0–1)
IMM GRANULOCYTES NFR BLD: 0.2 %
LYMPHOCYTES # BLD AUTO: 2.69 X10(3) UL (ref 1–4)
LYMPHOCYTES NFR BLD AUTO: 27.1 %
MAGNESIUM SERPL-MCNC: 2.3 MG/DL (ref 1.6–2.6)
MCH RBC QN AUTO: 29.7 PG (ref 26–34)
MCHC RBC AUTO-ENTMCNC: 31.8 G/DL (ref 31–37)
MCV RBC AUTO: 93.4 FL
MONOCYTES # BLD AUTO: 0.66 X10(3) UL (ref 0.1–1)
MONOCYTES NFR BLD AUTO: 6.6 %
NEUTROPHILS # BLD AUTO: 6.26 X10 (3) UL (ref 1.5–7.7)
NEUTROPHILS # BLD AUTO: 6.26 X10(3) UL (ref 1.5–7.7)
NEUTROPHILS NFR BLD AUTO: 63 %
OSMOLALITY SERPL CALC.SUM OF ELEC: 290 MOSM/KG (ref 275–295)
PLATELET # BLD AUTO: 333 10(3)UL (ref 150–450)
POTASSIUM SERPL-SCNC: 3.6 MMOL/L (ref 3.5–5.1)
PROT SERPL-MCNC: 7.5 G/DL (ref 6.4–8.2)
RBC # BLD AUTO: 4.38 X10(6)UL
SODIUM SERPL-SCNC: 140 MMOL/L (ref 136–145)
WBC # BLD AUTO: 9.9 X10(3) UL (ref 4–11)

## 2023-07-17 PROCEDURE — 96375 TX/PRO/DX INJ NEW DRUG ADDON: CPT

## 2023-07-17 PROCEDURE — 96374 THER/PROPH/DIAG INJ IV PUSH: CPT

## 2023-07-17 PROCEDURE — 99285 EMERGENCY DEPT VISIT HI MDM: CPT

## 2023-07-17 PROCEDURE — 70450 CT HEAD/BRAIN W/O DYE: CPT | Performed by: EMERGENCY MEDICINE

## 2023-07-17 PROCEDURE — 83735 ASSAY OF MAGNESIUM: CPT | Performed by: EMERGENCY MEDICINE

## 2023-07-17 PROCEDURE — 80053 COMPREHEN METABOLIC PANEL: CPT | Performed by: EMERGENCY MEDICINE

## 2023-07-17 PROCEDURE — 85025 COMPLETE CBC W/AUTO DIFF WBC: CPT | Performed by: EMERGENCY MEDICINE

## 2023-07-17 RX ORDER — HYDROMORPHONE HYDROCHLORIDE 1 MG/ML
1 INJECTION, SOLUTION INTRAMUSCULAR; INTRAVENOUS; SUBCUTANEOUS ONCE
Status: COMPLETED | OUTPATIENT
Start: 2023-07-17 | End: 2023-07-17

## 2023-07-17 RX ORDER — CLONIDINE HYDROCHLORIDE 0.1 MG/1
0.1 TABLET ORAL ONCE
Status: COMPLETED | OUTPATIENT
Start: 2023-07-17 | End: 2023-07-17

## 2023-07-17 RX ORDER — ONDANSETRON 2 MG/ML
4 INJECTION INTRAMUSCULAR; INTRAVENOUS ONCE
Status: COMPLETED | OUTPATIENT
Start: 2023-07-17 | End: 2023-07-17

## 2023-07-17 RX ORDER — DIPHENHYDRAMINE HYDROCHLORIDE 50 MG/ML
50 INJECTION INTRAMUSCULAR; INTRAVENOUS ONCE
Status: COMPLETED | OUTPATIENT
Start: 2023-07-17 | End: 2023-07-17

## 2023-07-18 VITALS
SYSTOLIC BLOOD PRESSURE: 154 MMHG | WEIGHT: 195 LBS | OXYGEN SATURATION: 92 % | DIASTOLIC BLOOD PRESSURE: 75 MMHG | BODY MASS INDEX: 34.55 KG/M2 | TEMPERATURE: 97 F | HEART RATE: 60 BPM | HEIGHT: 63 IN | RESPIRATION RATE: 18 BRPM

## 2023-07-18 NOTE — ED INITIAL ASSESSMENT (HPI)
Pt to ED for c/o\"left-sided hemiplegic migraine\" x 7 days, worse today. Sent from [address] [place] [address] for further evaluation of BP-168/106. Pt presents ambulatory with steady gait, with residual left-facial droop - speech clear, no drooling. Last Fioricet taken at 9 AM today, Soma & Zofran 4 hrs PTA & Dilaudid PO 2 mg yesterday.

## 2023-08-12 ENCOUNTER — APPOINTMENT (OUTPATIENT)
Dept: CT IMAGING | Facility: HOSPITAL | Age: 53
End: 2023-08-12
Attending: EMERGENCY MEDICINE
Payer: MEDICARE

## 2023-08-12 ENCOUNTER — HOSPITAL ENCOUNTER (INPATIENT)
Facility: HOSPITAL | Age: 53
LOS: 2 days | Discharge: HOME | End: 2023-08-16
Attending: EMERGENCY MEDICINE | Admitting: INTERNAL MEDICINE
Payer: MEDICARE

## 2023-08-12 DIAGNOSIS — G43.401 HEMIPLEGIC MIGRAINE WITH STATUS MIGRAINOSUS, NOT INTRACTABLE: Primary | ICD-10-CM

## 2023-08-12 DIAGNOSIS — R29.6 FALLS FREQUENTLY: ICD-10-CM

## 2023-08-12 LAB
ALBUMIN SERPL-MCNC: 3.7 G/DL (ref 3.4–5)
ALBUMIN/GLOB SERPL: 1.1 {RATIO} (ref 1–2)
ALP LIVER SERPL-CCNC: 94 U/L
ALT SERPL-CCNC: 26 U/L
ANION GAP SERPL CALC-SCNC: 3 MMOL/L (ref 0–18)
AST SERPL-CCNC: 24 U/L (ref 15–37)
BASOPHILS # BLD AUTO: 0.07 X10(3) UL (ref 0–0.2)
BASOPHILS NFR BLD AUTO: 0.5 %
BILIRUB SERPL-MCNC: 0.3 MG/DL (ref 0.1–2)
BUN BLD-MCNC: 16 MG/DL (ref 7–18)
CALCIUM BLD-MCNC: 8.9 MG/DL (ref 8.5–10.1)
CHLORIDE SERPL-SCNC: 109 MMOL/L (ref 98–112)
CO2 SERPL-SCNC: 27 MMOL/L (ref 21–32)
CREAT BLD-MCNC: 1.11 MG/DL
EGFRCR SERPLBLD CKD-EPI 2021: 59 ML/MIN/1.73M2 (ref 60–?)
EOSINOPHIL # BLD AUTO: 0.22 X10(3) UL (ref 0–0.7)
EOSINOPHIL NFR BLD AUTO: 1.7 %
ERYTHROCYTE [DISTWIDTH] IN BLOOD BY AUTOMATED COUNT: 14.9 %
GLOBULIN PLAS-MCNC: 3.4 G/DL (ref 2.8–4.4)
GLUCOSE BLD-MCNC: 104 MG/DL (ref 70–99)
HCT VFR BLD AUTO: 36.4 %
HGB BLD-MCNC: 11.5 G/DL
IMM GRANULOCYTES # BLD AUTO: 0.08 X10(3) UL (ref 0–1)
IMM GRANULOCYTES NFR BLD: 0.6 %
LYMPHOCYTES # BLD AUTO: 1.8 X10(3) UL (ref 1–4)
LYMPHOCYTES NFR BLD AUTO: 14 %
MAGNESIUM SERPL-MCNC: 1.9 MG/DL (ref 1.6–2.6)
MCH RBC QN AUTO: 29.9 PG (ref 26–34)
MCHC RBC AUTO-ENTMCNC: 31.6 G/DL (ref 31–37)
MCV RBC AUTO: 94.5 FL
MONOCYTES # BLD AUTO: 0.96 X10(3) UL (ref 0.1–1)
MONOCYTES NFR BLD AUTO: 7.5 %
NEUTROPHILS # BLD AUTO: 9.69 X10 (3) UL (ref 1.5–7.7)
NEUTROPHILS # BLD AUTO: 9.69 X10(3) UL (ref 1.5–7.7)
NEUTROPHILS NFR BLD AUTO: 75.7 %
OSMOLALITY SERPL CALC.SUM OF ELEC: 289 MOSM/KG (ref 275–295)
PLATELET # BLD AUTO: 277 10(3)UL (ref 150–450)
POTASSIUM SERPL-SCNC: 3.9 MMOL/L (ref 3.5–5.1)
PROT SERPL-MCNC: 7.1 G/DL (ref 6.4–8.2)
RBC # BLD AUTO: 3.85 X10(6)UL
SODIUM SERPL-SCNC: 139 MMOL/L (ref 136–145)
TROPONIN I HIGH SENSITIVITY: 10 NG/L
TSI SER-ACNC: 0.6 MIU/ML (ref 0.36–3.74)
WBC # BLD AUTO: 12.8 X10(3) UL (ref 4–11)

## 2023-08-12 PROCEDURE — 70450 CT HEAD/BRAIN W/O DYE: CPT | Performed by: EMERGENCY MEDICINE

## 2023-08-12 RX ORDER — DIPHENHYDRAMINE HYDROCHLORIDE 50 MG/ML
25 INJECTION INTRAMUSCULAR; INTRAVENOUS ONCE
Status: COMPLETED | OUTPATIENT
Start: 2023-08-12 | End: 2023-08-12

## 2023-08-12 RX ORDER — HYDROMORPHONE HYDROCHLORIDE 1 MG/ML
1 INJECTION, SOLUTION INTRAMUSCULAR; INTRAVENOUS; SUBCUTANEOUS ONCE
Status: COMPLETED | OUTPATIENT
Start: 2023-08-12 | End: 2023-08-12

## 2023-08-12 RX ORDER — ONDANSETRON 2 MG/ML
4 INJECTION INTRAMUSCULAR; INTRAVENOUS ONCE
Status: COMPLETED | OUTPATIENT
Start: 2023-08-12 | End: 2023-08-12

## 2023-08-13 PROBLEM — G43.401 HEMIPLEGIC MIGRAINE WITH STATUS MIGRAINOSUS, NOT INTRACTABLE: Status: ACTIVE | Noted: 2023-08-13

## 2023-08-13 PROBLEM — R29.6 FALLS FREQUENTLY: Status: ACTIVE | Noted: 2023-08-13

## 2023-08-13 LAB
ATRIAL RATE: 78 BPM
BILIRUB UR QL STRIP.AUTO: NEGATIVE
CLARITY UR REFRACT.AUTO: CLEAR
COLOR UR AUTO: YELLOW
GLUCOSE UR STRIP.AUTO-MCNC: NEGATIVE MG/DL
KETONES UR STRIP.AUTO-MCNC: NEGATIVE MG/DL
NITRITE UR QL STRIP.AUTO: NEGATIVE
P AXIS: 55 DEGREES
P-R INTERVAL: 146 MS
PH UR STRIP.AUTO: 6 [PH] (ref 5–8)
PROT UR STRIP.AUTO-MCNC: NEGATIVE MG/DL
Q-T INTERVAL: 376 MS
QRS DURATION: 98 MS
QTC CALCULATION (BEZET): 428 MS
R AXIS: 32 DEGREES
RBC UR QL AUTO: NEGATIVE
SP GR UR STRIP.AUTO: 1.01 (ref 1–1.03)
T AXIS: 26 DEGREES
UROBILINOGEN UR STRIP.AUTO-MCNC: <2 MG/DL
VENTRICULAR RATE: 78 BPM

## 2023-08-13 PROCEDURE — 99223 1ST HOSP IP/OBS HIGH 75: CPT | Performed by: OTHER

## 2023-08-13 RX ORDER — ACETAMINOPHEN 325 MG/1
325 TABLET ORAL EVERY 6 HOURS PRN
COMMUNITY

## 2023-08-13 RX ORDER — PANTOPRAZOLE SODIUM 40 MG/1
40 TABLET, DELAYED RELEASE ORAL
Status: DISCONTINUED | OUTPATIENT
Start: 2023-08-13 | End: 2023-08-16

## 2023-08-13 RX ORDER — HYDROMORPHONE HYDROCHLORIDE 4 MG/1
2 TABLET ORAL EVERY 6 HOURS PRN
Status: DISCONTINUED | OUTPATIENT
Start: 2023-08-13 | End: 2023-08-16

## 2023-08-13 RX ORDER — CYCLOBENZAPRINE HCL 10 MG
10 TABLET ORAL 3 TIMES DAILY PRN
Status: DISCONTINUED | OUTPATIENT
Start: 2023-08-13 | End: 2023-08-16

## 2023-08-13 RX ORDER — MINERAL OIL, PETROLATUM 425; 568 MG/G; MG/G
1 OINTMENT OPHTHALMIC 3 TIMES DAILY
COMMUNITY

## 2023-08-13 RX ORDER — ACETAMINOPHEN 500 MG
500 TABLET ORAL EVERY 4 HOURS PRN
Status: DISCONTINUED | OUTPATIENT
Start: 2023-08-13 | End: 2023-08-16

## 2023-08-13 RX ORDER — FLUTICASONE PROPIONATE 50 MCG
2 SPRAY, SUSPENSION (ML) NASAL DAILY
Status: DISCONTINUED | OUTPATIENT
Start: 2023-08-13 | End: 2023-08-16

## 2023-08-13 RX ORDER — PROCHLORPERAZINE EDISYLATE 5 MG/ML
5 INJECTION INTRAMUSCULAR; INTRAVENOUS EVERY 8 HOURS PRN
Status: DISCONTINUED | OUTPATIENT
Start: 2023-08-13 | End: 2023-08-13

## 2023-08-13 RX ORDER — LAMOTRIGINE 100 MG/1
200 TABLET ORAL 3 TIMES DAILY
Status: DISCONTINUED | OUTPATIENT
Start: 2023-08-13 | End: 2023-08-16

## 2023-08-13 RX ORDER — BUPROPION HYDROCHLORIDE 150 MG/1
150 TABLET ORAL EVERY MORNING
Status: DISCONTINUED | OUTPATIENT
Start: 2023-08-13 | End: 2023-08-16

## 2023-08-13 RX ORDER — CLONAZEPAM 0.5 MG/1
0.25 TABLET ORAL 3 TIMES DAILY
Status: DISCONTINUED | OUTPATIENT
Start: 2023-08-13 | End: 2023-08-16

## 2023-08-13 RX ORDER — ASPIRIN 81 MG/1
81 TABLET, CHEWABLE ORAL DAILY
Status: DISCONTINUED | OUTPATIENT
Start: 2023-08-13 | End: 2023-08-16

## 2023-08-13 RX ORDER — ONDANSETRON 2 MG/ML
4 INJECTION INTRAMUSCULAR; INTRAVENOUS EVERY 6 HOURS PRN
Status: DISCONTINUED | OUTPATIENT
Start: 2023-08-13 | End: 2023-08-16

## 2023-08-13 RX ORDER — HEPARIN SODIUM 5000 [USP'U]/ML
5000 INJECTION, SOLUTION INTRAVENOUS; SUBCUTANEOUS EVERY 8 HOURS SCHEDULED
Status: DISCONTINUED | OUTPATIENT
Start: 2023-08-13 | End: 2023-08-16

## 2023-08-13 RX ORDER — CETIRIZINE HYDROCHLORIDE 10 MG/1
10 TABLET ORAL DAILY
Status: DISCONTINUED | OUTPATIENT
Start: 2023-08-13 | End: 2023-08-16

## 2023-08-13 RX ORDER — LEVETIRACETAM 500 MG/1
500 TABLET ORAL 2 TIMES DAILY
Status: DISCONTINUED | OUTPATIENT
Start: 2023-08-13 | End: 2023-08-16

## 2023-08-13 RX ORDER — PREGABALIN 100 MG/1
200 CAPSULE ORAL 2 TIMES DAILY
Status: DISCONTINUED | OUTPATIENT
Start: 2023-08-13 | End: 2023-08-16

## 2023-08-13 RX ORDER — FLUTICASONE FUROATE AND VILANTEROL 100; 25 UG/1; UG/1
1 POWDER RESPIRATORY (INHALATION) DAILY
Status: DISCONTINUED | OUTPATIENT
Start: 2023-08-13 | End: 2023-08-16

## 2023-08-13 RX ORDER — MECLIZINE HCL 12.5 MG/1
12.5 TABLET ORAL 3 TIMES DAILY PRN
Status: DISCONTINUED | OUTPATIENT
Start: 2023-08-13 | End: 2023-08-16

## 2023-08-13 RX ORDER — LEVOTHYROXINE SODIUM 0.12 MG/1
125 TABLET ORAL
Status: DISCONTINUED | OUTPATIENT
Start: 2023-08-13 | End: 2023-08-16

## 2023-08-13 RX ORDER — PROMETHAZINE HYDROCHLORIDE 25 MG/1
25 TABLET ORAL EVERY 4 HOURS PRN
Status: DISCONTINUED | OUTPATIENT
Start: 2023-08-13 | End: 2023-08-13

## 2023-08-13 RX ORDER — MESALAMINE 400 MG/1
1200 CAPSULE, DELAYED RELEASE ORAL
Status: DISCONTINUED | OUTPATIENT
Start: 2023-08-13 | End: 2023-08-16

## 2023-08-13 RX ORDER — SODIUM CHLORIDE 9 MG/ML
INJECTION, SOLUTION INTRAVENOUS CONTINUOUS
Status: DISCONTINUED | OUTPATIENT
Start: 2023-08-13 | End: 2023-08-15

## 2023-08-13 RX ORDER — CLINDAMYCIN PHOSPHATE AND TRETINOIN 10; .25 MG/G; MG/G
1 GEL TOPICAL NIGHTLY
COMMUNITY

## 2023-08-13 RX ORDER — BUTALBITAL, ACETAMINOPHEN AND CAFFEINE 50; 325; 40 MG/1; MG/1; MG/1
1 TABLET ORAL EVERY 6 HOURS PRN
Status: DISCONTINUED | OUTPATIENT
Start: 2023-08-13 | End: 2023-08-16

## 2023-08-13 RX ORDER — DEXAMETHASONE SODIUM PHOSPHATE 4 MG/ML
4 VIAL (ML) INJECTION EVERY 6 HOURS
Status: DISCONTINUED | OUTPATIENT
Start: 2023-08-13 | End: 2023-08-15

## 2023-08-13 RX ORDER — MONTELUKAST SODIUM 10 MG/1
10 TABLET ORAL NIGHTLY
Status: DISCONTINUED | OUTPATIENT
Start: 2023-08-13 | End: 2023-08-16

## 2023-08-13 NOTE — ED QUICK NOTES
Orders for admission, patient is aware of plan and ready to go upstairs. Any questions, please call ED RN Nadia Bravo at extension [phone]. Patient Covid vaccination status: Fully vaccinated     COVID Test Ordered in ED: None    COVID Suspicion at Admission: N/A    Running Infusions:      Mental Status/LOC at time of transport: ALERT X 3    Other pertinent information: SX STARTED 3 DAYS AGO.  NO SLURRED SPEECH, MILD LIP DROOP  CIWA score: N/A   NIH score:  N/A

## 2023-08-13 NOTE — ED INITIAL ASSESSMENT (HPI)
C/o headache, nausea, dizziness, off balance. Fall x 3 today. S/s started Friday morning. Hx of vestibular migraines, pt reports this is how she usually feels when she has them. Denies vomiting. A&ox4. Takes baby aspirin.

## 2023-08-13 NOTE — PLAN OF CARE
Pt. A&O x4, on RA during day. Needed O2 at night last night (3L). NSR on tele. VSS. Up with standby. Migraine pain-- klonopin scheduled today as well as PRN dilaudid. Jose  0.9 infusing at 75/hr. Difficult intubation bracelet and sign in place. Seizure, fall, and safety precautions in place. Will continue to monitor. Problem: Patient/Family Goals  Goal: Patient/Family Long Term Goal  Description: Patient's Long Term Goal: be discharged     Interventions:  - follow plan of care. - See additional Care Plan goals for specific interventions  Outcome: Progressing  Goal: Patient/Family Short Term Goal  Description: Patient's Short Term Goal: manage O2    Interventions:   - follow plan of care.    - See additional Care Plan goals for specific interventions  Outcome: Progressing

## 2023-08-13 NOTE — PLAN OF CARE
Pt. A&O x4, on 3 L O2 NC, NSR on tele. VSS. Up with standby. Still with dizziness. Still with migraine pain. 0.9 infusing at 100/hr. Difficult intubation bracelet and sign in place. Seizure, fall, and safety precautions in place. Will continue to monitor. Problem: NEUROLOGICAL - ADULT  Goal: Achieves stable or improved neurological status  Description: INTERVENTIONS  - Assess for and report changes in neurological status  - Initiate measures to prevent increased intracranial pressure  - Maintain blood pressure and fluid volume within ordered parameters to optimize cerebral perfusion and minimize risk of hemorrhage  - Monitor temperature, glucose, and sodium.  Initiate appropriate interventions as ordered  Outcome: Progressing  Goal: Absence of seizures  Description: INTERVENTIONS  - Monitor for seizure activity  - Administer anti-seizure medications as ordered  - Monitor neurological status  Outcome: Progressing

## 2023-08-13 NOTE — PHYSICAL THERAPY NOTE
PHYSICAL THERAPY EVALUATION - INPATIENT     Room Number: 1861/6762-I  Evaluation Date: 8/13/2023  Type of Evaluation: Initial  Physician Order: PT Eval and Treat    Presenting Problem: migraines  Co-Morbidities : SDH, migraines, vestibular disorder  Reason for Therapy: Mobility Dysfunction and Discharge Planning      ASSESSMENT   Pt is a 52year old female admitted on 8/12/2023 for migraines. Functional outcome measures completed include [facility]. The AM-PAC '6-Clicks' Inpatient Basic Mobility Short Form was completed and this patient is demonstrating a Approx Degree of Impairment: 0%  degree of impairment in mobility. Research supports that patients with this level of impairment may benefit from OP PT (pt has a script). Pt able to progress to near community distance ambulation and negotiate stairs at modified indep levels. Requires hand held assist and owns a cane at home. PT Discharge Recommendations: Outpatient PT      PLAN  Patient has been evaluated and presents with no skilled Physical Therapy needs at this time. Patient discharged from Physical Therapy services. Please re-order if a new functional limitation presents during this admission. GOALS  Patient was able to achieve the following goals . ..     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, recent falls x 3 yesterday with knees giving out    SUBJECTIVE  C/o improvement with migraines, sensitivity to light/sounds, wears glasses with special tint  C/o new posterior tibial tendonitis and pain with stretching      OBJECTIVE  Precautions: Seizure  Fall Risk: Standard fall risk    WEIGHT BEARING RESTRICTION PAIN ASSESSMENT  Ratin  Location: migraine  Management Techniques: Activity promotion;Repositioning;Relaxation    COGNITION  A&Ox4, follows multistep commands, appropriate insight into deficits and safety awareness    RANGE OF MOTION AND STRENGTH ASSESSMENT  Upper extremity ROM and strength are within functional limits     Lower extremity ROM is within functional limits     Lower extremity strength is within functional limits       BALANCE  Static Sitting: Good  Dynamic Sitting: Good  Static Standing: Fair +  Dynamic Standing: Fair    ADDITIONAL TESTS                                    ACTIVITY TOLERANCE                         O2 WALK       NEUROLOGICAL FINDINGS                        AM-PAC '6-Clicks' INPATIENT SHORT FORM - BASIC MOBILITY  How much difficulty does the patient currently have. .. Patient Difficulty: Turning over in bed (including adjusting bedclothes, sheets and blankets)?: None   Patient Difficulty: Sitting down on and standing up from a chair with arms (e.g., wheelchair, bedside commode, etc.): None   Patient Difficulty: Moving from lying on back to sitting on the side of the bed?: None   How much help from another person does the patient currently need. ..    Help from Another: Moving to and from a bed to a chair (including a wheelchair)?: None   Help from Another: Need to walk in hospital room?: None   Help from Another: Climbing 3-5 steps with a railing?: None       AM-PAC Score:  Raw Score: 24   Approx Degree of Impairment: 0%   Standardized Score (AM-PAC Scale): 61.14   CMS Modifier (G-Code): CH    FUNCTIONAL ABILITY STATUS  Gait Assessment   Functional Mobility/Gait Assessment  Gait Assistance: Modified independent  Distance (ft): 400  Assistive Device: Other (Comment) (HHA on IV pole)  Stairs: Stairs  How Many Stairs: 6  Device: 1 Rail  Assist: Modified independent    Skilled Therapy Provided     Bed Mobility:  Rolling: indep  Supine to sit: indep   Sit to supine: indep  Sitting balance; indep     Transfer Mobility:  Sit to stand: indep   Stand to sit: indep  Toilet transfers indep  Toileting indep  Gait = 50ft without device mod I, no LOB  400ft with hand held assist on IV pole mod I  Stairs: x 6 with railing, ascending reciprocal pattern, descending step to pattern mod I    Education: use of str cane, dc recs of OP PT, walking program, reviewed ankle DF stretches, ankle alphabet, pt in agreement      Patient End of Session: Up in chair; With [facility] staff;Needs met;Call light within reach;RN aware of session/findings; All patient questions and concerns addressed    Patient Evaluation Complexity Level:  History Moderate - 1 or 2 personal factors and/or co-morbidities   Examination of body systems Low - addressing 1-2 elements   Clinical Presentation Low - Stable   Clinical Decision Making Low Complexity       PT Session Time: 40 minutes  Gait Training: 15 minutes

## 2023-08-14 LAB
ANION GAP SERPL CALC-SCNC: 2 MMOL/L (ref 0–18)
BASOPHILS # BLD AUTO: 0.03 X10(3) UL (ref 0–0.2)
BASOPHILS NFR BLD AUTO: 0.3 %
BUN BLD-MCNC: 9 MG/DL (ref 7–18)
CALCIUM BLD-MCNC: 8.9 MG/DL (ref 8.5–10.1)
CHLORIDE SERPL-SCNC: 114 MMOL/L (ref 98–112)
CO2 SERPL-SCNC: 25 MMOL/L (ref 21–32)
CREAT BLD-MCNC: 0.69 MG/DL
EGFRCR SERPLBLD CKD-EPI 2021: 102 ML/MIN/1.73M2 (ref 60–?)
EOSINOPHIL # BLD AUTO: 0 X10(3) UL (ref 0–0.7)
EOSINOPHIL NFR BLD AUTO: 0 %
ERYTHROCYTE [DISTWIDTH] IN BLOOD BY AUTOMATED COUNT: 14.6 %
GLUCOSE BLD-MCNC: 111 MG/DL (ref 70–99)
HCT VFR BLD AUTO: 34.8 %
HGB BLD-MCNC: 10.9 G/DL
IMM GRANULOCYTES # BLD AUTO: 0.08 X10(3) UL (ref 0–1)
IMM GRANULOCYTES NFR BLD: 0.9 %
LYMPHOCYTES # BLD AUTO: 0.81 X10(3) UL (ref 1–4)
LYMPHOCYTES NFR BLD AUTO: 9.2 %
MCH RBC QN AUTO: 29.1 PG (ref 26–34)
MCHC RBC AUTO-ENTMCNC: 31.3 G/DL (ref 31–37)
MCV RBC AUTO: 92.8 FL
MONOCYTES # BLD AUTO: 0.31 X10(3) UL (ref 0.1–1)
MONOCYTES NFR BLD AUTO: 3.5 %
NEUTROPHILS # BLD AUTO: 7.59 X10 (3) UL (ref 1.5–7.7)
NEUTROPHILS # BLD AUTO: 7.59 X10(3) UL (ref 1.5–7.7)
NEUTROPHILS NFR BLD AUTO: 86.1 %
OSMOLALITY SERPL CALC.SUM OF ELEC: 291 MOSM/KG (ref 275–295)
PLATELET # BLD AUTO: 285 10(3)UL (ref 150–450)
POTASSIUM SERPL-SCNC: 4 MMOL/L (ref 3.5–5.1)
RBC # BLD AUTO: 3.75 X10(6)UL
SODIUM SERPL-SCNC: 141 MMOL/L (ref 136–145)
WBC # BLD AUTO: 8.8 X10(3) UL (ref 4–11)

## 2023-08-14 PROCEDURE — 99232 SBSQ HOSP IP/OBS MODERATE 35: CPT

## 2023-08-14 RX ORDER — LITHIUM CARBONATE 300 MG/1
300 TABLET, FILM COATED, EXTENDED RELEASE ORAL NIGHTLY
COMMUNITY

## 2023-08-14 NOTE — PLAN OF CARE
Pt. A&O x4, on RA, NSR on tele. VSS. Up with standby. C/o improving migraine pain. 0.9 infusing at 75/hr. PRN medication given for nausea. Daily weight. Seizure, fall, and safety precautions in place. Will continue to monitor. Plan of care ongoing. Problem: NEUROLOGICAL - ADULT  Goal: Achieves stable or improved neurological status  Description: INTERVENTIONS  - Assess for and report changes in neurological status  - Initiate measures to prevent increased intracranial pressure  - Maintain blood pressure and fluid volume within ordered parameters to optimize cerebral perfusion and minimize risk of hemorrhage  - Monitor temperature, glucose, and sodium.  Initiate appropriate interventions as ordered  Outcome: Progressing  Goal: Absence of seizures  Description: INTERVENTIONS  - Monitor for seizure activity  - Administer anti-seizure medications as ordered  - Monitor neurological status  Outcome: Progressing

## 2023-08-14 NOTE — PLAN OF CARE
Assumed care at 0730  A/O x4  RA  NSR on tele  VSS  Complains of 5-6/10 migraine pain  Daily weight  0.9 @ 75 ml/hr. Paused at 1200 d/t IV occlusion. Difficult placement of IV - vascular access consulted. Fall & seizure precautions in place. Difficult intubation band in place with appropriate sign in room. Regular diet. Low appetite with migraines. States she gets nauseous with eating. Cardiac electrolyte protocol  PT rec outpt PT. OT to see. Reports 1 bm yesterday. Nothing today. All needs met. Pt updated on poc. Will continue with plan of care. Problem: NEUROLOGICAL - ADULT  Goal: Achieves stable or improved neurological status  Description: INTERVENTIONS  - Assess for and report changes in neurological status  - Initiate measures to prevent increased intracranial pressure  - Maintain blood pressure and fluid volume within ordered parameters to optimize cerebral perfusion and minimize risk of hemorrhage  - Monitor temperature, glucose, and sodium.  Initiate appropriate interventions as ordered  Outcome: Progressing  Goal: Absence of seizures  Description: INTERVENTIONS  - Monitor for seizure activity  - Administer anti-seizure medications as ordered  - Monitor neurological status  Outcome: Progressing

## 2023-08-15 PROCEDURE — 99232 SBSQ HOSP IP/OBS MODERATE 35: CPT

## 2023-08-15 RX ORDER — CAFFEINE 200 MG
200 TABLET ORAL EVERY 4 HOURS PRN
Status: DISCONTINUED | OUTPATIENT
Start: 2023-08-15 | End: 2023-08-16

## 2023-08-15 RX ORDER — DEXAMETHASONE SODIUM PHOSPHATE 4 MG/ML
4 VIAL (ML) INJECTION EVERY 8 HOURS
Status: COMPLETED | OUTPATIENT
Start: 2023-08-15 | End: 2023-08-16

## 2023-08-15 RX ORDER — CARBOXYMETHYLCELLULOSE SODIUM 10 MG/ML
1 GEL OPHTHALMIC AS NEEDED
Status: DISCONTINUED | OUTPATIENT
Start: 2023-08-15 | End: 2023-08-16

## 2023-08-15 NOTE — PLAN OF CARE
Assumed care of 45 y/o female @4175  A/Ox4, seizure prec  RA, NSR-Tele, cardiac EP  Daily weight, regular diet  Fioricet PRN given for migraine per MAR  SBA, voids well  RAC ext IV-infusing 0.9 @75 ml/hr  Safety prec maintained and all needs met          Problem: NEUROLOGICAL - ADULT  Goal: Achieves stable or improved neurological status  Description: INTERVENTIONS  - Assess for and report changes in neurological status  - Initiate measures to prevent increased intracranial pressure  - Maintain blood pressure and fluid volume within ordered parameters to optimize cerebral perfusion and minimize risk of hemorrhage  - Monitor temperature, glucose, and sodium.  Initiate appropriate interventions as ordered  Outcome: Progressing

## 2023-08-15 NOTE — PLAN OF CARE
Assumed care at 0700. A&O x 4. On RA tolerating well. NSR on tele. Patient still having headaches, PRN fiorcet, tylenol, zofran and flexeril given per [facility]. IVF discontinued per MD. Encouraging PO intake. Problem: NEUROLOGICAL - ADULT  Goal: Achieves stable or improved neurological status  Description: INTERVENTIONS  - Assess for and report changes in neurological status  - Initiate measures to prevent increased intracranial pressure  - Maintain blood pressure and fluid volume within ordered parameters to optimize cerebral perfusion and minimize risk of hemorrhage  - Monitor temperature, glucose, and sodium.  Initiate appropriate interventions as ordered  Outcome: Progressing  Goal: Absence of seizures  Description: INTERVENTIONS  - Monitor for seizure activity  - Administer anti-seizure medications as ordered  - Monitor neurological status  Outcome: Progressing

## 2023-08-16 VITALS
HEART RATE: 70 BPM | TEMPERATURE: 98 F | WEIGHT: 209 LBS | SYSTOLIC BLOOD PRESSURE: 125 MMHG | DIASTOLIC BLOOD PRESSURE: 77 MMHG | RESPIRATION RATE: 18 BRPM | BODY MASS INDEX: 37.03 KG/M2 | OXYGEN SATURATION: 98 % | HEIGHT: 63 IN

## 2023-08-16 LAB — LITHIUM SERPL-SCNC: 0.7 MMOL/L (ref 0.6–1.2)

## 2023-08-16 PROCEDURE — 99232 SBSQ HOSP IP/OBS MODERATE 35: CPT

## 2023-08-16 RX ORDER — METHYLPREDNISOLONE 4 MG/1
4 TABLET ORAL
Status: DISCONTINUED | OUTPATIENT
Start: 2023-08-19 | End: 2023-08-16

## 2023-08-16 RX ORDER — METHYLPREDNISOLONE 4 MG/1
4 TABLET ORAL
Status: DISCONTINUED | OUTPATIENT
Start: 2023-08-18 | End: 2023-08-16

## 2023-08-16 RX ORDER — METHYLPREDNISOLONE 4 MG/1
4 TABLET ORAL NIGHTLY
Status: DISCONTINUED | OUTPATIENT
Start: 2023-08-20 | End: 2023-08-16

## 2023-08-16 RX ORDER — MAGNESIUM SULFATE HEPTAHYDRATE 40 MG/ML
2 INJECTION, SOLUTION INTRAVENOUS ONCE
Status: COMPLETED | OUTPATIENT
Start: 2023-08-16 | End: 2023-08-16

## 2023-08-16 RX ORDER — METHYLPREDNISOLONE 4 MG/1
4 TABLET ORAL
Status: DISCONTINUED | OUTPATIENT
Start: 2023-08-21 | End: 2023-08-16

## 2023-08-16 RX ORDER — METHYLPREDNISOLONE 4 MG/1
4 TABLET ORAL
Status: DISCONTINUED | OUTPATIENT
Start: 2023-08-20 | End: 2023-08-16

## 2023-08-16 RX ORDER — METHYLPREDNISOLONE 4 MG/1
4 TABLET ORAL
Status: DISCONTINUED | OUTPATIENT
Start: 2023-08-17 | End: 2023-08-16

## 2023-08-16 RX ORDER — METHYLPREDNISOLONE 4 MG/1
8 TABLET ORAL
Status: DISCONTINUED | OUTPATIENT
Start: 2023-08-17 | End: 2023-08-16

## 2023-08-16 RX ORDER — METHYLPREDNISOLONE 4 MG/1
8 TABLET ORAL
Status: DISCONTINUED | OUTPATIENT
Start: 2023-08-16 | End: 2023-08-16

## 2023-08-16 NOTE — PLAN OF CARE
Assumed care at 0730  A/Ox4, RA, VSS  Tele, NSR  Pain meds per MAR, antiemetics  SBA, continent  Daily weight   Reg diet  Extended PIV R arm, SL  All needs met at this time    Problem: NEUROLOGICAL - ADULT  Goal: Achieves stable or improved neurological status  Description: INTERVENTIONS  - Assess for and report changes in neurological status  - Initiate measures to prevent increased intracranial pressure  - Maintain blood pressure and fluid volume within ordered parameters to optimize cerebral perfusion and minimize risk of hemorrhage  - Monitor temperature, glucose, and sodium.  Initiate appropriate interventions as ordered  Outcome: Progressing  Goal: Absence of seizures  Description: INTERVENTIONS  - Monitor for seizure activity  - Administer anti-seizure medications as ordered  - Monitor neurological status  Outcome: Progressing

## 2023-08-16 NOTE — PLAN OF CARE
NURSING DISCHARGE NOTE    Discharged Home via Wheelchair. Accompanied by RN  Belongings Taken by patient/family.   IV removed  AVS reviewed  All answers questioned

## 2023-08-16 NOTE — DISCHARGE INSTRUCTIONS
Division of Specialized Care for [address] [facility].   [address], [address]  Toll Free: [phone]

## 2023-08-16 NOTE — PROGRESS NOTES
A+Ox4  RA  Tele SR  C/o migraine HA x 1. PRN medications provided per [facility]. Safety precautions in place  Will continue to monitor.

## 2023-09-26 ENCOUNTER — HOSPITAL ENCOUNTER (OUTPATIENT)
Dept: MRI IMAGING | Facility: HOSPITAL | Age: 53
Discharge: HOME | End: 2023-09-26
Attending: Other
Payer: MEDICARE

## 2023-10-17 RX ORDER — SODIUM CHLORIDE 9 MG/ML
INJECTION, SOLUTION INTRAVENOUS CONTINUOUS
OUTPATIENT
Start: 2023-10-17

## 2023-10-27 ENCOUNTER — HOSPITAL ENCOUNTER (OUTPATIENT)
Dept: MRI IMAGING | Facility: HOSPITAL | Age: 53
Discharge: HOME | End: 2023-10-27
Attending: Other
Payer: MEDICARE

## 2023-10-27 ENCOUNTER — ANESTHESIA EVENT (OUTPATIENT)
Dept: MRI IMAGING | Facility: HOSPITAL | Age: 53
End: 2023-10-27
Payer: MEDICARE

## 2023-10-27 ENCOUNTER — ANESTHESIA (OUTPATIENT)
Dept: MRI IMAGING | Facility: HOSPITAL | Age: 53
End: 2023-10-27
Payer: MEDICARE

## 2023-10-27 VITALS
BODY MASS INDEX: 37.03 KG/M2 | WEIGHT: 209 LBS | TEMPERATURE: 98 F | DIASTOLIC BLOOD PRESSURE: 77 MMHG | HEART RATE: 73 BPM | RESPIRATION RATE: 16 BRPM | SYSTOLIC BLOOD PRESSURE: 133 MMHG | HEIGHT: 63 IN | OXYGEN SATURATION: 100 %

## 2023-10-27 DIAGNOSIS — R26.89 IMBALANCE: ICD-10-CM

## 2023-10-27 DIAGNOSIS — R29.6 FALLING: ICD-10-CM

## 2023-10-27 DIAGNOSIS — G37.9 DEMYELINATING DISEASE (HCC): ICD-10-CM

## 2023-10-27 LAB — GLUCOSE BLD-MCNC: 101 MG/DL (ref 70–99)

## 2023-10-27 PROCEDURE — 70553 MRI BRAIN STEM W/O & W/DYE: CPT | Performed by: OTHER

## 2023-10-27 PROCEDURE — 72156 MRI NECK SPINE W/O & W/DYE: CPT | Performed by: OTHER

## 2023-10-27 PROCEDURE — 82962 GLUCOSE BLOOD TEST: CPT

## 2023-10-27 PROCEDURE — A9575 INJ GADOTERATE MEGLUMI 0.1ML: HCPCS | Performed by: RADIOLOGY

## 2023-10-27 RX ORDER — NICOTINE POLACRILEX 4 MG
30 LOZENGE BUCCAL
Status: DISCONTINUED | OUTPATIENT
Start: 2023-10-27 | End: 2023-10-29

## 2023-10-27 RX ORDER — MIDAZOLAM HYDROCHLORIDE 1 MG/ML
INJECTION INTRAMUSCULAR; INTRAVENOUS AS NEEDED
Status: DISCONTINUED | OUTPATIENT
Start: 2023-10-27 | End: 2023-10-27 | Stop reason: SURG

## 2023-10-27 RX ORDER — LIDOCAINE HYDROCHLORIDE 10 MG/ML
INJECTION, SOLUTION EPIDURAL; INFILTRATION; INTRACAUDAL; PERINEURAL AS NEEDED
Status: DISCONTINUED | OUTPATIENT
Start: 2023-10-27 | End: 2023-10-27 | Stop reason: SURG

## 2023-10-27 RX ORDER — HYDROMORPHONE HYDROCHLORIDE 1 MG/ML
0.6 INJECTION, SOLUTION INTRAMUSCULAR; INTRAVENOUS; SUBCUTANEOUS EVERY 5 MIN PRN
Status: ACTIVE | OUTPATIENT
Start: 2023-10-27 | End: 2023-10-27

## 2023-10-27 RX ORDER — SODIUM CHLORIDE, SODIUM LACTATE, POTASSIUM CHLORIDE, CALCIUM CHLORIDE 600; 310; 30; 20 MG/100ML; MG/100ML; MG/100ML; MG/100ML
INJECTION, SOLUTION INTRAVENOUS CONTINUOUS PRN
Status: DISCONTINUED | OUTPATIENT
Start: 2023-10-27 | End: 2023-10-27 | Stop reason: SURG

## 2023-10-27 RX ORDER — NALOXONE HYDROCHLORIDE 0.4 MG/ML
0.08 INJECTION, SOLUTION INTRAMUSCULAR; INTRAVENOUS; SUBCUTANEOUS AS NEEDED
Status: ACTIVE | OUTPATIENT
Start: 2023-10-27 | End: 2023-10-27

## 2023-10-27 RX ORDER — SODIUM CHLORIDE 9 MG/ML
INJECTION, SOLUTION INTRAVENOUS CONTINUOUS
Status: DISCONTINUED | OUTPATIENT
Start: 2023-10-27 | End: 2023-10-29

## 2023-10-27 RX ORDER — LABETALOL HYDROCHLORIDE 5 MG/ML
5 INJECTION, SOLUTION INTRAVENOUS EVERY 5 MIN PRN
Status: ACTIVE | OUTPATIENT
Start: 2023-10-27 | End: 2023-10-27

## 2023-10-27 RX ORDER — DEXTROSE MONOHYDRATE 25 G/50ML
50 INJECTION, SOLUTION INTRAVENOUS
Status: DISCONTINUED | OUTPATIENT
Start: 2023-10-27 | End: 2023-10-29

## 2023-10-27 RX ORDER — ONDANSETRON 2 MG/ML
4 INJECTION INTRAMUSCULAR; INTRAVENOUS EVERY 6 HOURS PRN
Status: DISCONTINUED | OUTPATIENT
Start: 2023-10-27 | End: 2023-10-29

## 2023-10-27 RX ORDER — MIDAZOLAM HYDROCHLORIDE 1 MG/ML
1 INJECTION INTRAMUSCULAR; INTRAVENOUS EVERY 5 MIN PRN
Status: ACTIVE | OUTPATIENT
Start: 2023-10-27 | End: 2023-10-27

## 2023-10-27 RX ORDER — SODIUM CHLORIDE, SODIUM LACTATE, POTASSIUM CHLORIDE, CALCIUM CHLORIDE 600; 310; 30; 20 MG/100ML; MG/100ML; MG/100ML; MG/100ML
INJECTION, SOLUTION INTRAVENOUS CONTINUOUS
Status: DISCONTINUED | OUTPATIENT
Start: 2023-10-27 | End: 2023-10-29

## 2023-10-27 RX ORDER — NICOTINE POLACRILEX 4 MG
15 LOZENGE BUCCAL
Status: DISCONTINUED | OUTPATIENT
Start: 2023-10-27 | End: 2023-10-29

## 2023-10-27 RX ORDER — HYDROMORPHONE HYDROCHLORIDE 1 MG/ML
0.2 INJECTION, SOLUTION INTRAMUSCULAR; INTRAVENOUS; SUBCUTANEOUS EVERY 5 MIN PRN
Status: ACTIVE | OUTPATIENT
Start: 2023-10-27 | End: 2023-10-27

## 2023-10-27 RX ORDER — GADOTERATE MEGLUMINE 376.9 MG/ML
20 INJECTION INTRAVENOUS
Status: COMPLETED | OUTPATIENT
Start: 2023-10-27 | End: 2023-10-27

## 2023-10-27 RX ORDER — HYDROMORPHONE HYDROCHLORIDE 1 MG/ML
0.4 INJECTION, SOLUTION INTRAMUSCULAR; INTRAVENOUS; SUBCUTANEOUS EVERY 5 MIN PRN
Status: ACTIVE | OUTPATIENT
Start: 2023-10-27 | End: 2023-10-27

## 2023-10-27 RX ADMIN — SODIUM CHLORIDE, SODIUM LACTATE, POTASSIUM CHLORIDE, CALCIUM CHLORIDE: 600; 310; 30; 20 INJECTION, SOLUTION INTRAVENOUS at 12:11:00

## 2023-10-27 RX ADMIN — SODIUM CHLORIDE: 9 INJECTION, SOLUTION INTRAVENOUS at 14:05:00

## 2023-10-27 RX ADMIN — GADOTERATE MEGLUMINE 20 ML: 376.9 INJECTION INTRAVENOUS at 13:51:00

## 2023-10-27 RX ADMIN — LIDOCAINE HYDROCHLORIDE 30 MG: 10 INJECTION, SOLUTION EPIDURAL; INFILTRATION; INTRACAUDAL; PERINEURAL at 12:16:00

## 2023-10-27 RX ADMIN — MIDAZOLAM HYDROCHLORIDE 2 MG: 1 INJECTION INTRAMUSCULAR; INTRAVENOUS at 12:11:00

## 2023-10-27 NOTE — DISCHARGE INSTRUCTIONS
[address] [facility] Department of Radiology  MRI    Arrange for a responsible adult to stay with you  DO NOT drive for 24 hours  DO NOT take public transportation without the presence of responsible adult for 24 hours  Try to rest the day of the procedure  No strenuous activity (heavy lifting) for 48-72 hours)  DO NOT drink alcoholic beverages or take medication not specifically prescribed for 24 hours  Start taking sips of fluids and if tolerated then you can eat small meals the rest of the day  If you have any concerns, PLEASE DO NOT HESITATE to call any of these departments.     Radiology - [phone]  Emergency Room - [phone]

## 2023-10-27 NOTE — ANESTHESIA PROCEDURE NOTES
Airway  Date/Time: 10/27/2023 12:17 PM  Urgency: elective      General Information and Staff    Patient location during procedure: OR  Anesthesiologist: Kristi Norris MD  Performed: anesthesiologist   Performed by: Kristi Norris MD  Authorized by: Kristi Norris MD      Indications and Patient Condition  Indications for airway management: anesthesia  Sedation level: deep  Preoxygenated: yes  Patient position: sniffing  Mask difficulty assessment: 1 - vent by mask    Final Airway Details  Final airway type: supraglottic airway      Successful airway: classic  Size 3       Number of attempts at approach: 1

## 2023-12-19 ENCOUNTER — HOSPITAL ENCOUNTER (EMERGENCY)
Facility: HOSPITAL | Age: 53
Discharge: LEFT BEFORE DISCHARGE | End: 2023-12-19
Payer: MEDICARE

## 2024-02-17 ENCOUNTER — APPOINTMENT (OUTPATIENT)
Dept: GENERAL RADIOLOGY | Facility: HOSPITAL | Age: 54
End: 2024-02-17
Attending: EMERGENCY MEDICINE
Payer: MEDICARE

## 2024-02-17 ENCOUNTER — OFFICE VISIT (OUTPATIENT)
Dept: FAMILY MEDICINE CLINIC | Facility: CLINIC | Age: 54
End: 2024-02-17
Payer: MEDICARE

## 2024-02-17 ENCOUNTER — HOSPITAL ENCOUNTER (INPATIENT)
Facility: HOSPITAL | Age: 54
LOS: 6 days | Discharge: HOME | End: 2024-02-23
Attending: EMERGENCY MEDICINE | Admitting: HOSPITALIST
Payer: MEDICARE

## 2024-02-17 VITALS
HEART RATE: 82 BPM | OXYGEN SATURATION: 94 % | DIASTOLIC BLOOD PRESSURE: 88 MMHG | HEIGHT: 63 IN | BODY MASS INDEX: 35.26 KG/M2 | TEMPERATURE: 98 F | SYSTOLIC BLOOD PRESSURE: 134 MMHG | WEIGHT: 199 LBS | RESPIRATION RATE: 18 BRPM

## 2024-02-17 DIAGNOSIS — R06.2 WHEEZING: ICD-10-CM

## 2024-02-17 DIAGNOSIS — R06.02 SHORTNESS OF BREATH: Primary | ICD-10-CM

## 2024-02-17 DIAGNOSIS — J96.01 ACUTE RESPIRATORY FAILURE WITH HYPOXIA (HCC): ICD-10-CM

## 2024-02-17 DIAGNOSIS — J45.21 MILD INTERMITTENT ASTHMA WITH EXACERBATION (HCC): Primary | ICD-10-CM

## 2024-02-17 PROBLEM — R79.89 AZOTEMIA: Status: ACTIVE | Noted: 2024-02-17

## 2024-02-17 LAB
ALBUMIN SERPL-MCNC: 3.8 G/DL (ref 3.4–5)
ALBUMIN/GLOB SERPL: 1.1 {RATIO} (ref 1–2)
ALP LIVER SERPL-CCNC: 102 U/L
ALT SERPL-CCNC: 23 U/L
ANION GAP SERPL CALC-SCNC: 3 MMOL/L (ref 0–18)
AST SERPL-CCNC: 15 U/L (ref 15–37)
BASOPHILS # BLD AUTO: 0.03 X10(3) UL (ref 0–0.2)
BASOPHILS NFR BLD AUTO: 0.4 %
BILIRUB SERPL-MCNC: 0.2 MG/DL (ref 0.1–2)
BUN BLD-MCNC: 21 MG/DL (ref 9–23)
CALCIUM BLD-MCNC: 9.2 MG/DL (ref 8.5–10.1)
CHLORIDE SERPL-SCNC: 112 MMOL/L (ref 98–112)
CO2 SERPL-SCNC: 27 MMOL/L (ref 21–32)
CREAT BLD-MCNC: 0.95 MG/DL
EGFRCR SERPLBLD CKD-EPI 2021: 71 ML/MIN/1.73M2 (ref 60–?)
EOSINOPHIL # BLD AUTO: 0.09 X10(3) UL (ref 0–0.7)
EOSINOPHIL NFR BLD AUTO: 1.1 %
ERYTHROCYTE [DISTWIDTH] IN BLOOD BY AUTOMATED COUNT: 14.8 %
FLUAV + FLUBV RNA SPEC NAA+PROBE: NEGATIVE
FLUAV + FLUBV RNA SPEC NAA+PROBE: NEGATIVE
GLOBULIN PLAS-MCNC: 3.6 G/DL (ref 2.8–4.4)
GLUCOSE BLD-MCNC: 82 MG/DL (ref 70–99)
HCT VFR BLD AUTO: 40.6 %
HGB BLD-MCNC: 12.5 G/DL
IMM GRANULOCYTES # BLD AUTO: 0.05 X10(3) UL (ref 0–1)
IMM GRANULOCYTES NFR BLD: 0.6 %
LITHIUM SERPL-SCNC: 0.9 MMOL/L (ref 0.6–1.2)
LYMPHOCYTES # BLD AUTO: 2.02 X10(3) UL (ref 1–4)
LYMPHOCYTES NFR BLD AUTO: 23.9 %
MCH RBC QN AUTO: 29.5 PG (ref 26–34)
MCHC RBC AUTO-ENTMCNC: 30.8 G/DL (ref 31–37)
MCV RBC AUTO: 95.8 FL
MONOCYTES # BLD AUTO: 0.59 X10(3) UL (ref 0.1–1)
MONOCYTES NFR BLD AUTO: 7 %
NEUTROPHILS # BLD AUTO: 5.66 X10 (3) UL (ref 1.5–7.7)
NEUTROPHILS # BLD AUTO: 5.66 X10(3) UL (ref 1.5–7.7)
NEUTROPHILS NFR BLD AUTO: 67 %
OPERATOR ID: NORMAL
OSMOLALITY SERPL CALC.SUM OF ELEC: 296 MOSM/KG (ref 275–295)
PLATELET # BLD AUTO: 263 10(3)UL (ref 150–450)
POCT LOT NUMBER: NORMAL
POTASSIUM SERPL-SCNC: 3.9 MMOL/L (ref 3.5–5.1)
PROT SERPL-MCNC: 7.4 G/DL (ref 6.4–8.2)
RAPID SARS-COV-2 BY PCR: NOT DETECTED
RBC # BLD AUTO: 4.24 X10(6)UL
RSV RNA SPEC NAA+PROBE: NEGATIVE
SARS-COV-2 RNA RESP QL NAA+PROBE: NOT DETECTED
SODIUM SERPL-SCNC: 142 MMOL/L (ref 136–145)
WBC # BLD AUTO: 8.4 X10(3) UL (ref 4–11)

## 2024-02-17 PROCEDURE — 87070 CULTURE OTHR SPECIMN AEROBIC: CPT | Performed by: HOSPITALIST

## 2024-02-17 PROCEDURE — 99215 OFFICE O/P EST HI 40 MIN: CPT | Performed by: NURSE PRACTITIONER

## 2024-02-17 PROCEDURE — 87205 SMEAR GRAM STAIN: CPT | Performed by: HOSPITALIST

## 2024-02-17 PROCEDURE — 87077 CULTURE AEROBIC IDENTIFY: CPT | Performed by: HOSPITALIST

## 2024-02-17 PROCEDURE — 85025 COMPLETE CBC W/AUTO DIFF WBC: CPT | Performed by: EMERGENCY MEDICINE

## 2024-02-17 PROCEDURE — 93005 ELECTROCARDIOGRAM TRACING: CPT

## 2024-02-17 PROCEDURE — 71045 X-RAY EXAM CHEST 1 VIEW: CPT | Performed by: EMERGENCY MEDICINE

## 2024-02-17 PROCEDURE — 80178 ASSAY OF LITHIUM: CPT | Performed by: EMERGENCY MEDICINE

## 2024-02-17 PROCEDURE — 0241U SARS-COV-2/FLU A AND B/RSV BY PCR (GENEXPERT): CPT | Performed by: EMERGENCY MEDICINE

## 2024-02-17 PROCEDURE — 96375 TX/PRO/DX INJ NEW DRUG ADDON: CPT

## 2024-02-17 PROCEDURE — 96365 THER/PROPH/DIAG IV INF INIT: CPT

## 2024-02-17 PROCEDURE — 99285 EMERGENCY DEPT VISIT HI MDM: CPT

## 2024-02-17 PROCEDURE — 94640 AIRWAY INHALATION TREATMENT: CPT

## 2024-02-17 PROCEDURE — 93010 ELECTROCARDIOGRAM REPORT: CPT

## 2024-02-17 PROCEDURE — 80053 COMPREHEN METABOLIC PANEL: CPT

## 2024-02-17 PROCEDURE — 80053 COMPREHEN METABOLIC PANEL: CPT | Performed by: EMERGENCY MEDICINE

## 2024-02-17 PROCEDURE — 85025 COMPLETE CBC W/AUTO DIFF WBC: CPT

## 2024-02-17 PROCEDURE — U0002 COVID-19 LAB TEST NON-CDC: HCPCS | Performed by: NURSE PRACTITIONER

## 2024-02-17 RX ORDER — LAMOTRIGINE 100 MG/1
200 TABLET ORAL 3 TIMES DAILY
Status: DISCONTINUED | OUTPATIENT
Start: 2024-02-17 | End: 2024-02-23

## 2024-02-17 RX ORDER — BENZONATATE 200 MG/1
200 CAPSULE ORAL 3 TIMES DAILY PRN
Status: DISCONTINUED | OUTPATIENT
Start: 2024-02-17 | End: 2024-02-23

## 2024-02-17 RX ORDER — MESALAMINE 400 MG/1
1200 CAPSULE, DELAYED RELEASE ORAL
Status: DISCONTINUED | OUTPATIENT
Start: 2024-02-18 | End: 2024-02-23

## 2024-02-17 RX ORDER — ACETAMINOPHEN 500 MG
500 TABLET ORAL EVERY 4 HOURS PRN
Status: DISCONTINUED | OUTPATIENT
Start: 2024-02-17 | End: 2024-02-23

## 2024-02-17 RX ORDER — QUETIAPINE FUMARATE 50 MG/1
50 TABLET, FILM COATED ORAL NIGHTLY
Status: DISCONTINUED | OUTPATIENT
Start: 2024-02-17 | End: 2024-02-23

## 2024-02-17 RX ORDER — PANTOPRAZOLE SODIUM 40 MG/1
40 TABLET, DELAYED RELEASE ORAL
Status: DISCONTINUED | OUTPATIENT
Start: 2024-02-18 | End: 2024-02-23

## 2024-02-17 RX ORDER — CETIRIZINE HYDROCHLORIDE 10 MG/1
10 TABLET ORAL DAILY
Status: DISCONTINUED | OUTPATIENT
Start: 2024-02-17 | End: 2024-02-23

## 2024-02-17 RX ORDER — ONDANSETRON 2 MG/ML
4 INJECTION INTRAMUSCULAR; INTRAVENOUS EVERY 6 HOURS PRN
Status: DISCONTINUED | OUTPATIENT
Start: 2024-02-17 | End: 2024-02-23

## 2024-02-17 RX ORDER — ASPIRIN 81 MG/1
81 TABLET ORAL DAILY
Status: DISCONTINUED | OUTPATIENT
Start: 2024-02-18 | End: 2024-02-23

## 2024-02-17 RX ORDER — FLUTICASONE FUROATE AND VILANTEROL 200; 25 UG/1; UG/1
1 POWDER RESPIRATORY (INHALATION) DAILY
Status: DISCONTINUED | OUTPATIENT
Start: 2024-02-17 | End: 2024-02-23

## 2024-02-17 RX ORDER — MONTELUKAST SODIUM 10 MG/1
10 TABLET ORAL NIGHTLY
Status: DISCONTINUED | OUTPATIENT
Start: 2024-02-17 | End: 2024-02-23

## 2024-02-17 RX ORDER — QUETIAPINE FUMARATE 100 MG/1
200 TABLET, FILM COATED ORAL NIGHTLY
Status: DISCONTINUED | OUTPATIENT
Start: 2024-02-17 | End: 2024-02-23

## 2024-02-17 RX ORDER — PROCHLORPERAZINE EDISYLATE 5 MG/ML
5 INJECTION INTRAMUSCULAR; INTRAVENOUS EVERY 8 HOURS PRN
Status: DISCONTINUED | OUTPATIENT
Start: 2024-02-17 | End: 2024-02-17

## 2024-02-17 RX ORDER — HYDROMORPHONE HYDROCHLORIDE 2 MG/1
2 TABLET ORAL EVERY 6 HOURS PRN
Status: DISCONTINUED | OUTPATIENT
Start: 2024-02-17 | End: 2024-02-23

## 2024-02-17 RX ORDER — LEVOTHYROXINE SODIUM 0.12 MG/1
125 TABLET ORAL DAILY
Status: DISCONTINUED | OUTPATIENT
Start: 2024-02-18 | End: 2024-02-23

## 2024-02-17 RX ORDER — METHYLPREDNISOLONE SODIUM SUCCINATE 125 MG/2ML
60 INJECTION, POWDER, LYOPHILIZED, FOR SOLUTION INTRAMUSCULAR; INTRAVENOUS EVERY 8 HOURS
Status: DISPENSED | OUTPATIENT
Start: 2024-02-17 | End: 2024-02-21

## 2024-02-17 RX ORDER — SERTRALINE HYDROCHLORIDE 100 MG/1
100 TABLET, FILM COATED ORAL 2 TIMES DAILY
Status: DISCONTINUED | OUTPATIENT
Start: 2024-02-17 | End: 2024-02-23

## 2024-02-17 RX ORDER — ALBUTEROL SULFATE 90 UG/1
2 AEROSOL, METERED RESPIRATORY (INHALATION) EVERY 6 HOURS PRN
Status: DISCONTINUED | OUTPATIENT
Start: 2024-02-17 | End: 2024-02-23

## 2024-02-17 RX ORDER — CYCLOBENZAPRINE HCL 10 MG
10 TABLET ORAL 3 TIMES DAILY
Status: DISCONTINUED | OUTPATIENT
Start: 2024-02-17 | End: 2024-02-23

## 2024-02-17 RX ORDER — MAGNESIUM SULFATE HEPTAHYDRATE 40 MG/ML
2 INJECTION, SOLUTION INTRAVENOUS ONCE
Status: COMPLETED | OUTPATIENT
Start: 2024-02-17 | End: 2024-02-17

## 2024-02-17 RX ORDER — ENOXAPARIN SODIUM 100 MG/ML
40 INJECTION SUBCUTANEOUS DAILY
Status: DISCONTINUED | OUTPATIENT
Start: 2024-02-18 | End: 2024-02-23

## 2024-02-17 RX ORDER — CLONAZEPAM 0.5 MG/1
0.25 TABLET ORAL 3 TIMES DAILY
Status: DISCONTINUED | OUTPATIENT
Start: 2024-02-17 | End: 2024-02-23

## 2024-02-17 RX ORDER — METHYLPREDNISOLONE SODIUM SUCCINATE 125 MG/2ML
125 INJECTION, POWDER, LYOPHILIZED, FOR SOLUTION INTRAMUSCULAR; INTRAVENOUS ONCE
Status: COMPLETED | OUTPATIENT
Start: 2024-02-17 | End: 2024-02-17

## 2024-02-17 RX ORDER — PREGABALIN 100 MG/1
200 CAPSULE ORAL 2 TIMES DAILY
Status: DISCONTINUED | OUTPATIENT
Start: 2024-02-17 | End: 2024-02-23

## 2024-02-17 RX ORDER — ALBUTEROL SULFATE 2.5 MG/3ML
2.5 SOLUTION RESPIRATORY (INHALATION)
Status: DISCONTINUED | OUTPATIENT
Start: 2024-02-17 | End: 2024-02-18

## 2024-02-17 RX ORDER — BUPROPION HYDROCHLORIDE 150 MG/1
150 TABLET ORAL EVERY MORNING
Status: DISCONTINUED | OUTPATIENT
Start: 2024-02-18 | End: 2024-02-23

## 2024-02-17 RX ORDER — LEVETIRACETAM 500 MG/1
500 TABLET ORAL 2 TIMES DAILY
Status: DISCONTINUED | OUTPATIENT
Start: 2024-02-17 | End: 2024-02-23

## 2024-02-17 NOTE — ED QUICK NOTES
Orders for admission, patient is aware of plan and ready to go upstairs. Any questions, please call ED RN Alvino at extension [phone].     Patient Covid vaccination status: Fully vaccinated     COVID Test Ordered in ED: SARS-CoV-2/Flu A and B/RSV by PCR (GeneXpert)    COVID Suspicion at Admission: N/A    Running Infusions:  None    Mental Status/LOC at time of transport: A/OX4    Other pertinent information:   CIWA score: N/A   NIH score:  N/A

## 2024-02-17 NOTE — ED PROVIDER NOTES
Patient Seen in: [facility] Emergency Department      History     Chief Complaint   Patient presents with    Difficulty Breathing    Cough/URI     Sick for a few weeks, cough, sinus drainage, SOB     Stated Complaint: Sick for a few weeks, cough, sinus drainage, SOB    Subjective:   HPI    53-year-old with a history of asthma, bipolar affective disorder, kidney stones, migraine, prior subdural hematoma with traumatic brain injury, epilepsy, hypothyroidism, ulcerative colitis, hypertension presents for evaluation of cough and shortness of breath.  Patient has been sick for 2 weeks with cough and wheezing. Feels like her asthma is flaring. Has been using her nebulizer. No fever. Coughing up mucous, no blood. Chest hurts from coughing. No new leg swelling; L leg is typically more swollen than her R. Last on steroids many years ago, can't even recall the last time. Hasn't required admission for her asthma in a long time.   Went to the [facility] in [place] today and they sent her here for further evaluation.  Walk-in clinic visit reviewed from today.  O2 sat noted to be 89%.  She was given a DuoNeb.  Rapid COVID was negative.  Neurology note reviewed from 2/7/2024.  Patient has severe chronic migraine and complex partial seizure disorder.  She is on Dilaudid chronically.  Objective:   Past Medical History:   Diagnosis Date    Abdominal distention     Occasionally    Abdominal hernia 1995    Umbilical hernia from pregnancy    Abdominal pain     Acne     Acute, but ill-defined, cerebrovascular disease 2003    Mini    ALLERGIC RHINITIS     Alopecia     Anxiety state     Arthritis     ASTHMA     Asthma     Back pain     Back problem     scoliosis     Bad breath     Bipolar affective (HCC)     Blood in the stool 2008    Blurred vision 1980    Calculus of kidney 10/2020    Test done in hospital    Change in hair 2015    Alopecia areata    Chronic rhinitis     Claustrophobia     Colitis     Constipation     Decorative tattoo  1999 & 2001    DEPRESSION     Bipolar    Depression     Diarrhea, unspecified     Difficult intubation     difficult intubation 2017; developed swallowing issue post procedure    Disorder of thyroid     hypothyroid    Dizziness     Easy bruising     Esophageal reflux     Extrinsic asthma, unspecified     Fatigue     Feeling lonely     Fibromyalgia     Flatulence/gas pain/belching     Food intolerance 1977    Mushrooms    Frequent urination 1983    Drink lots of water    Frequent use of laxatives 2016    Daily    Gestational diabetes     Headache disorder     Heartburn     Hemorrhoids     History of depression     History of mental disorder     HYPOTHYROIDISM     Incontinence     at night     Indigestion 02/01/1977    Estimated date, anxiety, mental health    Irregular bowel habits     Leaking of urine 2010    In bed occasionally    Leg swelling 2017    Left leg worst    Migraine, hemiplegic     left side walking deficit, drooping face    MIGRAINES     Migraines     triggers; weather changes    Multiple lesions on computed tomography of brain and spine     Muscular torticollis     Head orientation favors left, can overcome    Myalgia and myositis, unspecified     Nausea     Night sweats 2015    Taty-Menopausal    Organic hypersomnia, unspecified PSG 6-29-14    AHI 2 RDI 2 REM AHI 2 SaO2 doroteo 88 %    Osteoarthritis     right hip    OTHER DISEASES     Crohn's    Pain in joints     Pain with bowel movements     Occasionally, with constipation    Pneumonia, organism unspecified(486)     Problems with swallowing 01/01/2018    Temporarily    Rash 1979    Seborrheic dermatitis    Seizure disorder (HCC) 10/2020    absent seizures    Shortness of breath     Sleep disturbance     Stress     STROKE     2003+ mild R.sided weakness    Syncope     Ulcerative colitis, unspecified     Visual impairment     reading glasses    Vomiting 1971    Vomiting blood     Wears glasses     Weight gain 12/15/2018    Subdural hematoma and other  traumatic brain injuries    Weight loss     Diet and exercise    Wheezing               Past Surgical History:   Procedure Laterality Date    CHOLECYSTECTOMY  3/10    COLONOSCOPY      ulcerative colitis    COLONOSCOPY N/A 2017    Procedure: COLONOSCOPY;  Surgeon: Hipolito Dobbins MD;  Location:  ENDOSCOPY    COLONOSCOPY N/A 2021    Procedure: COLONOSCOPY with biopsy,;  Surgeon: Ramsey Muhammad MD;  Location:  ENDOSCOPY    COLPOSCOPY, CERVIX W/UPPER ADJACENT VAGINA; W/BIOPSY(S), CERVIX      HERNIA SURGERY      Umbilical Hernia Repair    OTHER SURGICAL HISTORY N/A 2016    Procedure: ESOPHAGOGASTRODUODENOSCOPY (EGD);  Surgeon: Jeff Scherer MD;  Location:  ENDOSCOPY    PARAGARD, IUD  2014                Social History     Socioeconomic History    Marital status:    Tobacco Use    Smoking status: Former     Packs/day: 0.00     Years: 10.00     Additional pack years: 0.00     Total pack years: 0.00     Types: Cigarettes     Quit date: 2003     Years since quittin.0    Smokeless tobacco: Never    Tobacco comments:     Ages 13-22, [age]   Vaping Use    Vaping Use: Some days    Substances: THC, CBD, Flavoring, medical marijuana    Devices: Disposable, Refillable tank   Substance and Sexual Activity    Alcohol use: Not Currently    Drug use: Yes     Frequency: 2.0 times per week     Types: Cannabis     Comment: Medical Cannabis    Sexual activity: Not Currently     Partners: Male              Review of Systems    Positive for stated complaint: Sick for a few weeks, cough, sinus drainage, SOB  Other systems are as noted in HPI.  Constitutional and vital signs reviewed.      All other systems reviewed and negative except as noted above.    Physical Exam     ED Triage Vitals   BP 24 1308 134/82   Pulse 24 1308 63   Resp 24 1308 19   Temp 24 1309 97.6 °F (36.4 °C)   Temp src --    SpO2 24 1308 94 %   O2 Device 24 1308 None  (Room air)       Current:/64   Pulse 63   Temp 97.6 °F (36.4 °C)   Resp 12   LMP 02/20/2015 (Approximate)   SpO2 100%         Physical Exam    General: Patient is awake, alert, appears a bit tired.   HEENT:  Sclera are not icteric.  Conjunctivae within normal limits.  Mucous members are moist.   Cardiovascular: Regular rate and rhythm, normal S1-S2.  Respiratory: Diffuse wheezing in all lung fields with moderate air movement bilaterally.   Abdomen: Soft, nontender, nondistended.  Extremities: No pitting edema.  No unilateral calf swelling or calf tenderness to palpation.  Skin: warm and dry, no diaphoresis  ED Course     Labs Reviewed   COMP METABOLIC PANEL (14) - Abnormal; Notable for the following components:       Result Value    Calculated Osmolality 296 (*)     All other components within normal limits   CBC W/ DIFFERENTIAL - Abnormal; Notable for the following components:    MCHC 30.8 (*)     All other components within normal limits   LITHIUM (ESKALITH) - Normal   SARS-COV-2/FLU A AND B/RSV BY PCR (GENEXPERT) - Normal    Narrative:     This test is intended for the qualitative detection and differentiation of SARS-CoV-2, influenza A, influenza B, and respiratory syncytial virus (RSV) viral RNA in nasopharyngeal or nares swabs from individuals suspected of respiratory viral infection consistent with COVID-19 by their healthcare provider. Signs and symptoms of respiratory viral infection due to SARS-CoV-2, influenza, and RSV can be similar.    Test performed using the Xpert Xpress SARS-CoV-2/FLU/RSV (real time RT-PCR)  assay on the GeneXpert instrument, [employer], [place] [address].   This test is being used under the Food and Drug Administration's Emergency Use Authorization.    The authorized Fact Sheet for Healthcare Providers for this assay is available upon request from the laboratory.   CBC WITH DIFFERENTIAL WITH PLATELET    Narrative:     The following orders were created for panel order CBC  With Differential With Platelet.  Procedure                               Abnormality         Status                     ---------                               -----------         ------                     CBC W/ DIFFERENTIAL[[id number]]          Abnormal            Final result                 Please view results for these tests on the individual orders.   SCAN SLIDE   RAINBOW DRAW BLUE     EKG    Rate, intervals and axes as noted on EKG Report.  Rate: 61  Rhythm: Sinus Rhythm  Reading: No ST elevation or depression.  Nonspecific T wave changes.  Normal intervals including QTc 426.  Possible left atrial enlargement.  Similar to prior EKG August 2023.           Chest x-ray: I personally reviewed the radiographs and my individual interpretation shows no consolidation.  I also reviewed the official report which showed no acute airspace disease.  Subsegmental atelectasis at the lung bases.        Hour-long albuterol/Atrovent neb ordered.  Solu-Medrol ordered.  IV magnesium ordered.  Second hour-long neb was ordered.       MDM        Previous records reviewed as noted in HPI    Differential includes, but is not limited to, pneumonia, asthma exacerbation, pneumothorax    Review of any laboratory testing: CBC, CMP unremarkable.  COVID/flu/RSV negative.     Review of any radiographic studies: Chest x-ray without airspace disease.    Shared decision making with the patient.  On reevaluation after the above patient still with significant wheezing and room air O2 saturations in the high 80s requiring supplemental oxygen.  At this point she will be admitted.  Second hour-long neb was ordered.    Consultants utilized: Spoke with Dr. Palencia who will admit the patient.                  Admission disposition: 2/17/2024  4:43 PM                                        Medical Decision Making      Disposition and Plan     Clinical Impression:  1. Mild intermittent asthma with exacerbation    2. Acute respiratory failure with hypoxia  (HCC)         Disposition:  Admit  2/17/2024  4:43 pm    Follow-up:  No follow-up provider specified.        Medications Prescribed:  Current Discharge Medication List                            Hospital Problems       Present on Admission  Date Reviewed: 2/17/2024            ICD-10-CM Noted POA    * (Principal) Mild intermittent asthma with exacerbation J45.21 2/17/2024 Unknown    Azotemia R79.89 2/17/2024 Yes

## 2024-02-17 NOTE — PROGRESS NOTES
CHIEF COMPLAINT:     Chief Complaint   Patient presents with    Cough     Started couple weeks ago          HPI:   Marylin Walker is a 53 year old female who presents for worsening cough, shortness of breath and wheezing.  Patient reports cough x 2 weeks with the wheezing and shortness of breath being present over the past week. Denies fevers. Notes cough is productive with brown sputum.  Treating symptoms with home albuterol nebs..   Tolerates PO well at home. No n/v/d.  Denies any other aggravating or relieving factors at home. Denies any other treatment attempts prior to arrival.       Upon rooming pt the O2 sat was noted to be 89%. EMS was called. Pt was started on duoneb treatment.     Current Outpatient Medications   Medication Sig Dispense Refill    lithium  MG Oral Tab CR Take 1 tablet (300 mg total) by mouth at bedtime. TAKE 1 TABLET BY MOUTH EVERY NIGHT. SWALLOW WHOLE. TAKE WITH 450 MG TABLET  MG TOTAL      acetaminophen 325 MG Oral Tab Take 1 tablet (325 mg total) by mouth every 6 (six) hours as needed for Pain.      Refresh Lacri-Lube Ophthalmic Ointment Place 1 g into both eyes 3 (three) times daily.      diclofenac 1 % External Gel Apply 2 g topically 3 (three) times daily as needed (Tibial tendon injury).      Clindamycin-Tretinoin (ZIANA) 1.2-0.025 % External Gel Apply 1 Dose topically nightly.      MESALAMINE  MG Oral Capsule Delayed Release TAKE 3 CAPSULES BY MOUTH TWICE DAILY BEFORE MEALS 540 capsule 3    pantoprazole 40 MG Oral Tab EC Take 1 tablet (40 mg total) by mouth before breakfast. 90 tablet 3    dicyclomine 10 MG Oral Cap Take 1 capsule (10 mg total) by mouth every 4 to 6 hours as needed. 30 capsule 3    buPROPion  MG Oral Tablet 24 Hr Take 1 tablet (150 mg total) by mouth every morning.      Estradiol 10 MCG Vaginal Tab Place vaginally twice a week. AS NEEDED      Nebulizers ([place] AEROSOL DELIVERY SYSTEM) Does not apply Misc USE WITH ALBUTEROL AS NEEDED       albuterol 108 (90 Base) MCG/ACT Inhalation Aero Soln Inhale 2 puffs into the lungs every 6 (six) hours as needed.      QUEtiapine 50 MG Oral Tab Take 1 tablet (50 mg total) by mouth nightly.      HYDROmorphone 2 MG Oral Tab Take 1 tablet (2 mg total) by mouth every 6 (six) hours as needed for Pain. 45 tablet 0    CARISOPRODOL 350 MG Oral Tab TAKE 1 TABLET(350 MG) BY MOUTH THREE TIMES DAILY AS NEEDED FOR MUSCLE SPASMS 90 tablet 0    METOPROLOL TARTRATE 25 MG Oral Tab TAKE 1/2 TABLET BY MOUTH DAILY 45 tablet 0    LEVOCETIRIZINE 5 MG Oral Tab TAKE 1 TABLET(5 MG) BY MOUTH EVERY EVENING 90 tablet 3    levETIRAcetam 500 MG Oral Tab Take 1 tablet (500 mg total) by mouth 2 (two) times daily. 180 tablet 1    Lithium Carbonate  MG Oral Tab CR Take 1 tablet (450 mg total) by mouth at bedtime. (Patient taking differently: Take 750 mg by mouth at bedtime.) 30 tablet 0    OXCARBAZEPINE 300 MG Oral Tab TAKE 1 AND 1/2 TABLETS(450 MG) BY MOUTH TWICE DAILY 180 tablet 1    meclizine 12.5 MG Oral Tab Take 1 tablet (12.5 mg total) by mouth 3 (three) times daily as needed. 90 tablet 3    MONTELUKAST 10 MG Oral Tab TAKE 1 TABLET(10 MG) BY MOUTH EVERY NIGHT 90 tablet 0    sertraline 100 MG Oral Tab Take 1 tablet (100 mg total) by mouth 2 (two) times daily. 50mg in AM, 100mg in PM      lamoTRIgine (LAMICTAL) 200 MG Oral Tab Take 1 tablet (200 mg total) by mouth 3 (three) times daily. 90 tablet 5    pregabalin (LYRICA) 200 MG Oral Cap Take 1 capsule (200 mg total) by mouth 2 (two) times daily. 180 capsule 2    levothyroxine 125 MCG Oral Tab Take 1 tablet (125 mcg total) by mouth daily. 90 tablet 2    ondansetron 8 MG Oral Tablet Dispersible Take 1 tablet (8 mg total) by mouth every 8 (eight) hours as needed for Nausea. 60 tablet 2    fluticasone-salmeterol (ADVAIR DISKUS) 250-50 MCG/DOSE Inhalation Aerosol Powder, Breath Activated INHALE 1 PUFF INTO THE LUNGS EVERY 12 HOURS 120 each 0    albuterol sulfate (2.5 MG/3ML) 0.083% Inhalation  Nebu Soln Take 3 mL (2.5 mg total) by nebulization every 6 (six) hours as needed for Wheezing. 3 mL 2    Multiple Vitamins-Minerals (QC WOMENS DAILY MULTIVITAMIN) Oral Tab Take 1 tablet by mouth daily.      FLUTICASONE PROPIONATE 50 MCG/ACT Nasal Suspension SHAKE LIQUID AND USE 2 SPRAYS IN EACH NOSTRIL DAILY 48 g 3    QUEtiapine Fumarate 200 MG Oral Tab Take 1 tablet (200 mg total) by mouth nightly.      Ketoconazole 2 % External Shampoo Use to wash scalp 2-3 times per week. 120 mL 1    clonazePAM 0.5 MG Oral Tab Take 0.5 tablets (0.25 mg total) by mouth 3 (three) times daily. 90 tablet 0    Methylcellulose, Laxative, (CITRUCEL OR) Take by mouth as needed.      Carboxymethylcellulose Sodium (REFRESH TEARS OP) Apply 1 drop to eye as needed.      aspirin 81 MG Oral Tab Take 1 tablet (81 mg total) by mouth daily.        Past Medical History:   Diagnosis Date    Abdominal distention     Occasionally    Abdominal hernia 1995    Umbilical hernia from pregnancy    Abdominal pain     Acne     Acute, but ill-defined, cerebrovascular disease 2003    Mini    ALLERGIC RHINITIS     Alopecia     Anxiety state     Arthritis     ASTHMA     Asthma     Back pain     Back problem     scoliosis     Bad breath     Bipolar affective (HCC)     Blood in the stool 2008    Blurred vision 1980    Calculus of kidney 10/2020    Test done in hospital    Change in hair 2015    Alopecia areata    Chronic rhinitis     Claustrophobia     Colitis     Constipation     Decorative tattoo 1999 & 2001    DEPRESSION     Bipolar    Depression     Diarrhea, unspecified     Difficult intubation     difficult intubation 2017; developed swallowing issue post procedure    Disorder of thyroid     hypothyroid    Dizziness     Easy bruising     Esophageal reflux     Extrinsic asthma, unspecified     Fatigue     Feeling lonely     Fibromyalgia     Flatulence/gas pain/belching     Food intolerance 1977    Mushrooms    Frequent urination 1983    Drink lots of water     Frequent use of laxatives 2016    Daily    Gestational diabetes     Headache disorder     Heartburn     Hemorrhoids     History of depression     History of mental disorder     HYPOTHYROIDISM     Incontinence     at night     Indigestion 02/01/1977    Estimated date, anxiety, mental health    Irregular bowel habits     Leaking of urine 2010    In bed occasionally    Leg swelling 2017    Left leg worst    Migraine, hemiplegic     left side walking deficit, drooping face    MIGRAINES     Migraines     triggers; weather changes    Multiple lesions on computed tomography of brain and spine     Muscular torticollis     Head orientation favors left, can overcome    Myalgia and myositis, unspecified     Nausea     Night sweats 2015    Taty-Menopausal    Organic hypersomnia, unspecified PSG 6-29-14    AHI 2 RDI 2 REM AHI 2 SaO2 doroteo 88 %    Osteoarthritis     right hip    OTHER DISEASES     Crohn's    Pain in joints     Pain with bowel movements     Occasionally, with constipation    Pneumonia, organism unspecified(486)     Problems with swallowing 01/01/2018    Temporarily    Rash 1979    Seborrheic dermatitis    Seizure disorder (HCC) 10/2020    absent seizures    Shortness of breath     Sleep disturbance     Stress     STROKE     2003+ mild R.sided weakness    Syncope     Ulcerative colitis, unspecified     Visual impairment     reading glasses    Vomiting 1971    Vomiting blood     Wears glasses     Weight gain 12/15/2018    Subdural hematoma and other traumatic brain injuries    Weight loss 2015    Diet and exercise    Wheezing       Past Surgical History:   Procedure Laterality Date    CHOLECYSTECTOMY  3/10    COLONOSCOPY  2013    ulcerative colitis    COLONOSCOPY N/A 4/25/2017    Procedure: COLONOSCOPY;  Surgeon: Hipolito Dobbins MD;  Location:  ENDOSCOPY    COLONOSCOPY N/A 6/14/2021    Procedure: COLONOSCOPY with biopsy,;  Surgeon: Ramsey Muhammad MD;  Location:  ENDOSCOPY    COLPOSCOPY, CERVIX W/UPPER  ADJACENT VAGINA; W/BIOPSY(S), CERVIX      HERNIA SURGERY      Umbilical Hernia Repair    OTHER SURGICAL HISTORY N/A 2016    Procedure: ESOPHAGOGASTRODUODENOSCOPY (EGD);  Surgeon: Jeff Scherer MD;  [facility], IUD  2014         Social History     Socioeconomic History    Marital status:    Tobacco Use    Smoking status: Former     Packs/day: 0.00     Years: 10.00     Additional pack years: 0.00     Total pack years: 0.00     Types: Cigarettes     Quit date: 2003     Years since quittin.0    Smokeless tobacco: Never    Tobacco comments:     Ages 13-22, [age]   Vaping Use    Vaping Use: Some days    Substances: THC, CBD, Flavoring, medical marijuana    Devices: Disposable, Refillable tank   Substance and Sexual Activity    Alcohol use: Not Currently    Drug use: Yes     Frequency: 2.0 times per week     Types: Cannabis     Comment: Medical Cannabis    Sexual activity: Not Currently     Partners: Male         REVIEW OF SYSTEMS:   GENERAL: Denies fever. Notes good appetite  SKIN: no rashes or abnormal skin lesions  HEENT: Denies sore throat, + mild sinus symptoms, denies ear pain  LUNGS: + productive cough, + shortness of breath and wheezing,   CARDIOVASCULAR: denies chest pain or palpitations   GI: denies N/V/C or abdominal pain  NEURO: Denies headaches    EXAM:   /88   Pulse 61   Temp 97.5 °F (36.4 °C)   Resp 22   Ht 5' 3\" (1.6 m)   Wt 199 lb (90.3 kg)   LMP 2015 (Approximate)   SpO2 93%   BMI 35.25 kg/m²   GENERAL: well developed, well nourished,in no apparent distress  SKIN: no rashes,no suspicious lesions  HEAD: atraumatic, normocephalic.    EYES: conjunctiva clear  EARS: TM's intact and without erythema, no bulging, no retraction,no fluid, bony landmarks visualized. No erythema or swelling noted to ear canals or external ears.   NOSE: Nostrils patent, clear nasal discharge, nasal mucosa reddened   THROAT: Oral mucosa pink, moist.  Posterior pharynx is not erythematous. No exudates. No tonsillar hypertrophy noted.  No trismus. Uvula midline with no swelling. Voice clear/normal. No stridor  NECK: Supple, non-tender  LUNGS: Lungs coarse bilat. + expiratory wheezing in bilat upper lung fields. Few rales noted in bilat lower lung fields. Respirations labored.  CARDIO: RRR without murmur  EXTREMITIES: no cyanosis, clubbing or edema  LYMPH:  No lymphadenopathy.        ASSESSMENT AND PLAN:       ICD-10-CM    1. Shortness of breath  R06.02 COVID-19 LAB TEST NON-CDC      2. Wheezing  R06.2           Covid-19 test negative  Duoneb given in office. Repeat exam shows bilat lung fields still with expiratory wheezing. O2 sat up to 94%. I stayed with pt in room the entire visit. Sats remained between 90-94%. Medic team arrived and evaluated pt. Pt left with medic team on stretcher.      **Addendum: it was noted by medic that in the parking lot the pt then declined medical transport because she did not want to go to [facility]. See refused their transport and preferred to go to ED via private vehicle with her son. Medic did not specify ED.

## 2024-02-17 NOTE — ED INITIAL ASSESSMENT (HPI)
Patient reports c/o chest pain, shortness of breath, and cough. Patient seen in [facility] today and advised to come to ER for further work up.

## 2024-02-17 NOTE — H&P
.CC:   Chief Complaint   Patient presents with    Difficulty Breathing    Cough/URI     Sick for a few weeks, cough, sinus drainage, SOB        PCP: Yoel Servin MD    History of Present Illness: Patient is a 53 year old female with PMH sig for asthma who presented with 2 weeks of worsening cough/sob. Reports that grandson has been sick, she helps take care of him. She had rsv last month. No fevers/chills. Has tried using nebs without relief. Productive of brownish sputum. No bowel/bladder problems.     89% RA in walk in car  Still in high 80s on RA after hour long neb. Also received mag and solumedrol.       PMH  Past Medical History:   Diagnosis Date    Abdominal distention     Occasionally    Abdominal hernia 1995    Umbilical hernia from pregnancy    Abdominal pain     Acne     Acute, but ill-defined, cerebrovascular disease 2003    Mini    ALLERGIC RHINITIS     Alopecia     Anxiety state     Arthritis     ASTHMA     Asthma     Back pain     Back problem     scoliosis     Bad breath     Bipolar affective (HCC)     Blood in the stool 2008    Blurred vision 1980    Calculus of kidney 10/2020    Test done in hospital    Change in hair 2015    Alopecia areata    Chronic rhinitis     Claustrophobia     Colitis     Constipation     Decorative tattoo 1999 & 2001    DEPRESSION     Bipolar    Depression     Diarrhea, unspecified     Difficult intubation     difficult intubation 2017; developed swallowing issue post procedure    Disorder of thyroid     hypothyroid    Dizziness     Easy bruising     Esophageal reflux     Extrinsic asthma, unspecified     Fatigue     Feeling lonely     Fibromyalgia     Flatulence/gas pain/belching     Food intolerance 1977    Mushrooms    Frequent urination 1983    Drink lots of water    Frequent use of laxatives 2016    Daily    Gestational diabetes     Headache disorder     Heartburn     Hemorrhoids     History of depression     History of mental disorder     HYPOTHYROIDISM      Incontinence     at night     Indigestion 02/01/1977    Estimated date, anxiety, mental health    Irregular bowel habits     Leaking of urine 2010    In bed occasionally    Leg swelling 2017    Left leg worst    Migraine, hemiplegic     left side walking deficit, drooping face    MIGRAINES     Migraines     triggers; weather changes    Multiple lesions on computed tomography of brain and spine     Muscular torticollis     Head orientation favors left, can overcome    Myalgia and myositis, unspecified     Nausea     Night sweats 2015    Taty-Menopausal    Organic hypersomnia, unspecified PSG 6-29-14    AHI 2 RDI 2 REM AHI 2 SaO2 doroteo 88 %    Osteoarthritis     right hip    OTHER DISEASES     Crohn's    Pain in joints     Pain with bowel movements     Occasionally, with constipation    Pneumonia, organism unspecified(486)     Problems with swallowing 01/01/2018    Temporarily    Rash 1979    Seborrheic dermatitis    Seizure disorder (HCC) 10/2020    absent seizures    Shortness of breath     Sleep disturbance     Stress     STROKE     2003+ mild R.sided weakness    Syncope     Ulcerative colitis, unspecified     Visual impairment     reading glasses    Vomiting 1971    Vomiting blood     Wears glasses     Weight gain 12/15/2018    Subdural hematoma and other traumatic brain injuries    Weight loss 2015    Diet and exercise    Wheezing         PSH  Past Surgical History:   Procedure Laterality Date    CHOLECYSTECTOMY  3/10    COLONOSCOPY  2013    ulcerative colitis    COLONOSCOPY N/A 4/25/2017    Procedure: COLONOSCOPY;  Surgeon: Hipolito Dobbins MD;  Location:  ENDOSCOPY    COLONOSCOPY N/A 6/14/2021    Procedure: COLONOSCOPY with biopsy,;  Surgeon: Ramsey Muhammad MD;  Location:  ENDOSCOPY    COLPOSCOPY, CERVIX W/UPPER ADJACENT VAGINA; W/BIOPSY(S), CERVIX  1999    HERNIA SURGERY  1997    Umbilical Hernia Repair    OTHER SURGICAL HISTORY N/A 6/21/2016    Procedure: ESOPHAGOGASTRODUODENOSCOPY (EGD);  Surgeon:  Jeff Scherer MD;  Location: [facility]  2014        ALL:  Allergies   Allergen Reactions    Depakote [Valproic Acid] OTHER (SEE COMMENTS)     Suicidal ideation    Kdc:Olanzapine SWELLING    Ketorolac Tromethamine OTHER (SEE COMMENTS) and NAUSEA AND VOMITING     gastritis  Gastritis & UC  gastritis  Gastritis & UC      Latex RASH and Dyspnea    Molds & Smuts ASTHMA, ITCHING, RASH, SHORTNESS OF BREATH, WHEEZING and OTHER (SEE COMMENTS)    Nsaids OTHER (SEE COMMENTS)     Must take Nsaids sparingly due to history of gastritis  Must take Nsaids sparingly due to history of gastritis      Olanzapine SWELLING     Caused severe swelling and bruising    Penicillins NAUSEA AND VOMITING    Phenothiazines OTHER (SEE COMMENTS)     Extrapyramidal syndrome. No phenothiazines per neurology (documented during 18 Dr. Velázquez)  Extrapyramidal syndrome. No phenothiazines per neurology (documented during 18 Dr. Velázquez)  Extrapyramidal syndrome. No phenothiazines per neurology (documented during 18 Dr. Velázquez)    Amoxicillin NAUSEA AND VOMITING    Levofloxacin PAIN and OTHER (SEE COMMENTS)     Tendinitis in multiple joints  Tendinitis in multiple joints    Mushrooms NAUSEA AND VOMITING    Penicillin G Potassium NAUSEA AND VOMITING    Phenytoin ANXIETY, CONFUSION, DIZZINESS and HALLUCINATION    Sulfa Antibiotics NAUSEA AND VOMITING    Toradol NAUSEA AND VOMITING     Gastritis & UC    [place], [place] OTHER (SEE COMMENTS)     Sneezing watery eyes    Triptans HALLUCINATION     Lesions in brain    Erythromycin OTHER (SEE COMMENTS)        Home Medications:  No outpatient medications have been marked as taking for the 24 encounter (Hospital Encounter).         Soc Hx  Social History     Tobacco Use    Smoking status: Former     Packs/day: 0.00     Years: 10.00     Additional pack years: 0.00     Total pack years: 0.00     Types: Cigarettes     Quit date: 2003     Years since quittin.0     Smokeless tobacco: Never    Tobacco comments:     Ages [age], [age]   Substance Use Topics    Alcohol use: Not Currently        Fam Hx  Family History   Problem Relation Age of Onset    Breast Cancer Maternal Grandmother         81    Breast Cancer Other         dx post menopausal    High Blood Pressure Father     Colon Cancer Father         59    Colon Polyps Father     Hypertension Father     High Blood Pressure Mother     High Cholesterol Mother     Hypertension Mother     Mental Disorder Mother         Depression    Heart Disorder Sister 54        MI - LAD stenting    Heart Attack Sister         12/2018 \" maker\"    Mental Disorder Son         ADHD, Bipolar II    Mental Disorder Son         Severe anxiety, Bipolar II       Review of Systems  Comprehensive ROS reviewed and negative except for what's stated above.       OBJECTIVE:  /64   Pulse 63   Temp 97.6 °F (36.4 °C)   Resp 12   LMP 02/20/2015 (Approximate)   SpO2 100%     Gen- NAD, appears stated age  HEENT- NCAT, anicteric sclera, MMM, OP clear  Lymph- no cervical LAD  CV- RRR no murmurs. No GRAY  Lungs- wheezing, most at RLL  Abd- soft, ntnd, no organomegaly, BS+  Derm- no rashes  MSK- good muscle strength and tone, no joint swelling  Neuro- A&OX3, no focal deficits      Diagnostic Data:    CBC/Chem  Recent Labs   Lab 02/17/24  1320   WBC 8.4   HGB 12.5   MCV 95.8   .0       Recent Labs   Lab 02/17/24  1320      K 3.9      CO2 27.0   BUN 21   CREATSERUM 0.95   GLU 82   CA 9.2       Recent Labs   Lab 02/17/24  1320   ALT 23   AST 15   ALB 3.8       No results for input(s): \"TROP\" in the last 168 hours.    Additional Diagnostics: personally reviewed EKG- nsr    CXR: image personally reviewed- clear    Radiology: XR CHEST AP PORTABLE  (CPT=71045)    Result Date: 2/17/2024  PROCEDURE:  XR CHEST AP PORTABLE  (CPT=71045)  TECHNIQUE:  AP chest radiograph was obtained.  COMPARISON:  [facility] , XR, XR CHEST AP PORTABLE  (CPT=71045),  12/31/2021, 5:58 PM.  INDICATIONS:  Sick for a few weeks, cough, sinus drainage, SOB  PATIENT STATED HISTORY: (As transcribed by Technologist)  Patient stated she has been sick for a few weeks with a cough.    FINDINGS:  Subsegmental atelectasis at the lung bases.  No acute airspace disease.  Heart is normal size.  There are no pleural effusions.  Trace left pleural effusion.  The low lung volumes.            CONCLUSION:  Subsegmental atelectasis at the lung bases.   LOCATION:  [facility]      Dictated by (CST): Semaj Bergeron MD on 2/17/2024 at 2:59 PM     Finalized by (CST): Semaj Bergeron MD on 2/17/2024 at 3:00 PM          Available outpatient records reviewed--    ASSESSMENT / PLAN:     54 yo woman with acute asthma exacerbation    1) asthma exacerbation  - nebs q4  - solumedrol q8  - resume home inhalers  - covid/rsv/influenza neg  - sputum culture  - flutter valve to help expectorate    2) migraines/chronic pain/depression- resume home meds    SCDs, hilton Palencia MD  [facility] Hospitalist  Pager [phone]  Answering Service number: [phone]

## 2024-02-18 LAB
ATRIAL RATE: 61 BPM
P AXIS: 44 DEGREES
P-R INTERVAL: 162 MS
Q-T INTERVAL: 424 MS
QRS DURATION: 96 MS
QTC CALCULATION (BEZET): 426 MS
R AXIS: 20 DEGREES
T AXIS: 11 DEGREES
VENTRICULAR RATE: 61 BPM

## 2024-02-18 PROCEDURE — 94640 AIRWAY INHALATION TREATMENT: CPT

## 2024-02-18 PROCEDURE — 94799 UNLISTED PULMONARY SVC/PX: CPT

## 2024-02-18 RX ORDER — GUAIFENESIN 600 MG/1
600 TABLET, EXTENDED RELEASE ORAL 2 TIMES DAILY
Status: DISCONTINUED | OUTPATIENT
Start: 2024-02-18 | End: 2024-02-23

## 2024-02-18 RX ORDER — ALBUTEROL SULFATE 2.5 MG/3ML
2.5 SOLUTION RESPIRATORY (INHALATION)
Status: DISCONTINUED | OUTPATIENT
Start: 2024-02-18 | End: 2024-02-18

## 2024-02-18 RX ORDER — ALBUTEROL SULFATE 2.5 MG/3ML
2.5 SOLUTION RESPIRATORY (INHALATION)
Status: DISCONTINUED | OUTPATIENT
Start: 2024-02-18 | End: 2024-02-23

## 2024-02-18 NOTE — PROGRESS NOTES
NURSING ADMISSION NOTE      Patient admitted via Ambulatory  Oriented to room.  Safety precautions initiated.  Bed in low position.  Call light in reach.    2/17 AM: Pt is A/O x 4, has special glasses. Lung sounds are diminished, expiratory wheezing,on 2L. BP and HR are WNL, NSR on tele. BM today, voids in bathroom. SBA. Scheduled nebs and IV solumedrol. General diet. Pain meds PRN.

## 2024-02-18 NOTE — PLAN OF CARE
Patient is alert and oriented x4. Req 1-2L to maintain O2 sata >90%. Scheduled nebs. NS on tele. VSS. On lovenox. Up sba. Seizure precautions. C/o headache. PO dilaudid administered with reported relief. On IV steroids. POC discussed with pt, pt verbalizes understanding.   Problem: Patient/Family Goals  Goal: Patient/Family Long Term Goal  Description: Patient's Long Term Goal: discharge home with appropriate means    Interventions:  - labs  -meds per mar  -consults  - See additional Care Plan goals for specific interventions  Outcome: Progressing  Goal: Patient/Family Short Term Goal  Description: Patient's Short Term Goal:   2/17 noc: wean off supplemental O2    Interventions:   - flutter valve, nebs, IV steroids  - See additional Care Plan goals for specific interventions  Outcome: Progressing     Problem: RESPIRATORY - ADULT  Goal: Achieves optimal ventilation and oxygenation  Description: INTERVENTIONS:  - Assess for changes in respiratory status  - Assess for changes in mentation and behavior  - Position to facilitate oxygenation and minimize respiratory effort  - Oxygen supplementation based on oxygen saturation or ABGs  - Provide Smoking Cessation handout, if applicable  - Encourage broncho-pulmonary hygiene including cough, deep breathe, Incentive Spirometry  - Assess the need for suctioning and perform as needed  - Assess and instruct to report SOB or any respiratory difficulty  - Respiratory Therapy support as indicated  - Manage/alleviate anxiety  - Monitor for signs/symptoms of CO2 retention  Outcome: Progressing     Problem: SAFETY ADULT - FALL  Goal: Free from fall injury  Description: INTERVENTIONS:  - Assess pt frequently for physical needs  - Identify cognitive and physical deficits and behaviors that affect risk of falls.  - [place] fall precautions as indicated by assessment.  - Educate pt/family on patient safety including physical limitations  - Instruct pt to call for assistance with  activity based on assessment  - Modify environment to reduce risk of injury  - Provide assistive devices as appropriate  - Consider OT/PT consult to assist with strengthening/mobility  - Encourage toileting schedule  Outcome: Progressing

## 2024-02-18 NOTE — PROGRESS NOTES
.[facility] Hospitalist note    PCP: Yoel Servin MD    Chief Complaint:  F/u asthma exacerbation    SUBJECTIVE:  Feels better. Able to sleep a little last night.     OBJECTIVE:  Temp:  [97.5 °F (36.4 °C)-98.4 °F (36.9 °C)] 98.4 °F (36.9 °C)  Pulse:  [55-82] 59  Resp:  [12-24] 15  BP: (110-135)/(64-92) 133/72  SpO2:  [89 %-100 %] 95 %    Intake/Output:    Intake/Output Summary (Last 24 hours) at 2/18/2024 0846  Last data filed at 2/17/2024 1709  Gross per 24 hour   Intake 50 ml   Output --   Net 50 ml       Last 3 Weights   02/17/24 1857 199 lb (90.3 kg)   02/17/24 1145 199 lb (90.3 kg)   10/17/23 1029 209 lb (94.8 kg)       Exam  Gen: No acute distress  HEENT: anicteric sclera, MMM  Pulm: some wheeze RUL  CV: Heart with regular rate and rhythm, no peripheral edema  Abd: Abdomen soft, nontender, nondistended, no organomegaly, bowel sounds present  MSK: Full range of motion in extremities, no clubbing, no cyanosis  Skin: no rashes or lesions  Neuro: A&OX3, no focal deficits    Data Review:         Labs:     Recent Labs   Lab 02/17/24  1320   WBC 8.4   HGB 12.5   MCV 95.8   .0   NE 5.66   LYMABS 2.02       Recent Labs   Lab 02/17/24  1320      K 3.9      CO2 27.0   BUN 21   CREATSERUM 0.95   CA 9.2   GLU 82       Recent Labs   Lab 02/17/24  1320   ALT 23   AST 15   ALB 3.8       No results for input(s): \"TROP\", \"CK\", \"PBNP\", \"PCT\" in the last 168 hours.    No results for input(s): \"CRP\", \"ROYAL\", \"LDH\", \"DDIMER\" in the last 168 hours.    No results for input(s): \"PGLU\" in the last 168 hours.    Meds:   Scheduled Medication:   enoxaparin  40 mg Subcutaneous Daily    methylPREDNISolone  60 mg Intravenous Q8H    albuterol  2.5 mg Nebulization Q4H WA (5 times daily)    aspirin  81 mg Oral Daily    buPROPion ER  150 mg Oral QAM    cyclobenzaprine  10 mg Oral TID    clonazePAM  0.25 mg Oral TID    fluticasone furoate-vilanterol  1 puff Inhalation Daily    lamoTRIgine  200 mg Oral TID    levETIRAcetam  500 mg  Oral BID    cetirizine  10 mg Oral Daily    levothyroxine  125 mcg Oral Daily    lithium ER  750 mg Oral Nightly    mesalamine DR  1,200 mg Oral BID AC    metoprolol tartrate  12.5 mg Oral Daily Beta Blocker    montelukast  10 mg Oral Nightly    OXcarbazepine  450 mg Oral BID    pantoprazole  40 mg Oral Before breakfast    pregabalin  200 mg Oral BID    QUEtiapine  50 mg Oral Nightly    QUEtiapine  200 mg Oral Nightly    sertraline  100 mg Oral BID     Continuous Infusing Medication:  PRN Medication:acetaminophen, ondansetron, benzonatate, guaiFENesin, albuterol, HYDROmorphone       Microbiology:    No results found for this visit on 02/17/24.    Lab Results   Component Value Date    COVID19 Not Detected 02/17/2024    COVID19 Not Detected 02/17/2024    COVID19 Detected (A) 12/31/2021        Assessment/Plan:     54 yo woman with acute asthma exacerbation     1) asthma exacerbation- overall improving  - nebs q4  - solumedrol q8  - resumed home inhalers  - covid/rsv/influenza neg  - sputum culture pending  - flutter valve to help expectorate  - wean off o2     2) migraines/chronic pain/depression- resume home meds     SCDs, lovecarmella Palencia MD  [facility] Hospitalist  Pager: [phone]

## 2024-02-18 NOTE — PLAN OF CARE
2/18 AM: Pt is A/O x 4. Lung sounds are diminished on room air. BP and HR are WNL, NSR on tele. BM today, voids in bathroom. Scheduled nebs, PRN pain medication and Zofran. General diet. SBA.    Problem: Patient/Family Goals  Goal: Patient/Family Long Term Goal  Description: Patient's Long Term Goal: discharge home with appropriate means    Interventions:  - labs  -meds per mar  -consults  - See additional Care Plan goals for specific interventions  Outcome: Progressing  Goal: Patient/Family Short Term Goal  Description: Patient's Short Term Goal:   2/17 noc: wean off supplemental O2  2/18 AM: O2 walk    Interventions:   - flutter valve, nebs, IV steroids  - See additional Care Plan goals for specific interventions  Outcome: Progressing

## 2024-02-18 NOTE — RESPIRATORY THERAPY NOTE
Pt received on 2 lpm titrated to room air O2 sats 90% . BS diminished scattered wheeze . Scheduled nebs increased per potocol Duo q4 . Tolerating well.

## 2024-02-18 NOTE — PROGRESS NOTES
02/18/24 1250   Mobility   O2 walk? Yes   SPO2% on Room Air at Rest 92   SPO2% Ambulation on Room Air 90

## 2024-02-19 LAB
ADENOVIRUS PCR:: NOT DETECTED
B PARAPERT DNA SPEC QL NAA+PROBE: NOT DETECTED
B PERT DNA SPEC QL NAA+PROBE: NOT DETECTED
C PNEUM DNA SPEC QL NAA+PROBE: NOT DETECTED
CORONAVIRUS 229E PCR:: NOT DETECTED
CORONAVIRUS HKU1 PCR:: NOT DETECTED
CORONAVIRUS NL63 PCR:: NOT DETECTED
CORONAVIRUS OC43 PCR:: NOT DETECTED
FLUAV RNA SPEC QL NAA+PROBE: NOT DETECTED
FLUBV RNA SPEC QL NAA+PROBE: NOT DETECTED
METAPNEUMOVIRUS PCR:: DETECTED
MYCOPLASMA PNEUMONIA PCR:: NOT DETECTED
PARAINFLUENZA 1 PCR:: NOT DETECTED
PARAINFLUENZA 2 PCR:: NOT DETECTED
PARAINFLUENZA 3 PCR:: NOT DETECTED
PARAINFLUENZA 4 PCR:: NOT DETECTED
PROCALCITONIN SERPL-MCNC: <0.05 NG/ML (ref ?–0.16)
RHINOVIRUS/ENTERO PCR:: NOT DETECTED
RSV RNA SPEC QL NAA+PROBE: NOT DETECTED
SARS-COV-2 RNA NPH QL NAA+NON-PROBE: NOT DETECTED

## 2024-02-19 PROCEDURE — 94640 AIRWAY INHALATION TREATMENT: CPT

## 2024-02-19 PROCEDURE — 0202U NFCT DS 22 TRGT SARS-COV-2: CPT | Performed by: HOSPITALIST

## 2024-02-19 PROCEDURE — 84145 PROCALCITONIN (PCT): CPT | Performed by: HOSPITALIST

## 2024-02-19 PROCEDURE — 94799 UNLISTED PULMONARY SVC/PX: CPT

## 2024-02-19 RX ORDER — POLYETHYLENE GLYCOL 3350 17 G/17G
17 POWDER, FOR SOLUTION ORAL DAILY
Status: DISCONTINUED | OUTPATIENT
Start: 2024-02-19 | End: 2024-02-23

## 2024-02-19 RX ORDER — AZITHROMYCIN 250 MG/1
500 TABLET, FILM COATED ORAL DAILY
Status: DISCONTINUED | OUTPATIENT
Start: 2024-02-19 | End: 2024-02-22

## 2024-02-19 NOTE — PROGRESS NOTES
.[facility] Hospitalist note    PCP: Yoel Servin MD    Chief Complaint:  F/u asthma exacerbation    SUBJECTIVE:  More wheezing again this morning. Still some cough.    OBJECTIVE:  Temp:  [97.5 °F (36.4 °C)-98.3 °F (36.8 °C)] 97.5 °F (36.4 °C)  Pulse:  [69-76] 72  Resp:  [18-20] 18  BP: (115-146)/(62-71) 118/62  SpO2:  [89 %-94 %] 90 %    Intake/Output:  No intake or output data in the 24 hours ending 02/19/24 1441      Last 3 Weights   02/17/24 1857 199 lb (90.3 kg)   02/17/24 1145 199 lb (90.3 kg)   10/17/23 1029 209 lb (94.8 kg)       Exam  Gen: No acute distress  HEENT: anicteric sclera, MMM  Pulm: wheeze b/l  CV: Heart with regular rate and rhythm, no peripheral edema  Abd: Abdomen soft, nontender, nondistended, no organomegaly, bowel sounds present  MSK: Full range of motion in extremities, no clubbing, no cyanosis  Skin: no rashes or lesions  Neuro: A&OX3, no focal deficits    Data Review:         Labs:     Recent Labs   Lab 02/17/24  1320   WBC 8.4   HGB 12.5   MCV 95.8   .0   NE 5.66   LYMABS 2.02       Recent Labs   Lab 02/17/24  1320      K 3.9      CO2 27.0   BUN 21   CREATSERUM 0.95   CA 9.2   GLU 82       Recent Labs   Lab 02/17/24  1320   ALT 23   AST 15   ALB 3.8       Recent Labs   Lab 02/19/24  1113   PCT <0.05       No results for input(s): \"CRP\", \"ROYAL\", \"LDH\", \"DDIMER\" in the last 168 hours.    No results for input(s): \"PGLU\" in the last 168 hours.    Meds:   Scheduled Medication:   polyethylene glycol (PEG 3350)  17 g Oral Daily    azithromycin  500 mg Oral Daily    guaiFENesin ER  600 mg Oral BID    albuterol  2.5 mg Nebulization 6 times per day    enoxaparin  40 mg Subcutaneous Daily    methylPREDNISolone  60 mg Intravenous Q8H    aspirin  81 mg Oral Daily    buPROPion ER  150 mg Oral QAM    cyclobenzaprine  10 mg Oral TID    clonazePAM  0.25 mg Oral TID    fluticasone furoate-vilanterol  1 puff Inhalation Daily    lamoTRIgine  200 mg Oral TID    levETIRAcetam  500 mg Oral  BID    cetirizine  10 mg Oral Daily    levothyroxine  125 mcg Oral Daily    lithium ER  750 mg Oral Nightly    mesalamine DR  1,200 mg Oral BID AC    metoprolol tartrate  12.5 mg Oral Daily Beta Blocker    montelukast  10 mg Oral Nightly    OXcarbazepine  450 mg Oral BID    pantoprazole  40 mg Oral Before breakfast    pregabalin  200 mg Oral BID    QUEtiapine  50 mg Oral Nightly    QUEtiapine  200 mg Oral Nightly    sertraline  100 mg Oral BID     Continuous Infusing Medication:  PRN Medication:acetaminophen, ondansetron, benzonatate, albuterol, HYDROmorphone       Microbiology:    No results found for this visit on 02/17/24.    Lab Results   Component Value Date    COVID19 Not Detected 02/19/2024    COVID19 Not Detected 02/17/2024    COVID19 Not Detected 02/17/2024        Assessment/Plan:     54 yo woman with acute asthma exacerbation     1) asthma exacerbation- overall improving  - nebs   - solumedrol q8  - resumed home inhalers  - covid/rsv/influenza neg, sent off rvp +for metapneumovirus  - sputum culture with moraxella, started azithro  - asked pulm to see  - off of o2 today     2) migraines/chronic pain/depression- resume home meds     SCDs, hilton Palencia MD  [facility] Hospitalist  Pager: [phone]

## 2024-02-19 NOTE — PLAN OF CARE
A&Ox4. VSS. Afebrile. Spo2>90% on RA. Nebs. IV Solumedrol Tele-NSR. Lovenox. Reg diet. Up ad balbina. Seizure precautions in place. Patient c/o feeling constipated, miralax ordered per MD. PO zithromax.  Patient denies n/v/d, pain or SOB. Patient updated on POC. All questionas answered at this time. Call light within reach.         Problem: Patient/Family Goals  Goal: Patient/Family Long Term Goal  Description: Patient's Long Term Goal: discharge home with appropriate means    Interventions:  - labs  -meds per mar  -consults  - See additional Care Plan goals for specific interventions  Outcome: Progressing     Problem: Patient/Family Goals  Goal: Patient/Family Short Term Goal  Description: Patient's Short Term Goal:   2/17 noc: wean off supplemental O2  2/18 AM: O2 walk    Interventions:   - flutter valve, nebs, IV steroids  - See additional Care Plan goals for specific interventions  Outcome: Progressing

## 2024-02-19 NOTE — CONSULTS
HISTORY OF PRESENT ILLNESS     Marylin Walker is a 53 year old female with a history of asthma who presented to the ER yesterday with worsening dyspnea and cough. She says she had RSV at the end of December/early January. She started getting sick again with rhinorrhea, cough, shortness of breath, and wheezing in the preceding two weeks. She came to the ER and was found to have acute hypoxemic respiratory failure. She was admitted for an acute asthma exacerbation and started on supplemental oxygen, steroids, and bronchodilators. We've been consulted to assist in the patient's care.     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    AHI 2 RDI 2 REM AHI 2 SaO2 doroteo 88 %    Osteoarthritis     Scoliosis     Seizure disorder ([facility]) 10/2020    absent seizures    Stroke ([facility])     2003+ mild R.sided weakness    Ulcerative colitis ([facility])        PAST SURGICAL HISTORY      Past Surgical History:   Procedure Laterality Date    CHOLECYSTECTOMY  3/10    COLONOSCOPY  2013    ulcerative colitis    COLONOSCOPY N/A 4/25/2017    Procedure: COLONOSCOPY;  Surgeon: Hipolito Dobbins MD;  Location:  ENDOSCOPY    COLONOSCOPY N/A 6/14/2021    Procedure: COLONOSCOPY with biopsy,;  Surgeon: Ramsey Muhammad MD;  Location: [facility]    COLPOSCOPY, CERVIX W/UPPER ADJACENT VAGINA; W/BIOPSY(S), CERVIX  1999    HERNIA SURGERY  1997    Umbilical Hernia Repair    OTHER SURGICAL HISTORY N/A 6/21/2016    Procedure: ESOPHAGOGASTRODUODENOSCOPY (EGD);  Surgeon: Jeff Scherer MD;  Location:  ENDOSCOPY    PARAGARD, IUD  6/2014       HOME MEDICATIONS      Prior to Admission Medications    Prescriptions Last Dose Informant Patient Reported? Taking?   CARISOPRODOL 350 MG Oral Tab 2/17/2024  No Yes   Sig: TAKE 1 TABLET(350 MG) BY MOUTH THREE TIMES DAILY AS NEEDED FOR MUSCLE SPASMS   Carboxymethylcellulose Sodium (REFRESH TEARS OP) Unknown  Yes No   Sig: Apply 1 drop to eye as needed.   Clindamycin-Tretinoin (ZIANA) 1.2-0.025 % External Gel Unknown  Yes No   Sig: Apply 1 Dose topically nightly.   Estradiol 10 MCG Vaginal Tab Past Week  Yes Yes   Sig: Place vaginally twice a week. AS NEEDED   FLUTICASONE PROPIONATE 50 MCG/ACT Nasal Suspension Past Week  No Yes   Sig: SHAKE LIQUID AND USE 2 SPRAYS IN EACH NOSTRIL DAILY   HYDROmorphone 2 MG Oral Tab Past Week  No Yes   Sig: Take 1 tablet (2 mg total) by mouth every 6 (six) hours as needed for Pain.   Ketoconazole 2 % External Shampoo Unknown  No No   Sig: Use to wash scalp 2-3 times per week.   LEVOCETIRIZINE 5 MG Oral Tab 2/16/2024  No Yes   Sig: TAKE 1 TABLET(5 MG) BY MOUTH EVERY EVENING   Lithium Carbonate  MG Oral Tab CR 2/16/2024  No Yes   Sig: Take 1 tablet (450 mg total) by mouth at bedtime.   Patient taking differently: Take 750 mg by mouth at bedtime.   MESALAMINE  MG Oral Capsule Delayed Release 2/17/2024  No Yes   Sig: TAKE 3 CAPSULES BY MOUTH TWICE DAILY BEFORE MEALS   METOPROLOL TARTRATE 25 MG Oral Tab 2/17/2024  No Yes   Sig: TAKE 1/2 TABLET BY MOUTH DAILY   MONTELUKAST 10 MG Oral Tab 2/16/2024  No Yes   Sig: TAKE 1 TABLET(10 MG) BY MOUTH EVERY NIGHT   Methylcellulose, Laxative, (CITRUCEL OR) More than a month  Yes No   Sig: Take by mouth as needed.   Multiple Vitamins-Minerals (QC WOMENS DAILY MULTIVITAMIN) Oral Tab 2/17/2024  Yes Yes   Sig: Take 1 tablet by mouth daily.   Nebulizers (VIOS AEROSOL DELIVERY SYSTEM) Does not apply Misc 2/17/2024  Yes Yes   Sig: USE WITH ALBUTEROL AS NEEDED   OXCARBAZEPINE 300 MG Oral Tab 2/17/2024  No Yes   Sig: TAKE 1 AND 1/2 TABLETS(450 MG) BY MOUTH TWICE DAILY   QUEtiapine 50 MG Oral Tab  2/16/2024  Yes Yes   Sig: Take 1 tablet (50 mg total) by mouth nightly.   QUEtiapine Fumarate 200 MG Oral Tab 2/16/2024  Yes Yes   Sig: Take 1 tablet (200 mg total) by mouth nightly.   Refresh Lacri-Lube Ophthalmic Ointment Unknown  Yes No   Sig: Place 1 g into both eyes 3 (three) times daily.   acetaminophen 325 MG Oral Tab Past Week  Yes Yes   Sig: Take 1 tablet (325 mg total) by mouth every 6 (six) hours as needed for Pain.   albuterol 108 (90 Base) MCG/ACT Inhalation Aero Soln 2/17/2024  Yes Yes   Sig: Inhale 2 puffs into the lungs every 6 (six) hours as needed.   albuterol sulfate (2.5 MG/3ML) 0.083% Inhalation Nebu Soln 2/17/2024  No Yes   Sig: Take 3 mL (2.5 mg total) by nebulization every 6 (six) hours as needed for Wheezing.   aspirin 81 MG Oral Tab 2/17/2024  Yes Yes   Sig: Take 1 tablet (81 mg total) by mouth daily.   buPROPion  MG Oral Tablet 24 Hr 2/17/2024  Yes Yes   Sig: Take 1 tablet (150 mg total) by mouth every morning.   clonazePAM 0.5 MG Oral Tab 2/17/2024  No Yes   Sig: Take 0.5 tablets (0.25 mg total) by mouth 3 (three) times daily.   diclofenac 1 % External Gel Unknown  Yes No   Sig: Apply 2 g topically 3 (three) times daily as needed (Tibial tendon injury).   dicyclomine 10 MG Oral Cap More than a month  No No   Sig: Take 1 capsule (10 mg total) by mouth every 4 to 6 hours as needed.   fluticasone-salmeterol (ADVAIR DISKUS) 250-50 MCG/DOSE Inhalation Aerosol Powder, Breath Activated Past Month  No Yes   Sig: INHALE 1 PUFF INTO THE LUNGS EVERY 12 HOURS   lamoTRIgine (LAMICTAL) 200 MG Oral Tab 2/17/2024  No Yes   Sig: Take 1 tablet (200 mg total) by mouth 3 (three) times daily.   levETIRAcetam 500 MG Oral Tab 2/17/2024  No Yes   Sig: Take 1 tablet (500 mg total) by mouth 2 (two) times daily.   levothyroxine 125 MCG Oral Tab 2/17/2024  No Yes   Sig: Take 1 tablet (125 mcg total) by mouth daily.   lithium  MG Oral Tab CR 2/16/2024  Yes Yes   Sig: Take 1 tablet (300 mg total) by mouth  at bedtime. TAKE 1 TABLET BY MOUTH EVERY NIGHT. SWALLOW WHOLE. TAKE WITH 450 MG TABLET  MG TOTAL   meclizine 12.5 MG Oral Tab Past Week  No Yes   Sig: Take 1 tablet (12.5 mg total) by mouth 3 (three) times daily as needed.   ondansetron 8 MG Oral Tablet Dispersible 2/17/2024  No Yes   Sig: Take 1 tablet (8 mg total) by mouth every 8 (eight) hours as needed for Nausea.   pantoprazole 40 MG Oral Tab EC 2/17/2024 at qw  No Yes   Sig: Take 1 tablet (40 mg total) by mouth before breakfast.   pregabalin (LYRICA) 200 MG Oral Cap 2/17/2024  No Yes   Sig: Take 1 capsule (200 mg total) by mouth 2 (two) times daily.   sertraline 100 MG Oral Tab 2/17/2024  Yes Yes   Sig: Take 1 tablet (100 mg total) by mouth 2 (two) times daily. 50mg in AM, 100mg in PM      Facility-Administered Medications: None       CURRENT MEDICATIONS       polyethylene glycol (PEG 3350)  17 g Oral Daily    azithromycin  500 mg Oral Daily    guaiFENesin ER  600 mg Oral BID    albuterol  2.5 mg Nebulization 6 times per day    enoxaparin  40 mg Subcutaneous Daily    methylPREDNISolone  60 mg Intravenous Q8H    aspirin  81 mg Oral Daily    buPROPion ER  150 mg Oral QAM    cyclobenzaprine  10 mg Oral TID    clonazePAM  0.25 mg Oral TID    fluticasone furoate-vilanterol  1 puff Inhalation Daily    lamoTRIgine  200 mg Oral TID    levETIRAcetam  500 mg Oral BID    cetirizine  10 mg Oral Daily    levothyroxine  125 mcg Oral Daily    lithium ER  750 mg Oral Nightly    mesalamine DR  1,200 mg Oral BID AC    metoprolol tartrate  12.5 mg Oral Daily Beta Blocker    montelukast  10 mg Oral Nightly    OXcarbazepine  450 mg Oral BID    pantoprazole  40 mg Oral Before breakfast    pregabalin  200 mg Oral BID    QUEtiapine  50 mg Oral Nightly    QUEtiapine  200 mg Oral Nightly    sertraline  100 mg Oral BID       PRN meds:  acetaminophen, ondansetron, benzonatate, albuterol, HYDROmorphone    Infusions:        ALLERGIES      Allergies   Allergen Reactions    Depakote  [Valproic Acid] OTHER (SEE COMMENTS)     Suicidal ideation    Kdc:Olanzapine SWELLING    Ketorolac Tromethamine OTHER (SEE COMMENTS) and NAUSEA AND VOMITING     gastritis  Gastritis & UC  gastritis  Gastritis & UC      Latex RASH and Dyspnea    Molds & Smuts ASTHMA, ITCHING, RASH, SHORTNESS OF BREATH, WHEEZING and OTHER (SEE COMMENTS)    Nsaids OTHER (SEE COMMENTS)     Must take Nsaids sparingly due to history of gastritis  Must take Nsaids sparingly due to history of gastritis      Olanzapine SWELLING     Caused severe swelling and bruising    Penicillins NAUSEA AND VOMITING    Phenothiazines OTHER (SEE COMMENTS)     Extrapyramidal syndrome. No phenothiazines per neurology (documented during 18 Dr. Velázquez)  Extrapyramidal syndrome. No phenothiazines per neurology (documented during 18 Dr. Velázquez)  Extrapyramidal syndrome. No phenothiazines per neurology (documented during 18 Dr. Velázquez)    Amoxicillin NAUSEA AND VOMITING    Levofloxacin PAIN and OTHER (SEE COMMENTS)     Tendinitis in multiple joints  Tendinitis in multiple joints    Mushrooms NAUSEA AND VOMITING    Penicillin G Potassium NAUSEA AND VOMITING    Phenytoin ANXIETY, CONFUSION, DIZZINESS and HALLUCINATION    Sulfa Antibiotics NAUSEA AND VOMITING    Toradol NAUSEA AND VOMITING     Gastritis & UC    [place] OTHER (SEE COMMENTS)     Sneezing watery eyes    Triptans HALLUCINATION     Lesions in brain    Erythromycin OTHER (SEE COMMENTS)       SOCIAL HISTORY        Social History     Socioeconomic History    Marital status:      Spouse name: Not on file    Number of children: Not on file    Years of education: Not on file    Highest education level: Not on file   Occupational History    Not on file   Tobacco Use    Smoking status: Former     Packs/day: 0.00     Years: 10.00     Additional pack years: 0.00     Total pack years: 0.00     Types: Cigarettes     Quit date: 2003     Years since quittin.0    Smokeless  tobacco: Never    Tobacco comments:     Ages [age], [age]   Vaping Use    Vaping Use: Some days    Substances: THC, CBD, Flavoring, medical marijuana    Devices: Disposable, Refillable tank   Substance and Sexual Activity    Alcohol use: Not Currently    Drug use: Yes     Frequency: 2.0 times per week     Types: Cannabis     Comment: Medical Cannabis    Sexual activity: Not Currently     Partners: Male   Other Topics Concern    Not on file   Social History Narrative    Not on file     Social Determinants of Health     Financial Resource Strain: Not on file   Food Insecurity: No Food Insecurity (2/17/2024)    Food Insecurity     Food Insecurity: Never true   Transportation Needs: No Transportation Needs (2/17/2024)    Transportation Needs     Lack of Transportation: No   Physical Activity: Not on file   Stress: Not on file   Social Connections: Not on file   Housing Stability: Low Risk  (2/17/2024)    Housing Stability     Housing Instability: No     Housing Instability Emergency: Not on file       FAMILY HISTORY      Family History   Problem Relation Age of Onset    Breast Cancer Maternal Grandmother         81    Breast Cancer Other         dx post menopausal    High Blood Pressure Father     Colon Cancer Father         59    Colon Polyps Father     Hypertension Father     High Blood Pressure Mother     High Cholesterol Mother     Hypertension Mother     Mental Disorder Mother         Depression    Heart Disorder Sister 54        MI - LAD stenting    Heart Attack Sister         12/2018 \" maker\"    Mental Disorder Son         ADHD, Bipolar II    Mental Disorder Son         Severe anxiety, Bipolar II       REVIEW OF SYSTEMS      10 pt ROS negative except what is mentioned in HPI    OBJECTIVE      Vitals:    02/18/24 2017 02/19/24 0105 02/19/24 0508 02/19/24 0934   BP: 146/71 127/68 115/62 118/62   BP Location: Left arm Left arm Left arm Left arm   Pulse: 76 75 69 72   Resp: 20 18 18    Temp: 98.1 °F (36.7 °C)  97.8 °F (36.6 °C) 97.8 °F (36.6 °C) 97.5 °F (36.4 °C)   TempSrc: Oral Oral Oral Oral   SpO2: 90% 94% 94% 90%   Weight:         O2: room air    Gen - Alert, oriented x 3, in no apparent distress  HEENT - head normocephalic, atraumatic. Eyes-no scleral icterus or injection  Lungs - + bilateral wheezing. No visible respiratory distress  CV - regular rate & rhythm. Normal S1, S2. No murmurs, rubs, or gallops appreciated.  Abdomen - soft, nontender to palpation. No organomegaly appreciated.   Extremities - No cyanosis, clubbing, edema appreciated.      Labs:    Recent Labs   Lab 02/17/24  1320   WBC 8.4   HGB 12.5   .0     Recent Labs   Lab 02/17/24  1320      K 3.9      CO2 27.0   BUN 21   CREATSERUM 0.95   GLU 82   ANIONGAP 3   ALB 3.8   CA 9.2   ALKPHO 102   AST 15   ALT 23   BILT 0.2   TP 7.4     Recent Labs   Lab 02/19/24  1113   PCT <0.05       Recent Labs   Lab 02/17/24  1444 02/19/24  1142   COVID19 Not Detected Not Detected   INFAPCR Negative Not Detected   INFBPCR Negative Not Detected   RSV Negative  --      Imaging reviewed.    ASSESSMENT AND PLAN     Dyspnea/cough - secondary to asthma exacerbation triggered by metapneumovirus infection  -supplemental oxygen prn - now on room air  -continue IV steroids, bronchodilators  Metapneumovirus infection . Sputum culture also + for moraxella  -supportive care  -continue azithro which should cover moraxella infection  Asthma exacerbation  -IV solumedrol  -breo in lieu of home advair  -continue montelukast  -nebs prn  Nutrition   -reg diet  Proph   -LMWH  Dispo   -full code  -inpatient    We will continue to follow with you       Wilfredo Lind M.D.  Pulmonary/Critical Care

## 2024-02-19 NOTE — PLAN OF CARE
Received patient awake and oriented. On tele, SR. On room air, breath sounds diminished with expiratory wheezes. Occasional productive cough noted. On tele, SR. Medicated for pain with po Dilaudid and Zofran  and given cold packs for head and neck. Up to the bathroom independently, steady on feet.

## 2024-02-20 PROCEDURE — 94640 AIRWAY INHALATION TREATMENT: CPT

## 2024-02-20 NOTE — PLAN OF CARE
Pt is admitted for SOB. Seizure precaution. Droplet precaution. Pt is AOX4.  VSS, afebrile and c/o migraine headache. Gave PRN Dilaudid and Zofran.  SpO2 maintained on 1-3L. . Lung sounds exp wheezy. Nebs. SOB with ambulation. Strong  cough. Tele-NSR. Lovenox. Reg diet. Denies any v/d. Pt did not have bowel movement last few days. Gave Miralax.  Voids. Up with SBA.  IV steroid. PO ABX. WCTM. Pt is updated with plan of care.    Problem: Patient/Family Goals  Goal: Patient/Family Long Term Goal  Description: Patient's Long Term Goal: discharge home with appropriate means    Interventions:  - labs  -meds per mar  -consults  - See additional Care Plan goals for specific interventions  Outcome: Progressing  Goal: Patient/Family Short Term Goal  Description: Patient's Short Term Goal:   2/17 noc: wean off supplemental O2  2/18 AM: O2 walk  2/19 NOC: sleep  2/20: control headache and breath better     Interventions:   -PRN pain med   - flutter valve, nebs, IV steroids  - See additional Care Plan goals for specific interventions  Outcome: Progressing     Problem: RESPIRATORY - ADULT  Goal: Achieves optimal ventilation and oxygenation  Description: INTERVENTIONS:  - Assess for changes in respiratory status  - Assess for changes in mentation and behavior  - Position to facilitate oxygenation and minimize respiratory effort  - Oxygen supplementation based on oxygen saturation or ABGs  - Provide Smoking Cessation handout, if applicable  - Encourage broncho-pulmonary hygiene including cough, deep breathe, Incentive Spirometry  - Assess the need for suctioning and perform as needed  - Assess and instruct to report SOB or any respiratory difficulty  - Respiratory Therapy support as indicated  - Manage/alleviate anxiety  - Monitor for signs/symptoms of CO2 retention  Outcome: Progressing     Problem: SAFETY ADULT - FALL  Goal: Free from fall injury  Description: INTERVENTIONS:  - Assess pt frequently for physical needs  -  Identify cognitive and physical deficits and behaviors that affect risk of falls.  - [place] fall precautions as indicated by assessment.  - Educate pt/family on patient safety including physical limitations  - Instruct pt to call for assistance with activity based on assessment  - Modify environment to reduce risk of injury  - Provide assistive devices as appropriate  - Consider OT/PT consult to assist with strengthening/mobility  - Encourage toileting schedule  Outcome: Progressing

## 2024-02-20 NOTE — PROGRESS NOTES
S: She is still coughing and complaining of dyspnea.     Meds:   polyethylene glycol (PEG 3350)  17 g Oral Daily    azithromycin  500 mg Oral Daily    guaiFENesin ER  600 mg Oral BID    albuterol  2.5 mg Nebulization 6 times per day    enoxaparin  40 mg Subcutaneous Daily    methylPREDNISolone  60 mg Intravenous Q8H    aspirin  81 mg Oral Daily    buPROPion ER  150 mg Oral QAM    cyclobenzaprine  10 mg Oral TID    clonazePAM  0.25 mg Oral TID    fluticasone furoate-vilanterol  1 puff Inhalation Daily    lamoTRIgine  200 mg Oral TID    levETIRAcetam  500 mg Oral BID    cetirizine  10 mg Oral Daily    levothyroxine  125 mcg Oral Daily    lithium ER  750 mg Oral Nightly    mesalamine DR  1,200 mg Oral BID AC    metoprolol tartrate  12.5 mg Oral Daily Beta Blocker    montelukast  10 mg Oral Nightly    OXcarbazepine  450 mg Oral BID    pantoprazole  40 mg Oral Before breakfast    pregabalin  200 mg Oral BID    QUEtiapine  50 mg Oral Nightly    QUEtiapine  200 mg Oral Nightly    sertraline  100 mg Oral BID       Prn Meds:  acetaminophen, ondansetron, benzonatate, albuterol, HYDROmorphone    Infusions:      OBJECTIVE:  Vitals:    02/19/24 2346 02/20/24 0555 02/20/24 0733 02/20/24 1100   BP: 131/62 133/76 147/82 143/75   Pulse: 73 69 66 74   Resp: 19 18 17 17   Temp: 97.8 °F (36.6 °C) 97.8 °F (36.6 °C) 97.8 °F (36.6 °C) 98.9 °F (37.2 °C)   TempSrc: Oral Oral Oral Oral   SpO2: 94% 95% 96% 90%   Weight:         O2: room air    Gen - Alert, oriented x 3, in no apparent distress  Lungs - + wheezing bilaterally, although some of the wheezing seems to be from upper airway  CV - regular rate & rhythm. Normal S1, S2. No murmurs, rubs, or gallops appreciated.  Extremities - No cyanosis, clubbing, edema appreciated.        Labs:  Recent Labs   Lab 02/17/24  1320   WBC 8.4   HGB 12.5   .0     Recent Labs   Lab 02/17/24  1320      K 3.9      CO2 27.0   BUN 21   CREATSERUM 0.95   GLU 82   ANIONGAP 3    ALB 3.8   CA 9.2   ALKPHO 102   AST 15   ALT 23   BILT 0.2   TP 7.4     Recent Labs   Lab 02/19/24  1113   PCT <0.05       Recent Labs   Lab 02/17/24  1444 02/19/24  1142   COVID19 Not Detected Not Detected   INFAPCR Negative Not Detected   INFBPCR Negative Not Detected   RSV Negative  --        Imaging reviewed    ASSESSMENT AND PLAN      Dyspnea/cough - secondary to asthma exacerbation triggered by metapneumovirus infection  -supplemental oxygen prn - now on room air  -continue IV steroids, bronchodilators  Metapneumovirus infection . Sputum culture also + for moraxella  -supportive care  -continue azithro which should cover moraxella infection  Asthma exacerbation  -continue IV solumedrol  -breo in lieu of home advair  -continue montelukast  -nebs prn  Nutrition   -reg diet  Proph   -LMWH  Dispo   -full code  -inpatient; not ready for discharge     We will continue to follow with you     Wilfredo Lind M.D.  Pulmonary/Critical Care

## 2024-02-20 NOTE — PROGRESS NOTES
.[facility] note    PCP: Yoel Servin MD    Chief Complaint:  F/u asthma exacerbation    SUBJECTIVE:  Still wheezing/sob/cough    OBJECTIVE:  Temp:  [97.7 °F (36.5 °C)-98.9 °F (37.2 °C)] 98.9 °F (37.2 °C)  Pulse:  [66-75] 73  Resp:  [17-20] 17  BP: (110-148)/(62-82) 110/65  SpO2:  [90 %-96 %] 94 %    Intake/Output:  No intake or output data in the 24 hours ending 02/20/24 1728      Last 3 Weights   02/17/24 1857 199 lb (90.3 kg)   02/17/24 1145 199 lb (90.3 kg)   10/17/23 1029 209 lb (94.8 kg)       Exam  Gen: No acute distress  HEENT: anicteric sclera, MMM  Pulm: wheeze b/l  CV: Heart with regular rate and rhythm, no peripheral edema  Abd: Abdomen soft, nontender, nondistended, no organomegaly, bowel sounds present  MSK: Full range of motion in extremities, no clubbing, no cyanosis  Skin: no rashes or lesions  Neuro: A&OX3, no focal deficits    Data Review:         Labs:     Recent Labs   Lab 02/17/24  1320   WBC 8.4   HGB 12.5   MCV 95.8   .0   NE 5.66   LYMABS 2.02       Recent Labs   Lab 02/17/24  1320      K 3.9      CO2 27.0   BUN 21   CREATSERUM 0.95   CA 9.2   GLU 82       Recent Labs   Lab 02/17/24  1320   ALT 23   AST 15   ALB 3.8       Recent Labs   Lab 02/19/24  1113   PCT <0.05       No results for input(s): \"CRP\", \"ROYAL\", \"LDH\", \"DDIMER\" in the last 168 hours.    No results for input(s): \"PGLU\" in the last 168 hours.    Meds:   Scheduled Medication:   polyethylene glycol (PEG 3350)  17 g Oral Daily    azithromycin  500 mg Oral Daily    guaiFENesin ER  600 mg Oral BID    albuterol  2.5 mg Nebulization 6 times per day    enoxaparin  40 mg Subcutaneous Daily    methylPREDNISolone  60 mg Intravenous Q8H    aspirin  81 mg Oral Daily    buPROPion ER  150 mg Oral QAM    cyclobenzaprine  10 mg Oral TID    clonazePAM  0.25 mg Oral TID    fluticasone furoate-vilanterol  1 puff Inhalation Daily    lamoTRIgine  200 mg Oral TID    levETIRAcetam  500 mg Oral BID    cetirizine  10 mg Oral  Daily    levothyroxine  125 mcg Oral Daily    lithium ER  750 mg Oral Nightly    mesalamine DR  1,200 mg Oral BID AC    metoprolol tartrate  12.5 mg Oral Daily Beta Blocker    montelukast  10 mg Oral Nightly    OXcarbazepine  450 mg Oral BID    pantoprazole  40 mg Oral Before breakfast    pregabalin  200 mg Oral BID    QUEtiapine  50 mg Oral Nightly    QUEtiapine  200 mg Oral Nightly    sertraline  100 mg Oral BID     Continuous Infusing Medication:  PRN Medication:acetaminophen, ondansetron, benzonatate, albuterol, HYDROmorphone       Microbiology:    No results found for this visit on 02/17/24.    Lab Results   Component Value Date    COVID19 Not Detected 02/19/2024    COVID19 Not Detected 02/17/2024    COVID19 Not Detected 02/17/2024        Assessment/Plan:     52 yo woman with acute asthma exacerbation     1) asthma exacerbation- overall improving  - nebs   - solumedrol q8  - resumed home inhalers  - covid/rsv/influenza neg, sent off rvp +for metapneumovirus  - sputum culture with moraxella, started azithro  - appreciate pulm involvement  - off of o2 today     2) migraines/chronic pain/depression- resume home meds     SCDs, lovenox           Fernanda Palencia MD  [facility]  Pager: [phone]

## 2024-02-20 NOTE — PLAN OF CARE
Pt is A&Ox4. VSS, afebrile. SPO2 maintained on RA. 3L at NOC. IV steroids. Nebs. Strong cough. Encouraged IS. Tele, NSR. EP. Lovenox. Last BM 2/17. General diet. Voids. Up ad balbina. Denies pain. PIV, SL. No further needs at this time, continue POC. Safety precautions in place.     Problem: Patient/Family Goals  Goal: Patient/Family Long Term Goal  Description: Patient's Long Term Goal: discharge home with appropriate means    Interventions:  - labs  -meds per mar  -consults  - See additional Care Plan goals for specific interventions  Outcome: Progressing  Goal: Patient/Family Short Term Goal  Description: Patient's Short Term Goal:   2/17 noc: wean off supplemental O2  2/18 AM: O2 walk  2/19 NOC: sleep    Interventions:   - flutter valve, nebs, IV steroids  - See additional Care Plan goals for specific interventions  Outcome: Progressing     Problem: RESPIRATORY - ADULT  Goal: Achieves optimal ventilation and oxygenation  Description: INTERVENTIONS:  - Assess for changes in respiratory status  - Assess for changes in mentation and behavior  - Position to facilitate oxygenation and minimize respiratory effort  - Oxygen supplementation based on oxygen saturation or ABGs  - Provide Smoking Cessation handout, if applicable  - Encourage broncho-pulmonary hygiene including cough, deep breathe, Incentive Spirometry  - Assess the need for suctioning and perform as needed  - Assess and instruct to report SOB or any respiratory difficulty  - Respiratory Therapy support as indicated  - Manage/alleviate anxiety  - Monitor for signs/symptoms of CO2 retention  Outcome: Progressing     Problem: SAFETY ADULT - FALL  Goal: Free from fall injury  Description: INTERVENTIONS:  - Assess pt frequently for physical needs  - Identify cognitive and physical deficits and behaviors that affect risk of falls.  - [place] fall precautions as indicated by assessment.  - Educate pt/family on patient safety including physical limitations  -  Instruct pt to call for assistance with activity based on assessment  - Modify environment to reduce risk of injury  - Provide assistive devices as appropriate  - Consider OT/PT consult to assist with strengthening/mobility  - Encourage toileting schedule  Outcome: Progressing

## 2024-02-20 NOTE — SPIRITUAL CARE NOTE
Spiritual Care Visit Note    Patient Name: Marylin Walker Date of Spiritual Care Visit: 24   : 1970 Primary Dx: Mild intermittent asthma with exacerbation (HCC)       Referred By: Referral From: Other (Comment)    Spiritual Care Taxonomy:    Intended Effects: Demonstrate caring and concern    Methods: Offer support    Interventions: Acknowledge response to difficult experience;Active listening;Ask guided questions;Explain  role;Silent prayer;Share words of hope and inspiration    Visit Type/Summary:     - Spiritual Care: Consulted with RN prior to visit. Patient and family expressed appreciation for  visit.  remains available as needed for follow up.    Spiritual Care support can be requested via an Epic consult. For urgent/immediate needs, please contact the On Call  at: Aleksandr: ext [phone]

## 2024-02-21 PROCEDURE — 94640 AIRWAY INHALATION TREATMENT: CPT

## 2024-02-21 RX ORDER — PREDNISONE 20 MG/1
40 TABLET ORAL
Status: DISCONTINUED | OUTPATIENT
Start: 2024-02-22 | End: 2024-02-23

## 2024-02-21 NOTE — PROGRESS NOTES
Pulmonary Progress Note     Assessment / Plan:  Dyspnea/cough - secondary to asthma exacerbation triggered by metapneumovirus infection  -supplemental oxygen prn  -steroids and antibiotics as below  Metapneumovirus infection and sputum culture + for moraxella  -supportive care  -continue azithro which should cover moraxella infection  Asthma exacerbation  -IV to PO steroids tomorrow  -Breo in lieu of home Advair  -continue montelukast  -nebs prn  Nutrition   -reg diet  Proph   -LMWH  Dispo   -full code  -will follow       Subjective:  Ongoing wheezing  No new complaints    Objective:  Vitals:    02/20/24 1913 02/20/24 2339 02/21/24 0349 02/21/24 0817   BP: 149/78 137/82 142/79 134/82   BP Location: Left arm Left arm Left arm Left arm   Pulse: 70 69 67 70   Resp: 18 21 15 17   Temp: 98.3 °F (36.8 °C) 97.9 °F (36.6 °C) 98 °F (36.7 °C) 97.6 °F (36.4 °C)   TempSrc: Oral Oral Oral Oral   SpO2: 95% 93% 91% 94%   Weight:         Physical Exam:  General: no apparent distress, conversant  Skin: no rash, ulcers or subcutaneous nodules  Eyes: anicteric sclerae, moist conjunctivae  Head, ears, nose, throat: atraumatic, oropharynx clear with moist mucous membranes  Neck: trachea midline with no thyromegaly  Heart: regular rate and rhythm, no murmurs / rubs / gallops  Lungs: bilateral wheezing  Extremities: no edema or cyanosis  Psych: interactive, answering questions appropriately, appropriate affect    Medications:  Reviewed in EMR    Lab Data:  Reviewed in EMR    Imaging:  I independently visualized all relevant chest imaging in PACS and agree with radiology interpretation except where noted.

## 2024-02-21 NOTE — PROGRESS NOTES
[facility]ist note    PCP: Yoel Servin MD    Chief Complaint:  F/u asthma exacerbation    SUBJECTIVE:  Still pretty much the same. Would like to try walking some more.    OBJECTIVE:  Temp:  [97.6 °F (36.4 °C)-98.3 °F (36.8 °C)] 97.8 °F (36.6 °C)  Pulse:  [67-74] 74  Resp:  [15-21] 20  BP: (121-149)/(70-82) 124/70  SpO2:  [91 %-95 %] 95 %    Intake/Output:  No intake or output data in the 24 hours ending 02/21/24 1622      Last 3 Weights   02/17/24 1857 199 lb (90.3 kg)   02/17/24 1145 199 lb (90.3 kg)   10/17/23 1029 209 lb (94.8 kg)       Exam  Gen: No acute distress  HEENT: anicteric sclera, MMM  Pulm: wheeze b/l  CV: Heart with regular rate and rhythm, no peripheral edema  Abd: Abdomen soft, nontender, nondistended, no organomegaly, bowel sounds present  MSK: Full range of motion in extremities, no clubbing, no cyanosis  Skin: no rashes or lesions  Neuro: A&OX3, no focal deficits    Data Review:         Labs:     Recent Labs   Lab 02/17/24  1320   WBC 8.4   HGB 12.5   MCV 95.8   .0   NE 5.66   LYMABS 2.02       Recent Labs   Lab 02/17/24  1320      K 3.9      CO2 27.0   BUN 21   CREATSERUM 0.95   CA 9.2   GLU 82       Recent Labs   Lab 02/17/24  1320   ALT 23   AST 15   ALB 3.8       Recent Labs   Lab 02/19/24  1113   PCT <0.05       No results for input(s): \"CRP\", \"ROYAL\", \"LDH\", \"DDIMER\" in the last 168 hours.    No results for input(s): \"PGLU\" in the last 168 hours.    Meds:   Scheduled Medication:   [START ON 2/22/2024] predniSONE  40 mg Oral Daily with breakfast    polyethylene glycol (PEG 3350)  17 g Oral Daily    azithromycin  500 mg Oral Daily    guaiFENesin ER  600 mg Oral BID    albuterol  2.5 mg Nebulization 6 times per day    enoxaparin  40 mg Subcutaneous Daily    methylPREDNISolone  60 mg Intravenous Q8H    aspirin  81 mg Oral Daily    buPROPion ER  150 mg Oral QAM    cyclobenzaprine  10 mg Oral TID    clonazePAM  0.25 mg Oral TID    fluticasone furoate-vilanterol  1 puff  Inhalation Daily    lamoTRIgine  200 mg Oral TID    levETIRAcetam  500 mg Oral BID    cetirizine  10 mg Oral Daily    levothyroxine  125 mcg Oral Daily    lithium ER  750 mg Oral Nightly    mesalamine DR  1,200 mg Oral BID AC    metoprolol tartrate  12.5 mg Oral Daily Beta Blocker    montelukast  10 mg Oral Nightly    OXcarbazepine  450 mg Oral BID    pantoprazole  40 mg Oral Before breakfast    pregabalin  200 mg Oral BID    QUEtiapine  50 mg Oral Nightly    QUEtiapine  200 mg Oral Nightly    sertraline  100 mg Oral BID     Continuous Infusing Medication:  PRN Medication:acetaminophen, ondansetron, benzonatate, albuterol, HYDROmorphone       Microbiology:    No results found for this visit on 02/17/24.    Lab Results   Component Value Date    COVID19 Not Detected 02/19/2024    COVID19 Not Detected 02/17/2024    COVID19 Not Detected 02/17/2024        Assessment/Plan:     54 yo woman with acute asthma exacerbation     1) asthma exacerbation- overall improving  - nebs   - solumedrol q8--> prednisone  - resumed home inhalers  - covid/rsv/influenza neg, sent off rvp +for metapneumovirus  - sputum culture with moraxella, started azithro  - appreciate pulm involvement  - off of o2 today. Needs more ambulation. Encourage IS     2) migraines/chronic pain/depression- resume home meds     SCDs, hilton Palencia MD  [facility] Hospitalist  Pager: [phone]

## 2024-02-21 NOTE — PLAN OF CARE
Pt A&OX4. Seizure precautions. Received on 1L sating WNL. Will wean as tolerated. RA - BL. . C/o cough - See MAR. Scheduled nebs. On tele. NSR. C/o headache - See MAR. PIV SL. Tolerating regular diet. IV solumedrol. Voids. Up self. BRP. Pt updated on plan of care and verbalized understanding. Call light within reach. All needs met at this time. Will follow.     Problem: Patient/Family Goals  Goal: Patient/Family Long Term Goal  Description: Patient's Long Term Goal: discharge home with appropriate means    Interventions:  - labs  -meds per mar  -consults  - See additional Care Plan goals for specific interventions  Outcome: Progressing  Goal: Patient/Family Short Term Goal  Description: Patient's Short Term Goal:   2/17 noc: wean off supplemental O2  2/18 AM: O2 walk  2/19 NOC: sleep  2/20: control headache and breath better   2/20 NOC: cough control, sleep  Interventions:   -PRN pain med   - flutter valve, nebs, IV steroids  - See additional Care Plan goals for specific interventions  Outcome: Progressing     Problem: RESPIRATORY - ADULT  Goal: Achieves optimal ventilation and oxygenation  Description: INTERVENTIONS:  - Assess for changes in respiratory status  - Assess for changes in mentation and behavior  - Position to facilitate oxygenation and minimize respiratory effort  - Oxygen supplementation based on oxygen saturation or ABGs  - Provide Smoking Cessation handout, if applicable  - Encourage broncho-pulmonary hygiene including cough, deep breathe, Incentive Spirometry  - Assess the need for suctioning and perform as needed  - Assess and instruct to report SOB or any respiratory difficulty  - Respiratory Therapy support as indicated  - Manage/alleviate anxiety  - Monitor for signs/symptoms of CO2 retention  Outcome: Progressing     Problem: SAFETY ADULT - FALL  Goal: Free from fall injury  Description: INTERVENTIONS:  - Assess pt frequently for physical needs  - Identify cognitive and physical deficits  and behaviors that affect risk of falls.  - [place] fall precautions as indicated by assessment.  - Educate pt/family on patient safety including physical limitations  - Instruct pt to call for assistance with activity based on assessment  - Modify environment to reduce risk of injury  - Provide assistive devices as appropriate  - Consider OT/PT consult to assist with strengthening/mobility  - Encourage toileting schedule  Outcome: Progressing

## 2024-02-22 PROCEDURE — 94640 AIRWAY INHALATION TREATMENT: CPT

## 2024-02-22 RX ORDER — ALBUTEROL SULFATE 2.5 MG/3ML
SOLUTION RESPIRATORY (INHALATION)
Status: COMPLETED
Start: 2024-02-22 | End: 2024-02-22

## 2024-02-22 NOTE — PROGRESS NOTES
S: She is feeling about the same. She says she feels tired.     Meds:   predniSONE  40 mg Oral Daily with breakfast    polyethylene glycol (PEG 3350)  17 g Oral Daily    azithromycin  500 mg Oral Daily    guaiFENesin ER  600 mg Oral BID    albuterol  2.5 mg Nebulization 6 times per day    enoxaparin  40 mg Subcutaneous Daily    aspirin  81 mg Oral Daily    buPROPion ER  150 mg Oral QAM    cyclobenzaprine  10 mg Oral TID    clonazePAM  0.25 mg Oral TID    fluticasone furoate-vilanterol  1 puff Inhalation Daily    lamoTRIgine  200 mg Oral TID    levETIRAcetam  500 mg Oral BID    cetirizine  10 mg Oral Daily    levothyroxine  125 mcg Oral Daily    lithium ER  750 mg Oral Nightly    mesalamine DR  1,200 mg Oral BID AC    metoprolol tartrate  12.5 mg Oral Daily Beta Blocker    montelukast  10 mg Oral Nightly    OXcarbazepine  450 mg Oral BID    pantoprazole  40 mg Oral Before breakfast    pregabalin  200 mg Oral BID    QUEtiapine  50 mg Oral Nightly    QUEtiapine  200 mg Oral Nightly    sertraline  100 mg Oral BID       Prn Meds:  acetaminophen, ondansetron, benzonatate, albuterol, HYDROmorphone    Infusions:      OBJECTIVE:  Vitals:    02/22/24 0012 02/22/24 0057 02/22/24 0410 02/22/24 0737   BP:   126/65 147/77   Pulse: 86  81 65   Resp: 18  17 17   Temp:   97.9 °F (36.6 °C) 97.8 °F (36.6 °C)   TempSrc:   Oral Oral   SpO2: 93% 91% 95% 96%   Weight:         O2: 2LPM  Gen - Alert, oriented x 3, in no apparent distress  Lungs - + wheezing bilaterally.  CV - regular rate & rhythm. Normal S1, S2. No murmurs, rubs, or gallops appreciated  Extremities - No cyanosis, clubbing, edema appreciated.        Labs:  Recent Labs   Lab 02/17/24  1320   WBC 8.4   HGB 12.5   .0     Recent Labs   Lab 02/17/24  1320      K 3.9      CO2 27.0   BUN 21   CREATSERUM 0.95   GLU 82   ANIONGAP 3   ALB 3.8   CA 9.2   ALKPHO 102   AST 15   ALT 23   BILT 0.2   TP 7.4     Recent Labs   Lab 02/19/24  1113   PCT <0.05        Recent Labs   Lab 02/17/24  1444 02/19/24  1142   COVID19 Not Detected Not Detected   INFAPCR Negative Not Detected   INFBPCR Negative Not Detected   RSV Negative  --        Imaging reviewed    ASSESSMENT AND PLAN      Dyspnea/cough - secondary to asthma exacerbation triggered by metapneumovirus infection  -supplemental oxygen prn - currently on 2LPM  -continue steroids and antibiotics as below  Metapneumovirus infection and sputum culture + for moraxella  -supportive care  -discontinue azithro; she already completed 3 days  Asthma exacerbation - still wheezing.  -prednisone taper  -Breo in lieu of home Advair  -continue montelukast  -nebs prn  Nutrition   -reg diet  Proph   -LMWH  Dispo   -full code  -not ready for discharge yet.    We will continue to follow with you     Wilfredo Lind M.D.  Pulmonary/Critical Care

## 2024-02-22 NOTE — PROGRESS NOTES
[facility] Hospitalist note    PCP: Yoel Servin MD    Chief Complaint:  F/u asthma exacerbation    SUBJECTIVE:     Sitting up in bed, on 1-2L NC. Feels wheezy still. Pleuritic pain upon cough  OBJECTIVE:  Temp:  [97.8 °F (36.6 °C)-98.2 °F (36.8 °C)] 98 °F (36.7 °C)  Pulse:  [65-86] 76  Resp:  [16-20] 16  BP: (113-147)/(65-79) 113/68  SpO2:  [90 %-96 %] 90 %    Intake/Output:  No intake or output data in the 24 hours ending 02/22/24 1215      Last 3 Weights   02/17/24 1857 199 lb (90.3 kg)   02/17/24 1145 199 lb (90.3 kg)   10/17/23 1029 209 lb (94.8 kg)       Exam  Gen: No acute distress, no accessory m use  HEENT: anicteric sclera, MMM  Pulm: wheeze b/l, ok effort  CV: Heart with regular rate and rhythm, no peripheral edema  Abd: Abdomen soft, nontender, nondistended, no organomegaly, bowel sounds present  MSK:   no clubbing, no cyanosis  Skin: no visible rashes   Neuro: no facial droop or dysarthria    Data Review:         Labs:     Recent Labs   Lab 02/17/24  1320   WBC 8.4   HGB 12.5   MCV 95.8   .0   NE 5.66   LYMABS 2.02       Recent Labs   Lab 02/17/24  1320      K 3.9      CO2 27.0   BUN 21   CREATSERUM 0.95   CA 9.2   GLU 82       Recent Labs   Lab 02/17/24  1320   ALT 23   AST 15   ALB 3.8       Recent Labs   Lab 02/19/24  1113   PCT <0.05        Meds:   Scheduled Medication:   predniSONE  40 mg Oral Daily with breakfast    polyethylene glycol (PEG 3350)  17 g Oral Daily    guaiFENesin ER  600 mg Oral BID    albuterol  2.5 mg Nebulization 6 times per day    enoxaparin  40 mg Subcutaneous Daily    aspirin  81 mg Oral Daily    buPROPion ER  150 mg Oral QAM    cyclobenzaprine  10 mg Oral TID    clonazePAM  0.25 mg Oral TID    fluticasone furoate-vilanterol  1 puff Inhalation Daily    lamoTRIgine  200 mg Oral TID    levETIRAcetam  500 mg Oral BID    cetirizine  10 mg Oral Daily    levothyroxine  125 mcg Oral Daily    lithium ER  750 mg Oral Nightly    mesalamine DR  1,200 mg Oral BID AC     metoprolol tartrate  12.5 mg Oral Daily Beta Blocker    montelukast  10 mg Oral Nightly    OXcarbazepine  450 mg Oral BID    pantoprazole  40 mg Oral Before breakfast    pregabalin  200 mg Oral BID    QUEtiapine  50 mg Oral Nightly    QUEtiapine  200 mg Oral Nightly    sertraline  100 mg Oral BID     Continuous Infusing Medication:  PRN Medication:acetaminophen, ondansetron, benzonatate, albuterol, HYDROmorphone       Microbiology:    No results found for this visit on 02/17/24.    Lab Results   Component Value Date    COVID19 Not Detected 02/19/2024    COVID19 Not Detected 02/17/2024    COVID19 Not Detected 02/17/2024        Assessment/Plan:     54 yo woman with acute asthma exacerbation     1) acute hypoxic respiratory failure secondary to   asthma exacerbation- overall improving  - nebs   - sp solumedrol q8--> prednisone  - resumed home inhalers  - covid/rsv/influenza neg, sent off rvp +for metapneumovirus  - sputum culture with moraxella, sp azithro  - appreciate pulm involvement  -  Encourage IS     2) migraines/chronic pain/depression-   home meds     SCDs, lovenox     Dw pt and KONSTANTIN Blackburn         Thank You,  Felicia Tomas MD    [facility] Emergencyist  Internal Medicine  Answering Service number: [phone]

## 2024-02-22 NOTE — PLAN OF CARE
Patient is AO x 4. Maintains O2 sats on room air. Intermittent wheezing, on scheduled nebs. PRN tessalon for cough. Patient with pleuritic/rib cage pain with cough, also has headache at times, PRN pain medication administered see MAR. Seizure precautions maintained, no seizure activity noted. Afebrile. Patient reported BM last night, no other GI problems. Continent of b/b. IV steroids, transition to PO in the morning. PO abx, tolerating well. Patient updated on POC, rounded on routinely.     Problem: Patient/Family Goals  Goal: Patient/Family Long Term Goal  Description: Patient's Long Term Goal: discharge home with appropriate means    Interventions:  - labs  -meds per mar  -consults  - See additional Care Plan goals for specific interventions  Outcome: Progressing  Goal: Patient/Family Short Term Goal  Description: Patient's Short Term Goal:   2/17 noc: wean off supplemental O2  2/18 AM: O2 walk  2/19 NOC: sleep  2/20: control headache and breath better   2/20 NOC: cough control, sleep  Interventions:   -PRN pain med   - flutter valve, nebs, IV steroids  - See additional Care Plan goals for specific interventions  Outcome: Progressing     Problem: RESPIRATORY - ADULT  Goal: Achieves optimal ventilation and oxygenation  Description: INTERVENTIONS:  - Assess for changes in respiratory status  - Assess for changes in mentation and behavior  - Position to facilitate oxygenation and minimize respiratory effort  - Oxygen supplementation based on oxygen saturation or ABGs  - Provide Smoking Cessation handout, if applicable  - Encourage broncho-pulmonary hygiene including cough, deep breathe, Incentive Spirometry  - Assess the need for suctioning and perform as needed  - Assess and instruct to report SOB or any respiratory difficulty  - Respiratory Therapy support as indicated  - Manage/alleviate anxiety  - Monitor for signs/symptoms of CO2 retention  Outcome: Progressing     Problem: SAFETY ADULT - FALL  Goal: Free from  fall injury  Description: INTERVENTIONS:  - Assess pt frequently for physical needs  - Identify cognitive and physical deficits and behaviors that affect risk of falls.  - [place] fall precautions as indicated by assessment.  - Educate pt/family on patient safety including physical limitations  - Instruct pt to call for assistance with activity based on assessment  - Modify environment to reduce risk of injury  - Provide assistive devices as appropriate  - Consider OT/PT consult to assist with strengthening/mobility  - Encourage toileting schedule  Outcome: Progressing

## 2024-02-22 NOTE — PLAN OF CARE
Pt A&OX4. Seizure precautions.On RA - 1L NC sating WNL. . Scheduled nebs. On tele. NSR. No c/o pain at this time. PIV SL. Tolerating regular diet. Voids. Up self. BRP. Pt updated on plan of care and verbalized understanding. Call light within reach. All needs met at this time. Will follow.      Problem: Patient/Family Goals  Goal: Patient/Family Long Term Goal  Description: Patient's Long Term Goal: discharge home with appropriate means    Interventions:  - labs  -meds per mar  -consults  - See additional Care Plan goals for specific interventions  Outcome: Progressing  Goal: Patient/Family Short Term Goal  Description: Patient's Short Term Goal:   2/17 noc: wean off supplemental O2  2/18 AM: O2 walk  2/19 NOC: sleep  2/20: control headache and breath better   2/20 NOC: cough control, sleep  2/21 NOC: sleep, wean O2 as tolerated  Interventions:   -PRN pain med   - flutter valve, nebs, IV steroids  - See additional Care Plan goals for specific interventions  Outcome: Progressing     Problem: RESPIRATORY - ADULT  Goal: Achieves optimal ventilation and oxygenation  Description: INTERVENTIONS:  - Assess for changes in respiratory status  - Assess for changes in mentation and behavior  - Position to facilitate oxygenation and minimize respiratory effort  - Oxygen supplementation based on oxygen saturation or ABGs  - Provide Smoking Cessation handout, if applicable  - Encourage broncho-pulmonary hygiene including cough, deep breathe, Incentive Spirometry  - Assess the need for suctioning and perform as needed  - Assess and instruct to report SOB or any respiratory difficulty  - Respiratory Therapy support as indicated  - Manage/alleviate anxiety  - Monitor for signs/symptoms of CO2 retention  Outcome: Progressing     Problem: SAFETY ADULT - FALL  Goal: Free from fall injury  Description: INTERVENTIONS:  - Assess pt frequently for physical needs  - Identify cognitive and physical deficits and behaviors that affect risk  of falls.  - [place] fall precautions as indicated by assessment.  - Educate pt/family on patient safety including physical limitations  - Instruct pt to call for assistance with activity based on assessment  - Modify environment to reduce risk of injury  - Provide assistive devices as appropriate  - Consider OT/PT consult to assist with strengthening/mobility  - Encourage toileting schedule  Outcome: Progressing

## 2024-02-22 NOTE — PLAN OF CARE
A&Ox4. VSS. Afebrile. Spo2>90% on RA. Nebs. PO prednisone. Tele-NSR. Lovenox. Reg diet. Up ad balbina. Seizure precautions in place. Patient denies n/v/d, pain or SOB. Patient updated on POC. All questions answered at this time. Call light within reach.         Problem: Patient/Family Goals  Goal: Patient/Family Long Term Goal  Description: Patient's Long Term Goal: discharge home with appropriate means    Interventions:  - labs  -meds per mar  -consults  - See additional Care Plan goals for specific interventions  Outcome: Progressing     Problem: Patient/Family Goals  Goal: Patient/Family Short Term Goal  Description: Patient's Short Term Goal:   2/17 noc: wean off supplemental O2  2/18 AM: O2 walk  2/19 NOC: sleep  2/20: control headache and breath better   2/20 NOC: cough control, sleep  2/21 NOC: sleep, wean O2 as tolerated  Interventions:   -PRN pain med   - flutter valve, nebs, IV steroids  - See additional Care Plan goals for specific interventions  Outcome: Progressing

## 2024-02-23 VITALS
RESPIRATION RATE: 18 BRPM | HEART RATE: 75 BPM | OXYGEN SATURATION: 94 % | DIASTOLIC BLOOD PRESSURE: 70 MMHG | TEMPERATURE: 98 F | SYSTOLIC BLOOD PRESSURE: 127 MMHG | BODY MASS INDEX: 35 KG/M2 | WEIGHT: 199 LBS

## 2024-02-23 PROCEDURE — 94640 AIRWAY INHALATION TREATMENT: CPT

## 2024-02-23 RX ORDER — PREDNISONE 10 MG/1
TABLET ORAL
Qty: 18 TABLET | Refills: 0 | Status: SHIPPED | OUTPATIENT
Start: 2024-02-23 | End: 2024-03-03

## 2024-02-23 NOTE — DISCHARGE SUMMARY
General Medicine Discharge Summary     Patient ID:  Marylin Walker  53 year old  [id number]  4/16/1970    Admit date: 2/17/2024    Discharge date and time: 2/23/2024  3:40 PM     Attending Physician:  Felicia Tomas MD    Primary Care Physician: Yoel Servin MD     Reason for admission: sob      Risk of readmission: Marylin Walker has Moderate Risk of readmission after discharge from the hospital.    Hospital Discharge Diagnoses:  asthma exacerbation    Lace+ Score: 74  59-90 High Risk  29-58 Medium Risk  0-28   Low Risk.    TCM Follow-Up Recommendation:  LACE 29-58: Moderate Risk of readmission after discharge from the hospital.          Discharge Condition: alive    Important follow up  -PCP Yoel Servin MD see appointments listed below    -Labs: prn  -Radiology:  prn         Hospital Course:    54 yo woman with acute asthma exacerbation     1) acute hypoxic respiratory failure secondary to   asthma exacerbation- overall improving  - nebs   - sp solumedrol q8--> prednisone  - resumed home inhalers  - covid/rsv/influenza neg, sent off rvp +for metapneumovirus  - sputum culture with moraxella, sp azithro  - appreciate pulm involvement  -  Encourage IS     2) migraines/chronic pain/depression-   home meds  Consults: IP CONSULT TO HOSPITALIST  IP CONSULT TO PULMONOLOGY    Radiology:  XR CHEST AP PORTABLE  (CPT=71045)    Result Date: 2/17/2024  PROCEDURE:  XR CHEST AP PORTABLE  (CPT=71045)  TECHNIQUE:  AP chest radiograph was obtained.  COMPARISON:  [facility] , XR, XR CHEST AP PORTABLE  (CPT=71045), 12/31/2021, 5:58 PM.  INDICATIONS:  Sick for a few weeks, cough, sinus drainage, SOB  PATIENT STATED HISTORY: (As transcribed by Technologist)  Patient stated she has been sick for a few weeks with a cough.    FINDINGS:  Subsegmental atelectasis at the lung bases.  No acute airspace disease.  Heart is normal size.  There are no pleural effusions.  Trace left pleural effusion.  The low lung volumes.             CONCLUSION:  Subsegmental atelectasis at the lung bases.   LOCATION:  [facility]      Dictated by (CST): Semaj Bergeron MD on 2/17/2024 at 2:59 PM     Finalized by (CST): Semaj Bergeron MD on 2/17/2024 at 3:00 PM         Operative Procedures:      Disposition: alive    Patient Instructions: Current and discharge medications reviewed with patient  Discharge Medication List as of 2/23/2024  2:18 PM        START taking these medications    Details   predniSONE 10 MG Oral Tab Take 3 tablets (30 mg total) by mouth daily for 3 days, THEN 2 tablets (20 mg total) daily for 3 days, THEN 1 tablet (10 mg total) daily for 3 days., Normal, Disp-18 tablet, R-0           CONTINUE these medications which have NOT CHANGED    Details   !! lithium  MG Oral Tab CR Take 1 tablet (300 mg total) by mouth at bedtime. TAKE 1 TABLET BY MOUTH EVERY NIGHT. SWALLOW WHOLE. TAKE WITH 450 MG TABLET  MG TOTAL, Historical      acetaminophen 325 MG Oral Tab Take 1 tablet (325 mg total) by mouth every 6 (six) hours as needed for Pain., Historical      MESALAMINE  MG Oral Capsule Delayed Release TAKE 3 CAPSULES BY MOUTH TWICE DAILY BEFORE MEALS, Normal, Disp-540 capsule, R-3      pantoprazole 40 MG Oral Tab EC Take 1 tablet (40 mg total) by mouth before breakfast., Normal, Disp-90 tablet, R-3MUST BE SEEN FOR ANY FURTHER REFILLS.      buPROPion  MG Oral Tablet 24 Hr Take 1 tablet (150 mg total) by mouth every morning., Historical      Estradiol 10 MCG Vaginal Tab Place vaginally twice a week. AS NEEDED, Historical      Nebulizers (VIOS AEROSOL DELIVERY SYSTEM) Does not apply Misc USE WITH ALBUTEROL AS NEEDED, Historical      albuterol 108 (90 Base) MCG/ACT Inhalation Aero Soln Inhale 2 puffs into the lungs every 6 (six) hours as needed., Historical      !! QUEtiapine 50 MG Oral Tab Take 1 tablet (50 mg total) by mouth nightly., Historical      HYDROmorphone 2 MG Oral Tab Take 1 tablet (2 mg total) by mouth every 6 (six) hours as  needed for Pain., Normal, Disp-45 tablet, R-0      CARISOPRODOL 350 MG Oral Tab TAKE 1 TABLET(350 MG) BY MOUTH THREE TIMES DAILY AS NEEDED FOR MUSCLE SPASMS, Normal, Disp-90 tablet, R-0      METOPROLOL TARTRATE 25 MG Oral Tab TAKE 1/2 TABLET BY MOUTH DAILY, Normal, Disp-45 tablet, R-0MUST BE SEEN FOR ANY FURTHER REFILLS.      LEVOCETIRIZINE 5 MG Oral Tab TAKE 1 TABLET(5 MG) BY MOUTH EVERY EVENING, Normal, Disp-90 tablet, R-3      levETIRAcetam 500 MG Oral Tab Take 1 tablet (500 mg total) by mouth 2 (two) times daily., Normal, Disp-180 tablet, R-1      !! Lithium Carbonate  MG Oral Tab CR Take 1 tablet (450 mg total) by mouth at bedtime., Print Script, Disp-30 tablet, R-0      OXCARBAZEPINE 300 MG Oral Tab TAKE 1 AND 1/2 TABLETS(450 MG) BY MOUTH TWICE DAILY, Normal, Disp-180 tablet, R-1      meclizine 12.5 MG Oral Tab Take 1 tablet (12.5 mg total) by mouth 3 (three) times daily as needed., Normal, Disp-90 tablet, R-3      MONTELUKAST 10 MG Oral Tab TAKE 1 TABLET(10 MG) BY MOUTH EVERY NIGHT, Normal, Disp-90 tablet, R-0      sertraline 100 MG Oral Tab Take 1 tablet (100 mg total) by mouth 2 (two) times daily. 50mg in AM, 100mg in PM, Historical      lamoTRIgine (LAMICTAL) 200 MG Oral Tab Take 1 tablet (200 mg total) by mouth 3 (three) times daily., Normal, Disp-90 tablet, R-5      pregabalin (LYRICA) 200 MG Oral Cap Take 1 capsule (200 mg total) by mouth 2 (two) times daily., Normal, Disp-180 capsule, R-2      levothyroxine 125 MCG Oral Tab Take 1 tablet (125 mcg total) by mouth daily., Normal, Disp-90 tablet, R-2      ondansetron 8 MG Oral Tablet Dispersible Take 1 tablet (8 mg total) by mouth every 8 (eight) hours as needed for Nausea., Normal, Disp-60 tablet, R-2      fluticasone-salmeterol (ADVAIR DISKUS) 250-50 MCG/DOSE Inhalation Aerosol Powder, Breath Activated INHALE 1 PUFF INTO THE LUNGS EVERY 12 HOURS, Normal, Disp-120 each, R-0      albuterol sulfate (2.5 MG/3ML) 0.083% Inhalation Nebu Soln Take 3 mL  (2.5 mg total) by nebulization every 6 (six) hours as needed for Wheezing., Normal, Disp-3 mL, R-2      Multiple Vitamins-Minerals (QC WOMENS DAILY MULTIVITAMIN) Oral Tab Take 1 tablet by mouth daily., Historical      FLUTICASONE PROPIONATE 50 MCG/ACT Nasal Suspension SHAKE LIQUID AND USE 2 SPRAYS IN EACH NOSTRIL DAILY, Normal, Disp-48 g, R-3      !! QUEtiapine Fumarate 200 MG Oral Tab Take 1 tablet (200 mg total) by mouth nightly., Historical      clonazePAM 0.5 MG Oral Tab Take 0.5 tablets (0.25 mg total) by mouth 3 (three) times daily., Normal, Disp-90 tablet, R-0      aspirin 81 MG Oral Tab Take 1 tablet (81 mg total) by mouth daily., Historical      Ketoconazole 2 % External Shampoo Use to wash scalp 2-3 times per week., Normal, Disp-120 mL, R-1       !! - Potential duplicate medications found. Please discuss with provider.          Home Medication Changes:     Activity: as directed  Diet: as directed  Wound Care:  prn  Code Status: Full Code  O2: prn    Follow-up with        Follow up with Yoel Servin  Specialty: Family Medicine  coordinator to make appt for next available. please check [facility] for time.  you may reschedule if needed  [address] [place] [address] [phone]          Follow up with Wilfredo Lind in 1 week(s)  Specialty: PULMONARY DISEASES, Critical Care, Intensivist  Follow up 1-2 weeks  [address]  [facility] [address] [phone]      Total Time Coordinating Care: 35 minutes    Patient had opportunity to ask questions and state understand and agree with therapeutic plan as outlined    Thank You,  Felicia Tomas MD    [facility] Hospitalist  Internal Medicine  Answering Service number: [phone]

## 2024-02-23 NOTE — PROGRESS NOTES
02/23/24 1411   Mobility   O2 walk? Yes   SPO2% on Room Air at Rest 94   At rest oxygen flow (liters per minute) 0   SPO2% Ambulation on Room Air 91   Ambulation oxygen flow (liters per minute) 0

## 2024-02-23 NOTE — PROGRESS NOTES
S: She is feeling ok, she still has some dyspnea but was able to ambulate to the bathroom.    Meds:   predniSONE  40 mg Oral Daily with breakfast    polyethylene glycol (PEG 3350)  17 g Oral Daily    guaiFENesin ER  600 mg Oral BID    albuterol  2.5 mg Nebulization 6 times per day    enoxaparin  40 mg Subcutaneous Daily    aspirin  81 mg Oral Daily    buPROPion ER  150 mg Oral QAM    cyclobenzaprine  10 mg Oral TID    clonazePAM  0.25 mg Oral TID    fluticasone furoate-vilanterol  1 puff Inhalation Daily    lamoTRIgine  200 mg Oral TID    levETIRAcetam  500 mg Oral BID    cetirizine  10 mg Oral Daily    levothyroxine  125 mcg Oral Daily    lithium ER  750 mg Oral Nightly    mesalamine DR  1,200 mg Oral BID AC    metoprolol tartrate  12.5 mg Oral Daily Beta Blocker    montelukast  10 mg Oral Nightly    OXcarbazepine  450 mg Oral BID    pantoprazole  40 mg Oral Before breakfast    pregabalin  200 mg Oral BID    QUEtiapine  50 mg Oral Nightly    QUEtiapine  200 mg Oral Nightly    sertraline  100 mg Oral BID       Prn Meds:  acetaminophen, ondansetron, benzonatate, albuterol, HYDROmorphone    Infusions:      OBJECTIVE:  Vitals:    02/23/24 0057 02/23/24 0518 02/23/24 0801 02/23/24 1211   BP:  109/61 116/66 127/70   Pulse:  69 59 75   Resp:  16 18 18   Temp:  97.7 °F (36.5 °C) 98 °F (36.7 °C) 98.2 °F (36.8 °C)   TempSrc:  Oral Oral Oral   SpO2: 95% 93% 96% 94%   Weight:         O2: room air  Gen - Alert, oriented x 3, in no apparent distress  Lungs - + occasional wheezing bilaterally.  CV - regular rate & rhythm. Normal S1, S2. No murmurs, rubs, or gallops appreciated  Extremities - No cyanosis, clubbing, edema appreciated.        Labs:  Recent Labs   Lab 02/17/24  1320   WBC 8.4   HGB 12.5   .0     Recent Labs   Lab 02/17/24  1320      K 3.9      CO2 27.0   BUN 21   CREATSERUM 0.95   GLU 82   ANIONGAP 3   ALB 3.8   CA 9.2   ALKPHO 102   AST 15   ALT 23   BILT 0.2   TP 7.4     Recent Labs    Lab 02/19/24  1113   PCT <0.05       Recent Labs   Lab 02/17/24  1444 02/19/24  1142   COVID19 Not Detected Not Detected   INFAPCR Negative Not Detected   INFBPCR Negative Not Detected   RSV Negative  --        Imaging reviewed    ASSESSMENT AND PLAN      Dyspnea/cough - secondary to asthma exacerbation triggered by metapneumovirus infection  -supplemental oxygen prn - currently on room air  -continue steroids , bronchodilators, antibiotics as below  Metapneumovirus infection and sputum culture + for moraxella  -supportive care  -she already completed 3 days azithro  Asthma exacerbation   -prednisone taper  -Breo in lieu of home Advair  -continue montelukast  -nebs prn  Nutrition   -reg diet  Proph   -LMWH  Dispo   -full code  -no objection to discharge if patient is able to ambulate today. If discharged, she can follow up in 1-2 weeks.    Wilfredo Lind M.D.  Pulmonary/Critical Care

## 2024-02-23 NOTE — PROGRESS NOTES
[facility] Hospitalist note    PCP: Yoel Servin MD    Chief Complaint:  F/u asthma exacerbation    SUBJECTIVE:   Feels better today. Still with cough. No n/v/f/c    OBJECTIVE:  Temp:  [97.7 °F (36.5 °C)-98.1 °F (36.7 °C)] 98 °F (36.7 °C)  Pulse:  [59-78] 59  Resp:  [16-18] 18  BP: (109-134)/(61-76) 116/66  SpO2:  [92 %-96 %] 96 %    Intake/Output:  No intake or output data in the 24 hours ending 02/23/24 1123      Last 3 Weights   02/17/24 1857 199 lb (90.3 kg)   02/17/24 1145 199 lb (90.3 kg)   10/17/23 1029 209 lb (94.8 kg)       Exam  Gen: No acute distress, no accessory m use  HEENT: anicteric sclera, MMM  Pulm: decreased wheeze b/l, ok effort--> better  CV: Heart with regular rate and rhythm, no peripheral edema  Abd: Abdomen soft, nontender, nondistended, no organomegaly, bowel sounds present  MSK:   no clubbing, no cyanosis  Skin: no visible rashes   Neuro: no facial droop or dysarthria    Data Review:         Labs:     Recent Labs   Lab 02/17/24  1320   WBC 8.4   HGB 12.5   MCV 95.8   .0   NE 5.66   LYMABS 2.02       Recent Labs   Lab 02/17/24  1320      K 3.9      CO2 27.0   BUN 21   CREATSERUM 0.95   CA 9.2   GLU 82       Recent Labs   Lab 02/17/24  1320   ALT 23   AST 15   ALB 3.8       Recent Labs   Lab 02/19/24  1113   PCT <0.05        Meds:   Scheduled Medication:   predniSONE  40 mg Oral Daily with breakfast    polyethylene glycol (PEG 3350)  17 g Oral Daily    guaiFENesin ER  600 mg Oral BID    albuterol  2.5 mg Nebulization 6 times per day    enoxaparin  40 mg Subcutaneous Daily    aspirin  81 mg Oral Daily    buPROPion ER  150 mg Oral QAM    cyclobenzaprine  10 mg Oral TID    clonazePAM  0.25 mg Oral TID    fluticasone furoate-vilanterol  1 puff Inhalation Daily    lamoTRIgine  200 mg Oral TID    levETIRAcetam  500 mg Oral BID    cetirizine  10 mg Oral Daily    levothyroxine  125 mcg Oral Daily    lithium ER  750 mg Oral Nightly    mesalamine DR  1,200 mg Oral BID AC     metoprolol tartrate  12.5 mg Oral Daily Beta Blocker    montelukast  10 mg Oral Nightly    OXcarbazepine  450 mg Oral BID    pantoprazole  40 mg Oral Before breakfast    pregabalin  200 mg Oral BID    QUEtiapine  50 mg Oral Nightly    QUEtiapine  200 mg Oral Nightly    sertraline  100 mg Oral BID     Continuous Infusing Medication:  PRN Medication:acetaminophen, ondansetron, benzonatate, albuterol, HYDROmorphone       Microbiology:    No results found for this visit on 02/17/24.    Lab Results   Component Value Date    COVID19 Not Detected 02/19/2024    COVID19 Not Detected 02/17/2024    COVID19 Not Detected 02/17/2024        Assessment/Plan:     54 yo woman with acute asthma exacerbation     1) acute hypoxic respiratory failure secondary to   asthma exacerbation- overall improving  - nebs   - sp solumedrol q8--> prednisone  - resumed home inhalers  - covid/rsv/influenza neg, sent off rvp +for metapneumovirus  - sputum culture with moraxella, sp azithro  - appreciate pulm involvement  -  Encourage IS     2) migraines/chronic pain/depression-   home meds     SCDs, lovenox        Dispo: stable from IM standpoint- ok to dc if ok with pulmonary       Thank You,  Felicia Tomas MD    [facility] Hospitalist  Internal Medicine  Answering Service number: [phone]

## 2024-02-23 NOTE — PLAN OF CARE
Patient alert and oriented x4.  On RA.  NSR on tele.  HR 60s.  Mild HA.  PO prednisone.  Scheduled nebs.  Resting comfortably in bed.

## 2024-02-23 NOTE — PLAN OF CARE
NURSING DISCHARGE NOTE    Discharged Home via Wheelchair.  Accompanied by Support staff  Belongings Taken by patient/family.    Patient given dc paperwork.  Verbalizes understanding.  IV removed.

## 2024-02-23 NOTE — PLAN OF CARE
Pt A&OX4. Seizure precautions.On RA - 1L PRN NC sating WNL. . Scheduled nebs. On tele. NSR. C/o headache and pleuritic pain - See MAR. C/o cough - See MAR. PIV SL. Tolerating regular diet. Voids. Up self. BRP. Pt updated on plan of care and verbalized understanding. Call light within reach. All needs met at this time. Will follow.       Problem: Patient/Family Goals  Goal: Patient/Family Long Term Goal  Description: Patient's Long Term Goal: discharge home with appropriate means    Interventions:  - labs  -meds per mar  -consults  - See additional Care Plan goals for specific interventions  Outcome: Progressing  Goal: Patient/Family Short Term Goal  Description: Patient's Short Term Goal:   2/17 noc: wean off supplemental O2  2/18 AM: O2 walk  2/19 NOC: sleep  2/20: control headache and breath better   2/20 NOC: cough control, sleep  2/21 NOC: sleep, wean O2 as tolerated  2/22 NOC: sleep, improve SOB with exertion  Interventions:   -PRN pain med   - flutter valve, nebs, IV steroids  - See additional Care Plan goals for specific interventions  Outcome: Progressing     Problem: RESPIRATORY - ADULT  Goal: Achieves optimal ventilation and oxygenation  Description: INTERVENTIONS:  - Assess for changes in respiratory status  - Assess for changes in mentation and behavior  - Position to facilitate oxygenation and minimize respiratory effort  - Oxygen supplementation based on oxygen saturation or ABGs  - Provide Smoking Cessation handout, if applicable  - Encourage broncho-pulmonary hygiene including cough, deep breathe, Incentive Spirometry  - Assess the need for suctioning and perform as needed  - Assess and instruct to report SOB or any respiratory difficulty  - Respiratory Therapy support as indicated  - Manage/alleviate anxiety  - Monitor for signs/symptoms of CO2 retention  Outcome: Progressing     Problem: SAFETY ADULT - FALL  Goal: Free from fall injury  Description: INTERVENTIONS:  - Assess pt frequently for  physical needs  - Identify cognitive and physical deficits and behaviors that affect risk of falls.  - Leslie fall precautions as indicated by assessment.  - Educate pt/family on patient safety including physical limitations  - Instruct pt to call for assistance with activity based on assessment  - Modify environment to reduce risk of injury  - Provide assistive devices as appropriate  - Consider OT/PT consult to assist with strengthening/mobility  - Encourage toileting schedule  Outcome: Progressing

## 2024-03-15 ENCOUNTER — HOSPITAL ENCOUNTER (EMERGENCY)
Facility: HOSPITAL | Age: 54
Discharge: HOME | End: 2024-03-16
Attending: EMERGENCY MEDICINE
Payer: MEDICARE

## 2024-03-15 ENCOUNTER — APPOINTMENT (OUTPATIENT)
Dept: CT IMAGING | Facility: HOSPITAL | Age: 54
End: 2024-03-15
Attending: EMERGENCY MEDICINE
Payer: MEDICARE

## 2024-03-15 DIAGNOSIS — K52.9 GASTROENTERITIS: Primary | ICD-10-CM

## 2024-03-15 DIAGNOSIS — B34.9 VIRAL SYNDROME: ICD-10-CM

## 2024-03-15 LAB
ALBUMIN SERPL-MCNC: 4.1 G/DL (ref 3.4–5)
ALBUMIN/GLOB SERPL: 1.1 {RATIO} (ref 1–2)
ALP LIVER SERPL-CCNC: 97 U/L
ALT SERPL-CCNC: 20 U/L
ANION GAP SERPL CALC-SCNC: <0 MMOL/L (ref 0–18)
AST SERPL-CCNC: 12 U/L (ref 15–37)
BASOPHILS # BLD AUTO: 0.04 X10(3) UL (ref 0–0.2)
BASOPHILS NFR BLD AUTO: 0.6 %
BILIRUB SERPL-MCNC: 0.2 MG/DL (ref 0.1–2)
BUN BLD-MCNC: 7 MG/DL (ref 9–23)
CALCIUM BLD-MCNC: 9.8 MG/DL (ref 8.5–10.1)
CHLORIDE SERPL-SCNC: 113 MMOL/L (ref 98–112)
CO2 SERPL-SCNC: 30 MMOL/L (ref 21–32)
CREAT BLD-MCNC: 0.94 MG/DL
EGFRCR SERPLBLD CKD-EPI 2021: 72 ML/MIN/1.73M2 (ref 60–?)
EOSINOPHIL # BLD AUTO: 0.18 X10(3) UL (ref 0–0.7)
EOSINOPHIL NFR BLD AUTO: 2.5 %
ERYTHROCYTE [DISTWIDTH] IN BLOOD BY AUTOMATED COUNT: 14.7 %
GLOBULIN PLAS-MCNC: 3.6 G/DL (ref 2.8–4.4)
GLUCOSE BLD-MCNC: 119 MG/DL (ref 70–99)
HCT VFR BLD AUTO: 44.4 %
HGB BLD-MCNC: 13.8 G/DL
IMM GRANULOCYTES # BLD AUTO: 0.02 X10(3) UL (ref 0–1)
IMM GRANULOCYTES NFR BLD: 0.3 %
LYMPHOCYTES # BLD AUTO: 1.37 X10(3) UL (ref 1–4)
LYMPHOCYTES NFR BLD AUTO: 19 %
MCH RBC QN AUTO: 29.7 PG (ref 26–34)
MCHC RBC AUTO-ENTMCNC: 31.1 G/DL (ref 31–37)
MCV RBC AUTO: 95.5 FL
MONOCYTES # BLD AUTO: 0.51 X10(3) UL (ref 0.1–1)
MONOCYTES NFR BLD AUTO: 7.1 %
NEUTROPHILS # BLD AUTO: 5.08 X10 (3) UL (ref 1.5–7.7)
NEUTROPHILS # BLD AUTO: 5.08 X10(3) UL (ref 1.5–7.7)
NEUTROPHILS NFR BLD AUTO: 70.5 %
OSMOLALITY SERPL CALC.SUM OF ELEC: 293 MOSM/KG (ref 275–295)
PLATELET # BLD AUTO: 315 10(3)UL (ref 150–450)
POTASSIUM SERPL-SCNC: 4 MMOL/L (ref 3.5–5.1)
PROT SERPL-MCNC: 7.7 G/DL (ref 6.4–8.2)
RBC # BLD AUTO: 4.65 X10(6)UL
SODIUM SERPL-SCNC: 142 MMOL/L (ref 136–145)
WBC # BLD AUTO: 7.2 X10(3) UL (ref 4–11)

## 2024-03-15 PROCEDURE — 80053 COMPREHEN METABOLIC PANEL: CPT | Performed by: EMERGENCY MEDICINE

## 2024-03-15 PROCEDURE — 80053 COMPREHEN METABOLIC PANEL: CPT

## 2024-03-15 PROCEDURE — 93010 ELECTROCARDIOGRAM REPORT: CPT

## 2024-03-15 PROCEDURE — 99284 EMERGENCY DEPT VISIT MOD MDM: CPT

## 2024-03-15 PROCEDURE — 84484 ASSAY OF TROPONIN QUANT: CPT | Performed by: EMERGENCY MEDICINE

## 2024-03-15 PROCEDURE — 96360 HYDRATION IV INFUSION INIT: CPT

## 2024-03-15 PROCEDURE — 93005 ELECTROCARDIOGRAM TRACING: CPT

## 2024-03-15 PROCEDURE — 96361 HYDRATE IV INFUSION ADD-ON: CPT

## 2024-03-15 PROCEDURE — 85025 COMPLETE CBC W/AUTO DIFF WBC: CPT

## 2024-03-15 PROCEDURE — 70450 CT HEAD/BRAIN W/O DYE: CPT | Performed by: EMERGENCY MEDICINE

## 2024-03-15 PROCEDURE — 99285 EMERGENCY DEPT VISIT HI MDM: CPT

## 2024-03-15 PROCEDURE — 85025 COMPLETE CBC W/AUTO DIFF WBC: CPT | Performed by: EMERGENCY MEDICINE

## 2024-03-15 RX ORDER — ONDANSETRON 8 MG/1
8 TABLET, ORALLY DISINTEGRATING ORAL EVERY 4 HOURS PRN
Qty: 10 TABLET | Refills: 0 | Status: SHIPPED | OUTPATIENT
Start: 2024-03-15 | End: 2024-03-27

## 2024-03-16 VITALS
HEIGHT: 63 IN | SYSTOLIC BLOOD PRESSURE: 167 MMHG | DIASTOLIC BLOOD PRESSURE: 84 MMHG | RESPIRATION RATE: 24 BRPM | OXYGEN SATURATION: 94 % | BODY MASS INDEX: 35.27 KG/M2 | HEART RATE: 71 BPM | WEIGHT: 199.06 LBS | TEMPERATURE: 99 F

## 2024-03-16 LAB
ATRIAL RATE: 62 BPM
P AXIS: 41 DEGREES
P-R INTERVAL: 154 MS
Q-T INTERVAL: 430 MS
QRS DURATION: 98 MS
QTC CALCULATION (BEZET): 436 MS
R AXIS: 9 DEGREES
T AXIS: 21 DEGREES
TROPONIN I SERPL HS-MCNC: 7 NG/L
VENTRICULAR RATE: 62 BPM

## 2024-03-16 NOTE — ED INITIAL ASSESSMENT (HPI)
Pt to the emergency room for loss of balance, and dizziness today. Pt states she has had multiple falls today. Pt falling asleep during triage, and occasionally answering questions inappropriately. Pt denies chest pain, sob weakness at this time. Pt poor historian and unable to obtain a clear story. Pt arrived in a wc, No unilateral facial drooping noted, no unilateral weakness.

## 2024-03-16 NOTE — CM/SW NOTE
Received a call from the pt's Rn requesting to see pt for eval for a safe dc plan. Edcm spoke to pt and pt's sister. Pt lives w/ son at home who pt stated he is no help either. Pt having weakness since pt was dc'd last month from the hospital. Pt adamantly refused to go to any [facility] and would only go to [facility]. Edcm explained that [facility] is acute rehab and not [facility]. Edcm discussed caregivers and respite but that will be out of pocket and pt does not want that either. Pt and sister would prefer home health/HHPT/HHOT only. Explained to pt that will put in the referral request and whoever the reserve provider will call her. Pt and sister agreeable to that. Discussed with Dr. Gore all of the above and is also agreeable with the HHPT/OT. Will endorse to incoming Edcm in the morning.

## 2024-03-16 NOTE — ED QUICK NOTES
Pt to c5 via wc from lobby with sister - labs and iv access per triage KONSTANTIN Fernandez. Pt reports dizziness & nausea x weeks since dischg. Sister helps her with her meds - last seizure over one yr. Pt states she lives with her son.

## 2024-03-16 NOTE — ED QUICK NOTES
Attempted to ambulate the patient to the restroom and patient unable to ambulate. Patient states that she feels woozy and weak. Bed pan used instead.

## 2024-03-16 NOTE — ED QUICK NOTES
Pt received her AVS and verbalized understanding of dischg poc. Family at bs. Pt now awaiting EAS/bls 20 min eta.

## 2024-03-16 NOTE — ED QUICK NOTES
Pt remains resting comfortably on cart in c5 - pt requested a s ambulance to take her home \"on live on third floor\". Adult family remains at bs.

## 2024-03-16 NOTE — ED PROVIDER NOTES
Patient Seen in: [facility] Emergency Department      History     Chief Complaint   Patient presents with    Nausea/Vomiting/Diarrhea     Stated Complaint: Flu-like Sx x 3 wks; N/V - Zofran ODT given by EMS    Subjective:     HPI    53-year-old woman with traumatic brain injury, anxiety/depression, fibromyalgia, migraine headaches, seizure disorder, and bipolar affective disorder comes in with dizziness, weakness, and somnolence.  She has been having trouble ambulating.  No chest pain or shortness of breath.  Patient is poor historian.  She is accompanied by a friend that lives nearby.  Patient says she lives with her son who also has a history of traumatic brain injury and seizures.    Objective:   Past Medical History:   Diagnosis Date    Acne     Allergic rhinitis     Alopecia areata 2015    Anxiety     Asthma (HCC)     Bipolar affective (HCC)     Calculus of kidney 10/2020    Depression     Fibromyalgia     GERD (gastroesophageal reflux disease)     Gestational diabetes ([facility])     Hemorrhoids     Hypothyroidism     Migraines     Organic hypersomnia, unspecified PSG 6-29-14    AHI 2 RDI 2 REM AHI 2 SaO2 doroteo 88 %    Osteoarthritis     Scoliosis     Seizure disorder (HCC) 10/2020    absent seizures    Stroke ([facility])     2003+ mild R.sided weakness    Ulcerative colitis ([facility])               Past Surgical History:   Procedure Laterality Date    CHOLECYSTECTOMY  3/10    COLONOSCOPY  2013    ulcerative colitis    COLONOSCOPY N/A 4/25/2017    Procedure: COLONOSCOPY;  Surgeon: Hipolito Dobbins MD;  Location:  ENDOSCOPY    COLONOSCOPY N/A 6/14/2021    Procedure: COLONOSCOPY with biopsy,;  Surgeon: Ramsey Muhammad MD;  Location:  ENDOSCOPY    COLPOSCOPY, CERVIX W/UPPER ADJACENT VAGINA; W/BIOPSY(S), CERVIX  1999    HERNIA SURGERY  1997    Umbilical Hernia Repair    OTHER SURGICAL HISTORY N/A 6/21/2016    Procedure: ESOPHAGOGASTRODUODENOSCOPY (EGD);  Surgeon: Jeff Scherer MD;  Location:  ENDOSCOPY     ZEHRA IUD  2014                Social History     Socioeconomic History    Marital status:    Tobacco Use    Smoking status: Former     Packs/day: 0.00     Years: 10.00     Additional pack years: 0.00     Total pack years: 0.00     Types: Cigarettes     Quit date: 2003     Years since quittin.0    Smokeless tobacco: Never    Tobacco comments:     Ages [age], [age]   Vaping Use    Vaping Use: Some days    Substances: THC, CBD, Flavoring, medical marijuana    Devices: Disposable, Refillable tank   Substance and Sexual Activity    Alcohol use: Not Currently    Drug use: Yes     Frequency: 2.0 times per week     Types: Cannabis     Comment: Medical Cannabis    Sexual activity: Not Currently     Partners: Male     Social Determinants of Health     Food Insecurity: No Food Insecurity (2024)    Food Insecurity     Food Insecurity: Never true   Transportation Needs: No Transportation Needs (2024)    Transportation Needs     Lack of Transportation: No   Housing Stability: Low Risk  (2024)    Housing Stability     Housing Instability: No              Review of Systems    Positive for stated complaint: Flu-like Sx x 3 wks; N/V - Zofran ODT given by EMS  Other systems are as noted in HPI.  Constitutional and vital signs reviewed.      All other systems reviewed and negative except as noted above.    Physical Exam     ED Triage Vitals   BP 03/15/24 2035 (!) 157/92   Pulse 03/15/24 2035 63   Resp 03/15/24 2035 20   Temp 03/15/24 2035 98.7 °F (37.1 °C)   Temp src 03/15/24 2035 Temporal   SpO2 03/15/24 2035 91 %   O2 Device 03/15/24 2130 None (Room air)       Current:BP (!) 167/84   Pulse 63   Temp 98.7 °F (37.1 °C) (Temporal)   Resp 15   Ht 160 cm (5' 3\")   Wt 90.3 kg   LMP 2015 (Approximate)   SpO2 95%   BMI 35.26 kg/m²       General:  Vitals as listed.  No acute distress   HEENT: Sclerae anicteric.  Conjunctivae show no pallor.  Oropharynx clear, mucous membranes moist    Lungs: good air exchange  Extremities: no edema, normal peripheral pulses   Neuro: Alert oriented and nonfocal    ED Course     Labs Reviewed   COMP METABOLIC PANEL (14) - Abnormal; Notable for the following components:       Result Value    Glucose 119 (*)     Chloride 113 (*)     Anion Gap <0 (*)     BUN 7 (*)     AST 12 (*)     All other components within normal limits   TROPONIN I HIGH SENSITIVITY - Normal   CBC WITH DIFFERENTIAL WITH PLATELET    Narrative:     The following orders were created for panel order CBC With Differential With Platelet.  Procedure                               Abnormality         Status                     ---------                               -----------         ------                     CBC W/ DIFFERENTIAL[[id number]]                              Final result                 Please view results for these tests on the individual orders.   RAINBOW DRAW LAVENDER   RAINBOW DRAW LIGHT GREEN   RAINBOW DRAW BLUE   RAINBOW DRAW GOLD   CBC W/ DIFFERENTIAL     CT BRAIN OR HEAD (96855)    Result Date: 3/15/2024  CONCLUSION:  No CT evidence for acute intracranial process.   LOCATION:  [facility]   Dictated by (CST): Bev Holguin MD on 3/15/2024 at 11:22 PM     Finalized by (CST): Bev Holguin MD on 3/15/2024 at 11:23 PM        I independently read the CT scan and no intracranial bleed appreciated  EKG    Rate, intervals and axes as noted on EKG Report.  Rate: 62  Rhythm: Sinus Rhythm  Reading: Patient has T wave inversions in the anterior precordial leads though identical pattern to what she had January 1, 2022         ED COURSE and MDM     Sources of the medical history included the patient and sister who accompanies her    I reviewed prior external notes including discharge summary from her 2/17/2024 admission for asthma exacerbation associated with metapneumovirus as well as sputum culture positive for Moraxella, treated with azithromycin.    Patient hydrated with normal saline    Patient  seen by case management.  Patient is adamant that she does not want to be transferred to a skilled nursing facility.  The  and I agree that she will benefit for the Home health PT/OT    I have discussed with the patient the results of testing, differential diagnosis, and treatment plan. They expressed clear understanding of these instructions and agrees to the plan provided.    Disposition and Plan     Clinical Impression:  1. Gastroenteritis    2. Viral syndrome         Disposition:  Discharge  3/16/2024 12:46 am    Follow-up:  Yoel Servin MD  [address] [place] [id number] [phone]    Schedule an appointment as soon as possible for a visit in 3 day(s)          Medications Prescribed:  Current Discharge Medication List        START taking these medications    Details   !! ondansetron 8 MG Oral Tablet Dispersible Take 1 tablet (8 mg total) by mouth every 4 (four) hours as needed for Nausea.  Qty: 10 tablet, Refills: 0       !! - Potential duplicate medications found. Please discuss with provider.

## 2024-03-17 ENCOUNTER — APPOINTMENT (OUTPATIENT)
Dept: GENERAL RADIOLOGY | Facility: HOSPITAL | Age: 54
End: 2024-03-17
Attending: EMERGENCY MEDICINE
Payer: MEDICARE

## 2024-03-17 ENCOUNTER — APPOINTMENT (OUTPATIENT)
Dept: CT IMAGING | Facility: HOSPITAL | Age: 54
End: 2024-03-17
Attending: EMERGENCY MEDICINE
Payer: MEDICARE

## 2024-03-17 ENCOUNTER — HOSPITAL ENCOUNTER (INPATIENT)
Facility: HOSPITAL | Age: 54
End: 2024-03-17
Attending: EMERGENCY MEDICINE | Admitting: INTERNAL MEDICINE
Payer: MEDICARE

## 2024-03-17 ENCOUNTER — APPOINTMENT (OUTPATIENT)
Dept: MRI IMAGING | Facility: HOSPITAL | Age: 54
End: 2024-03-17
Attending: EMERGENCY MEDICINE
Payer: MEDICARE

## 2024-03-17 ENCOUNTER — HOSPITAL ENCOUNTER (INPATIENT)
Facility: HOSPITAL | Age: 54
LOS: 2 days | Discharge: HOME | End: 2024-03-20
Attending: EMERGENCY MEDICINE | Admitting: INTERNAL MEDICINE
Payer: MEDICARE

## 2024-03-17 DIAGNOSIS — F31.9 BIPOLAR 1 DISORDER (HCC): ICD-10-CM

## 2024-03-17 DIAGNOSIS — T56.891A LITHIUM TOXICITY, ACCIDENTAL OR UNINTENTIONAL, INITIAL ENCOUNTER: Primary | ICD-10-CM

## 2024-03-17 DIAGNOSIS — R53.1 WEAKNESS GENERALIZED: ICD-10-CM

## 2024-03-17 LAB
ACETONE: NEGATIVE
ALBUMIN SERPL-MCNC: 3.7 G/DL (ref 3.4–5)
ALBUMIN/GLOB SERPL: 1.1 {RATIO} (ref 1–2)
ALP LIVER SERPL-CCNC: 82 U/L
ALT SERPL-CCNC: 20 U/L
ANION GAP SERPL CALC-SCNC: 0 MMOL/L (ref 0–18)
AST SERPL-CCNC: 19 U/L (ref 15–37)
BASE EXCESS BLDV CALC-SCNC: 3.7 MMOL/L
BASOPHILS # BLD AUTO: 0.02 X10(3) UL (ref 0–0.2)
BASOPHILS NFR BLD AUTO: 0.3 %
BILIRUB SERPL-MCNC: 0.4 MG/DL (ref 0.1–2)
BILIRUB UR QL STRIP.AUTO: NEGATIVE
BUN BLD-MCNC: 8 MG/DL (ref 9–23)
CALCIUM BLD-MCNC: 9.7 MG/DL (ref 8.5–10.1)
CHLORIDE SERPL-SCNC: 111 MMOL/L (ref 98–112)
CLARITY UR REFRACT.AUTO: CLEAR
CO2 SERPL-SCNC: 29 MMOL/L (ref 21–32)
CREAT BLD-MCNC: 0.92 MG/DL
EGFRCR SERPLBLD CKD-EPI 2021: 74 ML/MIN/1.73M2 (ref 60–?)
EOSINOPHIL # BLD AUTO: 0.3 X10(3) UL (ref 0–0.7)
EOSINOPHIL NFR BLD AUTO: 4.2 %
ERYTHROCYTE [DISTWIDTH] IN BLOOD BY AUTOMATED COUNT: 14.6 %
GLOBULIN PLAS-MCNC: 3.4 G/DL (ref 2.8–4.4)
GLUCOSE BLD-MCNC: 199 MG/DL (ref 70–99)
GLUCOSE BLD-MCNC: 32 MG/DL (ref 70–99)
GLUCOSE BLD-MCNC: 36 MG/DL (ref 70–99)
GLUCOSE BLD-MCNC: 51 MG/DL (ref 70–99)
GLUCOSE BLD-MCNC: 519 MG/DL (ref 70–99)
GLUCOSE BLD-MCNC: 61 MG/DL (ref 70–99)
GLUCOSE BLD-MCNC: 84 MG/DL (ref 70–99)
GLUCOSE BLD-MCNC: 91 MG/DL (ref 70–99)
GLUCOSE UR STRIP.AUTO-MCNC: NORMAL MG/DL
HCO3 BLDV-SCNC: 26.1 MEQ/L (ref 22–26)
HCT VFR BLD AUTO: 43.1 %
HGB BLD-MCNC: 13.3 G/DL
IMM GRANULOCYTES # BLD AUTO: 0.01 X10(3) UL (ref 0–1)
IMM GRANULOCYTES NFR BLD: 0.1 %
KETONES UR STRIP.AUTO-MCNC: NEGATIVE MG/DL
LEUKOCYTE ESTERASE UR QL STRIP.AUTO: 250
LITHIUM SERPL-SCNC: 2.4 MMOL/L (ref 0.6–1.2)
LYMPHOCYTES # BLD AUTO: 2.13 X10(3) UL (ref 1–4)
LYMPHOCYTES NFR BLD AUTO: 30.1 %
MAGNESIUM SERPL-MCNC: 2.3 MG/DL (ref 1.6–2.6)
MCH RBC QN AUTO: 29.9 PG (ref 26–34)
MCHC RBC AUTO-ENTMCNC: 30.9 G/DL (ref 31–37)
MCV RBC AUTO: 96.9 FL
MONOCYTES # BLD AUTO: 0.64 X10(3) UL (ref 0.1–1)
MONOCYTES NFR BLD AUTO: 9 %
NEUTROPHILS # BLD AUTO: 3.98 X10 (3) UL (ref 1.5–7.7)
NEUTROPHILS # BLD AUTO: 3.98 X10(3) UL (ref 1.5–7.7)
NEUTROPHILS NFR BLD AUTO: 56.3 %
NITRITE UR QL STRIP.AUTO: NEGATIVE
OSMOLALITY SERPL CALC.SUM OF ELEC: 288 MOSM/KG (ref 275–295)
OXYHGB MFR BLDV: 35.5 % (ref 72–78)
PCO2 BLDV: 54 MM HG (ref 38–50)
PH BLDV: 7.36 [PH] (ref 7.33–7.43)
PH UR STRIP.AUTO: 7.5 [PH] (ref 5–8)
PLATELET # BLD AUTO: 261 10(3)UL (ref 150–450)
PO2 BLDV: 27 MM HG (ref 30–50)
POTASSIUM SERPL-SCNC: 4.3 MMOL/L (ref 3.5–5.1)
PROT SERPL-MCNC: 7.1 G/DL (ref 6.4–8.2)
PROT UR STRIP.AUTO-MCNC: NEGATIVE MG/DL
RBC # BLD AUTO: 4.45 X10(6)UL
RBC UR QL AUTO: NEGATIVE
SODIUM SERPL-SCNC: 140 MMOL/L (ref 136–145)
SP GR UR STRIP.AUTO: 1.01 (ref 1–1.03)
T3FREE SERPL-MCNC: 2.73 PG/ML (ref 2.4–4.2)
T4 FREE SERPL-MCNC: 1 NG/DL (ref 0.8–1.7)
TROPONIN I SERPL HS-MCNC: 7 NG/L
TSI SER-ACNC: 0.17 MIU/ML (ref 0.36–3.74)
UROBILINOGEN UR STRIP.AUTO-MCNC: NORMAL MG/DL
WBC # BLD AUTO: 7.1 X10(3) UL (ref 4–11)

## 2024-03-17 PROCEDURE — 70450 CT HEAD/BRAIN W/O DYE: CPT | Performed by: EMERGENCY MEDICINE

## 2024-03-17 PROCEDURE — 71045 X-RAY EXAM CHEST 1 VIEW: CPT | Performed by: EMERGENCY MEDICINE

## 2024-03-17 RX ORDER — INSULIN ASPART 100 [IU]/ML
0.15 INJECTION, SOLUTION INTRAVENOUS; SUBCUTANEOUS ONCE
Status: COMPLETED | OUTPATIENT
Start: 2024-03-17 | End: 2024-03-17

## 2024-03-17 RX ORDER — BISACODYL 10 MG
10 SUPPOSITORY, RECTAL RECTAL
Status: DISCONTINUED | OUTPATIENT
Start: 2024-03-17 | End: 2024-03-20

## 2024-03-17 RX ORDER — ACETAMINOPHEN 325 MG/1
650 TABLET ORAL EVERY 6 HOURS PRN
Status: DISCONTINUED | OUTPATIENT
Start: 2024-03-17 | End: 2024-03-20

## 2024-03-17 RX ORDER — DEXTROSE AND SODIUM CHLORIDE 5; .45 G/100ML; G/100ML
INJECTION, SOLUTION INTRAVENOUS ONCE
Status: COMPLETED | OUTPATIENT
Start: 2024-03-17 | End: 2024-03-17

## 2024-03-17 RX ORDER — ALBUTEROL SULFATE 90 UG/1
2 AEROSOL, METERED RESPIRATORY (INHALATION) EVERY 6 HOURS PRN
Status: DISCONTINUED | OUTPATIENT
Start: 2024-03-17 | End: 2024-03-20

## 2024-03-17 RX ORDER — DEXTROSE MONOHYDRATE 25 G/50ML
INJECTION, SOLUTION INTRAVENOUS
Status: COMPLETED
Start: 2024-03-17 | End: 2024-03-17

## 2024-03-17 RX ORDER — MELATONIN
3 NIGHTLY PRN
Status: DISCONTINUED | OUTPATIENT
Start: 2024-03-17 | End: 2024-03-20

## 2024-03-17 RX ORDER — HEPARIN SODIUM 5000 [USP'U]/ML
5000 INJECTION, SOLUTION INTRAVENOUS; SUBCUTANEOUS EVERY 8 HOURS SCHEDULED
Status: DISCONTINUED | OUTPATIENT
Start: 2024-03-18 | End: 2024-03-20

## 2024-03-17 RX ORDER — SENNOSIDES 8.6 MG
17.2 TABLET ORAL NIGHTLY PRN
Status: DISCONTINUED | OUTPATIENT
Start: 2024-03-17 | End: 2024-03-20

## 2024-03-17 RX ORDER — POLYETHYLENE GLYCOL 3350 17 G/17G
17 POWDER, FOR SOLUTION ORAL DAILY PRN
Status: DISCONTINUED | OUTPATIENT
Start: 2024-03-17 | End: 2024-03-20

## 2024-03-17 RX ORDER — SODIUM CHLORIDE 9 MG/ML
INJECTION, SOLUTION INTRAVENOUS CONTINUOUS
Status: DISCONTINUED | OUTPATIENT
Start: 2024-03-18 | End: 2024-03-20

## 2024-03-17 RX ORDER — SODIUM CHLORIDE 9 MG/ML
INJECTION, SOLUTION INTRAVENOUS CONTINUOUS
Status: ACTIVE | OUTPATIENT
Start: 2024-03-18 | End: 2024-03-18

## 2024-03-17 RX ORDER — ONDANSETRON 2 MG/ML
4 INJECTION INTRAMUSCULAR; INTRAVENOUS EVERY 6 HOURS PRN
Status: DISCONTINUED | OUTPATIENT
Start: 2024-03-17 | End: 2024-03-20

## 2024-03-17 RX ORDER — ONDANSETRON 2 MG/ML
4 INJECTION INTRAMUSCULAR; INTRAVENOUS EVERY 4 HOURS PRN
Status: ACTIVE | OUTPATIENT
Start: 2024-03-17 | End: 2024-03-18

## 2024-03-17 RX ORDER — ASPIRIN 81 MG/1
81 TABLET, CHEWABLE ORAL DAILY
Status: DISCONTINUED | OUTPATIENT
Start: 2024-03-18 | End: 2024-03-20

## 2024-03-17 RX ORDER — DEXTROSE MONOHYDRATE 25 G/50ML
50 INJECTION, SOLUTION INTRAVENOUS ONCE
Status: COMPLETED | OUTPATIENT
Start: 2024-03-17 | End: 2024-03-17

## 2024-03-18 ENCOUNTER — NURSE ONLY (OUTPATIENT)
Dept: ELECTROPHYSIOLOGY | Facility: HOSPITAL | Age: 54
End: 2024-03-18
Attending: Other
Payer: MEDICARE

## 2024-03-18 PROBLEM — F09 COGNITIVE DISORDER: Status: ACTIVE | Noted: 2020-11-11

## 2024-03-18 PROBLEM — G93.40 ENCEPHALOPATHY: Status: ACTIVE | Noted: 2020-11-11

## 2024-03-18 PROBLEM — R53.1 WEAKNESS GENERALIZED: Status: ACTIVE | Noted: 2024-03-18

## 2024-03-18 LAB
AMMONIA PLAS-MCNC: 45 UMOL/L (ref 11–32)
ANION GAP SERPL CALC-SCNC: 3 MMOL/L (ref 0–18)
BASOPHILS # BLD AUTO: 0.03 X10(3) UL (ref 0–0.2)
BASOPHILS NFR BLD AUTO: 0.5 %
BUN BLD-MCNC: 6 MG/DL (ref 9–23)
CALCIUM BLD-MCNC: 8.8 MG/DL (ref 8.5–10.1)
CHLORIDE SERPL-SCNC: 116 MMOL/L (ref 98–112)
CO2 SERPL-SCNC: 24 MMOL/L (ref 21–32)
CREAT BLD-MCNC: 0.6 MG/DL
EGFRCR SERPLBLD CKD-EPI 2021: 106 ML/MIN/1.73M2 (ref 60–?)
EOSINOPHIL # BLD AUTO: 0.21 X10(3) UL (ref 0–0.7)
EOSINOPHIL NFR BLD AUTO: 3.3 %
ERYTHROCYTE [DISTWIDTH] IN BLOOD BY AUTOMATED COUNT: 14.6 %
EST. AVERAGE GLUCOSE BLD GHB EST-MCNC: 111 MG/DL (ref 68–126)
GLUCOSE BLD-MCNC: 102 MG/DL (ref 70–99)
GLUCOSE BLD-MCNC: 159 MG/DL (ref 70–99)
GLUCOSE BLD-MCNC: 85 MG/DL (ref 70–99)
GLUCOSE BLD-MCNC: 87 MG/DL (ref 70–99)
GLUCOSE BLD-MCNC: 88 MG/DL (ref 70–99)
HBA1C MFR BLD: 5.5 % (ref ?–5.7)
HCT VFR BLD AUTO: 35.2 %
HGB BLD-MCNC: 11.5 G/DL
IMM GRANULOCYTES # BLD AUTO: 0.02 X10(3) UL (ref 0–1)
IMM GRANULOCYTES NFR BLD: 0.3 %
LITHIUM SERPL-SCNC: 1.5 MMOL/L (ref 0.6–1.2)
LYMPHOCYTES # BLD AUTO: 1.92 X10(3) UL (ref 1–4)
LYMPHOCYTES NFR BLD AUTO: 30.5 %
MAGNESIUM SERPL-MCNC: 2 MG/DL (ref 1.6–2.6)
MCH RBC QN AUTO: 30.3 PG (ref 26–34)
MCHC RBC AUTO-ENTMCNC: 32.7 G/DL (ref 31–37)
MCV RBC AUTO: 92.9 FL
MONOCYTES # BLD AUTO: 0.6 X10(3) UL (ref 0.1–1)
MONOCYTES NFR BLD AUTO: 9.5 %
NEUTROPHILS # BLD AUTO: 3.52 X10 (3) UL (ref 1.5–7.7)
NEUTROPHILS # BLD AUTO: 3.52 X10(3) UL (ref 1.5–7.7)
NEUTROPHILS NFR BLD AUTO: 55.9 %
OSMOLALITY SERPL CALC.SUM OF ELEC: 293 MOSM/KG (ref 275–295)
PLATELET # BLD AUTO: 233 10(3)UL (ref 150–450)
POTASSIUM SERPL-SCNC: 3.4 MMOL/L (ref 3.5–5.1)
RBC # BLD AUTO: 3.79 X10(6)UL
SODIUM SERPL-SCNC: 143 MMOL/L (ref 136–145)
T3FREE SERPL-MCNC: 2.37 PG/ML (ref 2.4–4.2)
T4 FREE SERPL-MCNC: 0.8 NG/DL (ref 0.8–1.7)
TSI SER-ACNC: 0.11 MIU/ML (ref 0.36–3.74)
WBC # BLD AUTO: 6.3 X10(3) UL (ref 4–11)

## 2024-03-18 PROCEDURE — 90792 PSYCH DIAG EVAL W/MED SRVCS: CPT

## 2024-03-18 PROCEDURE — 99223 1ST HOSP IP/OBS HIGH 75: CPT | Performed by: OTHER

## 2024-03-18 RX ORDER — BUPROPION HYDROCHLORIDE 150 MG/1
150 TABLET ORAL EVERY MORNING
Status: DISCONTINUED | OUTPATIENT
Start: 2024-03-18 | End: 2024-03-20

## 2024-03-18 RX ORDER — LEVETIRACETAM 500 MG/1
500 TABLET ORAL 2 TIMES DAILY
Status: DISCONTINUED | OUTPATIENT
Start: 2024-03-18 | End: 2024-03-20

## 2024-03-18 RX ORDER — LAMOTRIGINE 100 MG/1
200 TABLET ORAL 3 TIMES DAILY
Status: DISCONTINUED | OUTPATIENT
Start: 2024-03-18 | End: 2024-03-20

## 2024-03-18 RX ORDER — LEVOTHYROXINE SODIUM 0.12 MG/1
125 TABLET ORAL DAILY
Status: DISCONTINUED | OUTPATIENT
Start: 2024-03-19 | End: 2024-03-20

## 2024-03-18 RX ORDER — MONTELUKAST SODIUM 10 MG/1
10 TABLET ORAL NIGHTLY
Status: DISCONTINUED | OUTPATIENT
Start: 2024-03-18 | End: 2024-03-20

## 2024-03-18 RX ORDER — NICOTINE POLACRILEX 4 MG
15 LOZENGE BUCCAL
Status: DISCONTINUED | OUTPATIENT
Start: 2024-03-18 | End: 2024-03-20

## 2024-03-18 RX ORDER — QUETIAPINE FUMARATE 25 MG/1
50 TABLET, FILM COATED ORAL NIGHTLY
Status: DISCONTINUED | OUTPATIENT
Start: 2024-03-18 | End: 2024-03-20

## 2024-03-18 RX ORDER — FLUTICASONE PROPIONATE 50 MCG
2 SPRAY, SUSPENSION (ML) NASAL DAILY
Status: DISCONTINUED | OUTPATIENT
Start: 2024-03-19 | End: 2024-03-20

## 2024-03-18 RX ORDER — DEXTROSE MONOHYDRATE 25 G/50ML
50 INJECTION, SOLUTION INTRAVENOUS
Status: DISCONTINUED | OUTPATIENT
Start: 2024-03-18 | End: 2024-03-20

## 2024-03-18 RX ORDER — NICOTINE POLACRILEX 4 MG
30 LOZENGE BUCCAL
Status: DISCONTINUED | OUTPATIENT
Start: 2024-03-18 | End: 2024-03-20

## 2024-03-18 RX ORDER — MESALAMINE 400 MG/1
1200 CAPSULE, DELAYED RELEASE ORAL
Status: DISCONTINUED | OUTPATIENT
Start: 2024-03-18 | End: 2024-03-20

## 2024-03-18 RX ORDER — PANTOPRAZOLE SODIUM 40 MG/1
40 TABLET, DELAYED RELEASE ORAL
Status: DISCONTINUED | OUTPATIENT
Start: 2024-03-18 | End: 2024-03-20

## 2024-03-18 RX ORDER — FLUTICASONE FUROATE AND VILANTEROL 200; 25 UG/1; UG/1
1 POWDER RESPIRATORY (INHALATION) DAILY
Status: DISCONTINUED | OUTPATIENT
Start: 2024-03-18 | End: 2024-03-20

## 2024-03-18 RX ORDER — POTASSIUM CHLORIDE 1.5 G/1.58G
40 POWDER, FOR SOLUTION ORAL ONCE
Status: COMPLETED | OUTPATIENT
Start: 2024-03-18 | End: 2024-03-18

## 2024-03-18 NOTE — CONSULTS
[facility]  Report of Psychiatric Consultation    Marylin Walker Patient Status:  Inpatient    1970 MRN [id number]   Location [facility] 3NE-A Attending Steve Cerrato MD   Hosp Day # 0 PCP Yoel Servin MD     Date of Admission: 3/17/2024  Date of Consult: 3/18/2024   Reason for Consultation: Lithium toxicity    Impression: 53 year old female presents with multiple falls, dizziness, and altered mental status. She was found to have Lithium level of 2.4 and Ammonia level of 45    Primary Psychiatric Diagnosis:  Acute encephalopathy/delirium due to lithium toxicity and elevated ammonia levels. Per chart review, patient has a long history of delirium. Psych team has previously encouraged evaluation for major neurocognitive testing outside of the testing.     Bipolar II disorder, reports mood stable. Unable to recall last manic episode.    Cognitive disorder, unspecified. 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      Personality Traits:  Deferred     Pertinent Medical Diagnoses:  Lithium toxicity. 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     Recommendations:  Continue to hold Lithium. Can consider restarting tomorrow at lower dose. Lithium level down to 1.5 this AM.  Lithium level ordered for tomorrow.  Continue all other home medications  Wellbutrin  mg daily  Seroquel 50 mg nightly  Klonopin 0.25 mg TID PRN for anxiety  Zoloft 50 mg in morning and 100 mg at night  Anticonvulsants to be reordered by Hospitalist.  Urine drug screen ordered. Previous cannabis use.    YELENA Reynolds NP-C    History of Present Illness:  Patient presented to [facility] on 3/17/2024 with reports of multiple falls and dizziness. She was noted to be occasionally answering inappropriately to questions asked of her. Lithium level was noted to be 2.4 and Ammonia level at 45. Kidney function was within normal limits. No  electrolyte abnormalities. Psychiatry was consulted due to lithium toxicity.    Patient was noted to be sitting in chair eating breakfast. She reports that she was unsure what occurred besides feeling \"really out of it\". Noted that patient is a poor historian and has history of delirium. She reports that she is unsure if she was taking additional lithium than what is prescribed. Replies \"it is possible\". Lithium levels have previously been stable on same dosing. She denies changes in her hydration statues. Kidney function is noted to be within normal range and close to patient's baseline. Denies change in appetite. Discussed that she is the one that administers her medications to self and is also the one that sets up her weekly pill box. She denies intentionally taking extra lithium. Denies SI. Denies HI. Denies any recent change in mood. Reports seeing psychotherapist every other week. Denies any recent medication changes.     Past Psychiatric History:  1) Bipolar 2 disorder per psych notes from [facility]. She saw her 1st psychotherapist at age 6 and was first hospitalized for depression at age 13. She has a hx of 4 suicide attempts by OD as a teenager. She has a hx of 10+ inpatient psych hospitalizations. In 2003, she was dx with Bipolar 2 disorder and with med changes has not needed the inpatient psych unit since then. Her psychiatrist Dr. Seferino Fernandez and psychotherapist Ofelia Wells are both at [facility]. See Care Everywhere.     Substance Use History:  Previous cannabis use. Denies current. Will order drug screen.     Psych Family History:  Denies.     Social and Developmental History:   . She lives with her older son at this time. 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 Currently on disability.     Past Medical History:   Diagnosis Date    Acne     Allergic rhinitis     Alopecia areata 2015    Anxiety     Asthma (HCC)     Back problem     Bipolar affective (HCC)     Calculus of kidney  10/2020    Depression     Fibromyalgia     GERD (gastroesophageal reflux disease)     Gestational diabetes ([facility])     Hemorrhoids     Hypothyroidism     Migraines     Organic hypersomnia, unspecified PSG 6-29-14    AHI 2 RDI 2 REM AHI 2 SaO2 doroteo 88 %    Osteoarthritis     Pneumonia due to organism     Scoliosis     Seizure disorder ([facility]) 10/2020    absent seizures    Stroke ([facility])     2003+ mild R.sided weakness    Ulcerative colitis ([facility])     Visual impairment      Past Surgical History:   Procedure Laterality Date    CHOLECYSTECTOMY  03/2010    COLONOSCOPY  2013    ulcerative colitis    COLONOSCOPY N/A 04/25/2017    Procedure: COLONOSCOPY;  Surgeon: Hipolito Dobbins MD;  Location:  ENDOSCOPY    COLONOSCOPY N/A 06/14/2021    Procedure: COLONOSCOPY with biopsy,;  Surgeon: Ramsey Muhammad MD;  Location:  ENDOSCOPY    COLPOSCOPY, CERVIX W/UPPER ADJACENT VAGINA; W/BIOPSY(S), CERVIX  1999    HERNIA SURGERY  1997    Umbilical Hernia Repair    OTHER SURGICAL HISTORY N/A 06/21/2016    Procedure: ESOPHAGOGASTRODUODENOSCOPY (EGD);  Surgeon: Jeff Scherer MD;  Location:  ENDOSCOPY    PARAGARD, IUD  06/2014    UPPER GI ENDOSCOPY,EXAM       Family History   Problem Relation Age of Onset    Breast Cancer Maternal Grandmother         81    Breast Cancer Other         dx post menopausal    High Blood Pressure Father     Colon Cancer Father         59    Colon Polyps Father     Hypertension Father     High Blood Pressure Mother     High Cholesterol Mother     Hypertension Mother     Mental Disorder Mother         Depression    Heart Disorder Sister 54        MI - LAD stenting    Heart Attack Sister         12/2018 \" maker\"    Mental Disorder Son         ADHD, Bipolar II    Mental Disorder Son         Severe anxiety, Bipolar II      reports that she quit smoking about 21 years ago. Her smoking use included cigarettes. She has never used smokeless tobacco. She reports that she does not currently use alcohol.  She reports current drug use. Frequency: 2.00 times per week. Drug: Cannabis.    Allergies:  Allergies   Allergen Reactions    Depakote [Valproic Acid] OTHER (SEE COMMENTS)     Suicidal ideation    Kdc:Olanzapine SWELLING    Ketorolac Tromethamine OTHER (SEE COMMENTS) and NAUSEA AND VOMITING     gastritis  Gastritis & UC  gastritis  Gastritis & UC      Latex RASH and Dyspnea    Molds & Smuts ASTHMA, ITCHING, RASH, SHORTNESS OF BREATH, WHEEZING and OTHER (SEE COMMENTS)    Nsaids OTHER (SEE COMMENTS)     Must take Nsaids sparingly due to history of gastritis  Must take Nsaids sparingly due to history of gastritis      Olanzapine SWELLING     Caused severe swelling and bruising    Penicillins NAUSEA AND VOMITING    Phenothiazines OTHER (SEE COMMENTS)     Extrapyramidal syndrome. No phenothiazines per neurology (documented during 1/19/18 Dr. Velázquez)  Extrapyramidal syndrome. No phenothiazines per neurology (documented during 1/19/18 Dr. Velázquez)  Extrapyramidal syndrome. No phenothiazines per neurology (documented during 1/19/18 Dr. Velázquez)    Amoxicillin NAUSEA AND VOMITING    Levofloxacin PAIN and OTHER (SEE COMMENTS)     Tendinitis in multiple joints  Tendinitis in multiple joints    Mushrooms NAUSEA AND VOMITING    Penicillin G Potassium NAUSEA AND VOMITING    Phenytoin ANXIETY, CONFUSION, DIZZINESS and HALLUCINATION    Sulfa Antibiotics NAUSEA AND VOMITING    Toradol NAUSEA AND VOMITING     Gastritis & UC    Trees, Cullen OTHER (SEE COMMENTS)     Sneezing watery eyes    Triptans HALLUCINATION     Lesions in brain    Erythromycin OTHER (SEE COMMENTS)       Medications:    Current Facility-Administered Medications:     glucose (Dex4) 15 GM/59ML oral liquid 15 g, 15 g, Oral, Q15 Min PRN **OR** glucose (Glutose) 40% oral gel 15 g, 15 g, Oral, Q15 Min PRN **OR** glucose-vitamin C (Dex-4) chewable tab 4 tablet, 4 tablet, Oral, Q15 Min PRN **OR** dextrose 50% injection 50 mL, 50 mL, Intravenous, Q15 Min PRN **OR**  glucose (Dex4) 15 GM/59ML oral liquid 30 g, 30 g, Oral, Q15 Min PRN **OR** glucose (Glutose) 40% oral gel 30 g, 30 g, Oral, Q15 Min PRN **OR** glucose-vitamin C (Dex-4) chewable tab 8 tablet, 8 tablet, Oral, Q15 Min PRN    acetaminophen (Tylenol) tab 650 mg, 650 mg, Oral, Q6H PRN    albuterol (Ventolin HFA) 108 (90 Base) MCG/ACT inhaler 2 puff, 2 puff, Inhalation, Q6H PRN    aspirin chewable tab 81 mg, 81 mg, Oral, Daily    sodium chloride 0.9% infusion, , Intravenous, Continuous    heparin (Porcine) 5000 UNIT/ML injection 5,000 Units, 5,000 Units, Subcutaneous, Q8H DUNIA    melatonin tab 3 mg, 3 mg, Oral, Nightly PRN    polyethylene glycol (PEG 3350) (Miralax) 17 g oral packet 17 g, 17 g, Oral, Daily PRN    sennosides (Senokot) tab 17.2 mg, 17.2 mg, Oral, Nightly PRN    bisacodyl (Dulcolax) 10 MG rectal suppository 10 mg, 10 mg, Rectal, Daily PRN    ondansetron (Zofran) 4 MG/2ML injection 4 mg, 4 mg, Intravenous, Q6H PRN    Review of Systems   Constitutional:  Positive for malaise/fatigue.   HENT: Negative.     Eyes: Negative.    Respiratory: Negative.     Cardiovascular: Negative.    Gastrointestinal: Negative.    Genitourinary: Negative.    Musculoskeletal:  Positive for falls.   Skin: Negative.    Neurological:  Positive for dizziness and weakness.   Endo/Heme/Allergies: Negative.    Psychiatric/Behavioral: Negative.       Mental Status Exam:   Risk Assessment  Suicidal ideation: no suicidal ideation  Homicidal ideation: None noted    Appearance: unremarkable  Behavior: unremarkable  Attitude: cooperative and consistent  Gait: Normal, walked with PT    Speech: normal rate, rhythm and volume    Mood: euthymic  Affect: Congruent    Thought process: logical and sequential  Thought content: no delusions, obsessions, or other thought abnormalities  Perceptions: no hallucinations  Associations: Intact    Orientation: Alert and oriented x 4  Attention and Concentration:   fair  Memory:  intact immediate, recent,  remote  Language: Intact naming and repetition  Fund of Knowledge: Able to recite name of [place] president    Insight: Fair   Judgment: Fair     Objective    Vitals:    03/18/24 1136   BP: (!) 162/88   Pulse: 85   Resp: 14   Temp: 98 °F (36.7 °C)     Patient Strengths/Assets:  Caring     Suicide Risk Assessments:  Overall level of suicide risk is low     Risk Factors: history of suicide attempts     Environmental Factors: lives at home with son    Protective Factors: family    Laboratory Data:  Lab Results   Component Value Date    WBC 6.3 03/18/2024    HGB 11.5 03/18/2024    HCT 35.2 03/18/2024    .0 03/18/2024    CREATSERUM 0.60 03/18/2024    BUN 6 03/18/2024     03/18/2024    K 3.4 03/18/2024     03/18/2024    CO2 24.0 03/18/2024    GLU 88 03/18/2024    CA 8.8 03/18/2024    ALB 3.7 03/17/2024    ALKPHO 82 03/17/2024    BILT 0.4 03/17/2024    TP 7.1 03/17/2024    AST 19 03/17/2024    ALT 20 03/17/2024    T4F 1.0 03/17/2024    TSH 0.171 03/17/2024    MG 2.0 03/18/2024    PGLU 87 03/18/2024       STUDIES:    Micaela KIRK NPRoqueC

## 2024-03-18 NOTE — PROGRESS NOTES
NURSING ADMISSION NOTE      Patient admitted via Cart  Oriented to room.  Safety precautions initiated.  Bed in low position.  Call light in reach.      Assumed pt care at 1930  Aox4, RA, VSS  Tele-NSR  Seizure and aspiration precautions in place  Reported no pain; no n/v/d  Noncardiac electrolyte replacement protocol  Regular diet  Ambulatory-assistx1  Continent  Qid accu check  Updated with plan of care  All needs met at this time  Will continue plan of care

## 2024-03-18 NOTE — ED INITIAL ASSESSMENT (HPI)
Patient arrives via EMS with c/o dizziness and weakness. Seen here 2 days ago for the same symptoms but was discharged home. TBI few years ago with generalized weakness as deficit. Pt states \"I'm outwardly not well\"

## 2024-03-18 NOTE — CONSULTS
[facility]   NEUROLOGY   CONSULT NOTE    Admission date: 3/17/2024  Reason for Consult: Altered mental status.  Chief Complaint:   Chief Complaint   Patient presents with    Dizziness   ________________________________________________________________    History     History of Presenting Illness  53 year old female with history of traumatic brain injury, anxiety/depression, fibromyalgia, migraine, seizures, bipolar affective disorder, dizziness, weakness, somnolence, seen in the emergency department last night and was diagnosed with gastroenteritis and viral syndrome.  Patient when seen was awake, answering questions appropriately, slightly dysarthric at times with occasional word finding difficulties, showing some signs of tremors.  Denies any new weakness, numbness, paresthesias.  Still complains of increasing drowsiness.  No recent seizures.  Patient was told that she had toxic lithium level and other symptoms are because of that.    History obtained from patient and chart review.    Past Medical History:   Diagnosis Date    Acne     Allergic rhinitis     Alopecia areata 2015    Anxiety     Asthma ([facility])     Back problem     Bipolar affective ([facility])     Calculus of kidney 10/2020    Depression     Fibromyalgia     GERD (gastroesophageal reflux disease)     Gestational diabetes ([facility])     Hemorrhoids     Hypothyroidism     Migraines     Organic hypersomnia, unspecified PSG 6-29-14    AHI 2 RDI 2 REM AHI 2 SaO2 doroteo 88 %    Osteoarthritis     Pneumonia due to organism     Scoliosis     Seizure disorder ([facility]) 10/2020    absent seizures    Stroke ([facility])     2003+ mild R.sided weakness    Ulcerative colitis ([facility])     Visual impairment      Past Surgical History:   Procedure Laterality Date    CHOLECYSTECTOMY  03/2010    COLONOSCOPY  2013    ulcerative colitis    COLONOSCOPY N/A 04/25/2017    Procedure: COLONOSCOPY;  Surgeon: Hipolito Dobbins MD;  Location:  ENDOSCOPY    COLONOSCOPY N/A 06/14/2021     Procedure: COLONOSCOPY with biopsy,;  Surgeon: Ramsey Muhammad MD;  Location:  ENDOSCOPY    COLPOSCOPY, CERVIX W/UPPER ADJACENT VAGINA; W/BIOPSY(S), CERVIX      HERNIA SURGERY      Umbilical Hernia Repair    OTHER SURGICAL HISTORY N/A 2016    Procedure: ESOPHAGOGASTRODUODENOSCOPY (EGD);  Surgeon: Jeff Scherer MD;  Location:  ENDOSCOPY    [facility]  2014    UPPER GI ENDOSCOPY,EXAM       Social History     Socioeconomic History    Marital status:    Tobacco Use    Smoking status: Former     Packs/day: 0.00     Years: 10.00     Additional pack years: 0.00     Total pack years: 0.00     Types: Cigarettes     Quit date: 2003     Years since quittin.0    Smokeless tobacco: Never    Tobacco comments:     Ages [age], [age]   Vaping Use    Vaping Use: Some days    Substances: THC, CBD, Flavoring, medical marijuana    Devices: Disposable, Refillable tank   Substance and Sexual Activity    Alcohol use: Not Currently    Drug use: Yes     Frequency: 2.0 times per week     Types: Cannabis     Comment: Medical Cannabis    Sexual activity: Not Currently     Partners: Male     Social Determinants of Health     Food Insecurity: No Food Insecurity (3/18/2024)    Food Insecurity     Food Insecurity: Never true   Transportation Needs: No Transportation Needs (3/18/2024)    Transportation Needs     Lack of Transportation: No   Housing Stability: Low Risk  (3/18/2024)    Housing Stability     Housing Instability: No     Family History   Problem Relation Age of Onset    Breast Cancer Maternal Grandmother         81    Breast Cancer Other         dx post menopausal    High Blood Pressure Father     Colon Cancer Father         59    Colon Polyps Father     Hypertension Father     High Blood Pressure Mother     High Cholesterol Mother     Hypertension Mother     Mental Disorder Mother         Depression    Heart Disorder Sister 54        MI - LAD stenting    Heart Attack Sister          12/2018 \" maker\"    Mental Disorder Son         ADHD, Bipolar II    Mental Disorder Son         Severe anxiety, Bipolar II     Allergies   Allergies   Allergen Reactions    Depakote [Valproic Acid] OTHER (SEE COMMENTS)     Suicidal ideation    Kdc:Olanzapine SWELLING    Ketorolac Tromethamine OTHER (SEE COMMENTS) and NAUSEA AND VOMITING     gastritis  Gastritis & UC  gastritis  Gastritis & UC      Latex RASH and Dyspnea    Molds & Smuts ASTHMA, ITCHING, RASH, SHORTNESS OF BREATH, WHEEZING and OTHER (SEE COMMENTS)    Nsaids OTHER (SEE COMMENTS)     Must take Nsaids sparingly due to history of gastritis  Must take Nsaids sparingly due to history of gastritis      Olanzapine SWELLING     Caused severe swelling and bruising    Penicillins NAUSEA AND VOMITING    Phenothiazines OTHER (SEE COMMENTS)     Extrapyramidal syndrome. No phenothiazines per neurology (documented during 1/19/18 Dr. Velázquez)  Extrapyramidal syndrome. No phenothiazines per neurology (documented during 1/19/18 Dr. Velázquez)  Extrapyramidal syndrome. No phenothiazines per neurology (documented during 1/19/18 Dr. Velázquez)    Amoxicillin NAUSEA AND VOMITING    Levofloxacin PAIN and OTHER (SEE COMMENTS)     Tendinitis in multiple joints  Tendinitis in multiple joints    Mushrooms NAUSEA AND VOMITING    Penicillin G Potassium NAUSEA AND VOMITING    Phenytoin ANXIETY, CONFUSION, DIZZINESS and HALLUCINATION    Sulfa Antibiotics NAUSEA AND VOMITING    Toradol NAUSEA AND VOMITING     Gastritis & UC    [place] OTHER (SEE COMMENTS)     Sneezing watery eyes    Triptans HALLUCINATION     Lesions in brain    Erythromycin OTHER (SEE COMMENTS)       Home Meds  Current Outpatient Medications   Medication Instructions    acetaminophen (TYLENOL) 325 mg, Oral, Every 6 hours PRN    albuterol (VENTOLIN) 2.5 mg, Nebulization, Every 6 hours PRN    albuterol 108 (90 Base) MCG/ACT Inhalation Aero Soln 2 puffs, Inhalation, Every 6 hours PRN    aspirin 81 mg, Oral,  Daily    buPROPion ER (WELLBUTRIN XL) 150 mg, Oral, Every morning    CARISOPRODOL 350 MG Oral Tab TAKE 1 TABLET(350 MG) BY MOUTH THREE TIMES DAILY AS NEEDED FOR MUSCLE SPASMS    clonazePAM (KLONOPIN) 0.25 mg, Oral, 3 times daily    Estradiol 10 MCG Vaginal Tab Vaginal, Twice weekly, AS NEEDED    FLUTICASONE PROPIONATE 50 MCG/ACT Nasal Suspension SHAKE LIQUID AND USE 2 SPRAYS IN EACH NOSTRIL DAILY    fluticasone-salmeterol (ADVAIR DISKUS) 250-50 MCG/DOSE Inhalation Aerosol Powder, Breath Activated INHALE 1 PUFF INTO THE LUNGS EVERY 12 HOURS    HYDROmorphone (DILAUDID) 2 mg, Oral, Every 6 hours PRN    Ketoconazole 2 % External Shampoo Use to wash scalp 2-3 times per week.    lamoTRIgine (LAMICTAL) 200 mg, Oral, 3 times daily    levETIRAcetam (KEPPRA) 500 mg, Oral, 2 times daily    LEVOCETIRIZINE 5 MG Oral Tab TAKE 1 TABLET(5 MG) BY MOUTH EVERY EVENING    levothyroxine (SYNTHROID) 125 mcg, Oral, Daily    lithium ER (ESKALITH) 450 mg, Oral, Nightly    lithium ER (LITHOBID) 300 mg, Oral, Nightly, TAKE 1 TABLET BY MOUTH EVERY NIGHT. SWALLOW WHOLE. TAKE WITH 450 MG TABLET  MG TOTAL    meclizine (ANTIVERT) 12.5 mg, Oral, 3 times daily PRN    MESALAMINE  MG Oral Capsule Delayed Release TAKE 3 CAPSULES BY MOUTH TWICE DAILY BEFORE MEALS    METOPROLOL TARTRATE 25 MG Oral Tab TAKE 1/2 TABLET BY MOUTH DAILY    MONTELUKAST 10 MG Oral Tab TAKE 1 TABLET(10 MG) BY MOUTH EVERY NIGHT    Multiple Vitamins-Minerals (QC WOMENS DAILY MULTIVITAMIN) Oral Tab 1 tablet, Oral, Daily    Nebulizers (VIOS AEROSOL DELIVERY SYSTEM) Does not apply Misc USE WITH ALBUTEROL AS NEEDED    ondansetron (ZOFRAN-ODT) 8 mg, Oral, Every 8 hours PRN    ondansetron (ZOFRAN-ODT) 8 mg, Oral, Every 4 hours PRN    OXCARBAZEPINE 300 MG Oral Tab TAKE 1 AND 1/2 TABLETS(450 MG) BY MOUTH TWICE DAILY    pantoprazole (PROTONIX) 40 mg, Oral, Before breakfast    pregabalin (LYRICA) 200 mg, Oral, 2 times daily    QUEtiapine (SEROQUEL) 200 mg, Oral, Nightly     QUEtiapine (SEROQUEL) 50 mg, Oral, Nightly    sertraline (ZOLOFT) 100 mg, Oral, 2 times daily, 50mg in AM, 100mg in PM      Scheduled Meds:   aspirin  81 mg Oral Daily    heparin  5,000 Units Subcutaneous Q8H DUNIA     Continuous Infusions:   sodium chloride 100 mL/hr at 03/18/24 0112     PRN Meds:glucose **OR** glucose **OR** glucose-vitamin C **OR** dextrose **OR** glucose **OR** glucose **OR** glucose-vitamin C, acetaminophen, albuterol, melatonin, polyethylene glycol (PEG 3350), sennosides, bisacodyl, ondansetron    OBJECTIVE   VITAL SIGNS:   Temp:  [97.2 °F (36.2 °C)-98.2 °F (36.8 °C)] 98 °F (36.7 °C)  Pulse:  [65-85] 85  Resp:  [11-16] 14  BP: (131-172)/() 162/88  SpO2:  [96 %-100 %] 98 %    PHYSICAL EXAM:    NEUROLOGIC:    Mental Status:  A&O x 4, Follows simple commands, mild dysarthria, occasional word finding difficulties.  Attention and short-term memory reasonably normal.  Cranial nerves: PERRL.  Visual fields full.  EOMI.  Face symmetric with normal movement bilaterally.  Hearing grossly intact. Tongue midline with normal movements.   Motor: Drift:  Absent; Motor exam is 5 out of 5 in all extremities bilaterally.  Mild tremor without cogwheeling noted.  Sensation: Intact to light touch bilaterally  Cerebellar: Normal Finger-To-Nose test      LABORATORY DATA:  Last 24 hour labs were reviewed in detail.  Recent Labs   Lab 03/15/24  2112 03/17/24  2012 03/18/24  0720    140 143   K 4.0 4.3 3.4*   * 111 116*   CO2 30.0 29.0 24.0   * 84 88   BUN 7* 8* 6*   CREATSERUM 0.94 0.92 0.60     Recent Labs   Lab 03/15/24  2112 03/17/24  2012 03/18/24  0720   WBC 7.2 7.1 6.3   HGB 13.8 13.3 11.5*   .0 261.0 233.0     Recent Labs   Lab 03/15/24  2112 03/17/24  2012   ALT 20 20   AST 12* 19     Recent Labs   Lab 03/17/24 2012 03/18/24  0720   MG 2.3 2.0     Last A1c value was 5% done 9/30/2018.     Radiology:    CT BRAIN OR HEAD (70911)    Result Date: 3/17/2024  CONCLUSION:  No CT  evidence for acute intracranial process.   LOCATION:  [facility]   Dictated by (CST): Bev Holguin MD on 3/17/2024 at 10:24 PM     Finalized by (CST): Bev Holguin MD on 3/17/2024 at 10:26 PM       XR CHEST AP PORTABLE  (CPT=71045)    Result Date: 3/17/2024  CONCLUSION:  Elevation of the right hemidiaphragm.  There is infiltrate/atelectasis in the right lung base.  Atelectasis left base.  Normal heart size and pulmonary vascularity.   LOCATION:  [facility]      Dictated by (CST): Bev Holguin MD on 3/17/2024 at 9:45 PM     Finalized by (CST): Bev Holguin MD on 3/17/2024 at 9:46 PM      ASSESSMENT/PLAN   53 year old female with:    Encephalopathy, most likely toxic/metabolic, complicated by neuropsychiatric illness.  Will advise EEG, ammonia level.  Patient had a CT scan of the head done yesterday which was unremarkable for acute abnormalities.  Patient was counseled.  Toxic lithium level.  Most recent level 1.5.  Side effects including confusion, dry mouth, decreased memory, headache, weakness and muscle twitching is common with lithium toxicity.  Medication dose being adjusted.  Will follow-up after testing.    Principal Problem:    Lithium toxicity, accidental or unintentional, initial encounter  Active Problems:    Weakness generalized       Geo Perdomo MD  Neurohospitalist  [facility]    Disclaimer: This record was dictated using Dragon software. There may be errors due to voice recognition problems that were not realized and corrected during the completion of the note.

## 2024-03-18 NOTE — ED PROVIDER NOTES
Patient Seen in: [facility] Emergency Department      History     Chief Complaint   Patient presents with    Dizziness     Stated Complaint: dizzy/weak    Subjective:   HPI    This is a 53-year-old female who arrives with complaints of dizziness and generalized weakness.  The patient was seen 2 days ago for similar symptoms.  Diagnosed with gastroenteritis at that time she had some nausea and vomiting.  The patient has stated that she just feels generalized weakness she just feels like she is can get up.  She denies any one-sided weakness she denies any chest pain, she denies any shortness of breath.  She denies any headache she does state that she just feels weak and tired sometimes she has some word finding she says which she says is In the refer she thinks its mostly from her dry mouth.  The patient denies any chest pain, shortness of breath she does have history of fibromyalgia..  The patient denies any abdominal pain diarrhea she states occasionally she will have a little nausea she has a history of ulcerative colitis she states that she has a history of absence seizure's bipolar fibromyalgia gastroesophageal Relugolix disease, gestational diabetes.    Objective:   Past Medical History:   Diagnosis Date    Acne     Allergic rhinitis     Alopecia areata 2015    Anxiety     Asthma ([facility])     Bipolar affective ([facility])     Calculus of kidney 10/2020    Depression     Fibromyalgia     GERD (gastroesophageal reflux disease)     Gestational diabetes ([facility])     Hemorrhoids     Hypothyroidism     Migraines     Organic hypersomnia, unspecified PSG 6-29-14    AHI 2 RDI 2 REM AHI 2 SaO2 doroteo 88 %    Osteoarthritis     Scoliosis     Seizure disorder ([facility]) 10/2020    absent seizures    Stroke ([facility])     2003+ mild R.sided weakness    Ulcerative colitis ([facility])               Past Surgical History:   Procedure Laterality Date    CHOLECYSTECTOMY  3/10    COLONOSCOPY  2013    ulcerative colitis    COLONOSCOPY N/A 4/25/2017     Procedure: COLONOSCOPY;  Surgeon: Hipolito Dobbins MD;  Location:  ENDOSCOPY    COLONOSCOPY N/A 2021    Procedure: COLONOSCOPY with biopsy,;  Surgeon: Ramsey Muhammad MD;  Location:  ENDOSCOPY    COLPOSCOPY, CERVIX W/UPPER ADJACENT VAGINA; W/BIOPSY(S), CERVIX      HERNIA SURGERY      Umbilical Hernia Repair    OTHER SURGICAL HISTORY N/A 2016    Procedure: ESOPHAGOGASTRODUODENOSCOPY (EGD);  Surgeon: Jeff Scherer MD;  Location:  ENDOSCOPY    PARAGARD, IUD  2014                Social History     Socioeconomic History    Marital status:    Tobacco Use    Smoking status: Former     Packs/day: 0.00     Years: 10.00     Additional pack years: 0.00     Total pack years: 0.00     Types: Cigarettes     Quit date: 2003     Years since quittin.0    Smokeless tobacco: Never    Tobacco comments:     Ages [age], [age]   Vaping Use    Vaping Use: Some days    Substances: THC, CBD, Flavoring, medical marijuana    Devices: Disposable, Refillable tank   Substance and Sexual Activity    Alcohol use: Not Currently    Drug use: Yes     Frequency: 2.0 times per week     Types: Cannabis     Comment: Medical Cannabis    Sexual activity: Not Currently     Partners: Male     Social Determinants of Health     Food Insecurity: No Food Insecurity (2024)    Food Insecurity     Food Insecurity: Never true   Transportation Needs: No Transportation Needs (2024)    Transportation Needs     Lack of Transportation: No   Housing Stability: Low Risk  (2024)    Housing Stability     Housing Instability: No              Review of Systems    Positive for stated complaint: dizzy/weak  Other systems are as noted in HPI.  Constitutional and vital signs reviewed.      All other systems reviewed and negative except as noted above.    Physical Exam     ED Triage Vitals [24]   BP (!) 164/102   Pulse 80   Resp 14   Temp 98.2 °F (36.8 °C)   Temp src Oral   SpO2 98 %   O2 Device None  (Room air)       Current:/80   Pulse 70   Temp 98.2 °F (36.8 °C) (Oral)   Resp 13   LMP 02/20/2015 (Approximate)   SpO2 98%         Physical Exam    General: .    The patient is in no respiratory distress. The patient is not septic or toxic    HEENT: Atraumatic, conjunctiva are not pale.  There is no icterus.  Oral mucosa Is wet.  No facial trauma.  The neck is supple.    LUNGS: Clear to auscultation, there is no wheezing or retraction.  No crackles.    CV: Cardiovascular is regular without murmurs or rubs.    ABD: The abdomen is soft nondistended nontender.  There is no rebound.  There is no guarding.    EXT: There is good pulses bilaterally.  There is no calf tenderness.  There is no rash noted.  There is no edema    NEURO: Alert and oriented x3.  Muscle strength and sensory exam is grossly normal.  And the patient is neurologically intact with no focal findings.    Strength is grossly intact muscle strength 5 out of 5 since exam is normal patient is able to move any of her extremities.  There is no pronator drift.  ED Course     Labs Reviewed   COMP METABOLIC PANEL (14) - Abnormal; Notable for the following components:       Result Value    BUN 8 (*)     All other components within normal limits   URINALYSIS WITH CULTURE REFLEX - Abnormal; Notable for the following components:    Leukocyte Esterase Urine 250 (*)     Bacteria Urine Rare (*)     Squamous Epi. Cells Few (*)     All other components within normal limits   TSH W REFLEX TO FREE T4 - Abnormal; Notable for the following components:    TSH 0.171 (*)     All other components within normal limits   LITHIUM (ESKALITH) - Abnormal; Notable for the following components:    Lithium 2.4 (*)     All other components within normal limits   VENOUS BLOOD GAS - Abnormal; Notable for the following components:    Venous pCO2 54 (*)     Venous pO2 27 (*)     Venous HCO3 26.1 (*)     Venous O2Hb 35.5 (*)     All other components within normal limits   POCT  GLUCOSE - Abnormal; Notable for the following components:    POC Glucose 519 (*)     All other components within normal limits   POCT GLUCOSE - Abnormal; Notable for the following components:    POC Glucose 51 (*)     All other components within normal limits   POCT GLUCOSE - Abnormal; Notable for the following components:    POC Glucose 61 (*)     All other components within normal limits   CBC W/ DIFFERENTIAL - Abnormal; Notable for the following components:    MCHC 30.9 (*)     All other components within normal limits   TROPONIN I HIGH SENSITIVITY - Normal   MAGNESIUM - Normal   ACETONE - Normal   T4, FREE (S) - Normal   FREE T3 (TRIIODOTHYRONINE) - Normal   POCT GLUCOSE - Normal   CBC WITH DIFFERENTIAL WITH PLATELET    Narrative:     The following orders were created for panel order CBC With Differential With Platelet.  Procedure                               Abnormality         Status                     ---------                               -----------         ------                     CBC W/ DIFFERENTIAL[[id number]]          Abnormal            Final result                 Please view results for these tests on the individual orders.   URINE CULTURE, ROUTINE     EKG                 The patient was placed on monitors, IV was started, blood was drawn.       Workup was done to rule out electrolyte imbalance, thyroid abnormalities lithium abnormalities, and any intracranial bleed, and or stroke.  [facility],  [facility]        Admission disposition: 3/17/2024 11:00 PM         The EKG shows normal sinus rhythm.  There is no acute ST elevations or there is findings of T wave inversions in V1 to V3.  The rest of the EKG including rate rhythm axis and intervals I agree with the EKG report . The rate is 73      No old EKG from March 15 there is no significant changes        Compared to an EKG from February 17 the T wave inversion seems a little bit more prominent.            Patient's lithium level was noted to be elevated  acetone was negative the Accu-Chek did show was extremely elevated when she first arrived here 500.  Patient did get a dose of insulin.  But the repeat Accu-Cheks were all within normal limits and the official blood test were not elevated.  The first 1 may have been an artifact.  But she was given glucose.  Here and oral fluids and she tolerated without any problems her blood sugar had not gone down since then.  Magnesium was normal.  The patient's TSH was slightly low but the free T4 was normal.  Comprehensive was grossly within normal limits CBC was normal, troponin was negative, VBG was not elevated.  Urinalysis did show trace of leukocytes but no white cells or significant white cells.    Patient was given IV fluids    The patient was reexamined.  She has no focal findings.    The patient's troponin was negative.       I personally reviewed the radiographs and my individual interpretation shows  Slightly elevated hemidiaphragm.  No significant change from old chest x-ray.  CT of the brain shows no bleed or tumor.  Also reviewed official report and it shows  CT BRAIN OR HEAD ([id number])    Result Date: 3/17/2024  PROCEDURE:  CT BRAIN OR HEAD ([id number])  COMPARISON:  [facility] , CT, CT BRAIN OR HEAD ([id number]), 8/12/2023, 10:15 PM.  [facility] , CT, CT BRAIN OR HEAD ([id number]), 3/15/2024, 10:25 PM.  INDICATIONS:  dizzy/weak  TECHNIQUE:  Noncontrast CT scanning is performed through the brain. Dose reduction techniques were used. Dose information is transmitted to the ACR (American College of Radiology) NRDR (National Radiology Data Registry) which includes the Dose Index Registry.  PATIENT STATED HISTORY: (As transcribed by Technologist)  Dizziness,headache, bilateral weakness, unable to ambulate. History of seizures.    FINDINGS:   VENTRICLES/SULCI:   Ventricles and sulci are normal in size.   INTRACRANIAL:  There are no abnormal extraaxial fluid collections.  There is no midline shift. No evidence of acute intracranial hemorrhage.   SINUSES:  No significant inflammatory changes.  MASTOIDS:  The mastoids are clear.  SKULL:  No depressed calvarial fracture.            CONCLUSION:  No CT evidence for acute intracranial process.   LOCATION:  [facility]   Dictated by (CST): Bev Holguin MD on 3/17/2024 at 10:24 PM     Finalized by (CST): Bve Holguin MD on 3/17/2024 at 10:26 PM       XR CHEST AP PORTABLE  (CPT=71045)    Result Date: 3/17/2024  PROCEDURE:  XR CHEST AP PORTABLE  (CPT=71045)  TECHNIQUE:  AP chest radiograph was obtained.  COMPARISON:  [facility] , XR, XR CHEST AP PORTABLE  (CPT=71045), 2/17/2024, 2:27 PM.  INDICATIONS:  dizzy/weak  PATIENT STATED HISTORY: (As transcribed by Technologist)   dizzy/weak              CONCLUSION:  Elevation of the right hemidiaphragm.  There is infiltrate/atelectasis in the right lung base.  Atelectasis left base.  Normal heart size and pulmonary vascularity.   LOCATION:  [facility]      Dictated by (CST): Bev Holguin MD on 3/17/2024 at 9:45 PM     Finalized by (CST): Bev Holguin MD on 3/17/2024 at 9:46 PM       CT BRAIN OR HEAD ([id number])    Result Date: 3/15/2024  PROCEDURE:  CT BRAIN OR HEAD ([id number])  COMPARISON:  [facility] , CT, CT BRAIN OR HEAD ([id number]), 8/12/2023, 10:15 PM.  INDICATIONS:  Flu-like Sx x 3 wks; N/V - Zofran ODT given by EMS  TECHNIQUE:  Noncontrast CT scanning is performed through the brain. Dose reduction techniques were used. Dose information is transmitted to the ACR (American College of Radiology) NRDR (National Radiology Data Registry) which includes the Dose Index Registry.  PATIENT STATED HISTORY: (As transcribed by Technologist)  loss of balance, and dizziness today.    FINDINGS:   VENTRICLES/SULCI:   Ventricles and sulci are normal in size.   INTRACRANIAL:  There are no abnormal extraaxial fluid collections.  There is no midline shift. No evidence of acute intracranial hemorrhage.  SINUSES:  No significant inflammatory changes.  MASTOIDS:  The mastoids are clear.  SKULL:  No depressed  calvarial fracture.            CONCLUSION:  No CT evidence for acute intracranial process.   LOCATION:  [facility]   Dictated by (CST): Bev Holguin MD on 3/15/2024 at 11:22 PM     Finalized by (CST): Bev Holguin MD on 3/15/2024 at 11:23 PM       XR CHEST AP PORTABLE  (CPT=71045)    Result Date: 2/17/2024  PROCEDURE:  XR CHEST AP PORTABLE  (CPT=71045)  TECHNIQUE:  AP chest radiograph was obtained.  COMPARISON:  [facility] , XR, XR CHEST AP PORTABLE  (CPT=71045), 12/31/2021, 5:58 PM.  INDICATIONS:  Sick for a few weeks, cough, sinus drainage, SOB  PATIENT STATED HISTORY: (As transcribed by Technologist)  Patient stated she has been sick for a few weeks with a cough.    FINDINGS:  Subsegmental atelectasis at the lung bases.  No acute airspace disease.  Heart is normal size.  There are no pleural effusions.  Trace left pleural effusion.  The low lung volumes.            CONCLUSION:  Subsegmental atelectasis at the lung bases.   LOCATION:  [facility]      Dictated by (CST): Semaj Bergeron MD on 2/17/2024 at 2:59 PM     Finalized by (CST): Semaj Bergeron MD on 2/17/2024 at 3:00 PM            Reexamined the patient is no focal findings.  She is neurologically intact with no other focal findings.  At this present time.  Patient will need to be admitted I will discuss this case with the [facility] hospitalist.  Medical Decision Making      Disposition and Plan     Clinical Impression:  1. Lithium toxicity, accidental or unintentional, initial encounter    2. Weakness generalized         Disposition:  Admit  3/17/2024 11:00 pm    Follow-up:  No follow-up provider specified.        Medications Prescribed:  Current Discharge Medication List                            Hospital Problems       Present on Admission  Date Reviewed: 2/17/2024            ICD-10-CM Noted POA    * (Principal) Lithium toxicity, accidental or unintentional, initial encounter T56.891A 1/1/2022 Unknown

## 2024-03-18 NOTE — H&P
[facility]   part of [facility]    History and Physical     Marylin Walker Patient Status:  Inpatient    1970 MRN [id number]   Location [facility] 3NE-A Attending Steve Cerrato MD   Hosp Day # 0 PCP Yoel Servin MD     Chief Complaint: Altered mental status    History of Present Illness: Marylin Walker is a 53 year old female with history of allergic rhinitis, anxiety, depression, bipolar, asthma, history of kidney stones, fibromyalgia, GERD, gestational diabetes, hypothyroidism, history of migraines, osteoarthritis, history of seizures, history of stroke with no residual symptoms, history ulcer colitis presenting with generalized weakness, concerns with her memory, and difficulty with her speech.  Patient does mention 1 or 2 weeks of memory issues and speech difficulty although worse recently.  Patient also mentions generalized stomach discomfort but no nausea vomiting or significant diarrhea.  Patient does say some of her symptoms have improved while hospitalized.  Patient denies any other positive review of systems at this time.    Past Medical History:  Past Medical History:   Diagnosis Date    Acne     Allergic rhinitis     Alopecia areata     Anxiety     Asthma ([facility])     Back problem     Bipolar affective ([facility])     Calculus of kidney 10/2020    Depression     Fibromyalgia     GERD (gastroesophageal reflux disease)     Gestational diabetes ([facility])     Hemorrhoids     Hypothyroidism     Migraines     Organic hypersomnia, unspecified PSG 14    AHI 2 RDI 2 REM AHI 2 SaO2 doroteo 88 %    Osteoarthritis     Pneumonia due to organism     Scoliosis     Seizure disorder ([facility]) 10/2020    absent seizures    Stroke ([facility])     + mild R.sided weakness    Ulcerative colitis ([facility])     Visual impairment         Past Surgical History:   Past Surgical History:   Procedure Laterality Date    CHOLECYSTECTOMY  2010    COLONOSCOPY      ulcerative colitis    COLONOSCOPY N/A  04/25/2017    Procedure: COLONOSCOPY;  Surgeon: Hipolito Dobbins MD;  Location:  ENDOSCOPY    COLONOSCOPY N/A 06/14/2021    Procedure: COLONOSCOPY with biopsy,;  Surgeon: Ramsey Muhammad MD;  Location:  ENDOSCOPY    COLPOSCOPY, CERVIX W/UPPER ADJACENT VAGINA; W/BIOPSY(S), CERVIX  1999    HERNIA SURGERY  1997    Umbilical Hernia Repair    OTHER SURGICAL HISTORY N/A 06/21/2016    Procedure: ESOPHAGOGASTRODUODENOSCOPY (EGD);  Surgeon: Jeff Scherer MD;  Location:  ENDOSCOPY    PARAGARD, IUD  06/2014    UPPER GI ENDOSCOPY,EXAM         Social History:  reports that she quit smoking about 21 years ago. Her smoking use included cigarettes. She has never used smokeless tobacco. She reports that she does not currently use alcohol. She reports current drug use. Frequency: 2.00 times per week. Drug: Cannabis.no illegal drugs, not , 2 children, not working, using a walker    Family History:   Family History   Problem Relation Age of Onset    Breast Cancer Maternal Grandmother         81    Breast Cancer Other         dx post menopausal    High Blood Pressure Father     Colon Cancer Father         59    Colon Polyps Father     Hypertension Father     High Blood Pressure Mother     High Cholesterol Mother     Hypertension Mother     Mental Disorder Mother         Depression    Heart Disorder Sister 54        MI - LAD stenting    Heart Attack Sister         12/2018 \" maker\"    Mental Disorder Son         ADHD, Bipolar II    Mental Disorder Son         Severe anxiety, Bipolar II   Mother living  Father passed  1 sister living  0 brothers     Allergies:   Allergies   Allergen Reactions    Depakote [Valproic Acid] OTHER (SEE COMMENTS)     Suicidal ideation    Kdc:Olanzapine SWELLING    Ketorolac Tromethamine OTHER (SEE COMMENTS) and NAUSEA AND VOMITING     gastritis  Gastritis & UC  gastritis  Gastritis & UC      Latex RASH and Dyspnea    Molds & Smuts ASTHMA, ITCHING, RASH, SHORTNESS OF BREATH, WHEEZING  and OTHER (SEE COMMENTS)    Nsaids OTHER (SEE COMMENTS)     Must take Nsaids sparingly due to history of gastritis  Must take Nsaids sparingly due to history of gastritis      Olanzapine SWELLING     Caused severe swelling and bruising    Penicillins NAUSEA AND VOMITING    Phenothiazines OTHER (SEE COMMENTS)     Extrapyramidal syndrome. No phenothiazines per neurology (documented during 1/19/18 Dr. Velázquez)  Extrapyramidal syndrome. No phenothiazines per neurology (documented during 1/19/18 Dr. Velázquez)  Extrapyramidal syndrome. No phenothiazines per neurology (documented during 1/19/18 Dr. Velázquez)    Amoxicillin NAUSEA AND VOMITING    Levofloxacin PAIN and OTHER (SEE COMMENTS)     Tendinitis in multiple joints  Tendinitis in multiple joints    Mushrooms NAUSEA AND VOMITING    Penicillin G Potassium NAUSEA AND VOMITING    Phenytoin ANXIETY, CONFUSION, DIZZINESS and HALLUCINATION    Sulfa Antibiotics NAUSEA AND VOMITING    Toradol NAUSEA AND VOMITING     Gastritis & UC    Trees, Bates OTHER (SEE COMMENTS)     Sneezing watery eyes    Triptans HALLUCINATION     Lesions in brain    Erythromycin OTHER (SEE COMMENTS)       Medications:    Current Facility-Administered Medications on File Prior to Encounter   Medication Dose Route Frequency Provider Last Rate Last Admin    [COMPLETED] sodium chloride 0.9 % IV bolus 1,000 mL  1,000 mL Intravenous Once Joseph Gore MD   Stopped at 03/16/24 0008    [COMPLETED] albuterol (Ventolin) (2.5 MG/3ML) 0.083% nebulizer solution        2.5 mg at 02/22/24 0749    [COMPLETED] methylPREDNISolone sodium succinate (Solu-MEDROL) injection 125 mg  125 mg Intravenous Once Adilene Cruz MD   125 mg at 02/17/24 1407    [COMPLETED] albuterol (Ventolin) (5 MG/ML) 0.5% nebulizer solution 10 mg  10 mg Nebulization Once Adilene Crzu MD   10 mg at 02/17/24 1511    [COMPLETED] ipratropium (Atrovent) 0.02 % nebulizer solution 0.5 mg  0.5 mg Nebulization Once Adilene Cruz MD   0.5 mg  at 24 1511    [COMPLETED] magnesium sulfate in sterile water for injection 2 g/50mL IVPB premix 2 g  2 g Intravenous Once Adilene Cruz MD   Stopped at 24 1709    [] methylPREDNISolone sodium succinate (Solu-MEDROL) injection 60 mg  60 mg Intravenous Q8H Jaziel Moya MD   60 mg at 24 1612     Current Outpatient Medications on File Prior to Encounter   Medication Sig Dispense Refill    ondansetron 8 MG Oral Tablet Dispersible Take 1 tablet (8 mg total) by mouth every 4 (four) hours as needed for Nausea. 10 tablet 0    lithium  MG Oral Tab CR Take 1 tablet (300 mg total) by mouth at bedtime. TAKE 1 TABLET BY MOUTH EVERY NIGHT. SWALLOW WHOLE. TAKE WITH 450 MG TABLET  MG TOTAL      acetaminophen 325 MG Oral Tab Take 1 tablet (325 mg total) by mouth every 6 (six) hours as needed for Pain.      MESALAMINE  MG Oral Capsule Delayed Release TAKE 3 CAPSULES BY MOUTH TWICE DAILY BEFORE MEALS 540 capsule 3    pantoprazole 40 MG Oral Tab EC Take 1 tablet (40 mg total) by mouth before breakfast. 90 tablet 3    buPROPion  MG Oral Tablet 24 Hr Take 1 tablet (150 mg total) by mouth every morning.      Estradiol 10 MCG Vaginal Tab Place vaginally twice a week. AS NEEDED      albuterol 108 (90 Base) MCG/ACT Inhalation Aero Soln Inhale 2 puffs into the lungs every 6 (six) hours as needed.      QUEtiapine 50 MG Oral Tab Take 1 tablet (50 mg total) by mouth nightly.      HYDROmorphone 2 MG Oral Tab Take 1 tablet (2 mg total) by mouth every 6 (six) hours as needed for Pain. 45 tablet 0    CARISOPRODOL 350 MG Oral Tab TAKE 1 TABLET(350 MG) BY MOUTH THREE TIMES DAILY AS NEEDED FOR MUSCLE SPASMS 90 tablet 0    METOPROLOL TARTRATE 25 MG Oral Tab TAKE 1/2 TABLET BY MOUTH DAILY (Patient taking differently: Take 1 tablet (25 mg total) by mouth daily.) 45 tablet 0    LEVOCETIRIZINE 5 MG Oral Tab TAKE 1 TABLET(5 MG) BY MOUTH EVERY EVENING 90 tablet 3    levETIRAcetam 500 MG Oral Tab Take 1  tablet (500 mg total) by mouth 2 (two) times daily. 180 tablet 1    Lithium Carbonate  MG Oral Tab CR Take 1 tablet (450 mg total) by mouth at bedtime. (Patient taking differently: Take 750 mg by mouth at bedtime.) 30 tablet 0    OXCARBAZEPINE 300 MG Oral Tab TAKE 1 AND 1/2 TABLETS(450 MG) BY MOUTH TWICE DAILY 180 tablet 1    meclizine 12.5 MG Oral Tab Take 1 tablet (12.5 mg total) by mouth 3 (three) times daily as needed. 90 tablet 3    MONTELUKAST 10 MG Oral Tab TAKE 1 TABLET(10 MG) BY MOUTH EVERY NIGHT 90 tablet 0    sertraline 100 MG Oral Tab Take 1 tablet (100 mg total) by mouth 2 (two) times daily. 50mg in AM, 100mg in PM      lamoTRIgine (LAMICTAL) 200 MG Oral Tab Take 1 tablet (200 mg total) by mouth 3 (three) times daily. 90 tablet 5    pregabalin (LYRICA) 200 MG Oral Cap Take 1 capsule (200 mg total) by mouth 2 (two) times daily. 180 capsule 2    levothyroxine 125 MCG Oral Tab Take 1 tablet (125 mcg total) by mouth daily. 90 tablet 2    ondansetron 8 MG Oral Tablet Dispersible Take 1 tablet (8 mg total) by mouth every 8 (eight) hours as needed for Nausea. 60 tablet 2    fluticasone-salmeterol (ADVAIR DISKUS) 250-50 MCG/DOSE Inhalation Aerosol Powder, Breath Activated INHALE 1 PUFF INTO THE LUNGS EVERY 12 HOURS 120 each 0    albuterol sulfate (2.5 MG/3ML) 0.083% Inhalation Nebu Soln Take 3 mL (2.5 mg total) by nebulization every 6 (six) hours as needed for Wheezing. 3 mL 2    Multiple Vitamins-Minerals (QC WOMENS DAILY MULTIVITAMIN) Oral Tab Take 1 tablet by mouth daily.      FLUTICASONE PROPIONATE 50 MCG/ACT Nasal Suspension SHAKE LIQUID AND USE 2 SPRAYS IN EACH NOSTRIL DAILY 48 g 3    QUEtiapine Fumarate 200 MG Oral Tab Take 1 tablet (200 mg total) by mouth nightly.      clonazePAM 0.5 MG Oral Tab Take 0.5 tablets (0.25 mg total) by mouth 3 (three) times daily. 90 tablet 0    aspirin 81 MG Oral Tab Take 1 tablet (81 mg total) by mouth daily.      [] predniSONE 10 MG Oral Tab Take 3 tablets  (30 mg total) by mouth daily for 3 days, THEN 2 tablets (20 mg total) daily for 3 days, THEN 1 tablet (10 mg total) daily for 3 days. 18 tablet 0    Nebulizers (VIOS AEROSOL DELIVERY SYSTEM) Does not apply Misc USE WITH ALBUTEROL AS NEEDED      Ketoconazole 2 % External Shampoo Use to wash scalp 2-3 times per week. 120 mL 1       Review of Systems:   A comprehensive 14 point review of systems was completed.    Pertinent positives and negatives noted in the HPI.    Physical Exam:    /83 (BP Location: Right arm)   Pulse 85   Temp 98 °F (36.7 °C) (Oral)   Resp 14   Wt 197 lb (89.4 kg)   LMP 02/20/2015 (Approximate)   SpO2 97%   BMI 34.90 kg/m²   General: No acute distress. Alert and oriented x 3.  HEENT: Normocephalic atraumatic. Moist mucous membranes. EOM-I. PERRLA. Anicteric.  Neck: No lymphadenopathy. No JVD. No carotid bruits.  Respiratory: Clear to auscultation bilaterally. No wheezes. No rhonchi.  Cardiovascular: S1, S2. Regular rate and rhythm. No murmurs, rubs or gallops. Equal pulses.   Chest and Back: No tenderness or deformity.  Abdomen: Soft, nontender, nondistended.  Positive bowel sounds. No rebound, guarding or organomegaly.  Neurologic: No focal neurological deficits. CNII-XII grossly intact.  Musculoskeletal: Moves all extremities.  Extremities: No edema or cyanosis.  Integument: No rashes or lesions.   Psychiatric: Appropriate mood and affect.      Diagnostic Data:      Labs:  Recent Labs   Lab 03/15/24  2112 03/17/24  2012 03/18/24  0720   WBC 7.2 7.1 6.3   HGB 13.8 13.3 11.5*   MCV 95.5 96.9 92.9   .0 261.0 233.0       Recent Labs   Lab 03/15/24  2112 03/17/24  2012 03/18/24  0720   * 84 88   BUN 7* 8* 6*   CREATSERUM 0.94 0.92 0.60   CA 9.8 9.7 8.8   ALB 4.1 3.7  --     140 143   K 4.0 4.3 3.4*   * 111 116*   CO2 30.0 29.0 24.0   ALKPHO 97 82  --    AST 12* 19  --    ALT 20 20  --    BILT 0.2 0.4  --    TP 7.7 7.1  --        Estimated Creatinine Clearance:  87.6 mL/min (based on SCr of 0.6 mg/dL).    No results for input(s): \"PTP\", \"INR\" in the last 168 hours.    No results for input(s): \"TROP\", \"CK\" in the last 168 hours.    Imaging: Imaging data reviewed in Epic.      ASSESSMENT / PLAN:   53 year old female with history of allergic rhinitis, anxiety, depression, bipolar, asthma, history of kidney stones, fibromyalgia, GERD, gestational diabetes, hypothyroidism, history of migraines, osteoarthritis, history of seizures, history of stroke with no residual symptoms, history ulcer colitis presenting with generalized weakness, concerns with her memory, and difficulty with her speech.      Weakness  Memory and Speech difficulties   -CT brain negative for acute pathology  -may be secondary to elevated lithium levels, hold lithium dose, recheck leves  -consult neurology given hx cva and seizure for evaluation of these although no focal signs of weakness  -PT/OT    Anxiety  Depression  Bipolar  -bupropion  -sertraline  -seroquel  -hold clonazepam for now, resume tomorrow if continues to improve    Asthma  -continue inhalers    GERD  -pantoprazole    Fibromyalgia   -lyrica    Hypothyroidism  -levothyroxine    UC  -meslamine    Hx Seizure  -lamictal  -keppra  -oxcarbazepine    Hx CVA    Quality:  DVT Prophylaxis: scd, heparin sq  CODE status: Full per chart  Montes: none    Plan of care discussed with patient and staff    Dispo: no discharge     Steve Cerrato MD  [facility]  [phone]

## 2024-03-18 NOTE — PLAN OF CARE
Patient alert and oriented x3.  On RA.  NSR on tele HR 70s.  Denies pain at this time.  Lithium 1.5 today.  Blood sugars monitored.  Resting comfortably in bed.  Psych consulted.

## 2024-03-18 NOTE — PHYSICAL THERAPY NOTE
PHYSICAL THERAPY EVALUATION - INPATIENT     Room Number: 3615/3615-A  Evaluation Date: 3/18/2024  Type of Evaluation: Initial  Physician Order: PT Eval and Treat    Presenting Problem: lithium toxicity  Co-Morbidities : seizures, Bipolar disorder, Fibromyalgia  Reason for Therapy: Mobility Dysfunction and Discharge Planning    CT brain or head 3/17/24:  CONCLUSION:  No CT evidence for acute intracranial process.     PHYSICAL THERAPY ASSESSMENT   Patient is currently functioning near baseline with bed mobility, transfers, gait, stair negotiation, maintaining seated position, standing prolonged periods, and performing household tasks.  Prior to admission, patient's baseline is Mod I with use of RW for ambulation, Mod I for ADL and IADL, does not drive and has groceries delivered.  Patient is requiring contact guard assist as a result of the following impairments: decreased functional strength, decreased endurance/aerobic capacity, and impaired standing balance.  Physical Therapy will continue to follow for duration of hospitalization.    Patient will benefit from continued skilled PT Services at discharge to promote functional independence in home.  Anticipate patient will return home with home health PT.    PLAN  PT Treatment Plan: Bed mobility;Body mechanics;Endurance;Energy conservation;Patient education;Gait training;Stair training;Transfer training;Balance training  Rehab Potential : Good  Frequency (Obs): 3x/week  Number of Visits to Meet Established Goals: 5      CURRENT GOALS    Goal #1 Patient is able to demonstrate supine - sit EOB @ level: modified independent     Goal #2 Patient is able to demonstrate transfers Sit to/from Stand at assistance level: modified independent     Goal #3 Patient is able to ambulate 150 feet with assist device: walker - rolling at assistance level: modified independent     Goal #4 Pt to ascend/descend a flight of stairs with use of 1 rail and supervision   Goal #5    Goal  #6    Goal Comments: Goals established on 3/18/2024      PHYSICAL THERAPY MEDICAL/SOCIAL HISTORY  History related to current admission: Patient is a 53 year old female admitted on 3/17/2024 from home for dizziness and weakness.  Pt diagnosed with lithium toxicity.      HOME SITUATION  Type of Home: Apartment   Home Layout:  (third floor aparment)  Stairs to Enter : 2  Railing: Yes  Stairs to Bedroom: 30  Railing: Yes    Lives With: Son  Drives: No  Patient Owned Equipment: Rollator;Rolling walker       Prior Level of [place]: per pt report, pt is Mod I for ADL, IADL, and ambulates with RW. Pt lives in a third floor apartment and her 32 year old son lives with her. Pt babysits her grandson. Pt does not drive and has groceries delivered.     SUBJECTIVE  Pt is pleasant and cooperative.       OBJECTIVE  Precautions: Bed/chair alarm  Fall Risk: Standard fall risk    WEIGHT BEARING RESTRICTION                   PAIN ASSESSMENT     Location: denies pain       COGNITION  Overall Cognitive Status:  WFL - within functional limits    RANGE OF MOTION AND STRENGTH ASSESSMENT  Upper extremity ROM and strength are within functional limits     Lower extremity ROM is within functional limits     Lower extremity strength is within functional limits       BALANCE  Static Sitting: Good  Dynamic Sitting: Fair +  Static Standing: Poor +  Dynamic Standing: Poor +    ADDITIONAL TESTS                                    ACTIVITY TOLERANCE  Pulse: 71  Heart Rate Source: Monitor                   O2 WALK  Oxygen Therapy  SPO2% on Room Air at Rest: 99    NEUROLOGICAL FINDINGS                        AM-PAC '6-Clicks' INPATIENT SHORT FORM - BASIC MOBILITY  How much difficulty does the patient currently have...  Patient Difficulty: Turning over in bed (including adjusting bedclothes, sheets and blankets)?: None   Patient Difficulty: Sitting down on and standing up from a chair with arms (e.g., wheelchair, bedside commode, etc.): None    Patient Difficulty: Moving from lying on back to sitting on the side of the bed?: None   How much help from another person does the patient currently need...   Help from Another: Moving to and from a bed to a chair (including a wheelchair)?: None   Help from Another: Need to walk in hospital room?: A Little   Help from Another: Climbing 3-5 steps with a railing?: A Little       AM-PAC Score:  Raw Score: 22   Approx Degree of Impairment: 20.91%   Standardized Score (AM-PAC Scale): 53.28   CMS Modifier (G-Code): CJ    FUNCTIONAL ABILITY STATUS  Gait Assessment   Functional Mobility/Gait Assessment  Gait Assistance: Supervision  Distance (ft): 150  Assistive Device: Rolling walker  Pattern: Shuffle  Stairs: Stairs    Skilled Therapy Provided     Bed Mobility:  Rolling: NT  Supine to sit: Mod I   Sit to supine: NT     Transfer Mobility:  Sit to stand: supervision   Stand to sit: supervision  Gait = supervision    Therapist's Comments: pt lying in bed and agreeable to PT. Vitals assessed and WNL throughout session. Pt able to follow commands. Dec speed with all mobility observed and required inc time to complete tasks. Instructed pt in proper hand placement with sit<>Stand, instructed in upright posture and to keep RW within NANCY. Pt returned to room, sitting up in chair, alarm on. Advised pt to ambulate with RN staff 3 times per day and to be up in chair with assistance from RN at least 3 times per day. RN notified.     Exercise/Education Provided:  Body mechanics  Functional activity tolerated  Gait training    Patient End of Session: Up in chair;Needs met;Call light within reach;RN aware of session/findings;All patient questions and concerns addressed;Alarm set      Patient Evaluation Complexity Level:  History High - 3 or more personal factors and/or co-morbidities   Examination of body systems Moderate - addressing a total of 3 or more elements   Clinical Presentation Low - Stable   Clinical Decision Making Low -  Stable       PT Session Time: 30 minutes  Gait Training: 15 minutes  Therapeutic Activity: 0 minutes  Neuromuscular Re-education: 0 minutes  Therapeutic Exercise: 0 minutes

## 2024-03-18 NOTE — PLAN OF CARE
Problem: METABOLIC/FLUID AND ELECTROLYTES - ADULT  Goal: Glucose maintained within prescribed range  Description: INTERVENTIONS:  - Monitor Blood Glucose as ordered  - Assess for signs and symptoms of hyperglycemia and hypoglycemia  - Administer ordered medications to maintain glucose within target range  - Assess barriers to adequate nutritional intake and initiate nutrition consult as needed  - Instruct patient on self management of diabetes  Outcome: Progressing  Goal: Electrolytes maintained within normal limits  Description: INTERVENTIONS:  - Monitor labs and rhythm and assess patient for signs and symptoms of electrolyte imbalances  - Administer electrolyte replacement as ordered  - Monitor response to electrolyte replacements, including rhythm and repeat lab results as appropriate  - Fluid restriction as ordered  - Instruct patient on fluid and nutrition restrictions as appropriate  Outcome: Progressing     Problem: NEUROLOGICAL - ADULT  Goal: Absence of seizures  Description: INTERVENTIONS  - Monitor for seizure activity  - Administer anti-seizure medications as ordered  - Monitor neurological status  Outcome: Progressing  Goal: Remains free of injury related to seizure activity  Description: INTERVENTIONS:  - Maintain airway, patient safety  and administer oxygen as ordered  - Monitor patient for seizure activity, document and report duration and description of seizure to MD/LIP  - If seizure occurs, turn patient to side and suction secretions as needed  - Reorient patient post seizure  - Seizure pads on all 4 side rails  - Instruct patient/family to notify RN of any seizure activity  - Instruct patient/family to call for assistance with activity based on assessment  Outcome: Progressing     Problem: Impaired Functional Mobility  Goal: Achieve highest/safest level of mobility/gait  Description: Interventions:  - Assess patient's functional ability and stability  - Promote increasing activity/tolerance for  mobility and gait  - Educate and engage patient/family in tolerated activity level and precautions  Outcome: Progressing

## 2024-03-18 NOTE — ED QUICK NOTES
MRI called to cancel MRI due to patient declining.   
MRI paged in at 2045, on call tech is Quan. ETA is an hour  
Orders for admission, patient is aware of plan and ready to go upstairs. Any questions, please call ED RN Emily at extension [phone].     Patient Covid vaccination status: Fully vaccinated     COVID Test Ordered in ED: None    COVID Suspicion at Admission: N/A    Running Infusions:  D5-0.45    Mental Status/LOC at time of transport: A&O x2    Other pertinent information:   CIWA score: N/A   NIH score:  N/A        
Patient ambulated to the bathroom with assistance.  
Pt POC glucose 36. MD notified & verbal orders for D50 & infusion of D5 0.45 received. Pt given meds & RN remains at bedside at this time. Pt is alert & LOC unchanged from arrival to ED.   
Pt declined MRI as she states \"I need to be knocked out with propofol\". Case discussed with ED MD, ativan offered to patient for claustrophobia during MRI. Pt declined MRI unless propofol is given.   
Never smoker

## 2024-03-19 LAB
ALBUMIN SERPL-MCNC: 3.4 G/DL (ref 3.4–5)
ALBUMIN/GLOB SERPL: 1 {RATIO} (ref 1–2)
ALP LIVER SERPL-CCNC: 90 U/L
ALT SERPL-CCNC: 23 U/L
AMPHET UR QL SCN: NEGATIVE
ANION GAP SERPL CALC-SCNC: 4 MMOL/L (ref 0–18)
AST SERPL-CCNC: 12 U/L (ref 15–37)
ATRIAL RATE: 73 BPM
BASOPHILS # BLD AUTO: 0.04 X10(3) UL (ref 0–0.2)
BASOPHILS NFR BLD AUTO: 0.6 %
BENZODIAZ UR QL SCN: NEGATIVE
BILIRUB SERPL-MCNC: 0.3 MG/DL (ref 0.1–2)
BUN BLD-MCNC: 6 MG/DL (ref 9–23)
CALCIUM BLD-MCNC: 9.3 MG/DL (ref 8.5–10.1)
CHLORIDE SERPL-SCNC: 114 MMOL/L (ref 98–112)
CO2 SERPL-SCNC: 21 MMOL/L (ref 21–32)
COCAINE UR QL: NEGATIVE
CREAT BLD-MCNC: 0.83 MG/DL
CREAT UR-SCNC: 57.9 MG/DL
EGFRCR SERPLBLD CKD-EPI 2021: 83 ML/MIN/1.73M2 (ref 60–?)
EOSINOPHIL # BLD AUTO: 0.23 X10(3) UL (ref 0–0.7)
EOSINOPHIL NFR BLD AUTO: 3.3 %
ERYTHROCYTE [DISTWIDTH] IN BLOOD BY AUTOMATED COUNT: 14.5 %
ETHANOL UR-MCNC: NEGATIVE MG/DL
GLOBULIN PLAS-MCNC: 3.3 G/DL (ref 2.8–4.4)
GLUCOSE BLD-MCNC: 101 MG/DL (ref 70–99)
GLUCOSE BLD-MCNC: 74 MG/DL (ref 70–99)
GLUCOSE BLD-MCNC: 80 MG/DL (ref 70–99)
GLUCOSE BLD-MCNC: 84 MG/DL (ref 70–99)
GLUCOSE BLD-MCNC: 89 MG/DL (ref 70–99)
GLUCOSE BLD-MCNC: 96 MG/DL (ref 70–99)
HCT VFR BLD AUTO: 42.3 %
HGB BLD-MCNC: 13.2 G/DL
IMM GRANULOCYTES # BLD AUTO: 0.02 X10(3) UL (ref 0–1)
IMM GRANULOCYTES NFR BLD: 0.3 %
LITHIUM SERPL-SCNC: 0.8 MMOL/L (ref 0.6–1.2)
LYMPHOCYTES # BLD AUTO: 2.15 X10(3) UL (ref 1–4)
LYMPHOCYTES NFR BLD AUTO: 31.1 %
MAGNESIUM SERPL-MCNC: 2.1 MG/DL (ref 1.6–2.6)
MCH RBC QN AUTO: 30.1 PG (ref 26–34)
MCHC RBC AUTO-ENTMCNC: 31.2 G/DL (ref 31–37)
MCV RBC AUTO: 96.6 FL
MONOCYTES # BLD AUTO: 0.5 X10(3) UL (ref 0.1–1)
MONOCYTES NFR BLD AUTO: 7.2 %
NEUTROPHILS # BLD AUTO: 3.98 X10 (3) UL (ref 1.5–7.7)
NEUTROPHILS # BLD AUTO: 3.98 X10(3) UL (ref 1.5–7.7)
NEUTROPHILS NFR BLD AUTO: 57.5 %
OPIATES UR QL SCN: NEGATIVE
OSMOLALITY SERPL CALC.SUM OF ELEC: 285 MOSM/KG (ref 275–295)
P AXIS: 11 DEGREES
P-R INTERVAL: 164 MS
PCP UR QL SCN: NEGATIVE
PLATELET # BLD AUTO: 280 10(3)UL (ref 150–450)
POTASSIUM SERPL-SCNC: 4 MMOL/L (ref 3.5–5.1)
POTASSIUM SERPL-SCNC: 4 MMOL/L (ref 3.5–5.1)
PROT SERPL-MCNC: 6.7 G/DL (ref 6.4–8.2)
Q-T INTERVAL: 412 MS
QRS DURATION: 98 MS
QTC CALCULATION (BEZET): 453 MS
R AXIS: 57 DEGREES
RBC # BLD AUTO: 4.38 X10(6)UL
SODIUM SERPL-SCNC: 139 MMOL/L (ref 136–145)
T AXIS: 46 DEGREES
VENTRICULAR RATE: 73 BPM
WBC # BLD AUTO: 6.9 X10(3) UL (ref 4–11)

## 2024-03-19 PROCEDURE — 99232 SBSQ HOSP IP/OBS MODERATE 35: CPT

## 2024-03-19 PROCEDURE — 99233 SBSQ HOSP IP/OBS HIGH 50: CPT | Performed by: OTHER

## 2024-03-19 PROCEDURE — 95720 EEG PHY/QHP EA INCR W/VEEG: CPT | Performed by: OTHER

## 2024-03-19 RX ORDER — BUTALBITAL, ACETAMINOPHEN AND CAFFEINE 50; 325; 40 MG/1; MG/1; MG/1
1 TABLET ORAL EVERY 4 HOURS PRN
Status: DISCONTINUED | OUTPATIENT
Start: 2024-03-19 | End: 2024-03-20

## 2024-03-19 RX ORDER — LITHIUM CARBONATE 300 MG/1
300 CAPSULE ORAL NIGHTLY
Status: DISCONTINUED | OUTPATIENT
Start: 2024-03-19 | End: 2024-03-20

## 2024-03-19 NOTE — CDS QUERY
Dear Doctor Blossom,     Clinical information documents Acute Encephalopathy by neurologist. Please select the diagnosis that applies:    [   ] Acute Toxic Encephalopathy  [  x ] Acute Toxic Metabolic Encephalopathy   [   ] Other Encephalopathy (please specify): ________________      CLINICAL INDICATORS: 3/15 ED 53 Y/F- loss of balance, and dizziness today. Pt states she has had multiple falls today. Pt falling asleep during triage, and occasionally answering questions inappropriately     lithium level- 2.4> 1.5     ammonia- 45     CT head- negative for acute process    3/18 H&P PN- Memory and Speech difficulties may be secondary to elevated lithium levels, hold lithium dose, recheck levels     3/18 neurology- Exam-Follows simple commands, mild dysarthria, occasional word finding difficulties. #Encephalopathy, most likely toxic/metabolic, complicated by neuropsychiatric illness   #Toxic lithium level. Most recent level 1.5. Medication dose being adjusted.    3/19 neurology PN- Feels much better today compared to yesterday. Denies any clouding of consciousness, fogginess, confusion, disorientation.   Exam: Awake, alert, non-dysarthric, expressive an receptive language normal.   Pupils round and reactive to light, extra ocular movement normal, no facial weakness, hearing normal.  Motor strength and reflexes symmetrical.  Finger to Nose testing normal.         3/19 psychiatry PN- Acute encephalopathy/delirium due to lithium toxicity and elevated ammonia levels.       RISK FACTORS: lithium PTA, bipolar affective disorder    TREATMENT: neuro consult, trend lithium levels, PT/OT, hold lithium/dose adjustment, CT head, ammonia    Criteria for Types of acute encephalopathy: References by national institute of neurologic disorders and stroke: NINDS Encephalopathy information  Metabolic due to such things as fever, dehydration, electrolyte imbalance, hypoglycemia, hypoxemia, infection and organ failure.  Toxic- refers to the  effects of drugs and toxins  Toxic-metabolic is a combination of toxic and metabolic factors  Septic encephalopathy is a manifestation of severe sepsis, which is a specific type of metabolic encephalopathy  Hepatic encephalopathy is due to elevated blood ammonia levels and describes a spectrum of neurologic impairment (e.g. altered mental status, combativeness) in patients with severe end stage liver disease.  Hypertensive encephalopathy is an acute or subacute consequence of severe hypertension marked by headache, obtundation, confusion or stupor, with or without convulsions. Papilloma may be noted.         Use of terms such as suspected, possible, or probable (associated with a specific diagnosis that is being evaluated, monitored, or treated as if it exists) are acceptable and can be coded in the inpatient setting, when documented at the time of discharge.     Please add any additional documentation to your progress note and continue to document this through discharge.      For questions, please contact clinical  Medina Perez [phone]. Thank you

## 2024-03-19 NOTE — PROGRESS NOTES
History of Presenting Illness:  Patient seen for follow-up.  Feels much better today compared to yesterday.  Denies any clouding of consciousness, fogginess, confusion, disorientation.  Had mild headache which seems to be improving.  Denies any new weakness, numbness, paresthesias.  No seizure-like activity or loss of consciousness.    Vitals:   Vitals:    03/19/24 1137   BP: (!) 161/91   Pulse: 66   Resp: 16   Temp: 98.1 °F (36.7 °C)        Examination:    Awake , alert, non-dysarthric, expressive an receptive language normal.   Pupils round and reactive to light, extra ocular movement normal, no facial weakness, hearing normal.  Motor strength and reflexes symmetrical.  Finger to Nose testing normal.     Investigations:    CT BRAIN OR HEAD (37798)    Result Date: 3/17/2024  CONCLUSION:  No CT evidence for acute intracranial process.   LOCATION:  [facility]   Dictated by (CST): Bev Holguin MD on 3/17/2024 at 10:24 PM     Finalized by (CST): Bev Holguin MD on 3/17/2024 at 10:26 PM       XR CHEST AP PORTABLE  (CPT=71045)    Result Date: 3/17/2024  CONCLUSION:  Elevation of the right hemidiaphragm.  There is infiltrate/atelectasis in the right lung base.  Atelectasis left base.  Normal heart size and pulmonary vascularity.   LOCATION:  [facility]      Dictated by (CST): Bev Holguin MD on 3/17/2024 at 9:45 PM     Finalized by (CST): Bev Holguin MD on 3/17/2024 at 9:46 PM            Lab Results   Component Value Date    A1C 5.5 03/18/2024        Lab Results   Component Value Date    LDL 70 09/30/2018    HDL 58 09/30/2018    TRIG 93 09/30/2018        Recent Labs   Lab 03/17/24 2012 03/18/24  0720 03/19/24  0659   RBC 4.45 3.79* 4.38   HGB 13.3 11.5* 13.2   HCT 43.1 35.2 42.3   MCV 96.9 92.9 96.6   MCH 29.9 30.3 30.1   MCHC 30.9* 32.7 31.2   RDW 14.6 14.6 14.5   NEPRELIM 3.98 3.52 3.98   WBC 7.1 6.3 6.9   .0 233.0 280.0       Recent Labs   Lab 03/17/24 2012 03/18/24  0720 03/19/24  0659   GLU 84 88 89   BUN  8* 6* 6*   CREATSERUM 0.92 0.60 0.83   EGFRCR 74 106 83   CA 9.7 8.8 9.3    143 139   K 4.3 3.4* 4.0  4.0    116* 114*   CO2 29.0 24.0 21.0       Impression/MDM.    Encephalopathy, most likely toxic/metabolic, complicated by neuropsychiatric illness.  EEG showed mild epileptiform activity.  No electrographic seizures were noted.  Ammonia level was slightly elevated.  Advised lactulose.  Patient had a CT scan of the head done yesterday which was unremarkable for acute abnormalities.  Patient was counseled.  Toxic lithium level.  Most recent level 1.5.  Side effects including confusion, dry mouth, decreased memory, headache, weakness and muscle twitching is common with lithium toxicity.  Medication dose being adjusted.  History of seizures.  Patient has been on Keppra, oxcarbazepine, Lamictal and Lyrica, all of which have antiepileptic properties.  Mild epileptic abnormality was noted on EEG.  No evidence of electrographic seizures were noted.  Advised to continue medication.  Patient to follow-up with her neurologist after discharge.

## 2024-03-19 NOTE — PHYSICAL THERAPY NOTE
Attempted to see pt for treatment session, pt on continuous EEG. Will hold and follow as appropriate.     Rachel Bauer, PT, DPT  03/19/24

## 2024-03-19 NOTE — PROGRESS NOTES
[facility]  Report of Psychiatric Progress Note    Marylin Walker Patient Status:  Inpatient    1970 MRN [id number]   Location [facility] 3NE-A Attending Steve Cerrato MD   Hosp Day # 1 PCP Yoel Servin MD     Date of Admission: 3/17/2024  Date of Service: 3/19/2024   Reason for Consultation: Lithium toxicity    Impression: 53 year old female presents with multiple falls, dizziness, and altered mental status. She was found to have Lithium level of 2.4 and Ammonia level of 45    Primary Psychiatric Diagnosis:  Acute encephalopathy/delirium due to lithium toxicity and elevated ammonia levels. Per chart review, patient has a long history of delirium. Psych team has previously encouraged evaluation for major neurocognitive testing outside of the testing.     Bipolar II disorder, reports mood stable. Unable to recall last manic episode.    Cognitive disorder, unspecified. 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      Personality Traits:  Deferred     Pertinent Medical Diagnoses:  Lithium toxicity. 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     Recommendations:  Restart Lithium at lower dose of 300 mg nightly.  Lithium level ordered for tomorrow.  Continue all other home medications  Wellbutrin  mg daily  Seroquel 50 mg nightly  Klonopin 0.25 mg TID PRN for anxiety  Zoloft 50 mg in morning and 100 mg at night  Anticonvulsants to be reordered by Hospitalist.  Likely cleared to discharge tomorrow with close follow up with outpatient psychiatrist.     YELENA Reynolds NP-C    History of Present Illness:  Patient presented to [facility] on 3/17/2024 with reports of multiple falls and dizziness. She was noted to be occasionally answering inappropriately to questions asked of her. Lithium level was noted to be 2.4 and Ammonia level at 45. Kidney function was within normal limits. No electrolyte  abnormalities. Psychiatry was consulted due to lithium toxicity.    Patient was noted to be sitting in chair eating breakfast. She reports that she was unsure what occurred besides feeling \"really out of it\". Noted that patient is a poor historian and has history of delirium. She reports that she is unsure if she was taking additional lithium than what is prescribed. Replies \"it is possible\". Lithium levels have previously been stable on same dosing. She denies changes in her hydration statues. Kidney function is noted to be within normal range and close to patient's baseline. Denies change in appetite. Discussed that she is the one that administers her medications to self and is also the one that sets up her weekly pill box. She denies intentionally taking extra lithium. Denies SI. Denies HI. Denies any recent change in mood. Reports seeing psychotherapist every other week. Denies any recent medication changes.     Interval History  3/19/2024 - Reports symptoms have resolved. Informed of improved Lithium levels. Discussed restarting Lithium at lower dose with redraw tomorrow and close follow up with outpatient psychiatrist. She was agreeable. Discussed results of UDS. She does admit she vapes or has an edible twice a week. Denies any suicidal ideation.     Past Psychiatric History:  1) Bipolar 2 disorder per psych notes from [place]. She saw her 1st psychotherapist at age 6 and was first hospitalized for depression at age 13. She has a hx of 4 suicide attempts by OD as a teenager. She has a hx of 10+ inpatient psych hospitalizations. In 2003, she was dx with Bipolar 2 disorder and with med changes has not needed the inpatient psych unit since then. Her psychiatrist Dr. Seferino Fernandez and psychotherapist Ofelia Wells are both at [facility]. See Care Everywhere.     Substance Use History:  Previous cannabis use. Denies current. Will order drug screen.     Psych Family History:  Denies.     Social and Developmental History:    . She lives with her older son at this time. 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 Currently on disability.     Past Medical History:   Diagnosis Date    Acne     Allergic rhinitis     Alopecia areata 2015    Anxiety     Asthma ([facility])     Back problem     Bipolar affective ([facility])     Calculus of kidney 10/2020    Depression     Fibromyalgia     GERD (gastroesophageal reflux disease)     Gestational diabetes ([facility])     Hemorrhoids     Hypothyroidism     Migraines     Organic hypersomnia, unspecified PSG 6-29-14    AHI 2 RDI 2 REM AHI 2 SaO2 doroteo 88 %    Osteoarthritis     Pneumonia due to organism     Scoliosis     Seizure disorder ([facility]) 10/2020    absent seizures    Stroke ([facility])     2003+ mild R.sided weakness    Ulcerative colitis ([facility])     Visual impairment      Past Surgical History:   Procedure Laterality Date    CHOLECYSTECTOMY  03/2010    COLONOSCOPY  2013    ulcerative colitis    COLONOSCOPY N/A 04/25/2017    Procedure: COLONOSCOPY;  Surgeon: Hipolito Dobbins MD;  Location:  ENDOSCOPY    COLONOSCOPY N/A 06/14/2021    Procedure: COLONOSCOPY with biopsy,;  Surgeon: Ramsey Muhammad MD;  Location:  ENDOSCOPY    COLPOSCOPY, CERVIX W/UPPER ADJACENT VAGINA; W/BIOPSY(S), CERVIX  1999    HERNIA SURGERY  1997    Umbilical Hernia Repair    OTHER SURGICAL HISTORY N/A 06/21/2016    Procedure: ESOPHAGOGASTRODUODENOSCOPY (EGD);  Surgeon: Jeff Scherer MD;  Location:  ENDOSCOPY    PARAGARD, IUD  06/2014    UPPER GI ENDOSCOPY,EXAM       Family History   Problem Relation Age of Onset    Breast Cancer Maternal Grandmother         81    Breast Cancer Other         dx post menopausal    High Blood Pressure Father     Colon Cancer Father         59    Colon Polyps Father     Hypertension Father     High Blood Pressure Mother     High Cholesterol Mother     Hypertension Mother     Mental Disorder Mother         Depression    Heart Disorder Sister 54        MI - LAD stenting    Heart  Attack Sister         12/2018 \" maker\"    Mental Disorder Son         ADHD, Bipolar II    Mental Disorder Son         Severe anxiety, Bipolar II      reports that she quit smoking about 21 years ago. Her smoking use included cigarettes. She has never used smokeless tobacco. She reports that she does not currently use alcohol. She reports current drug use. Frequency: 2.00 times per week. Drug: Cannabis.    Allergies:  Allergies   Allergen Reactions    Depakote [Valproic Acid] OTHER (SEE COMMENTS)     Suicidal ideation    Kdc:Olanzapine SWELLING    Ketorolac Tromethamine OTHER (SEE COMMENTS) and NAUSEA AND VOMITING     gastritis  Gastritis & UC  gastritis  Gastritis & UC      Latex RASH and Dyspnea    Molds & Smuts ASTHMA, ITCHING, RASH, SHORTNESS OF BREATH, WHEEZING and OTHER (SEE COMMENTS)    Nsaids OTHER (SEE COMMENTS)     Must take Nsaids sparingly due to history of gastritis  Must take Nsaids sparingly due to history of gastritis      Olanzapine SWELLING     Caused severe swelling and bruising    Penicillins NAUSEA AND VOMITING    Phenothiazines OTHER (SEE COMMENTS)     Extrapyramidal syndrome. No phenothiazines per neurology (documented during 1/19/18 Dr. Velázquez)  Extrapyramidal syndrome. No phenothiazines per neurology (documented during 1/19/18 Dr. Velázquez)  Extrapyramidal syndrome. No phenothiazines per neurology (documented during 1/19/18 Dr. Velázquez)    Amoxicillin NAUSEA AND VOMITING    Levofloxacin PAIN and OTHER (SEE COMMENTS)     Tendinitis in multiple joints  Tendinitis in multiple joints    Mushrooms NAUSEA AND VOMITING    Penicillin G Potassium NAUSEA AND VOMITING    Phenytoin ANXIETY, CONFUSION, DIZZINESS and HALLUCINATION    Sulfa Antibiotics NAUSEA AND VOMITING    Toradol NAUSEA AND VOMITING     Gastritis & UC    Trees, Bruce OTHER (SEE COMMENTS)     Sneezing watery eyes    Triptans HALLUCINATION     Lesions in brain    Erythromycin OTHER (SEE COMMENTS)       Medications:    Current  Facility-Administered Medications:     butalbital-acetaminophen-caffeine (Fioricet) -40 MG per tab 1 tablet, 1 tablet, Oral, Q4H PRN    glucose (Dex4) 15 GM/59ML oral liquid 15 g, 15 g, Oral, Q15 Min PRN **OR** glucose (Glutose) 40% oral gel 15 g, 15 g, Oral, Q15 Min PRN **OR** glucose-vitamin C (Dex-4) chewable tab 4 tablet, 4 tablet, Oral, Q15 Min PRN **OR** dextrose 50% injection 50 mL, 50 mL, Intravenous, Q15 Min PRN **OR** glucose (Dex4) 15 GM/59ML oral liquid 30 g, 30 g, Oral, Q15 Min PRN **OR** glucose (Glutose) 40% oral gel 30 g, 30 g, Oral, Q15 Min PRN **OR** glucose-vitamin C (Dex-4) chewable tab 8 tablet, 8 tablet, Oral, Q15 Min PRN    sertraline (Zoloft) tab 100 mg, 100 mg, Oral, BID    QUEtiapine (SEROquel) tab 50 mg, 50 mg, Oral, Nightly    pregabalin (Lyrica) cap 200 mg, 200 mg, Oral, BID    pantoprazole (Protonix) DR tab 40 mg, 40 mg, Oral, Before breakfast    OXcarbazepine (Trileptal) tab 450 mg, 450 mg, Oral, BID    montelukast (Singulair) tab 10 mg, 10 mg, Oral, Nightly    metoprolol tartrate (Lopressor) tab 25 mg, 25 mg, Oral, Daily    mesalamine DR (Delzicol) cap 1,200 mg, 1,200 mg, Oral, BID AC    levothyroxine (Synthroid) tab 125 mcg, 125 mcg, Oral, Daily    levETIRAcetam (Keppra) tab 500 mg, 500 mg, Oral, BID    lamoTRIgine (LaMICtal) tab 200 mg, 200 mg, Oral, TID    fluticasone furoate-vilanterol (Breo Ellipta) 200-25 MCG/ACT inhaler 1 puff, 1 puff, Inhalation, Daily    fluticasone propionate (Flonase) 50 MCG/ACT nasal suspension 2 spray, 2 spray, Nasal, Daily    buPROPion ER (Wellbutrin XL) 24 hr tab 150 mg, 150 mg, Oral, QAM    acetaminophen (Tylenol) tab 650 mg, 650 mg, Oral, Q6H PRN    albuterol (Ventolin HFA) 108 (90 Base) MCG/ACT inhaler 2 puff, 2 puff, Inhalation, Q6H PRN    aspirin chewable tab 81 mg, 81 mg, Oral, Daily    sodium chloride 0.9% infusion, , Intravenous, Continuous    heparin (Porcine) 5000 UNIT/ML injection 5,000 Units, 5,000 Units, Subcutaneous, Q8H DUNIA     melatonin tab 3 mg, 3 mg, Oral, Nightly PRN    polyethylene glycol (PEG 3350) (Miralax) 17 g oral packet 17 g, 17 g, Oral, Daily PRN    sennosides (Senokot) tab 17.2 mg, 17.2 mg, Oral, Nightly PRN    bisacodyl (Dulcolax) 10 MG rectal suppository 10 mg, 10 mg, Rectal, Daily PRN    ondansetron (Zofran) 4 MG/2ML injection 4 mg, 4 mg, Intravenous, Q6H PRN    Review of Systems   Constitutional:  Positive for malaise/fatigue.   HENT: Negative.     Eyes: Negative.    Respiratory: Negative.     Cardiovascular: Negative.    Gastrointestinal: Negative.    Genitourinary: Negative.    Musculoskeletal:  Positive for falls.   Skin: Negative.    Neurological:  Positive for dizziness and weakness.   Endo/Heme/Allergies: Negative.    Psychiatric/Behavioral: Negative.       Mental Status Exam:   Risk Assessment  Suicidal ideation: no suicidal ideation  Homicidal ideation: None noted    Appearance: unremarkable  Behavior: unremarkable  Attitude: cooperative and consistent  Gait: Normal, walked with PT    Speech: normal rate, rhythm and volume    Mood: euthymic  Affect: Congruent    Thought process: logical and sequential  Thought content: no delusions, obsessions, or other thought abnormalities  Perceptions: no hallucinations  Associations: Intact    Orientation: Alert and oriented x 4  Attention and Concentration:   fair  Memory:  intact immediate, recent, remote  Language: Intact naming and repetition  Fund of Knowledge: Able to recite name of [place] president    Insight: Fair   Judgment: Fair     Objective    Vitals:    03/19/24 0817   BP: (!) 161/89   Pulse: 70   Resp: 16   Temp: 97.8 °F (36.6 °C)     Patient Strengths/Assets:  Caring     Suicide Risk Assessments:  Overall level of suicide risk is low     Risk Factors: history of suicide attempts     Environmental Factors: lives at home with son    Protective Factors: family    Laboratory Data:  Lab Results   Component Value Date    WBC 6.9 03/19/2024    HGB 13.2 03/19/2024    HCT  42.3 03/19/2024    .0 03/19/2024    CREATSERUM 0.83 03/19/2024    BUN 6 03/19/2024     03/19/2024    K 4.0 03/19/2024    K 4.0 03/19/2024     03/19/2024    CO2 21.0 03/19/2024    GLU 89 03/19/2024    CA 9.3 03/19/2024    ALB 3.4 03/19/2024    ALKPHO 90 03/19/2024    BILT 0.3 03/19/2024    TP 6.7 03/19/2024    AST 12 03/19/2024    ALT 23 03/19/2024    T4F 0.8 03/18/2024    TSH 0.110 03/18/2024    MG 2.1 03/19/2024    PGLU 80 03/19/2024       STUDIES:    Micaela KIRK NP-C

## 2024-03-19 NOTE — PROGRESS NOTES
[facility] Hospitalist Progress Note                                                                     [facility]   part of [facility]      Marylin Fernandez  4/16/1970    SUBJECTIVE: no chest pain, palpitations, shortness of breath, cough, vomiting, abdominal pain. Patient with migraine and some nausea because of that.     OBJECTIVE:  Temp:  [97.8 °F (36.6 °C)-98 °F (36.7 °C)] 97.8 °F (36.6 °C)  Pulse:  [62-93] 70  Resp:  [14-16] 16  BP: (131-162)/(77-99) 161/89  SpO2:  [95 %-98 %] 97 %  Exam  Gen: No acute distress, alert and oriented  Pulm: Lungs clear bilaterally, normal respiratory effort, no crackles, no wheezing  CV: Heart with regular rate and rhythm, no murmur.  Abd: Abdomen soft, nontender, nondistended, bowel sounds present  MSK: No significant pitting edema or tenderness of the LE  Skin: no new rashes or lesions    Labs:   Recent Labs   Lab 03/15/24  2112 03/17/24  2012 03/18/24  0720 03/19/24  0659   WBC 7.2 7.1 6.3 6.9   HGB 13.8 13.3 11.5* 13.2   MCV 95.5 96.9 92.9 96.6   .0 261.0 233.0 280.0       Recent Labs   Lab 03/15/24  2112 03/17/24  2012 03/18/24  0720 03/19/24  0659    140 143 139   K 4.0 4.3 3.4* 4.0  4.0   * 111 116* 114*   CO2 30.0 29.0 24.0 21.0   BUN 7* 8* 6* 6*   CREATSERUM 0.94 0.92 0.60 0.83   CA 9.8 9.7 8.8 9.3   MG  --  2.3 2.0 2.1   * 84 88 89       Recent Labs   Lab 03/15/24  2112 03/17/24  2012 03/19/24  0659   ALT 20 20 23   AST 12* 19 12*   ALB 4.1 3.7 3.4       Recent Labs   Lab 03/18/24  0539 03/18/24  1223 03/18/24  1552 03/19/24  0012 03/19/24  0613   PGLU 102* 87 85 96 80       Meds:   Scheduled:    sertraline  100 mg Oral BID    QUEtiapine  50 mg Oral Nightly    pregabalin  200 mg Oral BID    pantoprazole  40 mg Oral Before breakfast    OXcarbazepine  450 mg Oral BID    montelukast  10 mg Oral Nightly    metoprolol tartrate  25 mg Oral Daily    mesalamine DR  1,200 mg Oral BID AC     levothyroxine  125 mcg Oral Daily    levETIRAcetam  500 mg Oral BID    lamoTRIgine  200 mg Oral TID    fluticasone furoate-vilanterol  1 puff Inhalation Daily    fluticasone propionate  2 spray Nasal Daily    buPROPion ER  150 mg Oral QAM    aspirin  81 mg Oral Daily    heparin  5,000 Units Subcutaneous Q8H DUNIA     Continuous Infusions:    sodium chloride 100 mL/hr at 03/19/24 1006     PRN: butalbital-acetaminophen-caffeine, glucose **OR** glucose **OR** glucose-vitamin C **OR** dextrose **OR** glucose **OR** glucose **OR** glucose-vitamin C, acetaminophen, albuterol, melatonin, polyethylene glycol (PEG 3350), sennosides, bisacodyl, ondansetron      ASSESSMENT / PLAN:   53 year old female with history of allergic rhinitis, anxiety, depression, bipolar, asthma, history of kidney stones, fibromyalgia, GERD, gestational diabetes, hypothyroidism, history of migraines, osteoarthritis, history of seizures, history of stroke with no residual symptoms, history ulcer colitis presenting with generalized weakness, concerns with her memory, and difficulty with her speech.       Weakness  Memory and Speech difficulties   -CT brain negative for acute pathology  -may be secondary to elevated lithium levels, hold lithium dose, recheck leves  -consult neurology given hx cva and seizure for evaluation of these although no focal signs of weakness  -PT/OT--> pending  -EEG pending results  -neurology following    Migraines  -start fiorcet prn     Anxiety  Depression  Bipolar  -bupropion  -sertraline  -seroquel  -hold clonazepam for now, resume tomorrow if continues to improve  -lithium on hold due to elevated levels --> levels wnl, follow psych rec     Asthma  -continue inhalers     GERD  -pantoprazole     Fibromyalgia   -lyrica     Hypothyroidism  -levothyroxine     UC  -meslamine     Hx Seizure  -lamictal  -keppra  -oxcarbazepine     Hx CVA     Quality:  DVT Prophylaxis: scd, heparin sq  CODE status: Full per chart  Simon: none      Plan of care discussed with patient and staff     Dispo: no discharge      Steve Cerrato MD  [facility]  [phone]

## 2024-03-19 NOTE — PLAN OF CARE
Pt alert and oriented x 3-4  Assumed pt care at 0730  RA  Tele-NSR  Non-cardiac electrolyte protocol  EEG stopped  PRN Fioricet for migraine/PRN zofran for nausea  Accucheck q6h  PIV left hand with 0.9 NS at 100ml/hr  Heparin VTE prophylaxis  2 assist with walker    Problem: METABOLIC/FLUID AND ELECTROLYTES - ADULT  Goal: Glucose maintained within prescribed range  Description: INTERVENTIONS:  - Monitor Blood Glucose as ordered  - Assess for signs and symptoms of hyperglycemia and hypoglycemia  - Administer ordered medications to maintain glucose within target range  - Assess barriers to adequate nutritional intake and initiate nutrition consult as needed  - Instruct patient on self management of diabetes  Outcome: Progressing  Goal: Electrolytes maintained within normal limits  Description: INTERVENTIONS:  - Monitor labs and rhythm and assess patient for signs and symptoms of electrolyte imbalances  - Administer electrolyte replacement as ordered  - Monitor response to electrolyte replacements, including rhythm and repeat lab results as appropriate  - Fluid restriction as ordered  - Instruct patient on fluid and nutrition restrictions as appropriate  Outcome: Progressing     Problem: NEUROLOGICAL - ADULT  Goal: Absence of seizures  Description: INTERVENTIONS  - Monitor for seizure activity  - Administer anti-seizure medications as ordered  - Monitor neurological status  Outcome: Progressing  Goal: Remains free of injury related to seizure activity  Description: INTERVENTIONS:  - Maintain airway, patient safety  and administer oxygen as ordered  - Monitor patient for seizure activity, document and report duration and description of seizure to MD/LIP  - If seizure occurs, turn patient to side and suction secretions as needed  - Reorient patient post seizure  - Seizure pads on all 4 side rails  - Instruct patient/family to notify RN of any seizure activity  - Instruct patient/family to call for assistance with  activity based on assessment  Outcome: Progressing

## 2024-03-19 NOTE — PLAN OF CARE
Assumed care at 1930. A/Ox3-4, on room air, NSR on tele. Seizure precautions. Aspiration precautions. VTE with heparin. Regular diet, Q6 accuchecks. Up 2 with walker. Purewick in place, c/d/I. Continuous EEG. PIV infusing 0.9 nacl at 100 ml/hr. Patient updated with plan of care. All needs met at this time.     Problem: METABOLIC/FLUID AND ELECTROLYTES - ADULT  Goal: Glucose maintained within prescribed range  Description: INTERVENTIONS:  - Monitor Blood Glucose as ordered  - Assess for signs and symptoms of hyperglycemia and hypoglycemia  - Administer ordered medications to maintain glucose within target range  - Assess barriers to adequate nutritional intake and initiate nutrition consult as needed  - Instruct patient on self management of diabetes  Outcome: Progressing  Goal: Electrolytes maintained within normal limits  Description: INTERVENTIONS:  - Monitor labs and rhythm and assess patient for signs and symptoms of electrolyte imbalances  - Administer electrolyte replacement as ordered  - Monitor response to electrolyte replacements, including rhythm and repeat lab results as appropriate  - Fluid restriction as ordered  - Instruct patient on fluid and nutrition restrictions as appropriate  Outcome: Progressing     Problem: NEUROLOGICAL - ADULT  Goal: Absence of seizures  Description: INTERVENTIONS  - Monitor for seizure activity  - Administer anti-seizure medications as ordered  - Monitor neurological status  Outcome: Progressing  Goal: Remains free of injury related to seizure activity  Description: INTERVENTIONS:  - Maintain airway, patient safety  and administer oxygen as ordered  - Monitor patient for seizure activity, document and report duration and description of seizure to MD/LIP  - If seizure occurs, turn patient to side and suction secretions as needed  - Reorient patient post seizure  - Seizure pads on all 4 side rails  - Instruct patient/family to notify RN of any seizure activity  - Instruct  patient/family to call for assistance with activity based on assessment  Outcome: Progressing     Problem: Impaired Functional Mobility  Goal: Achieve highest/safest level of mobility/gait  Description: Interventions:  - Assess patient's functional ability and stability  - Promote increasing activity/tolerance for mobility and gait  - Educate and engage patient/family in tolerated activity level and precautions  - Recommend use of chair position in bed 3 times per day  Outcome: Progressing

## 2024-03-19 NOTE — OCCUPATIONAL THERAPY NOTE
OCCUPATIONAL THERAPY            OT order received, chart reviewed. Patient is on continuous EEG, will follow up at a later time/day as appropriate.

## 2024-03-19 NOTE — CM/SW NOTE
03/19/24 1100   CM/SW Referral Data   Referral Source Social Work (self-referral)   Reason for Referral Discharge planning   Informant Patient;Son;EMR;Clinical Staff Member   Medical Hx   Does patient have an established PCP? Yes   Patient Info   Advanced directives? Yes   Patient's Current Mental Status at Time of Assessment Alert;Oriented   Patient's Home Environment Condo/Apt no elevator   Patient lives with Son   Patient Status Prior to Admission   Independent with ADLs and Mobility Yes   Discharge Needs   Anticipated D/C needs Home health care     HOME SITUATION  Type of Home: Apartment   Home Layout:  (third floor aparment)  Stairs to Enter : 2  Railing: Yes  Stairs to Bedroom: 30  Railing: Yes     Lives With: Son  Drives: No  Patient Owned Equipment: Rollator;Rolling walker     Prior Level of Adrian per PT eval: per pt report, pt is Mod I for ADL, IADL, and ambulates with RW. Pt lives in a third floor apartment and her 32 year old son lives with her. Pt babysits her grandson. Pt does not drive and has groceries delivered.    Pt is a 54 y/o female admitted with lithium toxicity. Chart reviewed for discharge planning needs and noted PT is anticipating pt will need HH services at discharge. Noted pt was recently in the ED on 3/15/24 and discharged home with [facility]. SW received call from Beatrice at [facility] stating pt is current for RN and PT services. Beatrice stated RN provided SOC on [place] 3/17/24 prior to pt's hospitalization. HERMINIO met with pt at bedside to discuss discharge planning.     Pt stated she lives with her son (Papo) in a third floor apartment and is typically independent. Pt stated she is able to maneuver the stairs with the railings. Pt stated her son has health issues of his own and may not be able to assist her if needed. Pt stated she has groceries delivered. Pt stated she has recently started using a walker. Pt stated she also stays with her son/HCPOA (Del) in [place]  occasionally when she has to babysit her grandson. Pt stated her son (Del) is a paramedic. Discussed HH services, pt agreeable and in agreement to continue with [facility]. SW informed pt to reach out to [facility] and notify them if pt plans to stay with her son Del for a period of time, for the HH agency to check staffing in the area. Pt denied any further needs at this time.     SW updated pt's son/HCPOA Del. Pt's son stated he has spoken with [facility] and they have discussed adding a HH Aide for pt. Pt's son denied any further needs at this time. SW provided pt's son with SW contact information.     HH Aide added to HH order and attached to [facility] referral.  Healthcare reserved in [facility]. SW will continue to follow.     JUAN M Elliott  Discharge Planner

## 2024-03-19 NOTE — DISCHARGE INSTRUCTIONS
Sometimes managing your health at home requires assistance.  The [facility] team has recognized your preference to use [facility] [phone]. A representative from the home health agency will contact you or your family to schedule your first visit.

## 2024-03-19 NOTE — PROCEDURES
LONG-TERM VIDEO EEG REPORT;    Reason for Examination: Seizures/encephalopathy.    Technical Summary:   18 Channels of EEG and 1 Channel of EKG was performed utilizing internation 10/20 method. Motion, muscle and machine artifacts were noted.       Recording Start date/time: 03/18 at 1447  Recording end date/time: 03/19 at 1100      Background Activity:   The background activity consisted of 9 Hz waveforms, reactive to eye opening/ external stimulation.    Abnormality:  Throughout the recording mild, intermittent generalized sharp wave activity was noted.  No evidence of electrographic seizures were noted.    Activation:    Hyperventilation:   Not Performed.    Photic Stimulation:  Driving response seen.No       Sleep:  Stage I and II sleep seen.    Impression:  This is a Abnormal prolonged Video EEG study.   Mild intermittent generalized sharp wave activity was noted.  These waveforms are epileptogenic in nature.  No evidence of electrographic seizures were noted.  Clinical correlation is recommended.        Geo Perdomo MD  [facility].

## 2024-03-20 VITALS
WEIGHT: 197 LBS | RESPIRATION RATE: 16 BRPM | OXYGEN SATURATION: 94 % | DIASTOLIC BLOOD PRESSURE: 96 MMHG | SYSTOLIC BLOOD PRESSURE: 164 MMHG | TEMPERATURE: 98 F | HEART RATE: 63 BPM | BODY MASS INDEX: 35 KG/M2

## 2024-03-20 PROBLEM — R56.9 SEIZURES (HCC): Status: ACTIVE | Noted: 2024-03-20

## 2024-03-20 LAB
AMMONIA PLAS-MCNC: 26 UMOL/L (ref 11–32)
ANION GAP SERPL CALC-SCNC: 7 MMOL/L (ref 0–18)
BASOPHILS # BLD AUTO: 0.04 X10(3) UL (ref 0–0.2)
BASOPHILS NFR BLD AUTO: 0.6 %
BUN BLD-MCNC: 6 MG/DL (ref 9–23)
CALCIUM BLD-MCNC: 9.2 MG/DL (ref 8.5–10.1)
CHLORIDE SERPL-SCNC: 114 MMOL/L (ref 98–112)
CO2 SERPL-SCNC: 21 MMOL/L (ref 21–32)
CREAT BLD-MCNC: 0.8 MG/DL
EGFRCR SERPLBLD CKD-EPI 2021: 87 ML/MIN/1.73M2 (ref 60–?)
EOSINOPHIL # BLD AUTO: 0.23 X10(3) UL (ref 0–0.7)
EOSINOPHIL NFR BLD AUTO: 3.5 %
ERYTHROCYTE [DISTWIDTH] IN BLOOD BY AUTOMATED COUNT: 14.2 %
GLUCOSE BLD-MCNC: 104 MG/DL (ref 70–99)
GLUCOSE BLD-MCNC: 74 MG/DL (ref 70–99)
GLUCOSE BLD-MCNC: 95 MG/DL (ref 70–99)
HCT VFR BLD AUTO: 40.6 %
HGB BLD-MCNC: 12.7 G/DL
IMM GRANULOCYTES # BLD AUTO: 0.02 X10(3) UL (ref 0–1)
IMM GRANULOCYTES NFR BLD: 0.3 %
LITHIUM SERPL-SCNC: 0.5 MMOL/L (ref 0.6–1.2)
LYMPHOCYTES # BLD AUTO: 2.49 X10(3) UL (ref 1–4)
LYMPHOCYTES NFR BLD AUTO: 37.7 %
MAGNESIUM SERPL-MCNC: 1.8 MG/DL (ref 1.6–2.6)
MCH RBC QN AUTO: 29.7 PG (ref 26–34)
MCHC RBC AUTO-ENTMCNC: 31.3 G/DL (ref 31–37)
MCV RBC AUTO: 95.1 FL
MONOCYTES # BLD AUTO: 0.5 X10(3) UL (ref 0.1–1)
MONOCYTES NFR BLD AUTO: 7.6 %
NEUTROPHILS # BLD AUTO: 3.32 X10 (3) UL (ref 1.5–7.7)
NEUTROPHILS # BLD AUTO: 3.32 X10(3) UL (ref 1.5–7.7)
NEUTROPHILS NFR BLD AUTO: 50.3 %
OSMOLALITY SERPL CALC.SUM OF ELEC: 292 MOSM/KG (ref 275–295)
PLATELET # BLD AUTO: 261 10(3)UL (ref 150–450)
POTASSIUM SERPL-SCNC: 4.1 MMOL/L (ref 3.5–5.1)
RBC # BLD AUTO: 4.27 X10(6)UL
SODIUM SERPL-SCNC: 142 MMOL/L (ref 136–145)
WBC # BLD AUTO: 6.6 X10(3) UL (ref 4–11)

## 2024-03-20 PROCEDURE — 99233 SBSQ HOSP IP/OBS HIGH 50: CPT | Performed by: OTHER

## 2024-03-20 PROCEDURE — 99232 SBSQ HOSP IP/OBS MODERATE 35: CPT

## 2024-03-20 RX ORDER — LITHIUM CARBONATE 300 MG/1
300 TABLET, FILM COATED, EXTENDED RELEASE ORAL NIGHTLY
Status: SHIPPED | COMMUNITY
Start: 2024-03-20

## 2024-03-20 RX ORDER — AMLODIPINE BESYLATE 2.5 MG/1
2.5 TABLET ORAL DAILY
Status: DISCONTINUED | OUTPATIENT
Start: 2024-03-20 | End: 2024-03-20

## 2024-03-20 RX ORDER — LITHIUM CARBONATE 300 MG/1
300 CAPSULE ORAL
Qty: 90 CAPSULE | Refills: 0 | Status: ON HOLD | OUTPATIENT
Start: 2024-03-20 | End: 2024-03-24

## 2024-03-20 RX ORDER — AMLODIPINE BESYLATE 2.5 MG/1
2.5 TABLET ORAL DAILY
Qty: 30 TABLET | Refills: 0 | Status: SHIPPED | OUTPATIENT
Start: 2024-03-21

## 2024-03-20 NOTE — PROGRESS NOTES
Assumed care at 7:30 am   Alert and oriented x4  Able to ambulate with her walker  Neuro cleared  Psych Cleared  Dr Cerrato reconciled medications.   Sister here to pick her up.   IV removed  Tele removed  Wheeled down via wheelchair.

## 2024-03-20 NOTE — PROGRESS NOTES
[facility]  [facility] Neurology Progress Note    Marylin Walker Patient Status:  Inpatient    1970 MRN [id number]   Location [facility] 3NE-A Attending Steve Cerrato MD   Hosp Day # 2 PCP Yoel Servin MD     CC: Altered mental status    Subjective:  Seen for follow up visit today.  No acute events overnight.  Feeling well, headache is mild, Fioricet is helping. Denies any blurred or double vision, no fogginess, confusion, or disorientation. Denies any new weakness, numbness, paresthesias. No seizure-like activity or loss of consciousness.     MEDICATIONS:  No current outpatient medications on file.     Current Facility-Administered Medications   Medication Dose Route Frequency    butalbital-acetaminophen-caffeine (Fioricet) -40 MG per tab 1 tablet  1 tablet Oral Q4H PRN    lithium carbonate cap 300 mg  300 mg Oral Nightly    glucose (Dex4) 15 GM/59ML oral liquid 15 g  15 g Oral Q15 Min PRN    Or    glucose (Glutose) 40% oral gel 15 g  15 g Oral Q15 Min PRN    Or    glucose-vitamin C (Dex-4) chewable tab 4 tablet  4 tablet Oral Q15 Min PRN    Or    dextrose 50% injection 50 mL  50 mL Intravenous Q15 Min PRN    Or    glucose (Dex4) 15 GM/59ML oral liquid 30 g  30 g Oral Q15 Min PRN    Or    glucose (Glutose) 40% oral gel 30 g  30 g Oral Q15 Min PRN    Or    glucose-vitamin C (Dex-4) chewable tab 8 tablet  8 tablet Oral Q15 Min PRN    sertraline (Zoloft) tab 100 mg  100 mg Oral BID    QUEtiapine (SEROquel) tab 50 mg  50 mg Oral Nightly    pregabalin (Lyrica) cap 200 mg  200 mg Oral BID    pantoprazole (Protonix) DR tab 40 mg  40 mg Oral Before breakfast    OXcarbazepine (Trileptal) tab 450 mg  450 mg Oral BID    montelukast (Singulair) tab 10 mg  10 mg Oral Nightly    metoprolol tartrate (Lopressor) tab 25 mg  25 mg Oral Daily    mesalamine DR (Delzicol) cap 1,200 mg  1,200 mg Oral BID AC    levothyroxine (Synthroid) tab 125 mcg  125 mcg Oral Daily    levETIRAcetam (Keppra) tab 500 mg   500 mg Oral BID    lamoTRIgine (LaMICtal) tab 200 mg  200 mg Oral TID    fluticasone furoate-vilanterol (Breo Ellipta) 200-25 MCG/ACT inhaler 1 puff  1 puff Inhalation Daily    fluticasone propionate (Flonase) 50 MCG/ACT nasal suspension 2 spray  2 spray Nasal Daily    buPROPion ER (Wellbutrin XL) 24 hr tab 150 mg  150 mg Oral QAM    acetaminophen (Tylenol) tab 650 mg  650 mg Oral Q6H PRN    albuterol (Ventolin HFA) 108 (90 Base) MCG/ACT inhaler 2 puff  2 puff Inhalation Q6H PRN    aspirin chewable tab 81 mg  81 mg Oral Daily    sodium chloride 0.9% infusion   Intravenous Continuous    heparin (Porcine) 5000 UNIT/ML injection 5,000 Units  5,000 Units Subcutaneous Q8H DUNIA    melatonin tab 3 mg  3 mg Oral Nightly PRN    polyethylene glycol (PEG 3350) (Miralax) 17 g oral packet 17 g  17 g Oral Daily PRN    sennosides (Senokot) tab 17.2 mg  17.2 mg Oral Nightly PRN    bisacodyl (Dulcolax) 10 MG rectal suppository 10 mg  10 mg Rectal Daily PRN    ondansetron (Zofran) 4 MG/2ML injection 4 mg  4 mg Intravenous Q6H PRN       REVIEW OF SYSTEMS:  A 10-point system was reviewed.  Pertinent positives and negatives are noted in HPI.      PHYSICAL EXAMINATION:  VITAL SIGNS: BP (!) 164/96 (BP Location: Right arm)   Pulse 72   Temp 98.2 °F (36.8 °C) (Oral)   Resp 16   Wt 197 lb (89.4 kg)   LMP 02/20/2015 (Approximate)   SpO2 97%   BMI 34.90 kg/m²   GENERAL:  Patient is a 53 year old female in no acute distress.  HEENT:  Normocephalic, atraumatic  ABD: Soft, non tender  SKIN: Warm, dry, no rashes    NEUROLOGICAL:   Mental status: Oriented to person, place, and time   Speech: Fluent, no dysarthria  Memory and comprehension: Intact   Cranial Nerves: VFF, PERRL 3mm brisk, EOMI, no nystagmus, facial sensation intact, face symmetric, tongue midline, shoulder shrug equal, remainder CN intact  Motor: No drift, no focal arm or leg weakness   Sensory: Intact to light touch  Coordination: FTN intact  Gait:  Deferred      Imaging/Diagnostics:  CT BRAIN OR HEAD (14416)    Result Date: 3/17/2024  CONCLUSION:  No CT evidence for acute intracranial process.   LOCATION:  [facility]   Dictated by (CST): Bev Holguin MD on 3/17/2024 at 10:24 PM     Finalized by (CST): Bev Holguin MD on 3/17/2024 at 10:26 PM       XR CHEST AP PORTABLE  (CPT=71045)    Result Date: 3/17/2024  CONCLUSION:  Elevation of the right hemidiaphragm.  There is infiltrate/atelectasis in the right lung base.  Atelectasis left base.  Normal heart size and pulmonary vascularity.   LOCATION:  [facility]      Dictated by (CST): Bev Holguin MD on 3/17/2024 at 9:45 PM     Finalized by (CST): Bev Holguin MD on 3/17/2024 at 9:46 PM       CT BRAIN OR HEAD (64021)    Result Date: 3/15/2024  CONCLUSION:  No CT evidence for acute intracranial process.   LOCATION:  [facility]   Dictated by (CST): Bev Holguin MD on 3/15/2024 at 11:22 PM     Finalized by (CST): Bve Holguin MD on 3/15/2024 at 11:23 PM          Labs:  Recent Labs   Lab 03/17/24 2012 03/18/24  0720 03/19/24  0659   RBC 4.45 3.79* 4.38   HGB 13.3 11.5* 13.2   HCT 43.1 35.2 42.3   MCV 96.9 92.9 96.6   MCH 29.9 30.3 30.1   MCHC 30.9* 32.7 31.2   RDW 14.6 14.6 14.5   NEPRELIM 3.98 3.52 3.98   WBC 7.1 6.3 6.9   .0 233.0 280.0         Recent Labs   Lab 03/17/24 2012 03/18/24  0720 03/19/24  0659   GLU 84 88 89   BUN 8* 6* 6*   CREATSERUM 0.92 0.60 0.83   EGFRCR 74 106 83   CA 9.7 8.8 9.3    143 139   K 4.3 3.4* 4.0  4.0    116* 114*   CO2 29.0 24.0 21.0       Assessment/Plan:    A 53 year old female with:    Encephalopathy, most likely toxic/metabolic, complicated by neuropsychiatric illness and toxic lithium levels. Doing better, back to baseline.   - Routine EEG - mild epileptiform activity.  No electrographic seizures were noted.  - Ammonia level was slightly elevated. Lactulose treatment advised.   - CT scan of the head - negative for acute abnormalities.   Toxic lithium level,  accidental.   - Admission level was 2.4,, trended down.   - Most recent level  is normal at 0.8.    - Side effects from toxic lithium levels include confusion, dry mouth, decreased memory, headache, weakness and muscle twitching.   - Medication dose being adjusted.  History of seizures.   - Patient has been on Keppra 500 mg BID,  oxcarbazepine 450 mg BID, Lamictal 200 mg TID, and Lyrica 200 mg BID all of which have antiepileptic properties.  -  Mild epileptic abnormality was noted on LTM EEG.  No evidence of electrographic seizures were noted.    - Advised to continue medications as per home doses as listed above.   4. History of remote stroke in 2003, on ASA 81 mg for stroke prophylaxis daily. Continue.   Patient to follow-up with her neurologist Dr. Ramirez in 2to 4  weeks after discharge.   Discussed in detail with patient, all questions answered.   No further inpatient intervention indicated at this time. Discussed with Dr. Perdomo.   Is this a shared or split note between Advanced Practice Provider and Physician? Yes       Daniella Murphy APRHERVE  [facility]  3/20/2024, 8:43 AM   [place] # [id number]      Impression/plan/MDM:      Patient seen and examined personally.  Investigations reviewed.  Encephalopathy, most likely toxic/metabolic, complicated by neuropsychiatric illness.  EEG showed mild epileptiform activity.  No electrographic seizures were noted.  Ammonia level was slightly elevated.  Advised lactulose.  Patient had a CT scan of the head done yesterday which was unremarkable for acute abnormalities.  Patient was counseled.  Toxic lithium level.  Most recent level 0.8.Now normal.   Side effects including confusion, dry mouth, decreased memory, headache, weakness and muscle twitching is common with lithium toxicity.  Medication dose being adjusted.  History of seizures.  Patient has been on Keppra, oxcarbazepine, Lamictal and Lyrica, all of which have antiepileptic properties.  Mild  epileptic abnormality was noted on EEG.  No evidence of electrographic seizures were noted.  Advised to continue medication.  Patient to follow-up with her neurologist after discharge.

## 2024-03-20 NOTE — PLAN OF CARE
Assumed pt care at 1930. A/Ox4. Room air. NSR on tele. Electrolyte replacement protocol (noncardiac). PRN zofran given for nausea per MAR. L PIV infusing NS at 100 mL/hr.  Accu checks Q6. Ambulates 2 assist with a walker. All safety precautions are in place and will continue with plan of care.                    Problem: NEUROLOGICAL - ADULT  Goal: Absence of seizures  Description: INTERVENTIONS  - Monitor for seizure activity  - Administer anti-seizure medications as ordered  - Monitor neurological status  Outcome: Progressing  Goal: Remains free of injury related to seizure activity  Description: INTERVENTIONS:  - Maintain airway, patient safety  and administer oxygen as ordered  - Monitor patient for seizure activity, document and report duration and description of seizure to MD/LIP  - If seizure occurs, turn patient to side and suction secretions as needed  - Reorient patient post seizure  - Seizure pads on all 4 side rails  - Instruct patient/family to notify RN of any seizure activity  - Instruct patient/family to call for assistance with activity based on assessment  Outcome: Progressing

## 2024-03-20 NOTE — PROGRESS NOTES
[facility]  Report of Psychiatric Progress Note    Marylin Walker Patient Status:  Inpatient    1970 MRN [id number]   Location [facility] 3NE-A Attending Steve Cerrato MD   Hosp Day # 2 PCP Yoel Servin MD     Date of Admission: 3/17/2024  Date of Service: 3/20/2024   Reason for Consultation: Lithium toxicity    Impression: 53 year old female presents with multiple falls, dizziness, and altered mental status. She was found to have Lithium level of 2.4 and Ammonia level of 45    Primary Psychiatric Diagnosis:  Acute encephalopathy/delirium due to lithium toxicity and elevated ammonia levels. Per chart review, patient has a long history of delirium. Psych team has previously encouraged evaluation for major neurocognitive testing outside of the testing.     Bipolar II disorder, reports mood stable. Unable to recall last manic episode.    Cognitive disorder, unspecified. 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      Personality Traits:  Deferred     Pertinent Medical Diagnoses:  Lithium toxicity. 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     Recommendations:  Continue Lithium at lower dose of 300 mg nightly.  Lithium level ordered for Friday  Continue all other home medications  Wellbutrin  mg daily  Seroquel 50 mg nightly  Klonopin 0.25 mg TID PRN for anxiety  Zoloft 50 mg in morning and 100 mg at night  Anticonvulsants to be reordered by Hospitalist.  Cleared to discharge home from psychiatric perspective.    YELENA Reynolds NP-C    History of Present Illness:  Patient presented to [facility] on 3/17/2024 with reports of multiple falls and dizziness. She was noted to be occasionally answering inappropriately to questions asked of her. Lithium level was noted to be 2.4 and Ammonia level at 45. Kidney function was within normal limits. No electrolyte abnormalities. Psychiatry was consulted due to  lithium toxicity.    Patient was noted to be sitting in chair eating breakfast. She reports that she was unsure what occurred besides feeling \"really out of it\". Noted that patient is a poor historian and has history of delirium. She reports that she is unsure if she was taking additional lithium than what is prescribed. Replies \"it is possible\". Lithium levels have previously been stable on same dosing. She denies changes in her hydration statues. Kidney function is noted to be within normal range and close to patient's baseline. Denies change in appetite. Discussed that she is the one that administers her medications to self and is also the one that sets up her weekly pill box. She denies intentionally taking extra lithium. Denies SI. Denies HI. Denies any recent change in mood. Reports seeing psychotherapist every other week. Denies any recent medication changes.     Interval History  3/19/2024 - Reports symptoms have resolved. Informed of improved Lithium levels. Discussed restarting Lithium at lower dose with redraw tomorrow and close follow up with outpatient psychiatrist. She was agreeable. Discussed results of UDS. She does admit she vapes or has an edible twice a week. Denies any suicidal ideation.     3/20/2024 - Patient denies any side effects from restarting Lithium. Lithium level 0.5 this morning. Patient aware that new doses of Lithium was 300 mg and that she will need repeat Lithium levels on Friday. Patient aware she needs to follow up with her psychiatrist about further adjustment of Lithium. Cleared to discharge from a psychiatric perspective.     Past Psychiatric History:  1) Bipolar 2 disorder per psych notes from [place]. She saw her 1st psychotherapist at age 6 and was first hospitalized for depression at age 13. She has a hx of 4 suicide attempts by OD as a teenager. She has a hx of 10+ inpatient psych hospitalizations. In 2003, she was dx with Bipolar 2 disorder and with med changes has not  needed the inpatient psych unit since then. Her psychiatrist Dr. Seferino Fernandez and psychotherapist Ofelia Wells are both at [facility]. See Care Everywhere.     Substance Use History:  Previous cannabis use. Denies current. Will order drug screen.     Psych Family History:  Denies.     Social and Developmental History:   . She lives with her older son at this time. 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 Currently on disability.     Past Medical History:   Diagnosis Date    Acne     Allergic rhinitis     Alopecia areata 2015    Anxiety     Asthma ([facility])     Back problem     Bipolar affective ([facility])     Calculus of kidney 10/2020    Depression     Fibromyalgia     GERD (gastroesophageal reflux disease)     Gestational diabetes ([facility])     Hemorrhoids     Hypothyroidism     Migraines     Organic hypersomnia, unspecified PSG 6-29-14    AHI 2 RDI 2 REM AHI 2 SaO2 doroteo 88 %    Osteoarthritis     Pneumonia due to organism     Scoliosis     Seizure disorder ([facility]) 10/2020    absent seizures    Stroke ([facility])     2003+ mild R.sided weakness    Ulcerative colitis ([facility])     Visual impairment      Past Surgical History:   Procedure Laterality Date    CHOLECYSTECTOMY  03/2010    COLONOSCOPY  2013    ulcerative colitis    COLONOSCOPY N/A 04/25/2017    Procedure: COLONOSCOPY;  Surgeon: Hipolito Dobbins MD;  Location:  ENDOSCOPY    COLONOSCOPY N/A 06/14/2021    Procedure: COLONOSCOPY with biopsy,;  Surgeon: Ramsey Muhammad MD;  Location:  ENDOSCOPY    COLPOSCOPY, CERVIX W/UPPER ADJACENT VAGINA; W/BIOPSY(S), CERVIX  1999    HERNIA SURGERY  1997    Umbilical Hernia Repair    OTHER SURGICAL HISTORY N/A 06/21/2016    Procedure: ESOPHAGOGASTRODUODENOSCOPY (EGD);  Surgeon: Jeff Scherer MD;  Location:  ENDOSCOPY    PARAGARD, IUD  06/2014    UPPER GI ENDOSCOPY,EXAM       Family History   Problem Relation Age of Onset    Breast Cancer Maternal Grandmother         81    Breast Cancer Other         dx  post menopausal    High Blood Pressure Father     Colon Cancer Father         59    Colon Polyps Father     Hypertension Father     High Blood Pressure Mother     High Cholesterol Mother     Hypertension Mother     Mental Disorder Mother         Depression    Heart Disorder Sister 54        MI - LAD stenting    Heart Attack Sister         12/2018 \" maker\"    Mental Disorder Son         ADHD, Bipolar II    Mental Disorder Son         Severe anxiety, Bipolar II      reports that she quit smoking about 21 years ago. Her smoking use included cigarettes. She has never used smokeless tobacco. She reports that she does not currently use alcohol. She reports current drug use. Frequency: 2.00 times per week. Drug: Cannabis.    Allergies:  Allergies   Allergen Reactions    Depakote [Valproic Acid] OTHER (SEE COMMENTS)     Suicidal ideation    Kdc:Olanzapine SWELLING    Ketorolac Tromethamine OTHER (SEE COMMENTS) and NAUSEA AND VOMITING     gastritis  Gastritis & UC  gastritis  Gastritis & UC      Latex RASH and Dyspnea    Molds & Smuts ASTHMA, ITCHING, RASH, SHORTNESS OF BREATH, WHEEZING and OTHER (SEE COMMENTS)    Nsaids OTHER (SEE COMMENTS)     Must take Nsaids sparingly due to history of gastritis  Must take Nsaids sparingly due to history of gastritis      Olanzapine SWELLING     Caused severe swelling and bruising    Penicillins NAUSEA AND VOMITING    Phenothiazines OTHER (SEE COMMENTS)     Extrapyramidal syndrome. No phenothiazines per neurology (documented during 1/19/18 Dr. Velázquez)  Extrapyramidal syndrome. No phenothiazines per neurology (documented during 1/19/18 Dr. Velázquez)  Extrapyramidal syndrome. No phenothiazines per neurology (documented during 1/19/18 Dr. Velázquez)    Amoxicillin NAUSEA AND VOMITING    Levofloxacin PAIN and OTHER (SEE COMMENTS)     Tendinitis in multiple joints  Tendinitis in multiple joints    Mushrooms NAUSEA AND VOMITING    Penicillin G Potassium NAUSEA AND VOMITING    Phenytoin  ANXIETY, CONFUSION, DIZZINESS and HALLUCINATION    Sulfa Antibiotics NAUSEA AND VOMITING    Toradol NAUSEA AND VOMITING     Gastritis & UC    Trees, [place] OTHER (SEE COMMENTS)     Sneezing watery eyes    Triptans HALLUCINATION     Lesions in brain    Erythromycin OTHER (SEE COMMENTS)       Medications:    Current Facility-Administered Medications:     amLODIPine (Norvasc) tab 2.5 mg, 2.5 mg, Oral, Daily    butalbital-acetaminophen-caffeine (Fioricet) -40 MG per tab 1 tablet, 1 tablet, Oral, Q4H PRN    lithium carbonate cap 300 mg, 300 mg, Oral, Nightly    glucose (Dex4) 15 GM/59ML oral liquid 15 g, 15 g, Oral, Q15 Min PRN **OR** glucose (Glutose) 40% oral gel 15 g, 15 g, Oral, Q15 Min PRN **OR** glucose-vitamin C (Dex-4) chewable tab 4 tablet, 4 tablet, Oral, Q15 Min PRN **OR** dextrose 50% injection 50 mL, 50 mL, Intravenous, Q15 Min PRN **OR** glucose (Dex4) 15 GM/59ML oral liquid 30 g, 30 g, Oral, Q15 Min PRN **OR** glucose (Glutose) 40% oral gel 30 g, 30 g, Oral, Q15 Min PRN **OR** glucose-vitamin C (Dex-4) chewable tab 8 tablet, 8 tablet, Oral, Q15 Min PRN    sertraline (Zoloft) tab 100 mg, 100 mg, Oral, BID    QUEtiapine (SEROquel) tab 50 mg, 50 mg, Oral, Nightly    pregabalin (Lyrica) cap 200 mg, 200 mg, Oral, BID    pantoprazole (Protonix) DR tab 40 mg, 40 mg, Oral, Before breakfast    OXcarbazepine (Trileptal) tab 450 mg, 450 mg, Oral, BID    montelukast (Singulair) tab 10 mg, 10 mg, Oral, Nightly    metoprolol tartrate (Lopressor) tab 25 mg, 25 mg, Oral, Daily    mesalamine DR (Delzicol) cap 1,200 mg, 1,200 mg, Oral, BID AC    levothyroxine (Synthroid) tab 125 mcg, 125 mcg, Oral, Daily    levETIRAcetam (Keppra) tab 500 mg, 500 mg, Oral, BID    lamoTRIgine (LaMICtal) tab 200 mg, 200 mg, Oral, TID    fluticasone furoate-vilanterol (Breo Ellipta) 200-25 MCG/ACT inhaler 1 puff, 1 puff, Inhalation, Daily    fluticasone propionate (Flonase) 50 MCG/ACT nasal suspension 2 spray, 2 spray, Nasal, Daily     buPROPion ER (Wellbutrin XL) 24 hr tab 150 mg, 150 mg, Oral, QAM    acetaminophen (Tylenol) tab 650 mg, 650 mg, Oral, Q6H PRN    albuterol (Ventolin HFA) 108 (90 Base) MCG/ACT inhaler 2 puff, 2 puff, Inhalation, Q6H PRN    aspirin chewable tab 81 mg, 81 mg, Oral, Daily    sodium chloride 0.9% infusion, , Intravenous, Continuous    heparin (Porcine) 5000 UNIT/ML injection 5,000 Units, 5,000 Units, Subcutaneous, Q8H DUNIA    melatonin tab 3 mg, 3 mg, Oral, Nightly PRN    polyethylene glycol (PEG 3350) (Miralax) 17 g oral packet 17 g, 17 g, Oral, Daily PRN    sennosides (Senokot) tab 17.2 mg, 17.2 mg, Oral, Nightly PRN    bisacodyl (Dulcolax) 10 MG rectal suppository 10 mg, 10 mg, Rectal, Daily PRN    ondansetron (Zofran) 4 MG/2ML injection 4 mg, 4 mg, Intravenous, Q6H PRN    Review of Systems   Constitutional:  Positive for malaise/fatigue.   HENT: Negative.     Eyes: Negative.    Respiratory: Negative.     Cardiovascular: Negative.    Gastrointestinal: Negative.    Genitourinary: Negative.    Musculoskeletal:  Positive for falls.   Skin: Negative.    Neurological:  Positive for dizziness and weakness.   Endo/Heme/Allergies: Negative.    Psychiatric/Behavioral: Negative.       Mental Status Exam:   Risk Assessment  Suicidal ideation: no suicidal ideation  Homicidal ideation: None noted    Appearance: unremarkable  Behavior: unremarkable  Attitude: cooperative and consistent  Gait: Normal, walked with PT    Speech: normal rate, rhythm and volume    Mood: euthymic  Affect: Congruent    Thought process: logical and sequential  Thought content: no delusions, obsessions, or other thought abnormalities  Perceptions: no hallucinations  Associations: Intact    Orientation: Alert and oriented x 4  Attention and Concentration:   fair  Memory:  intact immediate, recent, remote  Language: Intact naming and repetition  Fund of Knowledge: Able to recite name of [place] president    Insight: Fair   Judgment: Fair     Objective    Vitals:     03/20/24 1004   BP:    Pulse: 60   Resp:    Temp:      Patient Strengths/Assets:  Caring     Suicide Risk Assessments:  Overall level of suicide risk is low     Risk Factors: history of suicide attempts     Environmental Factors: lives at home with son    Protective Factors: family    Laboratory Data:  Lab Results   Component Value Date    WBC 6.6 03/20/2024    HGB 12.7 03/20/2024    HCT 40.6 03/20/2024    .0 03/20/2024    CREATSERUM 0.80 03/20/2024    BUN 6 03/20/2024     03/20/2024    K 4.1 03/20/2024     03/20/2024    CO2 21.0 03/20/2024     03/20/2024    CA 9.2 03/20/2024    MG 1.8 03/20/2024    PGLU 74 03/20/2024       STUDIES:    Micaela KIRK NPRoqueC

## 2024-03-20 NOTE — CM/SW NOTE
Noted DC order placed. SW notified [facility] of pt's discharge today.     JUAN M Elliott  Discharge Planner

## 2024-03-20 NOTE — PHYSICAL THERAPY NOTE
PHYSICAL THERAPY TREATMENT NOTE - INPATIENT    Room Number: 3615/3615-A     Session: 1     Number of Visits to Meet Established Goals: 5    Presenting Problem: lithium toxicity  Co-Morbidities : seizures, Bipolar disorder, Fibromyalgia    History related to current admission: Patient is a 53 year old female admitted on 3/17/2024 from home for dizziness and weakness.  Pt diagnosed with lithium toxicity.     HOME SITUATION  Type of Home: Apartment   Home Layout:  (third floor aparment)  Stairs to Enter : 2  Railing: Yes  Stairs to Bedroom: 30  Railing: Yes     Lives With: Son  Drives: No  Patient Owned Equipment: Rollator;Rolling walker     Prior Level of [place]: per pt report, pt is Mod I for ADL, IADL, and ambulates with RW. Pt lives in a third floor apartment and her 32 year old son lives with her. Pt babysits her grandson. Pt does not drive and has groceries delivered.     ASSESSMENT   Patient demonstrates fair progress this session, goals  remain in progress.    Patient continues to function near baseline with bed mobility, transfers, gait, stair negotiation, and standing prolonged periods.  Contributing factors to remaining limitations include decreased functional strength, decreased endurance/aerobic capacity, impaired standing balance, impaired motor planning, and difficulty maintaining precautions.  Next session anticipate patient to progress bed mobility, transfers, gait, and stair negotiation.  Physical Therapy will continue to follow patient for duration of hospitalization.    Patient continues to benefit from continued skilled PT services: at discharge to promote functional independence and safety with additional support and return home with home health PT.    PLAN  PT Treatment Plan: Bed mobility;Body mechanics;Endurance;Energy conservation;Patient education;Gait training;Stair training;Transfer training;Balance training  Rehab Potential : Good  Frequency (Obs): 3x/week    CURRENT GOALS   Goal  #1 Patient is able to demonstrate supine - sit EOB @ level: modified independent     Goal #2 Patient is able to demonstrate transfers Sit to/from Stand at assistance level: modified independent     Goal #3 Patient is able to ambulate 150 feet with assist device: walker - rolling at assistance level: modified independent     Goal #4 Pt to ascend/descend a flight of stairs with use of 1 rail and supervision   Goal #5    Goal #6    Goal Comments: Goals established on 3/18/2024  3/20/2024 all goals ongoing     SUBJECTIVE  \"Once I'm in my apartment it's all on one floor\"     OBJECTIVE  Precautions: Bed/chair alarm    WEIGHT BEARING RESTRICTION                   PAIN ASSESSMENT   Ratin  Location: denies pain       BALANCE                                                                                                                       Static Sitting: Fair +  Dynamic Sitting: Fair           Static Standing: Poor +  Dynamic Standing: Poor +    ACTIVITY TOLERANCE  Pulse: 60  Heart Rate Source: Monitor                   O2 WALK         AM-PAC '6-Clicks' INPATIENT SHORT FORM - BASIC MOBILITY  How much difficulty does the patient currently have...  Patient Difficulty: Turning over in bed (including adjusting bedclothes, sheets and blankets)?: None   Patient Difficulty: Sitting down on and standing up from a chair with arms (e.g., wheelchair, bedside commode, etc.): A Little   Patient Difficulty: Moving from lying on back to sitting on the side of the bed?: None   How much help from another person does the patient currently need...   Help from Another: Moving to and from a bed to a chair (including a wheelchair)?: A Little   Help from Another: Need to walk in hospital room?: A Little   Help from Another: Climbing 3-5 steps with a railing?: A Little       AM-PAC Score:  Raw Score: 20   Approx Degree of Impairment: 35.83%   Standardized Score (AM-PAC Scale): 47.67   CMS Modifier (G-Code): CJ    FUNCTIONAL ABILITY  STATUS  Gait Assessment   Functional Mobility/Gait Assessment  Gait Assistance: Contact guard assist  Distance (ft): 200  Assistive Device: Rolling walker  Pattern:  (short step/stride length)  Stairs:  (stair step)  How Many Stairs: 1x10  Device: 1 Rail  Assist: Contact guard assist  Pattern: Ascend and Descend  Ascend and Descend : Step to    Skilled Therapy Provided    Gait Training:   Pt cued on upright standing posture to improve alignment with BUEs, RW height adjusted to improve alignment as well  Pt cued on gait sequencing with RW - pt cued on maintaining linear trajectory through middle of hallway as she has decreased awareness of ensuring adequate room for therapist on her side   Pt cued on proper UE placement on RW handles  Stair step brought to pt  - performed ascent/descent x10 with wall rail with overall CGA except for one instance of LOB due to  sock getting stuck on non-skid step, req modA to prevent fall, cued to ensure pt lifts foot adequately on subsequent repetitions, may benefit from shoes during next session       Bed Mobility:  Rolling: NT   Supine<>Sit: NT   Sit<>Supine: NT     Transfer Mobility:  Sit<>Stand: supervision   Stand<>Sit: supervision   Gait: CGA    Therapist's Comments: RN cleared for session. Pt agreeable for therapy, received supine. Instructed to call for nursing staff for any needs and OOB mobility.     Patient End of Session: Up in chair;Needs met;Call light within reach;RN aware of session/findings;All patient questions and concerns addressed;Alarm set;Discussed recommendations with /    PT Session Time: 35 minutes  Gait Trainin minutes

## 2024-03-21 NOTE — OCCUPATIONAL THERAPY NOTE
OCCUPATIONAL THERAPY EVALUATION - INPATIENT     Room Number: 3615/3615-A  Evaluation Date: 3/20/2024  Type of Evaluation: Initial  Presenting Problem: lithium toxicity    Physician Order: IP Consult to Occupational Therapy  Reason for Therapy: ADL/IADL Dysfunction and Discharge Planning    OCCUPATIONAL THERAPY ASSESSMENT   Patient is currently functioning below baseline with toileting, bathing, lower body dressing, transfers, static standing balance, dynamic standing balance, functional standing tolerance, and performing household tasks. 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  Patient is requiring stand-by assist and contact guard assist as a result of the following impairments: decreased endurance, impaired standing balance, cognitive deficits (h/o SDH, CVA, multiple head injuries), decreased insight to deficits, decreased safety awareness, and visual deficits. Occupational Therapy will continue to follow for duration of hospitalization.    Patient will benefit from continued skilled OT Services at discharge to promote functional independence and safety with additional support and return home with home health OT  Strongly recommend that patient have assistance for medication management . Recommend that patient be cleared to resume driving from physician. Patient may benefit from formal driving and detailed cognitive assessments as well.     History Related to Current Admission: Patient is a 53 year old female admitted on 3/17/2024 with Presenting Problem: lithium toxicity. Co-Morbidities : bipolar, traumatic SDH, CVA, migraines, cognitive disorder, gerd, fibromyalgia, scoliosis, LE neuropathy, delirium    WEIGHT BEARING RESTRICTION  Weight Bearing Restriction: None                Recommendations for nursing staff:   Transfers: 1 person, rw  Toileting location: bathroom     EVALUATION SESSION:  Patient Start of  Session: seated in recliner  FUNCTIONAL TRANSFER ASSESSMENT  Sit to Stand: Chair  Chair: Stand-by Assist  Toilet Transfer: Stand-by Assist    BED MOBILITY     BALANCE ASSESSMENT  Static Sitting: Supervision  Sitting Bilateral: Supervision  Static Standing: Supervision  Standing Bilateral: Minimal Assist    FUNCTIONAL ADL ASSESSMENT       ACTIVITY TOLERANCE: stable on room air                         O2 SATURATIONS       COGNITION  Arousal/Alertness:  appropriate responses to stimuli  Attention Span:  difficulty dividing attention and impaired concentration  Orientation Level:  oriented x4  Memory:  not tested, suspect impairment in working memory   Following Commands:  follows one step commands with increased time  Initiation: appears intact  Awareness of Errors:  assistance required to identify errors made and assistance required to correct errors made  Problem Solving:  assistance required to identify errors made and assistance required to implement solutions    Upper Extremity   ROM: within functional limits   Strength: within functional limits 5/5 RUE, 4+/5 LUE  Coordination  Gross motor: wfl  Fine motor: wfl  Sensation: no new deficits , wfl    EDUCATION PROVIDED  Patient: Role of Occupational Therapy; Plan of Care; Discharge Recommendations; Other (importance of attending to safety during functional mobility)  Patient's Response to Education: Verbalized Understanding; Requires Further Education    Equipment used: rw, gait belt  Demonstrates functional use, Would benefit from additional trial yes     Therapist comments: Patient noted to have slowed motor reactions and needed increased time for navigation due to processing and visual deficits. . Patient able to don socks using figure four position in chair Functional mobility completed with RW in hallway and  in bathroom for toilet transfer with SBA/CGA. Slow pace noted and patient bumped into doorway with RW x1, cueing and CGA to correct.  Patient reported that  she has h/o TBI and several concussions and has persistent functional visual deficits. Patient hade posterior LOB when her foot caught edge of stoop but PT was able to assist patient to regain balance. OT instructed patient to focus on motor task and provided verbal cueing to improve patient's attention.  Patient left in chair with needs met chair alarm on .     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 Drives when she feels safe to do so. Manages her own medications. Modified independent with ADLs. Cares for her 3 y/o grandson at his home which is a two level townhouse. Patient reported that she will not drive or be able to care for her grandson until she is more stable.     SUBJECTIVE   Participatory    PAIN ASSESSMENT  Ratin          OBJECTIVE  Precautions: Bed/chair alarm  Fall Risk: High fall risk      ASSESSMENTS    AM-PAC ‘6-Clicks’ Inpatient Daily Activity Short Form  -   Putting on and taking off regular lower body clothing?: A Little  -   Bathing (including washing, rinsing, drying)?: A Little  -   Toileting, which includes using toilet, bedpan or urinal? : A Little  -   Putting on and taking off regular upper body clothing?: A Little  -   Taking care of personal grooming such as brushing teeth?: None  -   Eating meals?: None    AM-PAC Score:  Score: 20  Approx Degree of Impairment: 38.32%  Standardized Score (AM-PAC Scale): 42.03    ADDITIONAL TESTS     NEUROLOGICAL FINDINGS      COGNITION  ASSESSMENTS       PLAN  OT Treatment Plan: Endurance training;Functional transfer training;ADL training;Energy conservation/work simplification techniques;UE strengthening/ROM;Patient/Family education;Equipment eval/education;Patient/Family training;Compensatory technique education  Rehab Potential : Good  Frequency: 3-5x/week  Number of Visits to Meet Established Goals: 4    ADL Goals   Patient will perform grooming: with supervision and while standing at sink  Patient will perform lower body dressing:  with supervision and with adaptive equipment PRN  Patient will perform toileting: with supervision  Patient will retrieve items from closet, couch, etc with supervision and good safety in prep for ADLs    Functional Transfer Goals  Patient will transfer from sit to supine:  with modified independent  Patient will transfer from supine to sit:  with modified independent  Patient will transfer to toilet:  with supervision    UE Exercise Program Goal  Patient will be supervision with bilateral AROM HEP (home exercise program).    Therapist Goals  Cognitive screening    Patient Evaluation Complexity Level:   Occupational Profile/Medical History LOW - Brief history including review of medical or therapy records    Specific performance deficits impacting engagement in ADL/IADL MODERATE  3 - 5 performance deficits   Client Assessment/Performance Deficits MODERATE - Comorbidities and min to mod modifications of tasks    Clinical Decision Making MODERATE - Analysis of occupational profile, detailed assessments, several treatment options    Overall Complexity LOW     OT Session Time: 30 minutes  Self-Care Home Management: 10 minutes  Therapeutic Activity: 15 minutes

## 2024-03-23 LAB
CARBOXY THC GCMS UR: 16 NG/MG CREAT
CARBOXY THC GCMS UR: 16 NG/MG CREAT

## 2024-03-24 ENCOUNTER — APPOINTMENT (OUTPATIENT)
Dept: CT IMAGING | Facility: HOSPITAL | Age: 54
End: 2024-03-24
Attending: EMERGENCY MEDICINE
Payer: MEDICARE

## 2024-03-24 ENCOUNTER — APPOINTMENT (OUTPATIENT)
Dept: GENERAL RADIOLOGY | Facility: HOSPITAL | Age: 54
End: 2024-03-24
Attending: EMERGENCY MEDICINE
Payer: MEDICARE

## 2024-03-24 ENCOUNTER — HOSPITAL ENCOUNTER (INPATIENT)
Facility: HOSPITAL | Age: 54
LOS: 3 days | Discharge: HOME | End: 2024-03-27
Attending: EMERGENCY MEDICINE | Admitting: INTERNAL MEDICINE
Payer: MEDICARE

## 2024-03-24 DIAGNOSIS — R79.89 ELEVATED TROPONIN: ICD-10-CM

## 2024-03-24 DIAGNOSIS — R11.2 NAUSEA AND VOMITING, UNSPECIFIED VOMITING TYPE: Primary | ICD-10-CM

## 2024-03-24 DIAGNOSIS — R94.31 EKG ABNORMALITIES: ICD-10-CM

## 2024-03-24 PROBLEM — E87.1 HYPONATREMIA: Status: ACTIVE | Noted: 2024-03-24

## 2024-03-24 PROBLEM — D72.829 LEUKOCYTOSIS: Status: ACTIVE | Noted: 2024-03-24

## 2024-03-24 PROBLEM — R73.9 HYPERGLYCEMIA: Status: ACTIVE | Noted: 2024-03-24

## 2024-03-24 LAB
ALBUMIN SERPL-MCNC: 4.3 G/DL (ref 3.4–5)
ALBUMIN/GLOB SERPL: 1.1 {RATIO} (ref 1–2)
ALP LIVER SERPL-CCNC: 114 U/L
ALT SERPL-CCNC: 37 U/L
ANION GAP SERPL CALC-SCNC: 13 MMOL/L (ref 0–18)
AST SERPL-CCNC: 31 U/L (ref 15–37)
BASOPHILS # BLD AUTO: 0.04 X10(3) UL (ref 0–0.2)
BASOPHILS NFR BLD AUTO: 0.3 %
BILIRUB SERPL-MCNC: 0.4 MG/DL (ref 0.1–2)
BUN BLD-MCNC: 13 MG/DL (ref 9–23)
CALCIUM BLD-MCNC: 10.1 MG/DL (ref 8.5–10.1)
CHLORIDE SERPL-SCNC: 103 MMOL/L (ref 98–112)
CHOLEST SERPL-MCNC: 240 MG/DL (ref ?–200)
CO2 SERPL-SCNC: 19 MMOL/L (ref 21–32)
CREAT BLD-MCNC: 0.77 MG/DL
EGFRCR SERPLBLD CKD-EPI 2021: 91 ML/MIN/1.73M2 (ref 60–?)
EOSINOPHIL # BLD AUTO: 0 X10(3) UL (ref 0–0.7)
EOSINOPHIL NFR BLD AUTO: 0 %
ERYTHROCYTE [DISTWIDTH] IN BLOOD BY AUTOMATED COUNT: 14.1 %
FLUAV + FLUBV RNA SPEC NAA+PROBE: NEGATIVE
FLUAV + FLUBV RNA SPEC NAA+PROBE: NEGATIVE
GLOBULIN PLAS-MCNC: 3.8 G/DL (ref 2.8–4.4)
GLUCOSE BLD-MCNC: 108 MG/DL (ref 70–99)
HCT VFR BLD AUTO: 43.7 %
HDLC SERPL-MCNC: 111 MG/DL (ref 40–59)
HGB BLD-MCNC: 14.9 G/DL
IMM GRANULOCYTES # BLD AUTO: 0.06 X10(3) UL (ref 0–1)
IMM GRANULOCYTES NFR BLD: 0.4 %
LDLC SERPL CALC-MCNC: 113 MG/DL (ref ?–100)
LIPASE SERPL-CCNC: 25 U/L (ref 13–75)
LITHIUM SERPL-SCNC: <0.2 MMOL/L (ref 0.6–1.2)
LYMPHOCYTES # BLD AUTO: 1.15 X10(3) UL (ref 1–4)
LYMPHOCYTES NFR BLD AUTO: 8.2 %
MAGNESIUM SERPL-MCNC: 1.8 MG/DL (ref 1.6–2.6)
MCH RBC QN AUTO: 30.3 PG (ref 26–34)
MCHC RBC AUTO-ENTMCNC: 34.1 G/DL (ref 31–37)
MCV RBC AUTO: 88.8 FL
MONOCYTES # BLD AUTO: 0.93 X10(3) UL (ref 0.1–1)
MONOCYTES NFR BLD AUTO: 6.6 %
NEUTROPHILS # BLD AUTO: 11.91 X10 (3) UL (ref 1.5–7.7)
NEUTROPHILS # BLD AUTO: 11.91 X10(3) UL (ref 1.5–7.7)
NEUTROPHILS NFR BLD AUTO: 84.5 %
NONHDLC SERPL-MCNC: 129 MG/DL (ref ?–130)
OSMOLALITY SERPL CALC.SUM OF ELEC: 281 MOSM/KG (ref 275–295)
PLATELET # BLD AUTO: 357 10(3)UL (ref 150–450)
POTASSIUM SERPL-SCNC: 3.4 MMOL/L (ref 3.5–5.1)
PROCALCITONIN SERPL-MCNC: <0.05 NG/ML (ref ?–0.16)
PROT SERPL-MCNC: 8.1 G/DL (ref 6.4–8.2)
RBC # BLD AUTO: 4.92 X10(6)UL
RSV RNA SPEC NAA+PROBE: NEGATIVE
SARS-COV-2 RNA RESP QL NAA+PROBE: NOT DETECTED
SODIUM SERPL-SCNC: 135 MMOL/L (ref 136–145)
TRIGL SERPL-MCNC: 93 MG/DL (ref 30–149)
TROPONIN I SERPL HS-MCNC: 211 NG/L
VLDLC SERPL CALC-MCNC: 16 MG/DL (ref 0–30)
WBC # BLD AUTO: 14.1 X10(3) UL (ref 4–11)

## 2024-03-24 PROCEDURE — 71045 X-RAY EXAM CHEST 1 VIEW: CPT | Performed by: EMERGENCY MEDICINE

## 2024-03-24 PROCEDURE — 70450 CT HEAD/BRAIN W/O DYE: CPT | Performed by: EMERGENCY MEDICINE

## 2024-03-24 PROCEDURE — 74177 CT ABD & PELVIS W/CONTRAST: CPT | Performed by: EMERGENCY MEDICINE

## 2024-03-24 RX ORDER — ACETAMINOPHEN 500 MG
500 TABLET ORAL EVERY 4 HOURS PRN
Status: DISCONTINUED | OUTPATIENT
Start: 2024-03-24 | End: 2024-03-27

## 2024-03-24 RX ORDER — QUETIAPINE FUMARATE 25 MG/1
50 TABLET, FILM COATED ORAL NIGHTLY
Status: DISCONTINUED | OUTPATIENT
Start: 2024-03-24 | End: 2024-03-27

## 2024-03-24 RX ORDER — ONDANSETRON 2 MG/ML
4 INJECTION INTRAMUSCULAR; INTRAVENOUS EVERY 4 HOURS PRN
Status: CANCELLED | OUTPATIENT
Start: 2024-03-24 | End: 2024-03-24

## 2024-03-24 RX ORDER — SENNOSIDES 8.6 MG
17.2 TABLET ORAL NIGHTLY PRN
Status: DISCONTINUED | OUTPATIENT
Start: 2024-03-24 | End: 2024-03-27

## 2024-03-24 RX ORDER — POTASSIUM CHLORIDE 1.5 G/1.58G
40 POWDER, FOR SOLUTION ORAL EVERY 4 HOURS
Status: DISPENSED | OUTPATIENT
Start: 2024-03-24 | End: 2024-03-25

## 2024-03-24 RX ORDER — LITHIUM CARBONATE 300 MG/1
300 CAPSULE ORAL
Status: DISCONTINUED | OUTPATIENT
Start: 2024-03-25 | End: 2024-03-24

## 2024-03-24 RX ORDER — LEVOTHYROXINE SODIUM 0.12 MG/1
125 TABLET ORAL
Status: DISCONTINUED | OUTPATIENT
Start: 2024-03-25 | End: 2024-03-27

## 2024-03-24 RX ORDER — SODIUM CHLORIDE 9 MG/ML
INJECTION, SOLUTION INTRAVENOUS CONTINUOUS
Status: CANCELLED | OUTPATIENT
Start: 2024-03-24 | End: 2024-03-24

## 2024-03-24 RX ORDER — AMLODIPINE BESYLATE 2.5 MG/1
2.5 TABLET ORAL DAILY
Status: DISCONTINUED | OUTPATIENT
Start: 2024-03-25 | End: 2024-03-27

## 2024-03-24 RX ORDER — CLONAZEPAM 0.5 MG/1
0.25 TABLET ORAL 3 TIMES DAILY
Status: DISCONTINUED | OUTPATIENT
Start: 2024-03-24 | End: 2024-03-27

## 2024-03-24 RX ORDER — MONTELUKAST SODIUM 10 MG/1
10 TABLET ORAL NIGHTLY
Status: DISCONTINUED | OUTPATIENT
Start: 2024-03-24 | End: 2024-03-27

## 2024-03-24 RX ORDER — FLUTICASONE FUROATE AND VILANTEROL 200; 25 UG/1; UG/1
1 POWDER RESPIRATORY (INHALATION) DAILY
Status: DISCONTINUED | OUTPATIENT
Start: 2024-03-24 | End: 2024-03-27

## 2024-03-24 RX ORDER — SERTRALINE HYDROCHLORIDE 100 MG/1
100 TABLET, FILM COATED ORAL SEE ADMIN INSTRUCTIONS
Status: DISCONTINUED | OUTPATIENT
Start: 2024-03-24 | End: 2024-03-24 | Stop reason: SDUPTHER

## 2024-03-24 RX ORDER — LAMOTRIGINE 200 MG/1
200 TABLET ORAL 3 TIMES DAILY
Status: DISCONTINUED | OUTPATIENT
Start: 2024-03-24 | End: 2024-03-27

## 2024-03-24 RX ORDER — ASPIRIN 81 MG/1
81 TABLET, CHEWABLE ORAL DAILY
Status: DISCONTINUED | OUTPATIENT
Start: 2024-03-25 | End: 2024-03-27

## 2024-03-24 RX ORDER — PREGABALIN 100 MG/1
200 CAPSULE ORAL 2 TIMES DAILY
Status: DISCONTINUED | OUTPATIENT
Start: 2024-03-24 | End: 2024-03-27

## 2024-03-24 RX ORDER — MESALAMINE 400 MG/1
1200 CAPSULE, DELAYED RELEASE ORAL
Status: DISCONTINUED | OUTPATIENT
Start: 2024-03-25 | End: 2024-03-27

## 2024-03-24 RX ORDER — BISACODYL 10 MG
10 SUPPOSITORY, RECTAL RECTAL
Status: DISCONTINUED | OUTPATIENT
Start: 2024-03-24 | End: 2024-03-27

## 2024-03-24 RX ORDER — PANTOPRAZOLE SODIUM 40 MG/1
40 TABLET, DELAYED RELEASE ORAL
Status: DISCONTINUED | OUTPATIENT
Start: 2024-03-25 | End: 2024-03-27

## 2024-03-24 RX ORDER — ONDANSETRON 2 MG/ML
4 INJECTION INTRAMUSCULAR; INTRAVENOUS EVERY 6 HOURS PRN
Status: DISCONTINUED | OUTPATIENT
Start: 2024-03-24 | End: 2024-03-27

## 2024-03-24 RX ORDER — ONDANSETRON 2 MG/ML
4 INJECTION INTRAMUSCULAR; INTRAVENOUS ONCE
Status: COMPLETED | OUTPATIENT
Start: 2024-03-24 | End: 2024-03-24

## 2024-03-24 RX ORDER — POLYETHYLENE GLYCOL 3350 17 G/17G
17 POWDER, FOR SOLUTION ORAL DAILY PRN
Status: DISCONTINUED | OUTPATIENT
Start: 2024-03-24 | End: 2024-03-27

## 2024-03-24 RX ORDER — LEVETIRACETAM 500 MG/1
500 TABLET ORAL 2 TIMES DAILY
Status: DISCONTINUED | OUTPATIENT
Start: 2024-03-24 | End: 2024-03-27

## 2024-03-24 RX ORDER — LITHIUM CARBONATE 300 MG/1
300 TABLET, FILM COATED, EXTENDED RELEASE ORAL NIGHTLY
Status: DISCONTINUED | OUTPATIENT
Start: 2024-03-24 | End: 2024-03-27

## 2024-03-24 RX ORDER — SERTRALINE HYDROCHLORIDE 100 MG/1
100 TABLET, FILM COATED ORAL NIGHTLY
Status: DISCONTINUED | OUTPATIENT
Start: 2024-03-24 | End: 2024-03-27

## 2024-03-24 RX ORDER — MAGNESIUM OXIDE 400 MG/1
400 TABLET ORAL ONCE
Status: COMPLETED | OUTPATIENT
Start: 2024-03-24 | End: 2024-03-24

## 2024-03-24 RX ORDER — POTASSIUM CHLORIDE 1.5 G/1.58G
20 POWDER, FOR SOLUTION ORAL ONCE
Status: COMPLETED | OUTPATIENT
Start: 2024-03-24 | End: 2024-03-24

## 2024-03-24 RX ORDER — CETIRIZINE HYDROCHLORIDE 10 MG/1
10 TABLET ORAL DAILY
Status: DISCONTINUED | OUTPATIENT
Start: 2024-03-24 | End: 2024-03-27

## 2024-03-24 RX ORDER — METOPROLOL TARTRATE 1 MG/ML
5 INJECTION, SOLUTION INTRAVENOUS ONCE
Status: COMPLETED | OUTPATIENT
Start: 2024-03-24 | End: 2024-03-24

## 2024-03-24 RX ORDER — ASPIRIN 81 MG/1
324 TABLET, CHEWABLE ORAL ONCE
Status: COMPLETED | OUTPATIENT
Start: 2024-03-24 | End: 2024-03-24

## 2024-03-24 RX ORDER — BUPROPION HYDROCHLORIDE 150 MG/1
150 TABLET ORAL EVERY MORNING
Status: DISCONTINUED | OUTPATIENT
Start: 2024-03-25 | End: 2024-03-27

## 2024-03-24 NOTE — ED QUICK NOTES
Orders for admission, patient is aware of plan and ready to go upstairs. Any questions, please call ED RN Berkley at extension [phone].     Patient Covid vaccination status: Fully vaccinated     COVID Test Ordered in ED: None    COVID Suspicion at Admission: N/A    Running Infusions:  None    Mental Status/LOC at time of transport: A&O x 4    Other pertinent information:   CIWA score: N/A   NIH score:  N/A

## 2024-03-24 NOTE — ED INITIAL ASSESSMENT (HPI)
Per family, pt c/o vomiting, unable to eat or drink anything x 2 days. Pt was unable to taking any AM prescription medications due to vomiting. Pt recently discharged after having high lithium levels.

## 2024-03-24 NOTE — ED PROVIDER NOTES
Patient Seen in: [facility] Emergency Department      History     Chief Complaint   Patient presents with    Medication Reaction    Nausea/Vomiting/Diarrhea     Stated Complaint: nv after medication changes    Subjective:   HPI    This is a 53-year-old female who was recently hospitalized for lithium toxicity the patient has a history of asthma, bipolar affective, kidney stones fibromyalgia gastroesophageal reflux disease gestational diabetes, also had a scrotal, stroke previously, and 2003.  The patient does have a history of ulcerative colitis.  The patient arrives here because she wanted been hospitalized with DEXA scan Wednesday by Friday she started having nausea and vomiting she has vomited multiple times.  The patient multiple vomited multiple times.  She has feeling been having some constipation also some mild abdominal pain she says no diarrhea she does feel constipated with this.  She has had no new headache she does have some chronic headaches.  She does have history of migraines.  She does state that she has no chest pain no fevers no dizziness no numbness no weakness.  The family states he was she was acting a little confused normally has some mild confusion because of her other medical problems but she is gotten slightly worse.  s.      Objective:   Past Medical History:   Diagnosis Date    Acne     Allergic rhinitis     Alopecia areata 2015    Anxiety     Asthma ([facility])     Back problem     Bipolar affective ([facility])     Calculus of kidney 10/2020    Depression     Fibromyalgia     GERD (gastroesophageal reflux disease)     Gestational diabetes ([facility])     Hemorrhoids     Hypothyroidism     Migraines     Nausea and vomiting, unspecified vomiting type 3/24/2024    Organic hypersomnia, unspecified PSG 6-29-14    AHI 2 RDI 2 REM AHI 2 SaO2 doroteo 88 %    Osteoarthritis     Pneumonia due to organism     Scoliosis     Seizure disorder ([facility]) 10/2020    absent seizures    Stroke ([facility])     2003+ mild R.sided  weakness    Ulcerative colitis ([facility])     Visual impairment               Past Surgical History:   Procedure Laterality Date    CHOLECYSTECTOMY  2010    COLONOSCOPY  2013    ulcerative colitis    COLONOSCOPY N/A 2017    Procedure: COLONOSCOPY;  Surgeon: Hipolito Dobbins MD;  Location:  ENDOSCOPY    COLONOSCOPY N/A 2021    Procedure: COLONOSCOPY with biopsy,;  Surgeon: Ramsey Muhammad MD;  Location:  ENDOSCOPY    COLPOSCOPY, CERVIX W/UPPER ADJACENT VAGINA; W/BIOPSY(S), CERVIX      HERNIA SURGERY      Umbilical Hernia Repair    OTHER SURGICAL HISTORY N/A 2016    Procedure: ESOPHAGOGASTRODUODENOSCOPY (EGD);  Surgeon: Jeff Scherer MD;  [facility]  [facility]    UPPER GI ENDOSCOPY,EXAM                  Social History     Socioeconomic History    Marital status:    Tobacco Use    Smoking status: Former     Packs/day: 0.00     Years: 10.00     Additional pack years: 0.00     Total pack years: 0.00     Types: Cigarettes     Quit date: 2003     Years since quittin.1    Smokeless tobacco: Never    Tobacco comments:     Ages [age], [age]   Vaping Use    Vaping Use: Some days    Substances: THC, CBD, Flavoring, medical marijuana    Devices: Disposable, Refillable tank   Substance and Sexual Activity    Alcohol use: Not Currently    Drug use: Yes     Frequency: 2.0 times per week     Types: Cannabis     Comment: Medical Cannabis    Sexual activity: Not Currently     Partners: Male     Social Determinants of Health     Food Insecurity: No Food Insecurity (3/18/2024)    Food Insecurity     Food Insecurity: Never true   Transportation Needs: No Transportation Needs (3/18/2024)    Transportation Needs     Lack of Transportation: No   Housing Stability: Low Risk  (3/18/2024)    Housing Stability     Housing Instability: No              Review of Systems    Positive for stated complaint: nv after medication changes  Other systems are as noted in  HPI.  Constitutional and vital signs reviewed.      All other systems reviewed and negative except as noted above.    Physical Exam     ED Triage Vitals [03/24/24 1536]   BP (!) 161/106   Pulse 96   Resp 18   Temp 98 °F (36.7 °C)   Temp src Temporal   SpO2 100 %   O2 Device None (Room air)       Current:BP (!) 169/112   Pulse 93   Temp 98 °F (36.7 °C) (Temporal)   Resp 20   Ht 160 cm (5' 3\")   Wt 90.7 kg   LMP 02/20/2015 (Approximate)   SpO2 99%   BMI 35.43 kg/m²         Physical Exam  General: .  Patient is in no respiratory distress she is a little slow to speak but knows what year is what month it is.  The patient is in no respiratory distress. The patient is not septic or toxic.  She is here with her daughter sister who stated that she just continued mostly the vomiting she does have some mild confusion normally and may be slightly worse but not significantly worse.      HEENT: Atraumatic, conjunctiva are not pale.  There is no icterus.  Oral mucosa Is wet.  No facial trauma.  The neck is supple.    LUNGS: Clear to auscultation, there is no wheezing or retraction.  No crackles.    CV: Cardiovascular is regular without murmurs or rubs.    ABD: The abdomen is soft nondistended.  Mild diffuse tenderness throughout the abdomen there is no rebound.  There is no guarding.    EXT: There is good pulses bilaterally.  There is no calf tenderness.  There is no rash noted.  There is no edema    NEURO: Alert to speak and oriented x3.  Muscle strength and sensory exam is grossly normal.  And the patient is neurologically intact with no focal findings.    Cranial nerves are grossly intact.  No pronator drift.  There is no facial asymmetry.    ED Course     Labs Reviewed   COMP METABOLIC PANEL (14) - Abnormal; Notable for the following components:       Result Value    Glucose 108 (*)     Sodium 135 (*)     Potassium 3.4 (*)     CO2 19.0 (*)     Alkaline Phosphatase 114 (*)     All other components within normal limits    LITHIUM (ESKALITH) - Abnormal; Notable for the following components:    Lithium <0.2 (*)     All other components within normal limits   TROPONIN I HIGH SENSITIVITY - Abnormal; Notable for the following components:    Troponin I (High Sensitivity) 211 (*)     All other components within normal limits   LIPID PANEL - Abnormal; Notable for the following components:    Cholesterol, Total 240 (*)     HDL Cholesterol 111 (*)     LDL Cholesterol 113 (*)     All other components within normal limits   CBC W/ DIFFERENTIAL - Abnormal; Notable for the following components:    WBC 14.1 (*)     Neutrophil Absolute Prelim 11.91 (*)     Neutrophil Absolute 11.91 (*)     All other components within normal limits   LIPASE - Normal   MAGNESIUM - Normal   RAPID STREP A SCREEN (LC) - Normal    Narrative:     A confirmatory culture is recommended if clinically indicated.   CBC WITH DIFFERENTIAL WITH PLATELET    Narrative:     The following orders were created for panel order CBC With Differential With Platelet.  Procedure                               Abnormality         Status                     ---------                               -----------         ------                     CBC W/ DIFFERENTIAL[[id number]]          Abnormal            Final result                 Please view results for these tests on the individual orders.   URINALYSIS, ROUTINE   PROCALCITONIN   SARS-COV-2/FLU A AND B/RSV BY PCR ([facility])   BLOOD CULTURE   BLOOD CULTURE                    The patient was placed on monitors, IV was started, blood was drawn.     Was done to rule out electrolyte imbalance, intracranial bleed, intra-abdominal pathology including obstruction, perforation.    MDM      The EKG shows normal sinus rhythm.  There is no acute ST elevations The rest of the EKG including rate rhythm axis and intervals I agree with the EKG report . The rate is 88 there is findings of T wave inversions V2 to V4.  And inferiorly.  The rate is 98.  There is  left ventricular hypertrophy and nonspecific ST changes    Admission disposition: 3/24/2024  7:58 PM       There is findings of T wave inversions in V1 to V4 on reviewing the old records T wave inversions were noted also then although the T wave inversion inferiorly are little bit more prominent for compared to March 17 EKG        The patient's troponin was elevated to 1 1.  Magnesium was normal.  The patient's lithium was not elevated white count was slightly high at 14.  Urinalysis was ordered, comprehensive shows a slightly low CO2 suggesting dehydration.  The patient's potassium was low she was replaced.  I did go back and reexamined her she I did talk to her and her family she states she has no chest pain, shortness of breath..  She states that she has had a little bit of phlegm and as she did have a little bit of sore throat after the vomiting.  The patient's rapid strep was negative.      Chest x-ray shows no pneumonia     I personally reviewed the radiographs and my individual interpretation shows    CT of the abdomen shows no obstruction or perforation  CT of the brain shows no acute bleed, tumor  Also reviewed official report and it shows       XR CHEST AP PORTABLE  (CPT=71045)    Result Date: 3/24/2024  PROCEDURE:  XR CHEST AP PORTABLE  (CPT=71045)  TECHNIQUE:  AP chest radiograph was obtained.  COMPARISON:  [facility] , XR, XR CHEST AP PORTABLE  (CPT=71045), 3/17/2024, 9:09 PM.  INDICATIONS:  nv after medication changes  PATIENT STATED HISTORY: (As transcribed by Technologist)  Patient has been vomiting and unable to eat or drink anything for around 2 days prior.    FINDINGS:  There is blunting at the costophrenic angles bilaterally consistent with small bilateral pleural effusions and dependent atelectasis in lower lobes.  Heart size is within normal limits.  Mediastinum and vanessa are unremarkable.  Chest wall structures are unremarkable.            CONCLUSION:  Blunting of costophrenic angles bilaterally is  consistent with small bilateral effusions and atelectasis in lower lobes.   LOCATION:  [facility]      Dictated by (CST): Sabino Pardo MD on 3/24/2024 at 7:49 PM     Finalized by (CST): Sabino Pardo MD on 3/24/2024 at 7:49 PM       CT ABDOMEN+PELVIS(CONTRAST ONLY)(CPT=74177)    Result Date: 3/24/2024  PROCEDURE:  CT ABDOMEN+PELVIS (CONTRAST ONLY) (CPT=74177)  COMPARISON:  [facility] , CT, CT ABDOMEN+PELVIS KIDNEYSTONE 2D RNDR(NO IV,NO ORAL)(CPT=74176), 9/15/2020, 5:11 PM.  INDICATIONS:  nv after medication changes  TECHNIQUE:  CT scanning was performed from the dome of the diaphragm to the pubic symphysis with non-ionic intravenous contrast material. Post contrast coronal MPR imaging was performed.  Dose reduction techniques were used. Dose information is transmitted to the ACR (American College of Radiology) NRDR (National Radiology Data Registry) which includes the Dose Index Registry.  PATIENT STATED HISTORY:(As transcribed by Technologist)  Patient has persistent nausea and vomiting after recent medication changes. Patient denies any current abdominal pain   CONTRAST USED:  100cc of Isovue 370  FINDINGS:  LIVER:  No enlargement, atrophy, abnormal density, or significant focal lesion.  BILIARY:  There has been a previous cholecystectomy.  There is no significant bile duct distension. PANCREAS:  No lesion, fluid collection, ductal dilatation, or atrophy.  SPLEEN:  No enlargement or focal lesion.  KIDNEYS:  Scattered benign cysts are noted in both kidneys.  There is a nonobstructing 2 mm calcification lower pole right kidney. ADRENALS:  No mass or enlargement.  AORTA/VASCULAR:  Aorta is atherosclerotic but not aneurysmal. RETROPERITONEUM:  No mass or adenopathy.  BOWEL/MESENTERY:  There is diverticulosis of the colon.  There is no CT evidence of diverticulitis. ABDOMINAL WALL:  Bilateral fat containing inguinal hernias are noted. URINARY BLADDER:  No visible focal wall thickening, lesion, or calculus.  PELVIC NODES:  No  adenopathy.  PELVIC ORGANS:  No visible mass.  Pelvic organs appropriate for patient age.  BONES:  No bony lesion or fracture.  LUNG BASES:  There is dependent atelectasis in the lower lobes. OTHER:  Negative.             CONCLUSION:  1. A specific etiology for abdominal pain is not evident. 2. There is diverticulosis of colon without CT evidence of diverticulitis. 3. Bilateral fat containing inguinal hernias. 4. There is nonobstructing right nephrolithiasis.    LOCATION:  [facility]   Dictated by (CST): Sabino Pardo MD on 3/24/2024 at 6:44 PM     Finalized by (CST): Sabino Pardo MD on 3/24/2024 at 6:47 PM       CT BRAIN OR HEAD (11537)    Result Date: 3/24/2024  PROCEDURE:  CT BRAIN OR HEAD (78961)  COMPARISON:  [facility] , CT, CT BRAIN OR HEAD ([id number]), 3/17/2024, 10:05 PM.  INDICATIONS:  nv after medication changes  TECHNIQUE:  Noncontrast CT scanning is performed through the brain. Dose reduction techniques were used. Dose information is transmitted to the ACR (American College of Radiology) NRDR (National Radiology Data Registry) which includes the Dose Index Registry.  PATIENT STATED HISTORY: (As transcribed by Technologist)  Patient has persistent nausea and vomiting after recent medication changes. Patient denies any head pain/dizziness    FINDINGS:  VENTRICLES/SULCI:  Ventricles and sulci are normal in size.  INTRACRANIAL:  There are no abnormal extraaxial fluid collections.  There is no midline shift.  There are no intraparenchymal brain abnormalities.  There is nothing specific for acute infarct.  There is no hemorrhage or mass lesion.  SINUSES:           No sign of acute sinusitis.  MASTOIDS:          No sign of acute inflammation. SKULL:             No evidence for fracture or osseous abnormality. OTHER:             None.            CONCLUSION:  There is no acute abnormality on the noncontrast CT of the head.    LOCATION:  [facility]   Dictated by (CST): Sabino Pardo MD on 3/24/2024 at 6:42 PM     Finalized by  (CST): Sabino Pardo MD on 3/24/2024 at 6:43 PM            The patient stated that she did have some congestion but no chest pain.  I did go repeat an EKG which showed    The EKG shows normal sinus rhythm.  There is no acute ST elevation.  When compared to an old EKG is not significant changes.  She is not hypoxic. The rest of the EKG including rate rhythm axis and intervals I agree with the EKG report . The rate is 74 there is T wave inversions of the 2 to be 5.    She has been having this cough congestion vomiting and diarrhea for several days.  Do not feel that this is an acute coronary syndrome based on the fact that she has no chest pain.  I did go back and talk to her but empirically will give her aspirin, patient's white count is slightly high.  But she is not febrile here.  I did talk to the hospitalist.  And I did review the films I do not feel there is a large infiltrate we will get a procalcitonin level.  If it is elevated hospitalist will follow the procalcitonin if it is elevated we will order antibiotics.  The patient is here primarily for vomiting.  But the elevated troponin is concerning she is presently not having any chest pain we will need to serially cardiac enzymes.  Her blood pressure was elevated therefore she was given Lopressor.  I discussed this case with Dr. Felicia Tomas, Dr. Puente.  She will be admitted to a cardiac telemetry bed..  She is not vomiting anymore has no chest pain or shortness of breath at this present time.  She did state that she feels a little congested will get procalcitonin, COVID       Medical Decision Making      Disposition and Plan     Clinical Impression:  1. Nausea and vomiting, unspecified vomiting type    2. Elevated troponin    3. EKG abnormalities         Disposition:  Admit  3/24/2024  7:58 pm    Follow-up:  No follow-up provider specified.        Medications Prescribed:  Current Discharge Medication List                            Hospital Problems        Present on Admission  Date Reviewed: 2/17/2024            ICD-10-CM Noted POA    * (Principal) Nausea and vomiting, unspecified vomiting type R11.2 3/24/2024 Unknown    Hyperglycemia R73.9 3/24/2024 Yes    Hyponatremia E87.1 3/24/2024 Yes    Leukocytosis D72.829 3/24/2024 Yes

## 2024-03-25 ENCOUNTER — APPOINTMENT (OUTPATIENT)
Dept: CT IMAGING | Facility: HOSPITAL | Age: 54
End: 2024-03-25
Payer: MEDICARE

## 2024-03-25 ENCOUNTER — APPOINTMENT (OUTPATIENT)
Dept: CT IMAGING | Facility: HOSPITAL | Age: 54
End: 2024-03-25
Attending: INTERNAL MEDICINE
Payer: MEDICARE

## 2024-03-25 ENCOUNTER — APPOINTMENT (OUTPATIENT)
Dept: CV DIAGNOSTICS | Facility: HOSPITAL | Age: 54
End: 2024-03-25
Attending: INTERNAL MEDICINE
Payer: MEDICARE

## 2024-03-25 LAB
ALBUMIN SERPL-MCNC: 3.4 G/DL (ref 3.4–5)
ALBUMIN/GLOB SERPL: 1.1 {RATIO} (ref 1–2)
ALP LIVER SERPL-CCNC: 87 U/L
ALT SERPL-CCNC: 30 U/L
AMPHET UR QL SCN: NEGATIVE
ANION GAP SERPL CALC-SCNC: 11 MMOL/L (ref 0–18)
AST SERPL-CCNC: 24 U/L (ref 15–37)
ATRIAL RATE: 74 BPM
ATRIAL RATE: 98 BPM
BASOPHILS # BLD AUTO: 0.05 X10(3) UL (ref 0–0.2)
BASOPHILS NFR BLD AUTO: 0.5 %
BENZODIAZ UR QL SCN: NEGATIVE
BILIRUB SERPL-MCNC: 0.4 MG/DL (ref 0.1–2)
BUN BLD-MCNC: 13 MG/DL (ref 9–23)
CALCIUM BLD-MCNC: 9.1 MG/DL (ref 8.5–10.1)
CHLORIDE SERPL-SCNC: 109 MMOL/L (ref 98–112)
CO2 SERPL-SCNC: 20 MMOL/L (ref 21–32)
COCAINE UR QL: NEGATIVE
CREAT BLD-MCNC: 0.61 MG/DL
CREAT UR-SCNC: 54.1 MG/DL
EGFRCR SERPLBLD CKD-EPI 2021: 106 ML/MIN/1.73M2 (ref 60–?)
EOSINOPHIL # BLD AUTO: 0.08 X10(3) UL (ref 0–0.7)
EOSINOPHIL NFR BLD AUTO: 0.8 %
ERYTHROCYTE [DISTWIDTH] IN BLOOD BY AUTOMATED COUNT: 14.2 %
GLOBULIN PLAS-MCNC: 3 G/DL (ref 2.8–4.4)
GLUCOSE BLD-MCNC: 78 MG/DL (ref 70–99)
HCT VFR BLD AUTO: 41.2 %
HGB BLD-MCNC: 13 G/DL
IMM GRANULOCYTES # BLD AUTO: 0.04 X10(3) UL (ref 0–1)
IMM GRANULOCYTES NFR BLD: 0.4 %
LYMPHOCYTES # BLD AUTO: 2.38 X10(3) UL (ref 1–4)
LYMPHOCYTES NFR BLD AUTO: 23.7 %
Lab: NEGATIVE
MAGNESIUM SERPL-MCNC: 2.1 MG/DL (ref 1.6–2.6)
MCH RBC QN AUTO: 29.6 PG (ref 26–34)
MCHC RBC AUTO-ENTMCNC: 31.6 G/DL (ref 31–37)
MCV RBC AUTO: 93.8 FL
MDMA UR QL SCN: NEGATIVE
MONOCYTES # BLD AUTO: 1.1 X10(3) UL (ref 0.1–1)
MONOCYTES NFR BLD AUTO: 10.9 %
NEUTROPHILS # BLD AUTO: 6.41 X10 (3) UL (ref 1.5–7.7)
NEUTROPHILS # BLD AUTO: 6.41 X10(3) UL (ref 1.5–7.7)
NEUTROPHILS NFR BLD AUTO: 63.7 %
OPIATES UR QL SCN: NEGATIVE
OSMOLALITY SERPL CALC.SUM OF ELEC: 289 MOSM/KG (ref 275–295)
OXYCODONE UR QL SCN: NEGATIVE
P AXIS: 37 DEGREES
P AXIS: 38 DEGREES
P-R INTERVAL: 130 MS
P-R INTERVAL: 132 MS
PLATELET # BLD AUTO: 301 10(3)UL (ref 150–450)
POTASSIUM SERPL-SCNC: 3.6 MMOL/L (ref 3.5–5.1)
PROT SERPL-MCNC: 6.4 G/DL (ref 6.4–8.2)
Q-T INTERVAL: 390 MS
Q-T INTERVAL: 428 MS
QRS DURATION: 86 MS
QRS DURATION: 88 MS
QTC CALCULATION (BEZET): 475 MS
QTC CALCULATION (BEZET): 497 MS
R AXIS: -3 DEGREES
R AXIS: 0 DEGREES
RBC # BLD AUTO: 4.39 X10(6)UL
SODIUM SERPL-SCNC: 140 MMOL/L (ref 136–145)
T AXIS: -25 DEGREES
T AXIS: -9 DEGREES
TROPONIN I SERPL HS-MCNC: 106 NG/L
TROPONIN I SERPL HS-MCNC: 156 NG/L
VENTRICULAR RATE: 74 BPM
VENTRICULAR RATE: 98 BPM
WBC # BLD AUTO: 10.1 X10(3) UL (ref 4–11)

## 2024-03-25 PROCEDURE — 93306 TTE W/DOPPLER COMPLETE: CPT | Performed by: INTERNAL MEDICINE

## 2024-03-25 PROCEDURE — 75574 CT ANGIO HRT W/3D IMAGE: CPT | Performed by: INTERNAL MEDICINE

## 2024-03-25 PROCEDURE — 90792 PSYCH DIAG EVAL W/MED SRVCS: CPT | Performed by: OTHER

## 2024-03-25 RX ORDER — METOPROLOL TARTRATE 100 MG/1
100 TABLET ORAL ONCE
Status: DISCONTINUED | OUTPATIENT
Start: 2024-03-25 | End: 2024-03-27

## 2024-03-25 RX ORDER — POTASSIUM CHLORIDE 20 MEQ/1
40 TABLET, EXTENDED RELEASE ORAL EVERY 4 HOURS
Status: COMPLETED | OUTPATIENT
Start: 2024-03-25 | End: 2024-03-25

## 2024-03-25 RX ORDER — METOPROLOL TARTRATE 50 MG/1
50 TABLET, FILM COATED ORAL ONCE AS NEEDED
Status: ACTIVE | OUTPATIENT
Start: 2024-03-26 | End: 2024-03-26

## 2024-03-25 RX ORDER — METOPROLOL TARTRATE 50 MG/1
50 TABLET, FILM COATED ORAL ONCE AS NEEDED
Status: COMPLETED | OUTPATIENT
Start: 2024-03-25 | End: 2024-03-25

## 2024-03-25 RX ORDER — METOPROLOL TARTRATE 1 MG/ML
5 INJECTION, SOLUTION INTRAVENOUS SEE ADMIN INSTRUCTIONS
Status: DISCONTINUED | OUTPATIENT
Start: 2024-03-25 | End: 2024-03-27

## 2024-03-25 RX ORDER — METOPROLOL TARTRATE 100 MG/1
100 TABLET ORAL ONCE AS NEEDED
Status: COMPLETED | OUTPATIENT
Start: 2024-03-25 | End: 2024-03-25

## 2024-03-25 RX ORDER — NITROGLYCERIN 0.4 MG/1
0.4 TABLET SUBLINGUAL ONCE
Status: COMPLETED | OUTPATIENT
Start: 2024-03-25 | End: 2024-03-25

## 2024-03-25 RX ORDER — METOPROLOL TARTRATE 100 MG/1
100 TABLET ORAL ONCE AS NEEDED
Status: ACTIVE | OUTPATIENT
Start: 2024-03-26 | End: 2024-03-26

## 2024-03-25 RX ORDER — NITROGLYCERIN 0.4 MG/1
TABLET SUBLINGUAL
Status: COMPLETED
Start: 2024-03-25 | End: 2024-03-25

## 2024-03-25 RX ORDER — METOPROLOL TARTRATE 50 MG/1
50 TABLET, FILM COATED ORAL ONCE
Status: DISCONTINUED | OUTPATIENT
Start: 2024-03-26 | End: 2024-03-27

## 2024-03-25 RX ORDER — DILTIAZEM HYDROCHLORIDE 5 MG/ML
5 INJECTION INTRAVENOUS SEE ADMIN INSTRUCTIONS
Status: DISCONTINUED | OUTPATIENT
Start: 2024-03-25 | End: 2024-03-27

## 2024-03-25 RX ORDER — METOPROLOL TARTRATE 100 MG/1
100 TABLET ORAL ONCE AS NEEDED
Status: DISCONTINUED | OUTPATIENT
Start: 2024-03-25 | End: 2024-03-27

## 2024-03-25 RX ORDER — SODIUM CHLORIDE 9 MG/ML
INJECTION, SOLUTION INTRAVENOUS CONTINUOUS
Status: DISCONTINUED | OUTPATIENT
Start: 2024-03-25 | End: 2024-03-26

## 2024-03-25 RX ORDER — BUTALBITAL, ACETAMINOPHEN AND CAFFEINE 50; 325; 40 MG/1; MG/1; MG/1
1 TABLET ORAL EVERY 4 HOURS PRN
Status: DISCONTINUED | OUTPATIENT
Start: 2024-03-25 | End: 2024-03-27

## 2024-03-25 RX ORDER — METOPROLOL TARTRATE 100 MG/1
100 TABLET ORAL ONCE
Status: DISCONTINUED | OUTPATIENT
Start: 2024-03-26 | End: 2024-03-27

## 2024-03-25 RX ORDER — METOPROLOL TARTRATE 50 MG/1
50 TABLET, FILM COATED ORAL ONCE
Status: DISCONTINUED | OUTPATIENT
Start: 2024-03-25 | End: 2024-03-27

## 2024-03-25 RX ORDER — HEPARIN SODIUM 5000 [USP'U]/ML
5000 INJECTION, SOLUTION INTRAVENOUS; SUBCUTANEOUS EVERY 8 HOURS SCHEDULED
Status: DISCONTINUED | OUTPATIENT
Start: 2024-03-25 | End: 2024-03-27

## 2024-03-25 RX ORDER — METOPROLOL TARTRATE 50 MG/1
50 TABLET, FILM COATED ORAL ONCE AS NEEDED
Status: DISCONTINUED | OUTPATIENT
Start: 2024-03-25 | End: 2024-03-27

## 2024-03-25 NOTE — H&P
[facility]   part of [facility]    History and Physical     Marylin Walker Patient Status:  Inpatient    1970 MRN [id number]   Location [facility] 8NE-A Attending Mitchel Solorio MD   Hosp Day # 1 PCP Yoel Servin MD     Chief Complaint: Nausea, vomiting    History of Present Illness: Marylin Walker is a  53 year old female with history of allergic rhinitis, anxiety, depression, bipolar, asthma, history of kidney stones, fibromyalgia, GERD, gestational diabetes, hypothyroidism, history of migraines, osteoarthritis, history of seizures, history of stroke with no residual symptoms, history ulcer colitis presenting with nausea, vomiting. Patient says that the n/v started on Friday and have continued untill she came in to the ER for further evaluation and treatment. She says due to all the vomiting she does have a sore throat but denies abdominal pain, diarrhea, constipation, chest pain, palpitations. Patient also has a headache that started since admission. Patient denies any positive review if systems.     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    AHI 2 RDI 2 REM AHI 2 SaO2 doroteo 88 %    Osteoarthritis     Pneumonia due to organism     Scoliosis     Seizure disorder (Formerly [facility]) 10/2020    absent seizures    Stroke (Formerly [facility])     + mild R.sided weakness    Ulcerative colitis (Formerly [facility])     Visual impairment         Past Surgical History:   Past Surgical History:   Procedure Laterality Date    CHOLECYSTECTOMY  2010    COLONOSCOPY       ulcerative colitis    COLONOSCOPY N/A 04/25/2017    Procedure: COLONOSCOPY;  Surgeon: Hipolito Dobbins MD;  Location:  ENDOSCOPY    COLONOSCOPY N/A 06/14/2021    Procedure: COLONOSCOPY with biopsy,;  Surgeon: Ramsey Muhammad MD;  Location:  ENDOSCOPY    COLPOSCOPY, CERVIX W/UPPER ADJACENT VAGINA; W/BIOPSY(S), CERVIX  1999    HERNIA SURGERY  1997    Umbilical Hernia Repair    OTHER SURGICAL HISTORY N/A 06/21/2016    Procedure: ESOPHAGOGASTRODUODENOSCOPY (EGD);  Surgeon: Jeff Scherer MD;  Location:  ENDOSCOPY    PARAGARD, IUD  06/2014    UPPER GI ENDOSCOPY,EXAM         Social History:  reports that she quit smoking about 21 years ago. Her smoking use included cigarettes. She has never used smokeless tobacco. She reports that she does not currently use alcohol. She reports current drug use. Frequency: 2.00 times per week. Drug: Cannabis.    Family History:   Family History   Problem Relation Age of Onset    Breast Cancer Maternal Grandmother         81    Breast Cancer Other         dx post menopausal    High Blood Pressure Father     Colon Cancer Father         59    Colon Polyps Father     Hypertension Father     High Blood Pressure Mother     High Cholesterol Mother     Hypertension Mother     Mental Disorder Mother         Depression    Heart Disorder Sister 54        MI - LAD stenting    Heart Attack Sister         12/2018 \" maker\"    Mental Disorder Son         ADHD, Bipolar II    Mental Disorder Son         Severe anxiety, Bipolar II       Allergies:   Allergies   Allergen Reactions    Depakote [Valproic Acid] OTHER (SEE COMMENTS)     Suicidal ideation    Kdc:Olanzapine SWELLING    Ketorolac Tromethamine OTHER (SEE COMMENTS) and NAUSEA AND VOMITING     gastritis  Gastritis & UC  gastritis  Gastritis & UC      Latex RASH and Dyspnea    Molds & Smuts ASTHMA, ITCHING, RASH, SHORTNESS OF BREATH, WHEEZING and OTHER (SEE COMMENTS)    Nsaids OTHER (SEE COMMENTS)     Must take Nsaids sparingly  due to history of gastritis  Must take Nsaids sparingly due to history of gastritis      Olanzapine SWELLING     Caused severe swelling and bruising    Penicillins NAUSEA AND VOMITING    Phenothiazines OTHER (SEE COMMENTS)     Extrapyramidal syndrome. No phenothiazines per neurology (documented during 18 Dr. Velázquez)  Extrapyramidal syndrome. No phenothiazines per neurology (documented during 18 Dr. Velázquez)  Extrapyramidal syndrome. No phenothiazines per neurology (documented during 18 Dr. Velázquez)    Amoxicillin NAUSEA AND VOMITING    Levofloxacin PAIN and OTHER (SEE COMMENTS)     Tendinitis in multiple joints  Tendinitis in multiple joints    Mushrooms NAUSEA AND VOMITING    Penicillin G Potassium NAUSEA AND VOMITING    Phenytoin ANXIETY, CONFUSION, DIZZINESS and HALLUCINATION    Sulfa Antibiotics NAUSEA AND VOMITING    Toradol NAUSEA AND VOMITING     Gastritis & UC    Trees, [place] OTHER (SEE COMMENTS)     Sneezing watery eyes    Triptans HALLUCINATION     Lesions in brain    Erythromycin OTHER (SEE COMMENTS)       Medications:    Current Facility-Administered Medications on File Prior to Encounter   Medication Dose Route Frequency Provider Last Rate Last Admin    [COMPLETED] potassium chloride (Klor-Con) 20 MEQ oral powder 40 mEq  40 mEq Oral Once Mitchel Solorio MD   40 mEq at 24 1010    [COMPLETED] sodium chloride 0.9 % IV bolus 1,000 mL  1,000 mL Intravenous Once Dyllan Espinoza MD   Stopped at 24    [COMPLETED] insulin aspart (NovoLOG) 100 Units/mL vial 8 Units  0.15 Units/kg (Ideal) Subcutaneous Once Dyllan Espinoza MD   8 Units at 24    [COMPLETED] dextrose 50% injection 50 mL  50 mL Intravenous Once Dyllan Espinoza MD   50 mL at 24    [] sodium chloride 0.9% infusion   Intravenous Continuous Dyllan Espinoza MD        [] ondansetron (Zofran) 4 MG/2ML injection 4 mg  4 mg Intravenous Q4H PRN Dyllan Espinoza MD         [COMPLETED] dextrose 5%-sodium chloride 0.45% infusion   Intravenous Once Dyllan Espinoza  mL/hr at 24 2339 New Bag at 24 2339    [COMPLETED] sodium chloride 0.9 % IV bolus 1,000 mL  1,000 mL Intravenous Once Joseph Gore MD   Stopped at 24 0008    [COMPLETED] albuterol (Ventolin) (2.5 MG/3ML) 0.083% nebulizer solution        2.5 mg at 24 0749    [COMPLETED] methylPREDNISolone sodium succinate (Solu-MEDROL) injection 125 mg  125 mg Intravenous Once Adilene Cruz MD   125 mg at 24 1407    [COMPLETED] albuterol (Ventolin) (5 MG/ML) 0.5% nebulizer solution 10 mg  10 mg Nebulization Once Adilene Cruz MD   10 mg at 24 1511    [COMPLETED] ipratropium (Atrovent) 0.02 % nebulizer solution 0.5 mg  0.5 mg Nebulization Once Adilene Cruz MD   0.5 mg at 24 1511    [COMPLETED] magnesium sulfate in sterile water for injection 2 g/50mL IVPB premix 2 g  2 g Intravenous Once Adilene Cruz MD   Stopped at 24 1709    [] methylPREDNISolone sodium succinate (Solu-MEDROL) injection 60 mg  60 mg Intravenous Q8H Jaziel Moya MD   60 mg at 24 1612     Current Outpatient Medications on File Prior to Encounter   Medication Sig Dispense Refill    acetaminophen 325 MG Oral Tab Take 1 tablet (325 mg total) by mouth every 6 (six) hours as needed for Pain.      albuterol 108 (90 Base) MCG/ACT Inhalation Aero Soln Inhale 2 puffs into the lungs every 6 (six) hours as needed.      METOPROLOL TARTRATE 25 MG Oral Tab TAKE 1/2 TABLET BY MOUTH DAILY (Patient taking differently: Take 1 tablet (25 mg total) by mouth daily.) 45 tablet 0    OXCARBAZEPINE 300 MG Oral Tab TAKE 1 AND 1/2 TABLETS(450 MG) BY MOUTH TWICE DAILY 180 tablet 1    MONTELUKAST 10 MG Oral Tab TAKE 1 TABLET(10 MG) BY MOUTH EVERY NIGHT 90 tablet 0    lamoTRIgine (LAMICTAL) 200 MG Oral Tab Take 1 tablet (200 mg total) by mouth 3 (three) times daily. 90 tablet 5    ondansetron 8 MG Oral Tablet  Dispersible Take 1 tablet (8 mg total) by mouth every 8 (eight) hours as needed for Nausea. 60 tablet 2    fluticasone-salmeterol (ADVAIR DISKUS) 250-50 MCG/DOSE Inhalation Aerosol Powder, Breath Activated INHALE 1 PUFF INTO THE LUNGS EVERY 12 HOURS 120 each 0    albuterol sulfate (2.5 MG/3ML) 0.083% Inhalation Nebu Soln Take 3 mL (2.5 mg total) by nebulization every 6 (six) hours as needed for Wheezing. 3 mL 2    Multiple Vitamins-Minerals (QC WOMENS DAILY MULTIVITAMIN) Oral Tab Take 1 tablet by mouth daily.      FLUTICASONE PROPIONATE 50 MCG/ACT Nasal Suspension SHAKE LIQUID AND USE 2 SPRAYS IN EACH NOSTRIL DAILY 48 g 3    clonazePAM 0.5 MG Oral Tab Take 0.5 tablets (0.25 mg total) by mouth 3 (three) times daily. 90 tablet 0    aspirin 81 MG Oral Tab Take 1 tablet (81 mg total) by mouth daily.      lithium  MG Oral Tab CR Take 1 tablet (300 mg total) by mouth at bedtime.      amLODIPine 2.5 MG Oral Tab Take 1 tablet (2.5 mg total) by mouth daily. 30 tablet 0    [] ondansetron 8 MG Oral Tablet Dispersible Take 1 tablet (8 mg total) by mouth every 4 (four) hours as needed for Nausea. 10 tablet 0    MESALAMINE  MG Oral Capsule Delayed Release TAKE 3 CAPSULES BY MOUTH TWICE DAILY BEFORE MEALS 540 capsule 3    pantoprazole 40 MG Oral Tab EC Take 1 tablet (40 mg total) by mouth before breakfast. 90 tablet 3    buPROPion  MG Oral Tablet 24 Hr Take 1 tablet (150 mg total) by mouth every morning.      Estradiol 10 MCG Vaginal Tab Place vaginally twice a week. AS NEEDED      Nebulizers (VIOS AEROSOL DELIVERY SYSTEM) Does not apply Misc USE WITH ALBUTEROL AS NEEDED      QUEtiapine 50 MG Oral Tab Take 1 tablet (50 mg total) by mouth nightly.      LEVOCETIRIZINE 5 MG Oral Tab TAKE 1 TABLET(5 MG) BY MOUTH EVERY EVENING 90 tablet 3    levETIRAcetam 500 MG Oral Tab Take 1 tablet (500 mg total) by mouth 2 (two) times daily. 180 tablet 1    meclizine 12.5 MG Oral Tab Take 1 tablet (12.5 mg total) by mouth 3  (three) times daily as needed. 90 tablet 3    sertraline 100 MG Oral Tab Take 1 tablet (100 mg total) by mouth 2 (two) times daily. 50mg in AM, 100mg in PM      pregabalin (LYRICA) 200 MG Oral Cap Take 1 capsule (200 mg total) by mouth 2 (two) times daily. 180 capsule 2    levothyroxine 125 MCG Oral Tab Take 1 tablet (125 mcg total) by mouth daily. 90 tablet 2    QUEtiapine Fumarate 200 MG Oral Tab Take 1 tablet (200 mg total) by mouth nightly.      Ketoconazole 2 % External Shampoo Use to wash scalp 2-3 times per week. 120 mL 1       Review of Systems:   A comprehensive 14 point review of systems was completed.    Pertinent positives and negatives noted in the HPI.    Physical Exam:    BP (!) 160/92 (BP Location: Left arm)   Pulse 68   Temp 98.2 °F (36.8 °C) (Oral)   Resp 14   Ht 5' 3\" (1.6 m)   Wt 186 lb 4.6 oz (84.5 kg)   LMP 02/20/2015 (Approximate)   SpO2 97%   BMI 33.00 kg/m²   General: No acute distress. Alert and oriented x 3.  HEENT: Normocephalic atraumatic. Moist mucous membranes. EOM-I.  Neck: No crackles. No JVD. No carotid bruits.  Respiratory: Clear to auscultation bilaterally. No wheezes. No crackles  Cardiovascular: S1, S2. Regular rate and rhythm. No murmur  Chest and Back: No tenderness or deformity.  Abdomen: Soft, nontender, nondistended.  Positive bowel sounds. No rebound, guarding   Neurologic: No focal neurological deficits. CNII-XII grossly intact. Sensation and strength intact  Musculoskeletal: Moves all extremities.  Extremities: No significant pitting edema or tenderness  Integument: No new rashes or lesions.   Psychiatric: Appropriate mood and affect.      Diagnostic Data:      Labs:  Recent Labs   Lab 03/19/24  0659 03/20/24  0836 03/24/24  1646 03/25/24  0635   WBC 6.9 6.6 14.1* 10.1   HGB 13.2 12.7 14.9 13.0   MCV 96.6 95.1 88.8 93.8   .0 261.0 357.0 301.0       Recent Labs   Lab 03/19/24  0659 03/20/24  0836 03/24/24  1647 03/25/24  0635   GLU 89 104* 108* 78   BUN 6*  6* 13 13   CREATSERUM 0.83 0.80 0.77 0.61   CA 9.3 9.2 10.1 9.1   ALB 3.4  --  4.3 3.4    142 135* 140   K 4.0  4.0 4.1 3.4* 3.6   * 114* 103 109   CO2 21.0 21.0 19.0* 20.0*   ALKPHO 90  --  114* 87   AST 12*  --  31 24   ALT 23  --  37 30   BILT 0.3  --  0.4 0.4   TP 6.7  --  8.1 6.4       Estimated Creatinine Clearance: 86.2 mL/min (based on SCr of 0.61 mg/dL).    No results for input(s): \"PTP\", \"INR\" in the last 168 hours.    No results for input(s): \"TROP\", \"CK\" in the last 168 hours.    Imaging: Imaging data reviewed in Epic.      ASSESSMENT / PLAN:   53 year old female with history of allergic rhinitis, anxiety, depression, bipolar, asthma, history of kidney stones, fibromyalgia, GERD, gestational diabetes, hypothyroidism, history of migraines, osteoarthritis, history of seizures, history of stroke with no residual symptoms, history ulcer colitis presenting with nausea, vomiting.     Nausea  Vomiting   -ct abd/pelvis--> no acute pathology  -no abdominal pain or diarrhea  -may be related to NSTEMi  -may be viral gastroenteritis  -ct brain neg for acute pathology  -supportive care    Headache  -ct brain neg for acute pathology  -acetaminophen po prn  -fiorcet po prn    NSTEMI  -no mention of chest pain to me  -trop trending down  -cardiology consulted  -ischemic eval defer to cardiology     Anxiety  Depression  Bipolar  -bupropion  -sertraline  -clonazepam  -lamotrigine  -oxcarbazepine  -lithium, working on figuring out previous dose prior to last admission    Hypothyroidism  -hypothyroidism    Asthma  -no active sx  -continue inhalers    GERD  -pantoprazole     HTN  -sbp stable  -amlodipine    Seizure  -lamictal  -Keppra  -trileptal    UC  -mealamine    Hx CVA  -asa  -statin     Quality:  DVT Prophylaxis: heparin sq  CODE status: Full  Montes: none    Plan of care discussed with patient and staff    Dispo: no discharge    MD Julianne Lopez Hospitalist  [phone]

## 2024-03-25 NOTE — CM/SW NOTE
Pt is a readmission. Pt is current w/ [facility]. Updates sent in Aidin. Psych consulted.      &  to remain available and supportive for discharge planning needs.    Yanira Isabel, MSW, LSW  Discharge Planner  [phone]

## 2024-03-25 NOTE — CONSULTS
[facility]  Report of Psychiatric Consultation    Marylin Walker Patient Status:  Inpatient    1970 MRN [id number]   [facility] 8NE-A Attending Mitchel Solorio MD   Hosp Day # 1 PCP Yoel Servin MD     Date of Admission: 3/24/24  Date of Consult: 3/25/24  Reason for Consultation: Input on psych meds    Impression:  Bipolar 2 disorder, stable.     Cognitive disorder, unspecified. Hx of traumatic SDH (Rt frontal-temporal) in . Hx of ischemia stroke in .      Recent Lithium toxicity on 3/17/24. Lithium was low at < 0.2 on 3/24/24.     Pertinent Medical Diagnoses:  Nausea/vomiting.  Seizure disorder, Migraines, Asthma, HTN.     Recommendations:  1) Continue lower dose Lithium ER at 300mg nightly. Previous dose was 750mg nightly prior to her 3/17/24-3/20/24 hospitalization with Li toxicity (level at 2.4). The cause of her Li toxicity was unclear since she did not have CHALO on that admission. And she denied taking extra Lithium intentionally or unintentionally. Will send my note to her outpatient psychiatrist. Dose may need to be increased in the future if she develops hypomanic or depressive symptoms.     2) Continue her other psych meds:  Wellbutrin  mg daily for depression  Trileptal 450mg po twice a day for bipolar d/o  Seroquel 50 mg nightly for sleep and bipolar d/o  Klonopin 0.25 mg TID PRN for anxiety  Zoloft 50 mg in morning and 100 mg at night for anxiety/depression    3) She will follow up with her outpatient psychiatrist and therapist.     Sohan Vinson MD  3/25/2024  2:58 PM    History of Present Illness:  52 yo female with hx bipolar 2 disorder and seizure d/o was admitted on 3/24/24 for management of her nausea and vomiting.     She is known to the psych service. She has bipolar 2 disorder that is being treated with Lithium and Seroquel. Trileptal is for both bipolar d/o and seizure d/o.      Per past conversation with her son Del, there was a notable  cognitive decline after the traumatic SDH in 2018. She has days when she is very forgetful, but it varies day by day. During her past hospitalizations, polypharmacy was always thought to be contributing to her delirium.     She was recently admitted from 3/17/24-3/20/24 for eval of altered mental status, dizziness, and falls. She was found to have Li toxicity (level at 2.4). She was seen by the psych service and her Li initially held and then restarted at a lower dose of 300mg nightly (compared to her usual 750mg nightly). Her delirium improved.      During this admission, she c/o nausea and fatigue. She endorses mild anxiety and depressed mood. She denies hopeless or worthless feelings or anhedonia or suicidal ideation. She had a CT angio of coronary arteries today that was unremarkable for signs of CAD.     Past Psychiatric History:  1) Bipolar 2 disorder per psych notes from [place]. She saw her 1st psychotherapist at age 6 and was first hospitalized for depression at age 13. She has a hx of 4 suicide attempts by OD as a teenager. She has a hx of 10+ inpatient psych hospitalizations. In 2003, she was dx with Bipolar 2 disorder and with med changes has not needed the inpatient psych unit since then. Her psychiatrist Dr. Seferino Fernandez and psychotherapist Ofelia Wells are both at [facility]. See Care Everywhere.      Substance Use History:  Previous cannabis use. Denies current.     Psych Family History:  Denies.      Social and Developmental History:   . She lives with her older son at this time. 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 Currently on disability. Her sister is her main source of social support.     Past Medical History:   Diagnosis Date    Acne     Allergic rhinitis     Alopecia areata 2015    Anxiety     Asthma (HCC)     Back problem     Bipolar affective (HCC)     Calculus of kidney 10/2020    Depression     Fibromyalgia     GERD (gastroesophageal reflux disease)      Gestational diabetes ([facility])     Hemorrhoids     Hypothyroidism     Migraines     Nausea and vomiting, unspecified vomiting type 3/24/2024    Organic hypersomnia, unspecified PSG 6-29-14    AHI 2 RDI 2 REM AHI 2 SaO2 doroteo 88 %    Osteoarthritis     Pneumonia due to organism     Scoliosis     Seizure disorder ([facility]) 10/2020    absent seizures    Stroke ([facility])     2003+ mild R.sided weakness    Ulcerative colitis ([facility])     Visual impairment      Past Surgical History:   Procedure Laterality Date    CHOLECYSTECTOMY  03/2010    COLONOSCOPY  2013    ulcerative colitis    COLONOSCOPY N/A 04/25/2017    Procedure: COLONOSCOPY;  Surgeon: Hipolito Dobbins MD;  Location:  ENDOSCOPY    COLONOSCOPY N/A 06/14/2021    Procedure: COLONOSCOPY with biopsy,;  Surgeon: Ramsey Muhammad MD;  Location:  ENDOSCOPY    COLPOSCOPY, CERVIX W/UPPER ADJACENT VAGINA; W/BIOPSY(S), CERVIX  1999    HERNIA SURGERY  1997    Umbilical Hernia Repair    OTHER SURGICAL HISTORY N/A 06/21/2016    Procedure: ESOPHAGOGASTRODUODENOSCOPY (EGD);  Surgeon: Jeff Scherer MD;  Location:  ENDOSCOPY    PARAGARD, IUD  06/2014    UPPER GI ENDOSCOPY,EXAM       Family History   Problem Relation Age of Onset    Breast Cancer Maternal Grandmother         81    Breast Cancer Other         dx post menopausal    High Blood Pressure Father     Colon Cancer Father         59    Colon Polyps Father     Hypertension Father     High Blood Pressure Mother     High Cholesterol Mother     Hypertension Mother     Mental Disorder Mother         Depression    Heart Disorder Sister 54        MI - LAD stenting    Heart Attack Sister         12/2018 \" maker\"    Mental Disorder Son         ADHD, Bipolar II    Mental Disorder Son         Severe anxiety, Bipolar II      reports that she quit smoking about 21 years ago. Her smoking use included cigarettes. She has never used smokeless tobacco. She reports that she does not currently use alcohol. She reports current drug  use. Frequency: 2.00 times per week. Drug: Cannabis.    Allergies:  Allergies   Allergen Reactions    Depakote [Valproic Acid] OTHER (SEE COMMENTS)     Suicidal ideation    Kdc:Olanzapine SWELLING    Ketorolac Tromethamine OTHER (SEE COMMENTS) and NAUSEA AND VOMITING     gastritis  Gastritis & UC  gastritis  Gastritis & UC      Latex RASH and Dyspnea    Molds & Smuts ASTHMA, ITCHING, RASH, SHORTNESS OF BREATH, WHEEZING and OTHER (SEE COMMENTS)    Nsaids OTHER (SEE COMMENTS)     Must take Nsaids sparingly due to history of gastritis  Must take Nsaids sparingly due to history of gastritis      Olanzapine SWELLING     Caused severe swelling and bruising    Penicillins NAUSEA AND VOMITING    Phenothiazines OTHER (SEE COMMENTS)     Extrapyramidal syndrome. No phenothiazines per neurology (documented during 1/19/18 Dr. Velázquez)  Extrapyramidal syndrome. No phenothiazines per neurology (documented during 1/19/18 Dr. Velázquez)  Extrapyramidal syndrome. No phenothiazines per neurology (documented during 1/19/18 Dr. Velázquez)    Amoxicillin NAUSEA AND VOMITING    Levofloxacin PAIN and OTHER (SEE COMMENTS)     Tendinitis in multiple joints  Tendinitis in multiple joints    Mushrooms NAUSEA AND VOMITING    Penicillin G Potassium NAUSEA AND VOMITING    Phenytoin ANXIETY, CONFUSION, DIZZINESS and HALLUCINATION    Sulfa Antibiotics NAUSEA AND VOMITING    Toradol NAUSEA AND VOMITING     Gastritis & UC    Trees, [place] OTHER (SEE COMMENTS)     Sneezing watery eyes    Triptans HALLUCINATION     Lesions in brain    Erythromycin OTHER (SEE COMMENTS)       Medications:    Current Facility-Administered Medications:     butalbital-acetaminophen-caffeine (Fioricet) -40 MG per tab 1 tablet, 1 tablet, Oral, Q4H PRN    heparin (Porcine) 5000 UNIT/ML injection 5,000 Units, 5,000 Units, Subcutaneous, Q8H [facility]    metoprolol tartrate (Lopressor) tab 50 mg, 50 mg, Oral, Once PRN **OR** metoprolol tartrate (Lopressor) tab 100 mg, 100 mg, Oral,  Once PRN    metoprolol tartrate (Lopressor) tab 50 mg, 50 mg, Oral, Once **OR** metoprolol tartrate (Lopressor) tab 100 mg, 100 mg, Oral, Once    [START ON 3/26/2024] metoprolol tartrate (Lopressor) tab 50 mg, 50 mg, Oral, Once **OR** [START ON 3/26/2024] metoprolol tartrate (Lopressor) tab 100 mg, 100 mg, Oral, Once    [START ON 3/26/2024] metoprolol tartrate (Lopressor) tab 50 mg, 50 mg, Oral, Once PRN **OR** [START ON 3/26/2024] metoprolol tartrate (Lopressor) tab 100 mg, 100 mg, Oral, Once PRN    sodium chloride 0.9% infusion, , Intravenous, Continuous    potassium chloride (K-Dur) tab 40 mEq, 40 mEq, Oral, Q4H    amLODIPine (Norvasc) tab 2.5 mg, 2.5 mg, Oral, Daily    aspirin chewable tab 81 mg, 81 mg, Oral, Daily    buPROPion ER (Wellbutrin XL) 24 hr tab 150 mg, 150 mg, Oral, QAM    clonazePAM (KlonoPIN) tab 0.25 mg, 0.25 mg, Oral, TID    fluticasone furoate-vilanterol (Breo Ellipta) 200-25 MCG/ACT inhaler 1 puff, 1 puff, Inhalation, Daily    lamoTRIgine (LaMICtal) tab 200 mg, 200 mg, Oral, TID    levETIRAcetam (Keppra) tab 500 mg, 500 mg, Oral, BID    cetirizine (ZyrTEC) tab 10 mg, 10 mg, Oral, Daily    levothyroxine (Synthroid) tab 125 mcg, 125 mcg, Oral, Daily @ 0700    lithium ER (LITHOBID) CR tab 300 mg, 300 mg, Oral, Nightly    mesalamine DR (Delzicol) cap 1,200 mg, 1,200 mg, Oral, BID AC    montelukast (Singulair) tab 10 mg, 10 mg, Oral, Nightly    OXcarbazepine (Trileptal) tab 450 mg, 450 mg, Oral, BID    pantoprazole (Protonix) DR tab 40 mg, 40 mg, Oral, Before breakfast    pregabalin (Lyrica) cap 200 mg, 200 mg, Oral, BID    QUEtiapine (SEROquel) tab 50 mg, 50 mg, Oral, Nightly    acetaminophen (Tylenol Extra Strength) tab 500 mg, 500 mg, Oral, Q4H PRN    polyethylene glycol (PEG 3350) (Miralax) 17 g oral packet 17 g, 17 g, Oral, Daily PRN    sennosides (Senokot) tab 17.2 mg, 17.2 mg, Oral, Nightly PRN    bisacodyl (Dulcolax) 10 MG rectal suppository 10 mg, 10 mg, Rectal, Daily PRN    ondansetron  (Zofran) 4 MG/2ML injection 4 mg, 4 mg, Intravenous, Q6H PRN    sertraline (Zoloft) tab 50 mg, 50 mg, Oral, QAM **AND** sertraline (Zoloft) tab 100 mg, 100 mg, Oral, Nightly    glycerin-hypromellose- (Artifical Tears) 0.2-0.2-1 % ophthalmic solution 1 drop, 1 drop, Both Eyes, QID PRN    Review of Systems   Constitutional:  Positive for malaise/fatigue.     Psychiatry Review Systems: No voices or visions. No elevated mood, racing thoughts, decreased need for sleep, grandiose thoughts.    Mental Status Exam:     Objective      Vitals:    03/25/24 1156   BP: 148/85   Pulse: 78   Resp: 14   Temp: 97.8 °F (36.6 °C)     Appearance: fair grooming  Behavior: normal psychomotor  Attitude: cooperative  Gait: not observed    Speech: normal rate and rhythm and soft    Mood: mildly depressed and anxious  Affect: Congruent    Thought process: logical and sequential  Thought content: ruminations  Perceptions: no hallucinations  Associations: Intact    Orientation: Oriented person, place, time, situation  Attention and Concentration: fair  Memory:  intact remote  Language: Intact naming and repetition  Fund of Knowledge: Able to recite name of [place] president    Insight: fair  Judgment: fair    Laboratory Data:  Lab Results   Component Value Date    WBC 10.1 03/25/2024    HGB 13.0 03/25/2024    HCT 41.2 03/25/2024    .0 03/25/2024    CREATSERUM 0.61 03/25/2024    BUN 13 03/25/2024     03/25/2024    K 3.6 03/25/2024     03/25/2024    CO2 20.0 03/25/2024    GLU 78 03/25/2024    CA 9.1 03/25/2024    ALB 3.4 03/25/2024    ALKPHO 87 03/25/2024    BILT 0.4 03/25/2024    TP 6.4 03/25/2024    AST 24 03/25/2024    ALT 30 03/25/2024    LIP 25 03/24/2024    MG 2.1 03/25/2024

## 2024-03-25 NOTE — H&P
[facility]   Hospitalist Team  [facility]   part of [facility]     History and Physical    Marylin Walker Patient Status:  Inpatient    1970 MRN [id number]   Location [facility] 8NE-A Attending Mitchel Solorio MD   Hosp Day # 1 PCP Yoel Servin MD     Admit Date:  3/24/2024    Is this a shared or split note between Advanced Practice Provider and Physician? Yes    ASSESSMENT / PLAN:   Marylin Walker is a 53 year old female from home with a PMHx significant for anxiety/MDD, bipolar, lithium toxicity, asthma, migraines, hypothyroidism, seizure disorder, stroke, HTN, kidney stones, and UC presents to the hospital with nausea/vomiting.     Nausea/vomiting  Elevated troponin  Abnormal ECG  H/O Lithium toxicity  -leukocytosis upon admission  14.1-->10.1  -procal normal  -CT abdomen with no specific etiology for ab pain, diverticulosis of colon, inguinal hernias, non-obstructing right kidney stone  -ECG abnormal x 2  -troponin 211-->156-->106, no cp, no dyspnea  -cardiology consulted  -rapid strep and RVP neg  -blood cxs in process  -CT brain with no acute abnormality  -serum lithium < 0.2  -CXR with small bilateral pleural effusions   -7 panel drug screen ordered  -UA  -clear liquid, tolerating, advance as tolerated  -zofran prn    HTN  -amlodipine    Seizure disorder  MDD/anxiety  H/O stroke  -clonazepam  -lamotrigine  -keppra  -oxcarbezepine  -lyrica  -lithium  -seroquel  -zoloft  -wellbutrin  -asa  -psych consulted    Asthma  -singulair    GERD  UC  -ppi  -delzicol    Hypothyroidism  -synthroid    GOC: Full code    MA/ACO Reach  -Re- Entry: yes  -Consults: cards  -Discharge Needs: TBD  -Appointments: TBD     FEN  -lytes in am  -diet-clears    Prophy  -SCD    Dispo  -pending clinical course  PCP: Yoel Servin MD       Outpatient records or previous hospital records reviewed.   Further recommendations pending patient's clinical course.  [facility] hospitalist  team to  continue to follow patient while in house  Concerns regarding plan of care were discussed with patient. Patient agrees with plan as detailed above. Discussed plan of care with Dr. Cerrato    Note: This chart was prepared using voice recognition software and may contain unintended word substitution errors.     Wilfredo FREEMAN  [facility] Hospital[facility]   Available via Perfect Serve or Bubble Chat (check Availability)    3/25/2024               HISTORY:   CC:   Chief Complaint   Patient presents with    Medication Reaction    Nausea/Vomiting/Diarrhea        PCP: Yoel Servin MD    History of Present Illness: Marylin Walker is a 53 year old female from home with a PMHx significant for anxiety/MDD, bipolar, lithium toxicity, asthma, migraines, hypothyroidism, seizure disorder, stroke, HTN, kidney stones, and UC presents to the hospital with nausea/vomiting. Since her last hospitalization she has been nauseas and has vomited at least 20 x per the patient and was unable to tolerate any po intake. She does report marijuana use every other day. CT abdomen with no specific etiology for ab pain.  She endorsed no chest pain but troponin  elevated and had ECG changes so cardiology was consulted for evaluation.  CXR with small bilateral pleural effusions.  She is on a clear liquid diet and tolerating so far, advance as tolerated.      Review of Systems  12 point systems reviewed, please see HPI for pertinent positives, otherwise negative    OBJECTIVE:  BP (!) 155/92 (BP Location: Left arm)   Pulse 74   Temp 97.6 °F (36.4 °C) (Oral)   Resp 17   Ht 5' 3\" (1.6 m)   Wt 186 lb 4.6 oz (84.5 kg)   LMP 02/20/2015 (Approximate)   SpO2 97%   BMI 33.00 kg/m²     GENERAL: no apparent distress  NEUROLOGIC: A/A; Ox3: strength normal; sensations intact  HEENT: normocephalic, normal nose, pharynx and TM's, sclera anicteric, conjunctiva normal  RESPIRATORY: normal expansion; non labored, CTA   CARDIOVASCULAR:  regular,nl S1 S2  ABDOMEN:  Soft, BS+; non distended, non tender    EXTREMITIES: no LE edema    PMH  Past Medical History:   Diagnosis Date    Acne     Allergic rhinitis     Alopecia areata 2015    Anxiety     Asthma ([facility])     Back problem     Bipolar affective ([facility])     Calculus of kidney 10/2020    Depression     Fibromyalgia     GERD (gastroesophageal reflux disease)     Gestational diabetes ([facility])     Hemorrhoids     Hypothyroidism     Migraines     Nausea and vomiting, unspecified vomiting type 3/24/2024    Organic hypersomnia, unspecified PSG 6-29-14    AHI 2 RDI 2 REM AHI 2 SaO2 doroteo 88 %    Osteoarthritis     Pneumonia due to organism     Scoliosis     Seizure disorder ([facility]) 10/2020    absent seizures    Stroke ([facility])     2003+ mild R.sided weakness    Ulcerative colitis ([facility])     Visual impairment         PSH  Past Surgical History:   Procedure Laterality Date    CHOLECYSTECTOMY  03/2010    COLONOSCOPY  2013    ulcerative colitis    COLONOSCOPY N/A 04/25/2017    Procedure: COLONOSCOPY;  Surgeon: Hipolito Dobbins MD;  Location:  ENDOSCOPY    COLONOSCOPY N/A 06/14/2021    Procedure: COLONOSCOPY with biopsy,;  Surgeon: Ramsey Muhammad MD;  Location:  ENDOSCOPY    COLPOSCOPY, CERVIX W/UPPER ADJACENT VAGINA; W/BIOPSY(S), CERVIX  1999    HERNIA SURGERY  1997    Umbilical Hernia Repair    OTHER SURGICAL HISTORY N/A 06/21/2016    Procedure: ESOPHAGOGASTRODUODENOSCOPY (EGD);  Surgeon: Jeff Scherer MD;  [facility]    [facility]    UPPER GI ENDOSCOPY,EXAM          ALL:  Allergies   Allergen Reactions    Depakote [Valproic Acid] OTHER (SEE COMMENTS)     Suicidal ideation    Kdc:Olanzapine SWELLING    Ketorolac Tromethamine OTHER (SEE COMMENTS) and NAUSEA AND VOMITING     gastritis  Gastritis & UC  gastritis  Gastritis & UC      Latex RASH and Dyspnea    Molds & Smuts ASTHMA, ITCHING, RASH, SHORTNESS OF BREATH, WHEEZING and OTHER (SEE COMMENTS)    Nsaids OTHER (SEE COMMENTS)      Must take Nsaids sparingly due to history of gastritis  Must take Nsaids sparingly due to history of gastritis      Olanzapine SWELLING     Caused severe swelling and bruising    Penicillins NAUSEA AND VOMITING    Phenothiazines OTHER (SEE COMMENTS)     Extrapyramidal syndrome. No phenothiazines per neurology (documented during 1/19/18 Dr. Velázquez)  Extrapyramidal syndrome. No phenothiazines per neurology (documented during 1/19/18 Dr. Velázquez)  Extrapyramidal syndrome. No phenothiazines per neurology (documented during 1/19/18 Dr. Velázquez)    Amoxicillin NAUSEA AND VOMITING    Levofloxacin PAIN and OTHER (SEE COMMENTS)     Tendinitis in multiple joints  Tendinitis in multiple joints    Mushrooms NAUSEA AND VOMITING    Penicillin G Potassium NAUSEA AND VOMITING    Phenytoin ANXIETY, CONFUSION, DIZZINESS and HALLUCINATION    Sulfa Antibiotics NAUSEA AND VOMITING    Toradol NAUSEA AND VOMITING     Gastritis & UC    Trees, [place] OTHER (SEE COMMENTS)     Sneezing watery eyes    Triptans HALLUCINATION     Lesions in brain    Erythromycin OTHER (SEE COMMENTS)        Home Medications:  Outpatient Medications Marked as Taking for the 3/24/24 encounter (Hospital Encounter)   Medication Sig Dispense Refill    acetaminophen 325 MG Oral Tab Take 1 tablet (325 mg total) by mouth every 6 (six) hours as needed for Pain.      albuterol 108 (90 Base) MCG/ACT Inhalation Aero Soln Inhale 2 puffs into the lungs every 6 (six) hours as needed.      METOPROLOL TARTRATE 25 MG Oral Tab TAKE 1/2 TABLET BY MOUTH DAILY (Patient taking differently: Take 1 tablet (25 mg total) by mouth daily.) 45 tablet 0    OXCARBAZEPINE 300 MG Oral Tab TAKE 1 AND 1/2 TABLETS(450 MG) BY MOUTH TWICE DAILY 180 tablet 1    MONTELUKAST 10 MG Oral Tab TAKE 1 TABLET(10 MG) BY MOUTH EVERY NIGHT 90 tablet 0    lamoTRIgine (LAMICTAL) 200 MG Oral Tab Take 1 tablet (200 mg total) by mouth 3 (three) times daily. 90 tablet 5    ondansetron 8 MG Oral Tablet Dispersible Take  1 tablet (8 mg total) by mouth every 8 (eight) hours as needed for Nausea. 60 tablet 2    fluticasone-salmeterol (ADVAIR DISKUS) 250-50 MCG/DOSE Inhalation Aerosol Powder, Breath Activated INHALE 1 PUFF INTO THE LUNGS EVERY 12 HOURS 120 each 0    albuterol sulfate (2.5 MG/3ML) 0.083% Inhalation Nebu Soln Take 3 mL (2.5 mg total) by nebulization every 6 (six) hours as needed for Wheezing. 3 mL 2    Multiple Vitamins-Minerals (QC WOMENS DAILY MULTIVITAMIN) Oral Tab Take 1 tablet by mouth daily.      FLUTICASONE PROPIONATE 50 MCG/ACT Nasal Suspension SHAKE LIQUID AND USE 2 SPRAYS IN EACH NOSTRIL DAILY 48 g 3    clonazePAM 0.5 MG Oral Tab Take 0.5 tablets (0.25 mg total) by mouth 3 (three) times daily. 90 tablet 0    aspirin 81 MG Oral Tab Take 1 tablet (81 mg total) by mouth daily.         Soc Hx  Social History     Tobacco Use    Smoking status: Former     Packs/day: 0.00     Years: 10.00     Additional pack years: 0.00     Total pack years: 0.00     Types: Cigarettes     Quit date: 2003     Years since quittin.1    Smokeless tobacco: Never    Tobacco comments:     Ages [age], [age]   Substance Use Topics    Alcohol use: Not Currently        Fam Hx  Family History   Problem Relation Age of Onset    Breast Cancer Maternal Grandmother         81    Breast Cancer Other         dx post menopausal    High Blood Pressure Father     Colon Cancer Father         59    Colon Polyps Father     Hypertension Father     High Blood Pressure Mother     High Cholesterol Mother     Hypertension Mother     Mental Disorder Mother         Depression    Heart Disorder Sister 54        MI - LAD stenting    Heart Attack Sister         2018 \" maker\"    Mental Disorder Son         ADHD, Bipolar II    Mental Disorder Son         Severe anxiety, Bipolar II                  DIAGNOSTIC DATA:   CBC/Chem  Recent Labs   Lab 24  0659 24  0836 24  1646 24  0635   WBC 6.9 6.6 14.1* 10.1   HGB 13.2 12.7 14.9 13.0    MCV 96.6 95.1 88.8 93.8   .0 261.0 357.0 301.0       Recent Labs   Lab 03/19/24  0659 03/20/24  0836 03/24/24  1647 03/25/24  0635    142 135* 140   K 4.0  4.0 4.1 3.4* 3.6   * 114* 103 109   CO2 21.0 21.0 19.0* 20.0*   BUN 6* 6* 13 13   CREATSERUM 0.83 0.80 0.77 0.61   GLU 89 104* 108* 78   CA 9.3 9.2 10.1 9.1   MG 2.1 1.8 1.8 2.1       Recent Labs   Lab 03/19/24  0659 03/24/24  1647 03/25/24  0635   ALT 23 37 30   AST 12* 31 24   ALB 3.4 4.3 3.4       No results for input(s): \"TROP\" in the last 168 hours.    Radiology: XR CHEST AP PORTABLE  (CPT=71045)    Result Date: 3/24/2024  CONCLUSION:  Blunting of costophrenic angles bilaterally is consistent with small bilateral effusions and atelectasis in lower lobes.   LOCATION:  [facility]      Dictated by (CST): Sabino Pardo MD on 3/24/2024 at 7:49 PM     Finalized by (CST): Sabino Pardo MD on 3/24/2024 at 7:49 PM       CT ABDOMEN+PELVIS(CONTRAST ONLY)(CPT=74177)    Result Date: 3/24/2024  CONCLUSION:  1. A specific etiology for abdominal pain is not evident. 2. There is diverticulosis of colon without CT evidence of diverticulitis. 3. Bilateral fat containing inguinal hernias. 4. There is nonobstructing right nephrolithiasis.    LOCATION:  [facility]   Dictated by (CST): Sabino Pardo MD on 3/24/2024 at 6:44 PM     Finalized by (CST): Sabino Pardo MD on 3/24/2024 at 6:47 PM       CT BRAIN OR HEAD (77754)    Result Date: 3/24/2024  CONCLUSION:  There is no acute abnormality on the noncontrast CT of the head.    LOCATION:  [facility]   Dictated by (CST): Sabino Pardo MD on 3/24/2024 at 6:42 PM     Finalized by (CST): Sabino Pardo MD on 3/24/2024 at 6:43 PM          SEE ATTENDING NOTE BELOW

## 2024-03-25 NOTE — PLAN OF CARE
Patient is alert and oriented times four. She is going to try taking her medications. She has not taken them for several days D/T nausea and vomiting. She says that she will take some and then try to take more after an hour. Patient is inus rhythm on monitor.     Patient is unsure of her dose of lithium. Dr. Wilson in to see. Patient's sister (who gives her her medications states that before she came into the hospital last time, she was getting 750 mg daily but patient states that no, it was 450 mg. Psych consult placed for dosing.

## 2024-03-25 NOTE — DISCHARGE INSTRUCTIONS
Resume home health care with  Home Health at discharge.       follow up with your psychiatrist and therapist.     Phone: [phone]  Fax: [phone]  Location  [facility], [address]    [email or web address]    [place] [employer]  This organization can possibly assist with services at home.

## 2024-03-25 NOTE — PROGRESS NOTES
NURSING ADMISSION NOTE      Patient admitted via Cart from ER.  Oriented to room 8605.  Safety precautions initiated.  Bed in low position.  Call light in reach.  Patient admitted alert and oriented, on room air, denies pain, mild nausea. Patient's admission data obtained (help from sister by telephone), assessment complete, admitting orders received and carried out. Patient made comfortable and updated on plan of care.

## 2024-03-25 NOTE — CONSULTS
[facility]  Report of Consultation    Marylin Walker Patient Status:  Inpatient    1970 MRN [id number]   [facility] 8NE-A Attending Mitchel Solorio MD   Hosp Day # 1 PCP Yoel Servin MD     Reason for Consultation:   Abnormal EKG, mildly elevated troponin    History of Present Illness:   Marylin Walker is a(n) 53 year old female with no known cardiac history but an extensive psych history on several meds (including lithium with a history of lithium toxicity) who presented with nausea and vomiting.      She was an inpatient last month with asthma and PNA (viral and possibly bacterial).  She returned home and soon after had nausea.  This started last week.  She then began vomiting.  He appetite decreased.  Over the span of several days she was unable to keep down liquids and she presented to the ER.    At baseline her activity is limited by orthopedic issues.  She can walk 3 flights of stairs but does not dyspnea.  No CP with or without exertion and none noted in the last several days.    No palpitations or syncope.    Her sister had a \" maker heart attack\" at approximately the same age.    She does not smoke and quit over a decade ago.    Her EKG in the ER showed deep anterior T wave inversions and moderate inferior T inversions.  These were not totally new but more pronounced.  The troponin was 200 approximately.  CT head showed no bleed and CT abdomen showed no acute processes.    She is presently comfortable with some nausea presently but no cardiac complaints.    Past Medical History:   Past Medical History:   Diagnosis Date    Acne     Allergic rhinitis     Alopecia areata     Anxiety     Asthma (HCC)     Back problem     Bipolar affective (HCC)     Calculus of kidney 10/2020    Depression     Fibromyalgia     GERD (gastroesophageal reflux disease)     Gestational diabetes (HCC)     Hemorrhoids     Hypothyroidism     Migraines     Nausea and  vomiting, unspecified vomiting type 3/24/2024    Organic hypersomnia, unspecified PSG 6-29-14    AHI 2 RDI 2 REM AHI 2 SaO2 doroteo 88 %    Osteoarthritis     Pneumonia due to organism     Scoliosis     Seizure disorder (Formerly [facility]) 10/2020    absent seizures    Stroke (Formerly [facility])     2003+ mild R.sided weakness    Ulcerative colitis (Formerly [facility])     Visual impairment        Social History:   Smoking:  None  Alcohol:  None    Family History:   No family history of premature arthrosclerotic heart disease     Medications:   Scheduled:    amLODIPine  2.5 mg Oral Daily    aspirin  81 mg Oral Daily    buPROPion ER  150 mg Oral QAM    clonazePAM  0.25 mg Oral TID    fluticasone furoate-vilanterol  1 puff Inhalation Daily    lamoTRIgine  200 mg Oral TID    levETIRAcetam  500 mg Oral BID    cetirizine  10 mg Oral Daily    levothyroxine  125 mcg Oral Daily @ 0700    lithium ER  300 mg Oral Nightly    mesalamine DR  1,200 mg Oral BID AC    montelukast  10 mg Oral Nightly    OXcarbazepine  450 mg Oral BID    pantoprazole  40 mg Oral Before breakfast    pregabalin  200 mg Oral BID    QUEtiapine  50 mg Oral Nightly    sertraline  50 mg Oral QAM    And    sertraline  100 mg Oral Nightly       Continuous Infusion:       PRN Medications:   butalbital-acetaminophen-caffeine, acetaminophen, polyethylene glycol (PEG 3350), sennosides, bisacodyl, ondansetron, glycerin-hypromellose-    Outpatient Medications:   Current Facility-Administered Medications on File Prior to Encounter   Medication Dose Route Frequency Provider Last Rate Last Admin    [COMPLETED] potassium chloride (Klor-Con) 20 MEQ oral powder 40 mEq  40 mEq Oral Once Mitchel Solorio MD   40 mEq at 03/18/24 1010    [COMPLETED] sodium chloride 0.9 % IV bolus 1,000 mL  1,000 mL Intravenous Once Dyllan Espinoza MD   Stopped at 03/17/24 2335    [COMPLETED] insulin aspart (NovoLOG) 100 Units/mL vial 8 Units  0.15 Units/kg (Ideal) Subcutaneous Once Dyllan Espinoza MD   8 Units at  24    [COMPLETED] dextrose 50% injection 50 mL  50 mL Intravenous Once Dyllan Espinoza MD   50 mL at 24 2339    [] sodium chloride 0.9% infusion   Intravenous Continuous Dyllan Espinoza MD        [] ondansetron (Zofran) 4 MG/2ML injection 4 mg  4 mg Intravenous Q4H PRN Dyllan Espinoza MD        [COMPLETED] dextrose 5%-sodium chloride 0.45% infusion   Intravenous Once Dyllan Espinoza  mL/hr at 24 2339 New Bag at 24 233    [COMPLETED] sodium chloride 0.9 % IV bolus 1,000 mL  1,000 mL Intravenous Once Joseph Gore MD   Stopped at 24 0008    [COMPLETED] albuterol (Ventolin) (2.5 MG/3ML) 0.083% nebulizer solution        2.5 mg at 24 0749    [COMPLETED] methylPREDNISolone sodium succinate (Solu-MEDROL) injection 125 mg  125 mg Intravenous Once Adilene Cruz MD   125 mg at 24 1407    [COMPLETED] albuterol (Ventolin) (5 MG/ML) 0.5% nebulizer solution 10 mg  10 mg Nebulization Once Adilene Cruz MD   10 mg at 24 1511    [COMPLETED] ipratropium (Atrovent) 0.02 % nebulizer solution 0.5 mg  0.5 mg Nebulization Once Adilene Cruz MD   0.5 mg at 24 1511    [COMPLETED] magnesium sulfate in sterile water for injection 2 g/50mL IVPB premix 2 g  2 g Intravenous Once Adilene Cruz MD   Stopped at 24 1709    [] methylPREDNISolone sodium succinate (Solu-MEDROL) injection 60 mg  60 mg Intravenous Q8H Jaziel Moya MD   60 mg at 24 1612     Current Outpatient Medications on File Prior to Encounter   Medication Sig Dispense Refill    acetaminophen 325 MG Oral Tab Take 1 tablet (325 mg total) by mouth every 6 (six) hours as needed for Pain.      albuterol 108 (90 Base) MCG/ACT Inhalation Aero Soln Inhale 2 puffs into the lungs every 6 (six) hours as needed.      METOPROLOL TARTRATE 25 MG Oral Tab TAKE 1/2 TABLET BY MOUTH DAILY (Patient taking differently: Take 1 tablet (25 mg total) by mouth daily.) 45 tablet 0     OXCARBAZEPINE 300 MG Oral Tab TAKE 1 AND 1/2 TABLETS(450 MG) BY MOUTH TWICE DAILY 180 tablet 1    MONTELUKAST 10 MG Oral Tab TAKE 1 TABLET(10 MG) BY MOUTH EVERY NIGHT 90 tablet 0    lamoTRIgine (LAMICTAL) 200 MG Oral Tab Take 1 tablet (200 mg total) by mouth 3 (three) times daily. 90 tablet 5    ondansetron 8 MG Oral Tablet Dispersible Take 1 tablet (8 mg total) by mouth every 8 (eight) hours as needed for Nausea. 60 tablet 2    fluticasone-salmeterol (ADVAIR DISKUS) 250-50 MCG/DOSE Inhalation Aerosol Powder, Breath Activated INHALE 1 PUFF INTO THE LUNGS EVERY 12 HOURS 120 each 0    albuterol sulfate (2.5 MG/3ML) 0.083% Inhalation Nebu Soln Take 3 mL (2.5 mg total) by nebulization every 6 (six) hours as needed for Wheezing. 3 mL 2    Multiple Vitamins-Minerals (QC WOMENS DAILY MULTIVITAMIN) Oral Tab Take 1 tablet by mouth daily.      FLUTICASONE PROPIONATE 50 MCG/ACT Nasal Suspension SHAKE LIQUID AND USE 2 SPRAYS IN EACH NOSTRIL DAILY 48 g 3    clonazePAM 0.5 MG Oral Tab Take 0.5 tablets (0.25 mg total) by mouth 3 (three) times daily. 90 tablet 0    aspirin 81 MG Oral Tab Take 1 tablet (81 mg total) by mouth daily.      lithium  MG Oral Tab CR Take 1 tablet (300 mg total) by mouth at bedtime.      amLODIPine 2.5 MG Oral Tab Take 1 tablet (2.5 mg total) by mouth daily. 30 tablet 0    [] ondansetron 8 MG Oral Tablet Dispersible Take 1 tablet (8 mg total) by mouth every 4 (four) hours as needed for Nausea. 10 tablet 0    MESALAMINE  MG Oral Capsule Delayed Release TAKE 3 CAPSULES BY MOUTH TWICE DAILY BEFORE MEALS 540 capsule 3    pantoprazole 40 MG Oral Tab EC Take 1 tablet (40 mg total) by mouth before breakfast. 90 tablet 3    buPROPion  MG Oral Tablet 24 Hr Take 1 tablet (150 mg total) by mouth every morning.      Estradiol 10 MCG Vaginal Tab Place vaginally twice a week. AS NEEDED      Nebulizers ([employer] AEROSOL DELIVERY SYSTEM) Does not apply Misc USE WITH ALBUTEROL AS NEEDED       QUEtiapine 50 MG Oral Tab Take 1 tablet (50 mg total) by mouth nightly.      LEVOCETIRIZINE 5 MG Oral Tab TAKE 1 TABLET(5 MG) BY MOUTH EVERY EVENING 90 tablet 3    levETIRAcetam 500 MG Oral Tab Take 1 tablet (500 mg total) by mouth 2 (two) times daily. 180 tablet 1    meclizine 12.5 MG Oral Tab Take 1 tablet (12.5 mg total) by mouth 3 (three) times daily as needed. 90 tablet 3    sertraline 100 MG Oral Tab Take 1 tablet (100 mg total) by mouth 2 (two) times daily. 50mg in AM, 100mg in PM      pregabalin (LYRICA) 200 MG Oral Cap Take 1 capsule (200 mg total) by mouth 2 (two) times daily. 180 capsule 2    levothyroxine 125 MCG Oral Tab Take 1 tablet (125 mcg total) by mouth daily. 90 tablet 2    QUEtiapine Fumarate 200 MG Oral Tab Take 1 tablet (200 mg total) by mouth nightly.      Ketoconazole 2 % External Shampoo Use to wash scalp 2-3 times per week. 120 mL 1       Allergies:   Allergies   Allergen Reactions    Depakote [Valproic Acid] OTHER (SEE COMMENTS)     Suicidal ideation    Kdc:Olanzapine SWELLING    Ketorolac Tromethamine OTHER (SEE COMMENTS) and NAUSEA AND VOMITING     gastritis  Gastritis & UC  gastritis  Gastritis & UC      Latex RASH and Dyspnea    Molds & Smuts ASTHMA, ITCHING, RASH, SHORTNESS OF BREATH, WHEEZING and OTHER (SEE COMMENTS)    Nsaids OTHER (SEE COMMENTS)     Must take Nsaids sparingly due to history of gastritis  Must take Nsaids sparingly due to history of gastritis      Olanzapine SWELLING     Caused severe swelling and bruising    Penicillins NAUSEA AND VOMITING    Phenothiazines OTHER (SEE COMMENTS)     Extrapyramidal syndrome. No phenothiazines per neurology (documented during 1/19/18 Dr. Velázquez)  Extrapyramidal syndrome. No phenothiazines per neurology (documented during 1/19/18 Dr. Velázquez)  Extrapyramidal syndrome. No phenothiazines per neurology (documented during 1/19/18 Dr. Velázquez)    Amoxicillin NAUSEA AND VOMITING    Levofloxacin PAIN and OTHER (SEE COMMENTS)     Tendinitis in  multiple joints  Tendinitis in multiple joints    Mushrooms NAUSEA AND VOMITING    Penicillin G Potassium NAUSEA AND VOMITING    Phenytoin ANXIETY, CONFUSION, DIZZINESS and HALLUCINATION    Sulfa Antibiotics NAUSEA AND VOMITING    Toradol NAUSEA AND VOMITING     Gastritis & UC    [place] OTHER (SEE COMMENTS)     Sneezing watery eyes    Triptans HALLUCINATION     Lesions in brain    Erythromycin OTHER (SEE COMMENTS)       Review of Systems:   No fevers, chills, change in weight or bowel habits.  Ten point review of systems is otherwise negative or unremarkable.    Physical Exam:   Vitals:    03/25/24 0830   BP: (!) 155/92   Pulse: 74   Resp: 17   Temp: 97.6 °F (36.4 °C)     Wt Readings from Last 3 Encounters:   03/24/24 186 lb 4.6 oz (84.5 kg)   03/18/24 197 lb (89.4 kg)   03/17/24 198 lb 6.6 oz (90 kg)           General: Well developed, well nourished female.  Pt is in no acute distress.  HEENT:   Normocephalic.  Atraumatic.  Eyes with no scleral icterus.  Neck: Supple.  No JVD.  Carotids 2+ and equal in symmetric fashion.  No bruits are noted.  Cardiac: Regular rate and rhythm.   There is a normal S1 and S2.  No S3 or S4.  No murmurs, rubs, or gallops.  PMI is non-displaced with a normal apical impulse.  Lungs: Clear to ascultation bilaterally.  No focal rales, rhonchi, or wheezes.  Good air movement is noted throughout all lung fields.  Abdomen: Soft.  Non-distended.  Non-tender.  Bowel sounds are present and normoactive.  No guarding or rebound.   Extremities: Extremities do not demonstrate any evidence of peripheral edema.   No cyanosis or clubbing of the digits is appreciated.  Femoral, Dorsalis Pedis, and Posterior Tibialis  pulses are 2+ and equal in a symmetric fashion.  Neurologic: Alert and oriented, normal affect.  No gross deficit appreciated.  Integument:  No visible rashes are appreciated.      Laboratories and Data:   Labs:    Recent Labs   Lab 03/19/24  0659 03/20/24  0836 03/24/24  1097  03/25/24  0635   GLU 89 104* 108* 78   BUN 6* 6* 13 13   CREATSERUM 0.83 0.80 0.77 0.61   CA 9.3 9.2 10.1 9.1   ALB 3.4  --  4.3 3.4    142 135* 140   K 4.0  4.0 4.1 3.4* 3.6   * 114* 103 109   CO2 21.0 21.0 19.0* 20.0*   ALKPHO 90  --  114* 87   AST 12*  --  31 24   ALT 23  --  37 30   BILT 0.3  --  0.4 0.4   TP 6.7  --  8.1 6.4       Recent Labs   Lab 03/20/24  0836 03/24/24  1646 03/25/24  0635   RBC 4.27 4.92 4.39   HGB 12.7 14.9 13.0   HCT 40.6 43.7 41.2   MCV 95.1 88.8 93.8   MCH 29.7 30.3 29.6   MCHC 31.3 34.1 31.6   RDW 14.2 14.1 14.2   NEPRELIM 3.32 11.91* 6.41   WBC 6.6 14.1* 10.1   .0 357.0 301.0       No results for input(s): \"PTP\", \"INR\" in the last 168 hours.    No results for input(s): \"TROP\", \"CK\" in the last 168 hours.    Diagnostics:   Tele: NSR.  EKG: NSR.  T inversion anterior and inferior.  Up to 4mm anteriorly.  Echo: 2019  Conclusions:     1. Left ventricle: The cavity size was normal. There was mild concentric      hypertrophy. Systolic function was vigorous. The estimated ejection      fraction was 70-75%. Features are consistent with a pseudonormal left      ventricular filling pattern, with concomitant abnormal relaxation and      increased filling pressure - grade 2 diastolic dysfunction.   2. Left atrium: The left atrial volume was mildly increased.   3. Right ventricle: The cavity size was normal. Systolic function was      normal.   4. Pulmonary arteries: Systolic pressure was at the upper limits of normal,      estimated to be 31mm Hg.   5. Pericardium, extracardiac: There was no pericardial effusion.   Stress Test: 2019  No ischemia, no infarct.        Assessment:  1. Nausea/vomiting  2. Mild NSTEMI  3. Abnormal EKG  4. RAMIRES  5. Bipolar disorder  6. Asthma  7. HTN      Plan:  1. NSTEMI: CTA gated coronaries and echo given elevated troponin.  May be \"type II MI\" (supply demand mismatch) from dehydration and possible hypotension.  EKG changes are not acute.  [place]  starting with non-invasive studies.  2. EKG: Brain bleed already r/o'd.  Echo for hypertrophy.  CTA for ischemia.  Otherwise may be due to complex psych meds.  3. HTN: Amlodipine.    Gera Christianson MD  3/25/2024  10:24 AM

## 2024-03-25 NOTE — PLAN OF CARE
Problem: Patient/Family Goals  Goal: Patient/Family Long Term Goal  Description: Patient's Long Term Goal: DC home    Interventions:  - comply with plan of care  - See additional Care Plan goals for specific interventions  Outcome: Progressing  Goal: Patient/Family Short Term Goal  Description: Patient's Short Term Goal: less nausea    Interventions:   - clear liquid diet  - prn nausea medication  - See additional Care Plan goals for specific interventions  Outcome: Progressing

## 2024-03-25 NOTE — PROGRESS NOTES
PSYCH CONSULT    Date of Admission: 3/24/24  Date of Consult: 3/25/24  Reason for Consultation: Input on psych meds    Impression:  Bipolar 2 disorder, stable.     Cognitive disorder, unspecified. Hx of traumatic SDH (Rt frontal-temporal) in 2018. Hx of ischemia stroke in 2003.      Recent Lithium toxicity on 3/17/24. Lithium was low at < 0.2 on 3/24/24.     Pertinent Medical Diagnoses:  Nausea/vomiting.  Seizure disorder, Migraines, Asthma, HTN.     Recommendations:  1) Continue lower dose Lithium ER at 300mg nightly. Previous dose was 750mg nightly prior to her 3/17/24-3/20/24 hospitalization with Li toxicity (level at 2.4). The cause of her Li toxicity was unclear since she did not have CHALO on that admission. And she denied taking extra Lithium intentionally or unintentionally. Will send my note to her outpatient psychiatrist. Dose may need to be increased in the future if she develops hypomanic or depressive symptoms.     2) Continue her other psych meds:  Wellbutrin  mg daily for depression  Trileptal 450mg po twice a day for bipolar d/o  Seroquel 50 mg nightly for sleep and bipolar d/o  Klonopin 0.25 mg TID PRN for anxiety  Zoloft 50 mg in morning and 100 mg at night for anxiety/depression    3) She will follow up with her outpatient psychiatrist and therapist.     Full note to follow.    Dr. Sohan Vinson

## 2024-03-25 NOTE — PLAN OF CARE
Problem: nausea/vomiting, elevated TROP  Data: Patient alert and oriented overnight, on room air denies pain. Patient able and willing to take a good majority of night time medications, small episode of emesis a while afterward. Patient with purewick in place overnight, vital signs stable, NSR on tele.    Action: Due medications given prn nausea medication, all patient's needs attended to.   Education (patient/family): Patient updated on plan of care. Plan for DC back home with home health when appropriate.  Response: Patient verbalizes understanding of plan of care and appears to be resting comfortably at this time.    Problem: Patient/Family Goals  Goal: Patient/Family Long Term Goal  Description: Patient's Long Term Goal: DC home    Interventions:  - comply with plan of care  - See additional Care Plan goals for specific interventions  Outcome: Progressing  Goal: Patient/Family Short Term Goal  Description: Patient's Short Term Goal: less nausea    Interventions:   - clear liquid diet  - prn nausea medication  - See additional Care Plan goals for specific interventions  Outcome: Progressing     Problem: GASTROINTESTINAL - ADULT  Goal: Minimal or absence of nausea and vomiting  Description: INTERVENTIONS:  - Maintain adequate hydration with IV or PO as ordered and tolerated  - Nasogastric tube to low intermittent suction as ordered  - Evaluate effectiveness of ordered antiemetic medications  - Provide nonpharmacologic comfort measures as appropriate  - Advance diet as tolerated, if ordered  - Obtain nutritional consult as needed  - Evaluate fluid balance  Outcome: Progressing     Problem: CARDIOVASCULAR - ADULT  Goal: Absence of cardiac arrhythmias or at baseline  Description: INTERVENTIONS:  - Continuous cardiac monitoring, monitor vital signs, obtain 12 lead EKG if indicated  - Evaluate effectiveness of antiarrhythmic and heart rate control medications as ordered  - Initiate emergency measures for life  threatening arrhythmias  - Monitor electrolytes and administer replacement therapy as ordered  Outcome: Progressing

## 2024-03-26 PROBLEM — F09 COGNITIVE DISORDER: Status: ACTIVE | Noted: 2024-03-26

## 2024-03-26 LAB
ANION GAP SERPL CALC-SCNC: 5 MMOL/L (ref 0–18)
BASOPHILS # BLD AUTO: 0.05 X10(3) UL (ref 0–0.2)
BASOPHILS NFR BLD AUTO: 0.8 %
BILIRUB UR QL STRIP.AUTO: NEGATIVE
BUN BLD-MCNC: 13 MG/DL (ref 9–23)
CALCIUM BLD-MCNC: 8.7 MG/DL (ref 8.5–10.1)
CHLORIDE SERPL-SCNC: 117 MMOL/L (ref 98–112)
CLARITY UR REFRACT.AUTO: CLEAR
CO2 SERPL-SCNC: 22 MMOL/L (ref 21–32)
CREAT BLD-MCNC: 0.67 MG/DL
EGFRCR SERPLBLD CKD-EPI 2021: 103 ML/MIN/1.73M2 (ref 60–?)
EOSINOPHIL # BLD AUTO: 0.15 X10(3) UL (ref 0–0.7)
EOSINOPHIL NFR BLD AUTO: 2.3 %
ERYTHROCYTE [DISTWIDTH] IN BLOOD BY AUTOMATED COUNT: 14.5 %
GLUCOSE BLD-MCNC: 70 MG/DL (ref 70–99)
GLUCOSE UR STRIP.AUTO-MCNC: NORMAL MG/DL
HCT VFR BLD AUTO: 39 %
HGB BLD-MCNC: 11.9 G/DL
IMM GRANULOCYTES # BLD AUTO: 0.03 X10(3) UL (ref 0–1)
IMM GRANULOCYTES NFR BLD: 0.5 %
KETONES UR STRIP.AUTO-MCNC: 40 MG/DL
LEUKOCYTE ESTERASE UR QL STRIP.AUTO: 75
LYMPHOCYTES # BLD AUTO: 2.61 X10(3) UL (ref 1–4)
LYMPHOCYTES NFR BLD AUTO: 39.8 %
MAGNESIUM SERPL-MCNC: 2 MG/DL (ref 1.6–2.6)
MCH RBC QN AUTO: 28.9 PG (ref 26–34)
MCHC RBC AUTO-ENTMCNC: 30.5 G/DL (ref 31–37)
MCV RBC AUTO: 94.7 FL
MONOCYTES # BLD AUTO: 0.6 X10(3) UL (ref 0.1–1)
MONOCYTES NFR BLD AUTO: 9.2 %
NEUTROPHILS # BLD AUTO: 3.11 X10 (3) UL (ref 1.5–7.7)
NEUTROPHILS # BLD AUTO: 3.11 X10(3) UL (ref 1.5–7.7)
NEUTROPHILS NFR BLD AUTO: 47.4 %
NITRITE UR QL STRIP.AUTO: NEGATIVE
OSMOLALITY SERPL CALC.SUM OF ELEC: 297 MOSM/KG (ref 275–295)
PH UR STRIP.AUTO: 6.5 [PH] (ref 5–8)
PLATELET # BLD AUTO: 278 10(3)UL (ref 150–450)
POTASSIUM SERPL-SCNC: 4 MMOL/L (ref 3.5–5.1)
POTASSIUM SERPL-SCNC: 4 MMOL/L (ref 3.5–5.1)
PROT UR STRIP.AUTO-MCNC: NEGATIVE MG/DL
RBC # BLD AUTO: 4.12 X10(6)UL
RBC UR QL AUTO: NEGATIVE
SODIUM SERPL-SCNC: 144 MMOL/L (ref 136–145)
SP GR UR STRIP.AUTO: 1.01 (ref 1–1.03)
UROBILINOGEN UR STRIP.AUTO-MCNC: NORMAL MG/DL
WBC # BLD AUTO: 6.6 X10(3) UL (ref 4–11)

## 2024-03-26 RX ORDER — MAGNESIUM HYDROXIDE/ALUMINUM HYDROXICE/SIMETHICONE 120; 1200; 1200 MG/30ML; MG/30ML; MG/30ML
30 SUSPENSION ORAL 4 TIMES DAILY PRN
Qty: 355 ML | Refills: 0 | Status: SHIPPED | OUTPATIENT
Start: 2024-03-26

## 2024-03-26 RX ORDER — MAGNESIUM HYDROXIDE/ALUMINUM HYDROXICE/SIMETHICONE 120; 1200; 1200 MG/30ML; MG/30ML; MG/30ML
30 SUSPENSION ORAL 4 TIMES DAILY PRN
Status: DISCONTINUED | OUTPATIENT
Start: 2024-03-26 | End: 2024-03-27

## 2024-03-26 NOTE — PLAN OF CARE
Patient was in bed, alert and responsive. She denied any nausea at this time.   Problem: Patient/Family Goals  Goal: Patient/Family Long Term Goal  Description: Patient's Long Term Goal: DC home    Interventions:  - comply with plan of care  - See additional Care Plan goals for specific interventions  Outcome: Progressing  Goal: Patient/Family Short Term Goal  Description: Patient's Short Term Goal: less nausea    Interventions:   - clear liquid diet  - prn nausea medication  - See additional Care Plan goals for specific interventions  Outcome: Progressing     Problem: GASTROINTESTINAL - ADULT  Goal: Minimal or absence of nausea and vomiting  Description: INTERVENTIONS:  - Maintain adequate hydration with IV or PO as ordered and tolerated  - Nasogastric tube to low intermittent suction as ordered  - Evaluate effectiveness of ordered antiemetic medications  - Provide nonpharmacologic comfort measures as appropriate  - Advance diet as tolerated, if ordered  - Obtain nutritional consult as needed  - Evaluate fluid balance  Outcome: Progressing     Problem: CARDIOVASCULAR - ADULT  Goal: Absence of cardiac arrhythmias or at baseline  Description: INTERVENTIONS:  - Continuous cardiac monitoring, monitor vital signs, obtain 12 lead EKG if indicated  - Evaluate effectiveness of antiarrhythmic and heart rate control medications as ordered  - Initiate emergency measures for life threatening arrhythmias  - Monitor electrolytes and administer replacement therapy as ordered  Outcome: Progressing

## 2024-03-26 NOTE — PROGRESS NOTES
[facility] Group Cardiology  Report of Consultation    Marylin Walker Patient Status:  Inpatient    1970 MRN [id number]   [facility] 8NE-A Attending Mitchel Solorio MD   Hosp Day # 2 PCP Yoel Servin MD     Reason for Consultation:   Abnormal EKG, mildly elevated troponin    History of Present Illness:   Marylin Walker is a(n) 53 year old female with no known cardiac history but an extensive psych history on several meds (including lithium with a history of lithium toxicity) who presented with nausea and vomiting.      Subjective:  - CTA negative.  - No CP.  - Nausea continues.      Past Medical History:   Past Medical History:   Diagnosis Date    Acne     Allergic rhinitis     Alopecia areata     Anxiety     Asthma (Formerly [facility])     Back problem     Bipolar affective (Formerly [facility])     Calculus of kidney 10/2020    Depression     Fibromyalgia     GERD (gastroesophageal reflux disease)     Gestational diabetes (Formerly [facility])     Hemorrhoids     Hypothyroidism     Migraines     Nausea and vomiting, unspecified vomiting type 3/24/2024    Organic hypersomnia, unspecified PSG 6-29-14    AHI 2 RDI 2 REM AHI 2 SaO2 doroteo 88 %    Osteoarthritis     Pneumonia due to organism     Scoliosis     Seizure disorder (Formerly [facility]) 10/2020    absent seizures    Stroke (Formerly [facility])     + mild R.sided weakness    Ulcerative colitis (Formerly [facility])     Visual impairment        Medications:   Scheduled:    heparin  5,000 Units Subcutaneous Q8H DUNIA    metoprolol  5 mg Intravenous See Admin Instructions    Or    dilTIAZem  5 mg Intravenous See Admin Instructions    metoprolol tartrate  50 mg Oral Once    Or    metoprolol tartrate  100 mg Oral Once    metoprolol tartrate  50 mg Oral Once    Or    metoprolol tartrate  100 mg Oral Once    amLODIPine  2.5 mg Oral Daily    aspirin  81 mg Oral Daily    buPROPion ER  150 mg Oral QAM    clonazePAM  0.25 mg Oral TID    fluticasone furoate-vilanterol  1 puff Inhalation Daily    lamoTRIgine  200  mg Oral TID    levETIRAcetam  500 mg Oral BID    cetirizine  10 mg Oral Daily    levothyroxine  125 mcg Oral Daily @ 0700    lithium ER  300 mg Oral Nightly    mesalamine DR  1,200 mg Oral BID AC    montelukast  10 mg Oral Nightly    OXcarbazepine  450 mg Oral BID    pantoprazole  40 mg Oral Before breakfast    pregabalin  200 mg Oral BID    QUEtiapine  50 mg Oral Nightly    sertraline  50 mg Oral QAM    And    sertraline  100 mg Oral Nightly       Continuous Infusion:    sodium chloride Stopped (03/26/24 0200)       PRN Medications:   alum-mag hydroxide-simethicone, butalbital-acetaminophen-caffeine, metoprolol tartrate **OR** metoprolol tartrate, metoprolol tartrate **OR** metoprolol tartrate, acetaminophen, polyethylene glycol (PEG 3350), sennosides, bisacodyl, ondansetron, glycerin-hypromellose-    Outpatient Medications:         Allergies:       Review of Systems:   No fevers, chills, change in weight or bowel habits.  Ten point review of systems is otherwise negative or unremarkable.    Physical Exam:   Vitals:    03/26/24 0900   BP:    Pulse: 64   Resp: 15   Temp:      Wt Readings from Last 3 Encounters:   03/24/24 186 lb 4.6 oz (84.5 kg)   03/18/24 197 lb (89.4 kg)   03/17/24 198 lb 6.6 oz (90 kg)           General: Well developed, well nourished female.  Pt is in no acute distress.  HEENT:   Normocephalic.  Atraumatic.  Eyes with no scleral icterus.  Neck: Supple.  No JVD.  Carotids 2+ and equal in symmetric fashion.  No bruits are noted.  Cardiac: Regular rate and rhythm.   There is a normal S1 and S2.  No S3 or S4.  No murmurs, rubs, or gallops.  PMI is non-displaced with a normal apical impulse.  Lungs: Clear to ascultation bilaterally.  No focal rales, rhonchi, or wheezes.  Good air movement is noted throughout all lung fields.  Abdomen: Soft.  Non-distended.  Non-tender.  Bowel sounds are present and normoactive.  No guarding or rebound.   Extremities: Extremities do not demonstrate any evidence  of peripheral edema.   No cyanosis or clubbing of the digits is appreciated.  Femoral, Dorsalis Pedis, and Posterior Tibialis  pulses are 2+ and equal in a symmetric fashion.  Neurologic: Alert and oriented, normal affect.  No gross deficit appreciated.  Integument:  No visible rashes are appreciated.      Laboratories and Data:   Labs:    Recent Labs   Lab 03/24/24  1647 03/25/24  0635 03/26/24  0550   * 78 70   BUN 13 13 13   CREATSERUM 0.77 0.61 0.67   CA 10.1 9.1 8.7   ALB 4.3 3.4  --    * 140 144   K 3.4* 3.6 4.0  4.0    109 117*   CO2 19.0* 20.0* 22.0   ALKPHO 114* 87  --    AST 31 24  --    ALT 37 30  --    BILT 0.4 0.4  --    TP 8.1 6.4  --        Recent Labs   Lab 03/24/24  1646 03/25/24  0635 03/26/24  0550   RBC 4.92 4.39 4.12   HGB 14.9 13.0 11.9*   HCT 43.7 41.2 39.0   MCV 88.8 93.8 94.7   MCH 30.3 29.6 28.9   MCHC 34.1 31.6 30.5*   RDW 14.1 14.2 14.5   NEPRELIM 11.91* 6.41 3.11   WBC 14.1* 10.1 6.6   .0 301.0 278.0       No results for input(s): \"PTP\", \"INR\" in the last 168 hours.    No results for input(s): \"TROP\", \"CK\" in the last 168 hours.    Diagnostics:   Tele: NSR.  EKG: NSR.  T inversion anterior and inferior.  Up to 4mm anteriorly.  Echo: 2019  Conclusions:     1. Left ventricle: The cavity size was normal. There was mild concentric      hypertrophy. Systolic function was vigorous. The estimated ejection      fraction was 70-75%. Features are consistent with a pseudonormal left      ventricular filling pattern, with concomitant abnormal relaxation and      increased filling pressure - grade 2 diastolic dysfunction.   2. Left atrium: The left atrial volume was mildly increased.   3. Right ventricle: The cavity size was normal. Systolic function was      normal.   4. Pulmonary arteries: Systolic pressure was at the upper limits of normal,      estimated to be 31mm Hg.   5. Pericardium, extracardiac: There was no pericardial effusion.   Stress Test: 2019  No ischemia,  no infarct.    Echo 3/25/24  Conclusions:   1. Left ventricle: The cavity size was normal. Wall thickness was normal.      Systolic function was normal. The estimated ejection fraction was 60-65%,      by the Teichholz method. Left ventricular diastolic function parameters      were normal.   2. Left atrium: The left atrial volume was normal.   3. Pulmonary arteries: Systolic pressure was mildly increased, in the range      of 30mm Hg to 35mm Hg.       Assessment:  1. Nausea/vomiting  2. Mild NSTEMI  3. Abnormal EKG  4. RAMIRES  5. Bipolar disorder  6. Asthma  7. HTN      Plan:  1. NSTEMI: CTA gated coronaries negative for significant CAD.  2. EKG: Brain bleed already r/o'd.  Echo for hypertrophy.  CTA negative for ischemia.  Otherwise may be due to complex psych meds.  3. HTN: Amlodipine.      [facility] MD

## 2024-03-26 NOTE — PROGRESS NOTES
[facility]   part of [facility] Hospitalist Progress Note     Marylin Walker Patient Status:  Inpatient    1970 MRN [id number]   Location [facility] 8NE-A Attending Jacob Boogie MD   Hosp Day # 2 PCP Yoel Servin MD     Follow up for: The primary encounter diagnosis was Nausea and vomiting, unspecified vomiting type. Diagnoses of Elevated troponin and EKG abnormalities were also pertinent to this visit.      Interval History/Subjective:     RUTHY overnight  Afebrile, HDS  CT Coronaries negative, Echo unremarkable     Reports some persistent reflux but otherwise feels well, nausea improving, no vomiting, no CP/SOB. Had some ice cream this morning that was well tolerated, has not attempted anything further    Vital signs:  Temp:  [97.3 °F (36.3 °C)-97.9 °F (36.6 °C)] 97.8 °F (36.6 °C)  Pulse:  [55-78] 59  Resp:  [11-20] 12  BP: ()/(59-88) 124/74  SpO2:  [92 %-100 %] 100 %    Physical Exam:    General: NAD, Comfortable, Nontoxic   Respiratory: CTAB; reg resp rate & effort, no wheezes/crackles  Cardiovascular: S1, S2. Regular rate and rhythm. No murmurs appreciated  Abdomen: Soft, NTND, no guarding/rebound   Neurologic: No focal neurological deficits.   Extremities: No edema.  Skin: Dry, no rashes, ulcers or lesions     Diagnostic Data:      Labs:  Recent Labs   Lab 24  0836 24  1646 24  0635 24  0550   WBC 6.6 14.1* 10.1 6.6   HGB 12.7 14.9 13.0 11.9*   MCV 95.1 88.8 93.8 94.7   .0 357.0 301.0 278.0       Recent Labs   Lab 24  1647 24  0635 24  0550   * 78 70   BUN 13 13 13   CREATSERUM 0.77 0.61 0.67   CA 10.1 9.1 8.7   ALB 4.3 3.4  --    * 140 144   K 3.4* 3.6 4.0  4.0    109 117*   CO2 19.0* 20.0* 22.0   ALKPHO 114* 87  --    AST 31 24  --    ALT 37 30  --    BILT 0.4 0.4  --    TP 8.1 6.4  --        No results for input(s): \"PTP\", \"INR\" in the last 168 hours.    No results for input(s): \"TROP\", \"CK\"  in the last 168 hours.         Imaging: Imaging data reviewed in Epic.    Medications:    heparin  5,000 Units Subcutaneous Q8H DUNIA    metoprolol  5 mg Intravenous See Admin Instructions    Or    dilTIAZem  5 mg Intravenous See Admin Instructions    metoprolol tartrate  50 mg Oral Once    Or    metoprolol tartrate  100 mg Oral Once    metoprolol tartrate  50 mg Oral Once    Or    metoprolol tartrate  100 mg Oral Once    amLODIPine  2.5 mg Oral Daily    aspirin  81 mg Oral Daily    buPROPion ER  150 mg Oral QAM    clonazePAM  0.25 mg Oral TID    fluticasone furoate-vilanterol  1 puff Inhalation Daily    lamoTRIgine  200 mg Oral TID    levETIRAcetam  500 mg Oral BID    cetirizine  10 mg Oral Daily    levothyroxine  125 mcg Oral Daily @ 0700    lithium ER  300 mg Oral Nightly    mesalamine DR  1,200 mg Oral BID AC    montelukast  10 mg Oral Nightly    OXcarbazepine  450 mg Oral BID    pantoprazole  40 mg Oral Before breakfast    pregabalin  200 mg Oral BID    QUEtiapine  50 mg Oral Nightly    sertraline  50 mg Oral QAM    And    sertraline  100 mg Oral Nightly       ASSESSMENT / PLAN:     53 year old female with history of allergic rhinitis, anxiety, depression, bipolar, asthma, history of kidney stones, fibromyalgia, GERD, gestational diabetes, hypothyroidism, history of migraines, osteoarthritis, history of seizures, history of stroke with no residual symptoms, history ulcer colitis presenting with nausea, vomiting.      Nausea -- improving   Vomiting  -- resolved   -ct abd/pelvis--> no acute pathology  -no abdominal pain or diarrhea  -may be related to NSTEMI vs. viral gastroenteritis  -ct brain neg for acute pathology  -supportive care, symptoms improving   -stop IVF today, encourage PO Intake & monitor for hydration status      Headache -- resolved   -ct brain neg for acute pathology  -acetaminophen po prn  -fiorcet po prn     NSTEMI  -no mention of chest pain/SOB on admission   -trop trending down  -cardiology  consulted, rec CTA coronaries/echo, both unremarkable for acute pathology      Anxiety  Depression  Bipolar  -bupropion  -sertraline  -clonazepam  -lamotrigine  -oxcarbazepine  -lithium, levels undetectable on admission, pt reports because she could not tolerate PO meds   -f/u with her primary psychiatrist for further med titration      Hypothyroidism  -resume home synthroid      Asthma  -no active sx  -continue inhalers     GERD  -pantoprazole      HTN  -sbp stable  -amlodipine     Seizure  -lamictal  -Keppra  -trileptal     UC  -mealamine     Hx CVA  -asa  -statin     DVT Mechanical Prophylaxis:   SCDs,    DVT Pharmacologic Prophylaxis   Medication    heparin (Porcine) 5000 UNIT/ML injection 5,000 Units      DVT Pharmacologic prophylaxis: Aspirin 162 mg         Code Status: Full Code    Dispo: inpatient; IKER tomorrow pending clinical stability (Cr) off IVF, Cardiology clearance     Plan of care discussed with patient and/or family at bedside.    HERVE Boogie MD  [facility]   [phone]      This note was created using voice recognition technology. It may include inadvertent transcriptional errors. Any such errors should be contextually interpreted and should not be taken to alter the content or the meaning.     Note to Patient: The 21st Century Cures Act makes medical notes like these available to patients in the interest of transparency. However, be advised this is a medical document. It is intended as peer to peer communication. It is written in medical language and may contain abbreviations or verbiage that are unfamiliar. It may appear blunt or direct. Medical documents are intended to carry relevant information, facts as evident, and the clinical opinion of the practitioner and not intended to be the primary source of your information.  Please refer directly to myself or clinical staff for information regarding plan of care.

## 2024-03-26 NOTE — PLAN OF CARE
Received patient at change of shift.    Patient A&Ox 4. On RA with adequate saturations. NSR on tele monitor. Patient has no C/O of pain or N/V. Seizure precautions in place. Patient updated with their plan of care and verbalized understanding. Bed locked in lowest position, call light and personal belongings within reach.     Problem: Patient/Family Goals  Goal: Patient/Family Long Term Goal  Description: Patient's Long Term Goal: DC home    Interventions:  - comply with plan of care  - See additional Care Plan goals for specific interventions  Outcome: Progressing  Goal: Patient/Family Short Term Goal  Description: Patient's Short Term Goal: less nausea    Interventions:   - clear liquid diet  - prn nausea medication  - See additional Care Plan goals for specific interventions  Outcome: Progressing     Problem: GASTROINTESTINAL - ADULT  Goal: Minimal or absence of nausea and vomiting  Description: INTERVENTIONS:  - Maintain adequate hydration with IV or PO as ordered and tolerated  - Nasogastric tube to low intermittent suction as ordered  - Evaluate effectiveness of ordered antiemetic medications  - Provide nonpharmacologic comfort measures as appropriate  - Advance diet as tolerated, if ordered  - Obtain nutritional consult as needed  - Evaluate fluid balance  Outcome: Progressing     Problem: CARDIOVASCULAR - ADULT  Goal: Absence of cardiac arrhythmias or at baseline  Description: INTERVENTIONS:  - Continuous cardiac monitoring, monitor vital signs, obtain 12 lead EKG if indicated  - Evaluate effectiveness of antiarrhythmic and heart rate control medications as ordered  - Initiate emergency measures for life threatening arrhythmias  - Monitor electrolytes and administer replacement therapy as ordered  Outcome: Progressing

## 2024-03-27 VITALS
DIASTOLIC BLOOD PRESSURE: 71 MMHG | RESPIRATION RATE: 17 BRPM | HEART RATE: 83 BPM | WEIGHT: 186.31 LBS | HEIGHT: 63 IN | SYSTOLIC BLOOD PRESSURE: 139 MMHG | TEMPERATURE: 98 F | OXYGEN SATURATION: 96 % | BODY MASS INDEX: 33.01 KG/M2

## 2024-03-27 LAB
ANION GAP SERPL CALC-SCNC: 4 MMOL/L (ref 0–18)
BUN BLD-MCNC: 8 MG/DL (ref 9–23)
CALCIUM BLD-MCNC: 9 MG/DL (ref 8.5–10.1)
CHLORIDE SERPL-SCNC: 110 MMOL/L (ref 98–112)
CO2 SERPL-SCNC: 26 MMOL/L (ref 21–32)
CREAT BLD-MCNC: 0.8 MG/DL
EGFRCR SERPLBLD CKD-EPI 2021: 87 ML/MIN/1.73M2 (ref 60–?)
GLUCOSE BLD-MCNC: 137 MG/DL (ref 70–99)
OSMOLALITY SERPL CALC.SUM OF ELEC: 290 MOSM/KG (ref 275–295)
POTASSIUM SERPL-SCNC: 3.4 MMOL/L (ref 3.5–5.1)
SODIUM SERPL-SCNC: 140 MMOL/L (ref 136–145)

## 2024-03-27 PROCEDURE — 99232 SBSQ HOSP IP/OBS MODERATE 35: CPT | Performed by: OTHER

## 2024-03-27 RX ORDER — MAGNESIUM HYDROXIDE/ALUMINUM HYDROXICE/SIMETHICONE 120; 1200; 1200 MG/30ML; MG/30ML; MG/30ML
30 SUSPENSION ORAL
Status: DISCONTINUED | OUTPATIENT
Start: 2024-03-27 | End: 2024-03-27

## 2024-03-27 NOTE — PLAN OF CARE
Discharge instructions reviewed with patient, who acknowledged understanding. Tele box and IV removed. Patient transported to [facility], via wheelchair, with all paperwork and belongings.

## 2024-03-27 NOTE — PLAN OF CARE
A&Ox4. O2 sat WNL on RA. SR on tele. Denies chest pain. Continent of bladder and bowel. Pt states that her stomach still does not feel \"right\" However has been eating ice cream. Tolerating pills. Up x1 and walker.     Problem: Patient/Family Goals  Goal: Patient/Family Long Term Goal  Description: Patient's Long Term Goal: DC home    Interventions:  - comply with plan of care  - See additional Care Plan goals for specific interventions  Outcome: Progressing  Goal: Patient/Family Short Term Goal  Description: Patient's Short Term Goal: less nausea    Interventions:   - clear liquid diet  - prn nausea medication  - See additional Care Plan goals for specific interventions  Outcome: Progressing     Problem: GASTROINTESTINAL - ADULT  Goal: Minimal or absence of nausea and vomiting  Description: INTERVENTIONS:  - Maintain adequate hydration with IV or PO as ordered and tolerated  - Nasogastric tube to low intermittent suction as ordered  - Evaluate effectiveness of ordered antiemetic medications  - Provide nonpharmacologic comfort measures as appropriate  - Advance diet as tolerated, if ordered  - Obtain nutritional consult as needed  - Evaluate fluid balance  Outcome: Progressing     Problem: CARDIOVASCULAR - ADULT  Goal: Absence of cardiac arrhythmias or at baseline  Description: INTERVENTIONS:  - Continuous cardiac monitoring, monitor vital signs, obtain 12 lead EKG if indicated  - Evaluate effectiveness of antiarrhythmic and heart rate control medications as ordered  - Initiate emergency measures for life threatening arrhythmias  - Monitor electrolytes and administer replacement therapy as ordered  Outcome: Progressing

## 2024-03-27 NOTE — DISCHARGE SUMMARY
mouth 3 (three) times daily., Normal, Disp-90 tablet, R-5      pregabalin (LYRICA) 200 MG Oral Cap Take 1 capsule (200 mg total) by mouth 2 (two) times daily., Normal, Disp-180 capsule, R-2      levothyroxine 125 MCG Oral Tab Take 1 tablet (125 mcg total) by mouth daily., Normal, Disp-90 tablet, R-2      ondansetron 8 MG Oral Tablet Dispersible Take 1 tablet (8 mg total) by mouth every 8 (eight) hours as needed for Nausea., Normal, Disp-60 tablet, R-2      fluticasone-salmeterol (ADVAIR DISKUS) 250-50 MCG/DOSE Inhalation Aerosol Powder, Breath Activated INHALE 1 PUFF INTO THE LUNGS EVERY 12 HOURS, Normal, Disp-120 each, R-0      albuterol sulfate (2.5 MG/3ML) 0.083% Inhalation Nebu Soln Take 3 mL (2.5 mg total) by nebulization every 6 (six) hours as needed for Wheezing., Normal, Disp-3 mL, R-2      Multiple Vitamins-Minerals (QC WOMENS DAILY MULTIVITAMIN) Oral Tab Take 1 tablet by mouth daily., Historical      FLUTICASONE PROPIONATE 50 MCG/ACT Nasal Suspension SHAKE LIQUID AND USE 2 SPRAYS IN EACH NOSTRIL DAILY, Normal, Disp-48 g, R-3      clonazePAM 0.5 MG Oral Tab Take 0.5 tablets (0.25 mg total) by mouth 3 (three) times daily., Normal, Disp-90 tablet, R-0      aspirin 81 MG Oral Tab Take 1 tablet (81 mg total) by mouth daily., Historical           STOP taking these medications       Ketoconazole 2 % External Shampoo              Activity: as tolerated  Diet:  bland/soft diet  Wound Care: as directed  Code Status: Full Code      Exam on day of discharge:     Vitals:    03/27/24 1612   BP: 139/71   Pulse: 83   Resp: 17   Temp: 98.1 °F (36.7 °C)       General: nad, comfortable, nontoxic   Heart: RRR, no murmurs appreciated   Lungs: clear bilaterally, reg resp rate & effort, no wheezes/crackles   Abdomen: soft, ntnd, no guarding/rebound   Extremities: WWP, no GRAY   Neuro: CN inact, no focal deficits      Total time coordinating care for discharge: 35 minutes    N MD CAMI QuanG  [facility]  [phone]

## 2024-03-27 NOTE — PROGRESS NOTES
[facility]  Report of Consultation    Marylin Walker Patient Status:  Inpatient    1970 MRN [id number]   [facility] 8NE-A Attending Mitchel Solorio MD   Hosp Day # 3 PCP Yoel Servin MD     Reason for Consultation:   Abnormal EKG, mildly elevated troponin    History of Present Illness:   Marylin Walker is a(n) 53 year old female with no known cardiac history but an extensive psych history on several meds (including lithium with a history of lithium toxicity) who presented with nausea and vomiting.      Subjective:  - CTA negative.  - No CP.  - Some abdominal discomfort  - Thinks she might need a endoscopy      Past Medical History:   Past Medical History:   Diagnosis Date    Acne     Allergic rhinitis     Alopecia areata     Anxiety     Asthma ([facility])     Back problem     Bipolar affective ([facility])     Calculus of kidney 10/2020    Depression     Fibromyalgia     GERD (gastroesophageal reflux disease)     Gestational diabetes ([facility])     Hemorrhoids     Hypothyroidism     Migraines     Nausea and vomiting, unspecified vomiting type 3/24/2024    Organic hypersomnia, unspecified PSG 6-29-14    AHI 2 RDI 2 REM AHI 2 SaO2 doroteo 88 %    Osteoarthritis     Pneumonia due to organism     Scoliosis     Seizure disorder ([facility]) 10/2020    absent seizures    Stroke ([facility])     + mild R.sided weakness    Ulcerative colitis ([facility])     Visual impairment        Medications:   Scheduled:    heparin  5,000 Units Subcutaneous Q8H DUNIA    metoprolol  5 mg Intravenous See Admin Instructions    Or    dilTIAZem  5 mg Intravenous See Admin Instructions    metoprolol tartrate  50 mg Oral Once    Or    metoprolol tartrate  100 mg Oral Once    metoprolol tartrate  50 mg Oral Once    Or    metoprolol tartrate  100 mg Oral Once    amLODIPine  2.5 mg Oral Daily    aspirin  81 mg Oral Daily    buPROPion ER  150 mg Oral QAM    clonazePAM  0.25 mg Oral TID    fluticasone furoate-vilanterol   1 puff Inhalation Daily    lamoTRIgine  200 mg Oral TID    levETIRAcetam  500 mg Oral BID    cetirizine  10 mg Oral Daily    levothyroxine  125 mcg Oral Daily @ 0700    lithium ER  300 mg Oral Nightly    mesalamine DR  1,200 mg Oral BID AC    montelukast  10 mg Oral Nightly    OXcarbazepine  450 mg Oral BID    pantoprazole  40 mg Oral Before breakfast    pregabalin  200 mg Oral BID    QUEtiapine  50 mg Oral Nightly    sertraline  50 mg Oral QAM    And    sertraline  100 mg Oral Nightly       Continuous Infusion:         PRN Medications:   alum-mag hydroxide-simethicone, butalbital-acetaminophen-caffeine, metoprolol tartrate **OR** metoprolol tartrate, acetaminophen, polyethylene glycol (PEG 3350), sennosides, bisacodyl, ondansetron, glycerin-hypromellose-    Outpatient Medications:         Allergies:       Review of Systems:   No fevers, chills, change in weight or bowel habits.  Ten point review of systems is otherwise negative or unremarkable.    Physical Exam:   Vitals:    03/27/24 0810   BP: 143/83   Pulse: 71   Resp: 10   Temp: 98.2 °F (36.8 °C)     Wt Readings from Last 3 Encounters:   03/24/24 186 lb 4.6 oz (84.5 kg)   03/18/24 197 lb (89.4 kg)   03/17/24 198 lb 6.6 oz (90 kg)           General: Well developed, well nourished female.  Pt is in no acute distress.  HEENT:   Normocephalic.  Atraumatic.  Eyes with no scleral icterus.  Neck: Supple.  No JVD.  Carotids 2+ and equal in symmetric fashion.  No bruits are noted.  Cardiac: Regular rate and rhythm.   There is a normal S1 and S2.  No S3 or S4.  No murmurs, rubs, or gallops.  PMI is non-displaced with a normal apical impulse.  Lungs: Clear to ascultation bilaterally.  No focal rales, rhonchi, or wheezes.  Good air movement is noted throughout all lung fields.  Abdomen: Soft.  Non-distended.  Non-tender.  Bowel sounds are present and normoactive.  No guarding or rebound.   Extremities: Extremities do not demonstrate any evidence of peripheral edema.    No cyanosis or clubbing of the digits is appreciated.  Femoral, Dorsalis Pedis, and Posterior Tibialis  pulses are 2+ and equal in a symmetric fashion.  Neurologic: Alert and oriented, normal affect.  No gross deficit appreciated.  Integument:  No visible rashes are appreciated.      Laboratories and Data:   Labs:    Recent Labs   Lab 03/24/24  1647 03/25/24  0635 03/26/24  0550   * 78 70   BUN 13 13 13   CREATSERUM 0.77 0.61 0.67   CA 10.1 9.1 8.7   ALB 4.3 3.4  --    * 140 144   K 3.4* 3.6 4.0  4.0    109 117*   CO2 19.0* 20.0* 22.0   ALKPHO 114* 87  --    AST 31 24  --    ALT 37 30  --    BILT 0.4 0.4  --    TP 8.1 6.4  --        Recent Labs   Lab 03/24/24  1646 03/25/24  0635 03/26/24  0550   RBC 4.92 4.39 4.12   HGB 14.9 13.0 11.9*   HCT 43.7 41.2 39.0   MCV 88.8 93.8 94.7   MCH 30.3 29.6 28.9   MCHC 34.1 31.6 30.5*   RDW 14.1 14.2 14.5   NEPRELIM 11.91* 6.41 3.11   WBC 14.1* 10.1 6.6   .0 301.0 278.0       No results for input(s): \"PTP\", \"INR\" in the last 168 hours.    No results for input(s): \"TROP\", \"CK\" in the last 168 hours.    Diagnostics:   Tele: NSR.  EKG: NSR.  T inversion anterior and inferior.  Up to 4mm anteriorly.  Echo: 2019  Conclusions:     1. Left ventricle: The cavity size was normal. There was mild concentric      hypertrophy. Systolic function was vigorous. The estimated ejection      fraction was 70-75%. Features are consistent with a pseudonormal left      ventricular filling pattern, with concomitant abnormal relaxation and      increased filling pressure - grade 2 diastolic dysfunction.   2. Left atrium: The left atrial volume was mildly increased.   3. Right ventricle: The cavity size was normal. Systolic function was      normal.   4. Pulmonary arteries: Systolic pressure was at the upper limits of normal,      estimated to be 31mm Hg.   5. Pericardium, extracardiac: There was no pericardial effusion.   Stress Test: 2019  No ischemia, no infarct.    Echo  3/25/24  Conclusions:   1. Left ventricle: The cavity size was normal. Wall thickness was normal.      Systolic function was normal. The estimated ejection fraction was 60-65%,      by the Teichholz method. Left ventricular diastolic function parameters      were normal.   2. Left atrium: The left atrial volume was normal.   3. Pulmonary arteries: Systolic pressure was mildly increased, in the range      of 30mm Hg to 35mm Hg.       Assessment:  1. Nausea/vomiting  2. Mild NSTEMI  3. Abnormal EKG  4. RAMIRES  5. Bipolar disorder  6. Asthma  7. HTN      Plan:  1. NSTEMI: CTA gated coronaries negative for significant CAD.  2. EKG: Brain bleed already r/o'd.  Echo for hypertrophy.  CTA negative for ischemia.  Otherwise may be due to complex psych meds.  3. HTN: Amlodipine.    Ok to DC from CV perspective.  FU with PCP.    [facility] MD

## 2024-03-27 NOTE — PROGRESS NOTES
[facility]   part of [facility] Hospitalist Progress Note     Marylin Walker Patient Status:  Inpatient    1970 MRN [id number]   Location [facility] 8NE-A Attending Jacob Boogie MD   Hosp Day # 3 PCP Yoel Servni MD     Follow up for: The primary encounter diagnosis was Nausea and vomiting, unspecified vomiting type. Diagnoses of Elevated troponin and EKG abnormalities were also pertinent to this visit.      Interval History/Subjective:     RUTHY overnight  Afebrile, HDS  Cr stable off IVF    ***    Vital signs:  Temp:  [97.5 °F (36.4 °C)-98.9 °F (37.2 °C)] 98.2 °F (36.8 °C)  Pulse:  [66-79] 71  Resp:  [10-23] 10  BP: (132-156)/(80-91) 143/83  SpO2:  [92 %-98 %] 95 %    Physical Exam:    General: NAD, Comfortable, Nontoxic   Respiratory: CTAB; reg resp rate & effort, no wheezes/crackles  Cardiovascular: S1, S2. Regular rate and rhythm. No murmurs appreciated  Abdomen: Soft, NTND, no guarding/rebound   Neurologic: No focal neurological deficits.   Extremities: No edema.  Skin: Dry, no rashes, ulcers or lesions     Diagnostic Data:      Labs:  Recent Labs   Lab 24  1646 24  0635 24  0550   WBC 14.1* 10.1 6.6   HGB 14.9 13.0 11.9*   MCV 88.8 93.8 94.7   .0 301.0 278.0       Recent Labs   Lab 24  1647 24  0635 24  0550   * 78 70   BUN 13 13 13   CREATSERUM 0.77 0.61 0.67   CA 10.1 9.1 8.7   ALB 4.3 3.4  --    * 140 144   K 3.4* 3.6 4.0  4.0    109 117*   CO2 19.0* 20.0* 22.0   ALKPHO 114* 87  --    AST 31 24  --    ALT 37 30  --    BILT 0.4 0.4  --    TP 8.1 6.4  --        No results for input(s): \"PTP\", \"INR\" in the last 168 hours.    No results for input(s): \"TROP\", \"CK\" in the last 168 hours.         Imaging: Imaging data reviewed in Epic.    Medications:    heparin  5,000 Units Subcutaneous Q8H DUNIA    metoprolol  5 mg Intravenous See Admin Instructions    Or    dilTIAZem  5 mg Intravenous See Admin Instructions     metoprolol tartrate  50 mg Oral Once    Or    metoprolol tartrate  100 mg Oral Once    metoprolol tartrate  50 mg Oral Once    Or    metoprolol tartrate  100 mg Oral Once    amLODIPine  2.5 mg Oral Daily    aspirin  81 mg Oral Daily    buPROPion ER  150 mg Oral QAM    clonazePAM  0.25 mg Oral TID    fluticasone furoate-vilanterol  1 puff Inhalation Daily    lamoTRIgine  200 mg Oral TID    levETIRAcetam  500 mg Oral BID    cetirizine  10 mg Oral Daily    levothyroxine  125 mcg Oral Daily @ 0700    lithium ER  300 mg Oral Nightly    mesalamine DR  1,200 mg Oral BID AC    montelukast  10 mg Oral Nightly    OXcarbazepine  450 mg Oral BID    pantoprazole  40 mg Oral Before breakfast    pregabalin  200 mg Oral BID    QUEtiapine  50 mg Oral Nightly    sertraline  50 mg Oral QAM    And    sertraline  100 mg Oral Nightly       ASSESSMENT / PLAN:     53 year old female with history of allergic rhinitis, anxiety, depression, bipolar, asthma, history of kidney stones, fibromyalgia, GERD, gestational diabetes, hypothyroidism, history of migraines, osteoarthritis, history of seizures, history of stroke with no residual symptoms, history ulcer colitis presenting with nausea, vomiting.      Nausea -- improving   Vomiting  -- resolved   -ct abd/pelvis--> no acute pathology  -no abdominal pain or diarrhea  -may be related to NSTEMI vs. viral gastroenteritis  -ct brain neg for acute pathology  -supportive care, symptoms improving   -stop IVF today, encourage PO Intake & monitor for hydration status      Headache -- resolved   -ct brain neg for acute pathology  -acetaminophen po prn  -fiorcet po prn     NSTEMI  -no mention of chest pain/SOB on admission   -trop trending down  -cardiology consulted, rec CTA coronaries/echo, both unremarkable for acute pathology      Anxiety  Depression  Bipolar  -bupropion  -sertraline  -clonazepam  -lamotrigine  -oxcarbazepine  -lithium, levels undetectable on admission, pt reports because she could  not tolerate PO meds   -f/u with her primary psychiatrist for further med titration      Hypothyroidism  -resume home synthroid      Asthma  -no active sx  -continue inhalers     GERD  -pantoprazole      HTN  -sbp stable  -amlodipine     Seizure  -lamictal  -Keppra  -trileptal     UC  -mealamine     Hx CVA  -asa  -statin     DVT Mechanical Prophylaxis:   SCDs,    DVT Pharmacologic Prophylaxis   Medication    heparin (Porcine) 5000 UNIT/ML injection 5,000 Units      DVT Pharmacologic prophylaxis: Aspirin 162 mg         Code Status: Full Code    Dispo: inpatient; IKER tomorrow pending clinical stability (Cr) off IVF, Cardiology clearance     Plan of care discussed with patient and/or family at bedside.    HERVE Boogie MD  [facility]   [phone]      This note was created using voice recognition technology. It may include inadvertent transcriptional errors. Any such errors should be contextually interpreted and should not be taken to alter the content or the meaning.     Note to Patient: The 21st Century Cures Act makes medical notes like these available to patients in the interest of transparency. However, be advised this is a medical document. It is intended as peer to peer communication. It is written in medical language and may contain abbreviations or verbiage that are unfamiliar. It may appear blunt or direct. Medical documents are intended to carry relevant information, facts as evident, and the clinical opinion of the practitioner and not intended to be the primary source of your information.  Please refer directly to myself or clinical staff for information regarding plan of care.

## 2024-03-27 NOTE — PROGRESS NOTES
[facility]  Report of Psychiatric Progress Note    Marylin Walker Patient Status:  Inpatient    1970 MRN [id number]   Location [facility] 8NE-A Attending Mitchel Solorio MD   Hosp Day # 3 PCP Yoel Servin MD     Date of Admission: 3/24/24  Date of Service: 3/27/24  Reason for Consultation: Input on psych meds    Impression:  Bipolar 2 disorder, stable.     Cognitive disorder, unspecified. Hx of traumatic SDH (Rt frontal-temporal) in 2018. Hx of ischemia stroke in .      Recent Lithium toxicity on 3/17/24. Lithium was low at < 0.2 on 3/24/24.     Pertinent Medical Diagnoses:  Nausea/vomiting.  Seizure disorder, Migraines, Asthma, HTN.     Recommendations:  1) Continue lower dose Lithium ER at 300mg nightly. Previous dose was 750mg nightly prior to her 3/17/24-3/20/24 hospitalization with Li toxicity (level at 2.4). The cause of her Li toxicity was unclear since she did not have CHALO on that admission. And she denied taking extra Lithium intentionally or unintentionally. Will send my note to her outpatient psychiatrist. Dose may need to be increased in the future if she develops hypomanic or depressive symptoms.     2) Continue her other psych meds:  Wellbutrin  mg daily for depression  Trileptal 450mg po twice a day for bipolar d/o  Seroquel 50 mg nightly for sleep and bipolar d/o  Klonopin 0.25 mg TID scheduled for anxiety  Zoloft 50 mg in morning and 100 mg at night for anxiety/depression    3) She will follow up with her outpatient psychiatrist and therapist.     Sohan Vinson MD    History of Present Illness:  54 yo female with hx bipolar 2 disorder and seizure d/o was admitted on 3/24/24 for management of her nausea and vomiting.     She is known to the psych service. She has bipolar 2 disorder that is being treated with Lithium and Seroquel. Trileptal is for both bipolar d/o and seizure d/o.      Per past conversation with her son Del, there was a notable cognitive  decline after the traumatic SDH in 2018. She has days when she is very forgetful, but it varies day by day. During her past hospitalizations, polypharmacy was always thought to be contributing to her delirium.     She was recently admitted from 3/17/24-3/20/24 for eval of altered mental status, dizziness, and falls. She was found to have Li toxicity (level at 2.4). She was seen by the psych service and her Li initially held and then restarted at a lower dose of 300mg nightly (compared to her usual 750mg nightly). Her delirium improved.      During this admission, she c/o nausea and fatigue. She endorses mild anxiety and depressed mood. She denies hopeless or worthless feelings or anhedonia or suicidal ideation. She had a CT angio of coronary arteries today that was unremarkable for signs of CAD.     Interval Hx:  3/27/24- She feels \"all right\". She has mild anxiety about eating solid foods. No nausea/vomiting. She denies depressed mood, hopeless or worthless feelings, or suicidal ideation. No racing thoughts, elevated mood/energy, decreased need for sleep, or grandiose ideation.     Past Psychiatric History:  1) Bipolar 2 disorder per psych notes from [place]. She saw her 1st psychotherapist at age 6 and was first hospitalized for depression at age 13. She has a hx of 4 suicide attempts by OD as a teenager. She has a hx of 10+ inpatient psych hospitalizations. In 2003, she was dx with Bipolar 2 disorder and with med changes has not needed the inpatient psych unit since then. Her psychiatrist Dr. Seferino Fernandez and psychotherapist Ofelia Wells are both at [facility]. See Care Everywhere.      Substance Use History:  Previous cannabis use. Denies current.     Psych Family History:  Denies.      Social and Developmental History:   . She lives with her older son at this time. 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 Currently on disability. Her sister is her main source of social support.     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-14    AHI 2 RDI 2 REM AHI 2 SaO2 doroteo 88 %    Osteoarthritis     Pneumonia due to organism     Scoliosis     Seizure disorder ([facility]) 10/2020    absent seizures    Stroke ([facility])     2003+ mild R.sided weakness    Ulcerative colitis ([facility])     Visual impairment      Past Surgical History:   Procedure Laterality Date    CHOLECYSTECTOMY  03/2010    COLONOSCOPY  2013    ulcerative colitis    COLONOSCOPY N/A 04/25/2017    Procedure: COLONOSCOPY;  Surgeon: Hipolito Dobbins MD;  Location:  ENDOSCOPY    COLONOSCOPY N/A 06/14/2021    Procedure: COLONOSCOPY with biopsy,;  Surgeon: Ramsey Muhammad MD;  Location:  ENDOSCOPY    COLPOSCOPY, CERVIX W/UPPER ADJACENT VAGINA; W/BIOPSY(S), CERVIX  1999    HERNIA SURGERY  1997    Umbilical Hernia Repair    OTHER SURGICAL HISTORY N/A 06/21/2016    Procedure: ESOPHAGOGASTRODUODENOSCOPY (EGD);  Surgeon: Jeff Scherer MD;  Location:  ENDOSCOPY    PARAGARD, IUD  06/2014    UPPER GI ENDOSCOPY,EXAM       Family History   Problem Relation Age of Onset    Breast Cancer Maternal Grandmother         81    Breast Cancer Other         dx post menopausal    High Blood Pressure Father     Colon Cancer Father         59    Colon Polyps Father     Hypertension Father     High Blood Pressure Mother     High Cholesterol Mother     Hypertension Mother     Mental Disorder Mother         Depression    Heart Disorder Sister 54        MI - LAD stenting    Heart Attack Sister         12/2018 \" maker\"    Mental Disorder Son         ADHD, Bipolar  II    Mental Disorder Son         Severe anxiety, Bipolar II      reports that she quit smoking about 21 years ago. Her smoking use included cigarettes. She has never used smokeless tobacco. She reports that she does not currently use alcohol. She reports current drug use. Frequency: 2.00 times per week. Drug: Cannabis.    Allergies:  Allergies   Allergen Reactions    Depakote [Valproic Acid] OTHER (SEE COMMENTS)     Suicidal ideation    Kdc:Olanzapine SWELLING    Ketorolac Tromethamine OTHER (SEE COMMENTS) and NAUSEA AND VOMITING     gastritis  Gastritis & UC  gastritis  Gastritis & UC      Latex RASH and Dyspnea    Molds & Smuts ASTHMA, ITCHING, RASH, SHORTNESS OF BREATH, WHEEZING and OTHER (SEE COMMENTS)    Nsaids OTHER (SEE COMMENTS)     Must take Nsaids sparingly due to history of gastritis  Must take Nsaids sparingly due to history of gastritis      Olanzapine SWELLING     Caused severe swelling and bruising    Penicillins NAUSEA AND VOMITING    Phenothiazines OTHER (SEE COMMENTS)     Extrapyramidal syndrome. No phenothiazines per neurology (documented during 1/19/18 Dr. Velázquez)  Extrapyramidal syndrome. No phenothiazines per neurology (documented during 1/19/18 Dr. Velázquez)  Extrapyramidal syndrome. No phenothiazines per neurology (documented during 1/19/18 Dr. Velázquez)    Amoxicillin NAUSEA AND VOMITING    Levofloxacin PAIN and OTHER (SEE COMMENTS)     Tendinitis in multiple joints  Tendinitis in multiple joints    Mushrooms NAUSEA AND VOMITING    Penicillin G Potassium NAUSEA AND VOMITING    Phenytoin ANXIETY, CONFUSION, DIZZINESS and HALLUCINATION    Sulfa Antibiotics NAUSEA AND VOMITING    Toradol NAUSEA AND VOMITING     Gastritis & UC    Trees, Robbins OTHER (SEE COMMENTS)     Sneezing watery eyes    Triptans HALLUCINATION     Lesions in brain    Erythromycin OTHER (SEE COMMENTS)       Medications:    Current Facility-Administered Medications:     alum-mag hydroxide-simethicone (Maalox) 200-200-20 MG/5ML  oral suspension 30 mL, 30 mL, Oral, TID CC and HS    butalbital-acetaminophen-caffeine (Fioricet) -40 MG per tab 1 tablet, 1 tablet, Oral, Q4H PRN    heparin (Porcine) 5000 UNIT/ML injection 5,000 Units, 5,000 Units, Subcutaneous, Q8H DUNIA    metoprolol tartrate (Lopressor) tab 50 mg, 50 mg, Oral, Once PRN **OR** metoprolol tartrate (Lopressor) tab 100 mg, 100 mg, Oral, Once PRN    metoprolol (Lopressor) 5 mg/5mL injection 5 mg, 5 mg, Intravenous, See Admin Instructions **OR** dilTIAZem (cardIZEM) 25 mg/5mL injection 5 mg, 5 mg, Intravenous, See Admin Instructions    metoprolol tartrate (Lopressor) tab 50 mg, 50 mg, Oral, Once **OR** metoprolol tartrate (Lopressor) tab 100 mg, 100 mg, Oral, Once    metoprolol tartrate (Lopressor) tab 50 mg, 50 mg, Oral, Once **OR** metoprolol tartrate (Lopressor) tab 100 mg, 100 mg, Oral, Once    amLODIPine (Norvasc) tab 2.5 mg, 2.5 mg, Oral, Daily    aspirin chewable tab 81 mg, 81 mg, Oral, Daily    buPROPion ER (Wellbutrin XL) 24 hr tab 150 mg, 150 mg, Oral, QAM    clonazePAM (KlonoPIN) tab 0.25 mg, 0.25 mg, Oral, TID    fluticasone furoate-vilanterol (Breo Ellipta) 200-25 MCG/ACT inhaler 1 puff, 1 puff, Inhalation, Daily    lamoTRIgine (LaMICtal) tab 200 mg, 200 mg, Oral, TID    levETIRAcetam (Keppra) tab 500 mg, 500 mg, Oral, BID    cetirizine (ZyrTEC) tab 10 mg, 10 mg, Oral, Daily    levothyroxine (Synthroid) tab 125 mcg, 125 mcg, Oral, Daily @ 0700    lithium ER (LITHOBID) CR tab 300 mg, 300 mg, Oral, Nightly    mesalamine DR (Delzicol) cap 1,200 mg, 1,200 mg, Oral, BID AC    montelukast (Singulair) tab 10 mg, 10 mg, Oral, Nightly    OXcarbazepine (Trileptal) tab 450 mg, 450 mg, Oral, BID    pantoprazole (Protonix) DR tab 40 mg, 40 mg, Oral, Before breakfast    pregabalin (Lyrica) cap 200 mg, 200 mg, Oral, BID    QUEtiapine (SEROquel) tab 50 mg, 50 mg, Oral, Nightly    acetaminophen (Tylenol Extra Strength) tab 500 mg, 500 mg, Oral, Q4H PRN    polyethylene glycol (PEG  3350) (Miralax) 17 g oral packet 17 g, 17 g, Oral, Daily PRN    sennosides (Senokot) tab 17.2 mg, 17.2 mg, Oral, Nightly PRN    bisacodyl (Dulcolax) 10 MG rectal suppository 10 mg, 10 mg, Rectal, Daily PRN    ondansetron (Zofran) 4 MG/2ML injection 4 mg, 4 mg, Intravenous, Q6H PRN    sertraline (Zoloft) tab 50 mg, 50 mg, Oral, QAM **AND** sertraline (Zoloft) tab 100 mg, 100 mg, Oral, Nightly    glycerin-hypromellose- (Artifical Tears) 0.2-0.2-1 % ophthalmic solution 1 drop, 1 drop, Both Eyes, QID PRN    Review of Systems   Constitutional:  Positive for malaise/fatigue.     Psychiatry Review Systems: No voices or visions.     Mental Status Exam:     Objective      Vitals:    03/27/24 1409   BP:    Pulse: 75   Resp: (!) 9   Temp:      Appearance: fair grooming  Behavior: normal psychomotor  Attitude: cooperative  Gait: not observed    Speech: normal rate and rhythm and soft    Mood: Mild anxiety  Affect: Congruent    Thought process: logical and sequential  Thought content: ruminations  Perceptions: no hallucinations  Associations: Intact    Orientation: Oriented person, place, time, situation  Attention and Concentration: fair  Memory:  intact remote  Language: Intact naming and repetition  Fund of Knowledge: Able to recite name of [place] president    Insight: fair  Judgment: fair    Laboratory Data:  Lab Results   Component Value Date    CREATSERUM 0.80 03/27/2024    BUN 8 03/27/2024     03/27/2024    K 3.4 03/27/2024     03/27/2024    CO2 26.0 03/27/2024     03/27/2024    CA 9.0 03/27/2024

## 2024-03-27 NOTE — PLAN OF CARE
Received patient at change of shift.    A&Ox 4. NSR on tele monitor. On RA with adequate saturations. Continent of B&B. No C/O of chest pain. PRN Zofran and Fioricet given. Possible discharge today. Updated patient with plan of care, and verbalized understanding. Bed locked and in lowest position, call light and personal belongings within reach. All needs met at this time.    Problem: Patient/Family Goals  Goal: Patient/Family Long Term Goal  Description: Patient's Long Term Goal: DC home    Interventions:  - comply with plan of care  - See additional Care Plan goals for specific interventions  Outcome: Progressing  Goal: Patient/Family Short Term Goal  Description: Patient's Short Term Goal: less nausea    Interventions:   - clear liquid diet  - prn nausea medication  - See additional Care Plan goals for specific interventions  Outcome: Progressing     Problem: GASTROINTESTINAL - ADULT  Goal: Minimal or absence of nausea and vomiting  Description: INTERVENTIONS:  - Maintain adequate hydration with IV or PO as ordered and tolerated  - Nasogastric tube to low intermittent suction as ordered  - Evaluate effectiveness of ordered antiemetic medications  - Provide nonpharmacologic comfort measures as appropriate  - Advance diet as tolerated, if ordered  - Obtain nutritional consult as needed  - Evaluate fluid balance  Outcome: Progressing     Problem: CARDIOVASCULAR - ADULT  Goal: Absence of cardiac arrhythmias or at baseline  Description: INTERVENTIONS:  - Continuous cardiac monitoring, monitor vital signs, obtain 12 lead EKG if indicated  - Evaluate effectiveness of antiarrhythmic and heart rate control medications as ordered  - Initiate emergency measures for life threatening arrhythmias  - Monitor electrolytes and administer replacement therapy as ordered  Outcome: Progressing

## 2024-03-27 NOTE — CM/SW NOTE
3/27: Spoke with patient regarding some concerns once she returns home.The patient mentioned how she needs assistance around the house for daily cleaning and help bringing the clothes up from the first floor laundry room since she lives on the third floor and there is no elevator. Patient also expressed help in cooking meals. Patient mentioned how son who she lives with is unable to help her due to his medical condition.  placed resources in patients AVS.      Patient also stated that she would like one of her Doctors to contact her son Del to explained her medical condition to him, Dr. Boogie was  contacted and said he would called son.

## 2024-03-28 NOTE — DISCHARGE SUMMARY
[facility]   part of [facility]    DISCHARGE SUMMARY     Marylin Walker Patient Status:  Inpatient    1970 MRN [id number]   Location [facility] 3NE-A Attending No att. providers found   Hosp Day # 2 PCP Yoel Servin MD     Date of Admission: 3/17/2024  Date of Discharge: 3/20/2024  Discharge Disposition: Home or Self Care    Discharge Diagnosis: Acute encephalopathy/delirium due to lithium toxicity    History of Present Illness: 53 year old female with history of allergic rhinitis, anxiety, depression, bipolar, asthma, history of kidney stones, fibromyalgia, GERD, gestational diabetes, hypothyroidism, history of migraines, osteoarthritis, history of seizures, history of stroke with no residual symptoms, history ulcer colitis presenting with generalized weakness, concerns with her memory, and difficulty with her speech.  Patient was seen in consultation with neurology and psychiatry.  Patient's lithium levels were checked which were elevated.  Patient lithium was held.  Patient lithium dose was eventually adjusted by psychiatry and resumed, inpatient.  Patient had an EEG evaluated by neurology.  No acute pathology was seen.  Patient did have ammonia level that was checked initially this was elevated on recheck was normal as result no treatment needed noted.  Patient with CT head that showed no acute pathology as well.  Patient clinically improved after lithium levels normalized.  Patient is symptoms are likely secondary to decreased lithium levels.  Patient was clinically stable, vital stable, labs stable for discharge.  Patient agreeable discharge.  Patient to follow-up with neurology and psychiatry as an outpatient.    Discharge Medication List:     Discharge Medications        START taking these medications        Instructions Prescription details   amLODIPine 2.5 MG Tabs  Commonly known as: Norvasc      Take 1 tablet (2.5 mg total) by mouth daily.   Quantity: 30 tablet  Refills:  0            CHANGE how you take these medications        Instructions Prescription details   lithium  MG Tbcr  Commonly known as: LITHOBID  What changed:   additional instructions  Another medication with the same name was removed. Continue taking this medication, and follow the directions you see here.      Take 1 tablet (300 mg total) by mouth at bedtime.   Refills: 0     metoprolol tartrate 25 MG Tabs  Commonly known as: Lopressor  What changed: how much to take      TAKE 1/2 TABLET BY MOUTH DAILY   Quantity: 45 tablet  Refills: 0            CONTINUE taking these medications        Instructions Prescription details   acetaminophen 325 MG Tabs  Commonly known as: Tylenol      Take 1 tablet (325 mg total) by mouth every 6 (six) hours as needed for Pain.   Refills: 0     albuterol (2.5 MG/3ML) 0.083% Nebu  Commonly known as: Ventolin      Take 3 mL (2.5 mg total) by nebulization every 6 (six) hours as needed for Wheezing.   Quantity: 3 mL  Refills: 2     albuterol 108 (90 Base) MCG/ACT Aers  Commonly known as: Ventolin HFA      Inhale 2 puffs into the lungs every 6 (six) hours as needed.   Refills: 0     aspirin 81 MG Tabs      Take 1 tablet (81 mg total) by mouth daily.   Refills: 0     buPROPion  MG Tb24  Commonly known as: Wellbutrin XL      Take 1 tablet (150 mg total) by mouth every morning.   Refills: 0     clonazePAM 0.5 MG Tabs  Commonly known as: KlonoPIN      Take 0.5 tablets (0.25 mg total) by mouth 3 (three) times daily.   Quantity: 90 tablet  Refills: 0     Estradiol 10 MCG Tabs      Place vaginally twice a week. AS NEEDED   Refills: 0     fluticasone propionate 50 MCG/ACT Susp  Commonly known as: Flonase      SHAKE LIQUID AND USE 2 SPRAYS IN EACH NOSTRIL DAILY   Quantity: 48 g  Refills: 3     fluticasone-salmeterol 250-50 MCG/ACT Aepb  Commonly known as: Advair Diskus      INHALE 1 PUFF INTO THE LUNGS EVERY 12 HOURS   Quantity: 120 each  Refills: 0     lamoTRIgine 200 MG Tabs  Commonly  known as: LaMICtal      Take 1 tablet (200 mg total) by mouth 3 (three) times daily.   Quantity: 90 tablet  Refills: 5     levETIRAcetam 500 MG Tabs  Commonly known as: Keppra      Take 1 tablet (500 mg total) by mouth 2 (two) times daily.   Quantity: 180 tablet  Refills: 1     levocetirizine 5 MG Tabs  Commonly known as: Xyzal      TAKE 1 TABLET(5 MG) BY MOUTH EVERY EVENING   Quantity: 90 tablet  Refills: 3     levothyroxine 125 MCG Tabs  Commonly known as: Synthroid      Take 1 tablet (125 mcg total) by mouth daily.   Quantity: 90 tablet  Refills: 2     meclizine 12.5 MG Tabs  Commonly known as: Antivert      Take 1 tablet (12.5 mg total) by mouth 3 (three) times daily as needed.   Quantity: 90 tablet  Refills: 3     mesalamine  MG Cpdr  Commonly known as: Delzicol      TAKE 3 CAPSULES BY MOUTH TWICE DAILY BEFORE MEALS   Quantity: 540 capsule  Refills: 3     montelukast 10 MG Tabs  Commonly known as: Singulair      TAKE 1 TABLET(10 MG) BY MOUTH EVERY NIGHT   Quantity: 90 tablet  Refills: 0     ondansetron 8 MG Tbdp  Commonly known as: Zofran-ODT      Take 1 tablet (8 mg total) by mouth every 8 (eight) hours as needed for Nausea.   Quantity: 60 tablet  Refills: 2     OXcarbazepine 300 MG Tabs  Commonly known as: Trileptal      TAKE 1 AND 1/2 TABLETS(450 MG) BY MOUTH TWICE DAILY   Quantity: 180 tablet  Refills: 1     pantoprazole 40 MG Tbec  Commonly known as: Protonix      Take 1 tablet (40 mg total) by mouth before breakfast.   Quantity: 90 tablet  Refills: 3     pregabalin 200 MG Caps  Commonly known as: Lyrica      Take 1 capsule (200 mg total) by mouth 2 (two) times daily.   Quantity: 180 capsule  Refills: 2     QC Womens Daily Multivitamin Tabs      Take 1 tablet by mouth daily.   Refills: 0     QUEtiapine 50 MG Tabs  Commonly known as: SEROquel      Take 1 tablet (50 mg total) by mouth nightly.   Refills: 0     sertraline 100 MG Tabs  Commonly known as: Zoloft      Take 1 tablet (100 mg total) by mouth  2 (two) times daily. 50mg in AM, 100mg in PM   Refills: 0     Vios Aerosol Delivery System Misc      USE WITH ALBUTEROL AS NEEDED   Refills: 0            STOP taking these medications      carisoprodol 350 MG Tabs  Commonly known as: SOMA        HYDROmorphone 2 MG Tabs  Commonly known as: Dilaudid        predniSONE 10 MG Tabs  Commonly known as: Deltasone                  Where to Get Your Medications        These medications were sent to [facility] #[id number] - [facility] [address], [phone], [phone]  [address], [facility] [address]      Phone: [phone]   amLODIPine 2.5 MG Tabs         Follow-up appointment:   Leah Ramirez MD  [address] [place] [address] [phone]    Schedule an appointment as soon as possible for a visit in 2 week(s)  2- 4 weeks    Yoel Servin MD  [address] [place] [address] [phone]    Follow up  3/25 @ 1130 am    Appointments for Next 30 Days 3/28/2024 - 4/27/2024      None            Vital signs:   stable    Exam  Gen: No acute distress, alert and oriented  Pulm: Lungs clear bilaterally, normal respiratory effort, no crackles, no wheezing  CV: Heart with regular rate and rhythm, no murmur.  Abd: Abdomen soft, nontender, nondistended, bowel sounds present  MSK: No significant pitting edema or tenderness of the LE  Skin: no new rashes or lesions  -----------------------------------------------------------------------------------------------  PATIENT DISCHARGE INSTRUCTIONS: See electronic chart    ASSESSMENT / PLAN:   53 year old female with history of allergic rhinitis, anxiety, depression, bipolar, asthma, history of kidney stones, fibromyalgia, GERD, gestational diabetes, hypothyroidism, history of migraines, osteoarthritis, history of seizures, history of stroke with no residual symptoms, history ulcer colitis presenting with generalized weakness, concerns with her memory, and difficulty with her speech.        Weakness  Memory and Speech difficulties   -CT brain negative for acute pathology  -may be secondary to elevated lithium levels, hold lithium dose, recheck levels--> wnl, resume lithium at lower dose per psych rec  -consult neurology given hx cva and seizure for evaluation of these although no focal signs of weakness  -PT/OT--> pending--> home  -EEG pending results--> no acute pathology   -neurology following--> ok for dc     Migraines  -start fiorcet prn     Anxiety  Depression  Bipolar  -bupropion  -sertraline  -seroquel  -hold clonazepam for now, resume tomorrow if continues to improve--> resume  -lithium on hold due to elevated levels --> levels wnl, follow psych rec--> lithium resumed at lower level, outpt follow up     Asthma  -continue inhalers     GERD  -pantoprazole     Fibromyalgia   -lyrica     Hypothyroidism  -levothyroxine     UC  -meslamine     Hx Seizure  -lamictal  -keppra  -oxcarbazepine     Hx CVA     Plan of care discussed with patient and staff     Dispo:  discharge      Steve Cerrato MD  Pending sale to [facility]  [phone]    Time spent:  > 35 minutes

## 2024-05-21 NOTE — LETTER
[facility] [address], [address] [facility]    Consent for Anesthesia   1.    Jacklyn Snellen agree to be cared for by an anesthesiologist, who is specially trained to monitor me and give me medicine to put me to sleep or keep me comf vision, nerves, or muscles and in extremely rare instances death. 5. My doctor has explained to me other choices available to me for my care (alternatives).   6. Pregnant Patients (“epidural”):  I understand that the risks of having an epidural (medicine g Patient/Caregiver provided printed discharge information.

## 2024-07-09 ENCOUNTER — APPOINTMENT (OUTPATIENT)
Dept: CT IMAGING | Facility: HOSPITAL | Age: 54
End: 2024-07-09
Payer: MEDICARE

## 2024-07-09 ENCOUNTER — HOSPITAL ENCOUNTER (EMERGENCY)
Facility: HOSPITAL | Age: 54
Discharge: HOME | End: 2024-07-09
Attending: EMERGENCY MEDICINE
Payer: MEDICARE

## 2024-07-09 VITALS
TEMPERATURE: 99 F | HEART RATE: 62 BPM | RESPIRATION RATE: 16 BRPM | SYSTOLIC BLOOD PRESSURE: 151 MMHG | BODY MASS INDEX: 34 KG/M2 | WEIGHT: 190 LBS | OXYGEN SATURATION: 99 % | DIASTOLIC BLOOD PRESSURE: 83 MMHG

## 2024-07-09 DIAGNOSIS — S09.90XA INJURY OF HEAD, INITIAL ENCOUNTER: Primary | ICD-10-CM

## 2024-07-09 LAB — GLUCOSE BLD-MCNC: 95 MG/DL (ref 70–99)

## 2024-07-09 PROCEDURE — 99284 EMERGENCY DEPT VISIT MOD MDM: CPT

## 2024-07-09 PROCEDURE — 70450 CT HEAD/BRAIN W/O DYE: CPT | Performed by: EMERGENCY MEDICINE

## 2024-07-09 PROCEDURE — 82962 GLUCOSE BLOOD TEST: CPT

## 2024-07-09 PROCEDURE — S0119 ONDANSETRON 4 MG: HCPCS | Performed by: EMERGENCY MEDICINE

## 2024-07-09 PROCEDURE — 72125 CT NECK SPINE W/O DYE: CPT | Performed by: EMERGENCY MEDICINE

## 2024-07-09 RX ORDER — HYDROCODONE BITARTRATE AND ACETAMINOPHEN 5; 325 MG/1; MG/1
1 TABLET ORAL ONCE
Status: COMPLETED | OUTPATIENT
Start: 2024-07-09 | End: 2024-07-09

## 2024-07-09 RX ORDER — ONDANSETRON 4 MG/1
4 TABLET, ORALLY DISINTEGRATING ORAL ONCE
Status: COMPLETED | OUTPATIENT
Start: 2024-07-09 | End: 2024-07-09

## 2024-07-09 RX ORDER — ACETAMINOPHEN 500 MG
500 TABLET ORAL ONCE
Status: COMPLETED | OUTPATIENT
Start: 2024-07-09 | End: 2024-07-09

## 2024-07-09 RX ORDER — HYDROCODONE BITARTRATE AND ACETAMINOPHEN 5; 325 MG/1; MG/1
1-2 TABLET ORAL EVERY 6 HOURS PRN
Qty: 12 TABLET | Refills: 0 | Status: SHIPPED | OUTPATIENT
Start: 2024-07-09 | End: 2024-07-16

## 2024-07-09 RX ORDER — ONDANSETRON 4 MG/1
4 TABLET, ORALLY DISINTEGRATING ORAL EVERY 4 HOURS PRN
Qty: 10 TABLET | Refills: 0 | Status: SHIPPED | OUTPATIENT
Start: 2024-07-09 | End: 2024-07-16

## 2024-07-09 NOTE — ED INITIAL ASSESSMENT (HPI)
Patient endorses fall 2 nights ago, patient stated \" I think I slipped on the rug I think I hit the back of my head on the toilet bowl.\" Patient endorses nausea, vomiting, blurred vision, No thinners, unsure if she lost consciousness. Patient unsure of how long she was on the floor before anyone came to help her. Patient notes headache, nausea and neck pain. Vomiting at least 5 times, yellow liquid. Patient has hx of Subdural Hematoma in 2018.

## 2024-07-10 NOTE — ED PROVIDER NOTES
Patient Seen in: [facility] Emergency Department      History     Chief Complaint   Patient presents with    Head Neck Injury     Stated Complaint: head inj    Subjective:   HPI    54-year-old woman here for evaluation after head injury.  Reports associated neck pain.  States yesterday, slipped on a rug, fell backwards believes she struck her head on the toilet.  Does not believe there is LOC however reports persistent nausea, headache, light sensitivity after the fall.  Denies focal weakness or numbness, any other injuries or complaints.    Objective:   Past Medical History:    Acne    Allergic rhinitis    Alopecia areata    Anxiety    Asthma ([facility])    Back problem    Bipolar affective ([facility])    Calculus of kidney    Depression    Fibromyalgia    GERD (gastroesophageal reflux disease)    Gestational diabetes ([facility])    Hemorrhoids    Hypothyroidism    Migraines    Nausea and vomiting, unspecified vomiting type    Organic hypersomnia, unspecified    AHI 2 RDI 2 REM AHI 2 SaO2 doroteo 88 %    Osteoarthritis    Pneumonia due to organism    Scoliosis    Seizure disorder ([facility])    absent seizures    Stroke ([facility])    2003+ mild R.sided weakness    Ulcerative colitis ([facility])    Visual impairment              Past Surgical History:   Procedure Laterality Date    Cholecystectomy  03/2010    Colonoscopy  2013    ulcerative colitis    Colonoscopy N/A 04/25/2017    Procedure: COLONOSCOPY;  Surgeon: Hipolito Dobbins MD;  Location:  ENDOSCOPY    Colonoscopy N/A 06/14/2021    Procedure: COLONOSCOPY with biopsy,;  Surgeon: Ramsey Muhammad MD;  Location:  ENDOSCOPY    Colposcopy, cervix w/upper adjacent vagina; w/biopsy(s), cervix  1999    Hernia surgery  1997    Umbilical Hernia Repair    Other surgical history N/A 06/21/2016    Procedure: ESOPHAGOGASTRODUODENOSCOPY (EGD);  Surgeon: Jeff Scherer MD;  Location:  ENDOSCOPY    [place]ard, iud  06/2014    Upper gi endoscopy,exam                  Social History      Socioeconomic History    Marital status:    Tobacco Use    Smoking status: Former     Current packs/day: 0.00     Types: Cigarettes     Quit date: 1993     Years since quittin.4    Smokeless tobacco: Never    Tobacco comments:     Ages [age], [age]   Vaping Use    Vaping status: Some Days    Substances: THC, CBD, Flavoring, medical marijuana    Devices: Disposable, Refillable tank   Substance and Sexual Activity    Alcohol use: Not Currently    Drug use: Yes     Frequency: 2.0 times per week     Types: Cannabis     Comment: Medical Cannabis    Sexual activity: Not Currently     Partners: Male     Social Determinants of Health     Food Insecurity: No Food Insecurity (3/24/2024)    Food Insecurity     Food Insecurity: Never true   Transportation Needs: No Transportation Needs (3/24/2024)    Transportation Needs     Lack of Transportation: No   Housing Stability: Low Risk  (3/24/2024)    Housing Stability     Housing Instability: No              Review of Systems    Positive for stated Chief Complaint: Head Neck Injury    Other systems are as noted in HPI.  Constitutional and vital signs reviewed.      All other systems reviewed and negative except as noted above.    Physical Exam     ED Triage Vitals [24 1715]   /86   Pulse 101   Resp 20   Temp 98.5 °F (36.9 °C)   Temp src Temporal   SpO2 94 %   O2 Device None (Room air)       Current Vitals:   Vital Signs  BP: 151/83  Pulse: 62  Resp: 16  Temp: 98.5 °F (36.9 °C)  Temp src: Temporal  MAP (mmHg): (!) 104    Oxygen Therapy  SpO2: 99 %  O2 Device: None (Room air)            Physical Exam        Physical Exam  Vitals signs and nursing note reviewed.   General: Patient sitting up in bed no acute distress  Head: Normocephalic, hematoma to the occiput  Neck: C-collar in place  HEENT:  Mucous membranes are moist.   Cardiovascular:  Normal rate and regular rhythm.  No Edema  Pulmonary:  Pulmonary effort is normal.  Normal breath sounds. No  wheezing, rhonchi or rales.   Abdominal: Soft nontender nondistended, normal bowel sounds, no guarding no rebound tenderness  Skin: Warm and dry  Neurological: Awake alert, speech is normal strength and sensation intact in all 4 extremities, stable narrow-base gait.        ED Course     Labs Reviewed   POCT GLUCOSE - Normal             CT SPINE CERVICAL (CPT=72125)    Result Date: 7/9/2024  PROCEDURE:  CT SPINE CERVICAL (CPT=72125)  COMPARISON:  None.  INDICATIONS:  Status post slip and fall head injury.  Headache and neck pain.  TECHNIQUE:  Noncontrast CT scanning of the cervical spine is performed from the skull base through C7.  Multiplanar reconstructions are generated.  Dose reduction techniques were used. Dose information is transmitted to the ACR (American College of Radiology) NRDR (National Radiology Data Registry) which includes the Dose Index Registry.  PATIENT STATED HISTORY: (As transcribed by Technologist)  Fell over rug last night. Patient in c collar    FINDINGS:  There is normal alignment of the cervical spine.  No acute osseous injuries are noted.  There are mild to moderate osteoarthritic changes of the atlantoaxial joint.  There are mild to moderate degenerative disc changes of the cervical spine, most pronounced at C5-6 and C6-7 where there is disc height loss and endplate osteophytes.  No CT evidence to suggest regions of significant stenosis.  No soft tissue swelling or soft tissue lesions identified about the C-spine.            CONCLUSION:  1. No acute process. 2. Multilevel degenerative changes as described above.    LOCATION:  [facility]   Dictated by (CST): Landon Lomas DO on 7/09/2024 at 7:18 PM     Finalized by (CST): Landon Lomas DO on 7/09/2024 at 7:20 PM       CT BRAIN OR HEAD (02230)    Result Date: 7/9/2024  PROCEDURE:  CT BRAIN OR HEAD (51846)  COMPARISON:  [facility] , CT, CT BRAIN OR HEAD (83492), 3/24/2024, 6:12 PM.  INDICATIONS:  Status post slip and fall injury to the head.   Headache and neck pain.  TECHNIQUE:  Noncontrast CT scanning is performed through the brain. Dose reduction techniques were used. Dose information is transmitted to the ACR (American College of Radiology) NRDR (National Radiology Data Registry) which includes the Dose Index Registry.  PATIENT STATED HISTORY: (As transcribed by Technologist)  Fell over a rug last night. Patient in C-collar    FINDINGS:  VENTRICLES/SULCI:  Ventricles and sulci are normal in size.  INTRACRANIAL:  There are no abnormal extraaxial fluid collections.  There is no midline shift.  There are no intraparenchymal brain abnormalities.  There is nothing specific for acute infarct.  There is no hemorrhage or mass lesion.  SINUSES:           No sign of acute sinusitis.  MASTOIDS:          No sign of acute inflammation. SKULL:             No evidence for fracture or osseous abnormality. OTHER:             None.            CONCLUSION:  Negative exam.    LOCATION:  [facility]   Dictated by (CST): Landon Lomas DO on 7/09/2024 at 7:16 PM     Finalized by (CST): Landon Lomas DO on 7/09/2024 at 7:18 PM               MDM   54-year-old woman here for evaluation after head injury differential includes intracranial hemorrhage, skull fracture, concussion, also reports neck pain.  CT of the cervical spine with no acute fracture c-collar was removed CT of the head with no acute intracranial hemorrhage, believe patient may be suffering from some symptoms of concussion, have recommended brain rest, short supply of pain medication was provided to use as needed, return precautions discussed patient is in agreement with plan.                                   Medical Decision Making      Disposition and Plan     Clinical Impression:  1. Injury of head, initial encounter         Disposition:  Discharge  7/9/2024  8:58 pm    Follow-up:  Yoel Servin MD  [address] [place] [address]  [phone]    Follow up  Follow-up with your PMD for reevaluation in 24 to  48 hours.  Return to ER if symptoms worsen or change or if any other new concerns.    [facility], [place] [address] [place] [address] [place] [id number] [phone]  Schedule an appointment as soon as possible for a visit in 1 day(s)  For further evaluation of possible concussion          Medications Prescribed:  Discharge Medication List as of 7/9/2024  9:19 PM        START taking these medications    Details   HYDROcodone-acetaminophen 5-325 MG Oral Tab Take 1-2 tablets by mouth every 6 (six) hours as needed for Pain (do not take this medication prior to drinking alcohol or driving as this medication can impair your senses.). Do not drink alcohol or drive while taking this medication as it can impair yo ur senses., Normal, Disp-12 tablet, R-0      !! ondansetron 4 MG Oral Tablet Dispersible Take 1 tablet (4 mg total) by mouth every 4 (four) hours as needed for Nausea., Normal, Disp-10 tablet, R-0       !! - Potential duplicate medications found. Please discuss with provider.

## 2024-12-18 ENCOUNTER — APPOINTMENT (OUTPATIENT)
Dept: CT IMAGING | Facility: HOSPITAL | Age: 54
End: 2024-12-18
Attending: EMERGENCY MEDICINE
Payer: MEDICARE

## 2024-12-18 ENCOUNTER — APPOINTMENT (OUTPATIENT)
Dept: CT IMAGING | Facility: HOSPITAL | Age: 54
DRG: 897 | End: 2024-12-18
Attending: EMERGENCY MEDICINE
Payer: MEDICARE

## 2024-12-18 ENCOUNTER — HOSPITAL ENCOUNTER (INPATIENT)
Facility: HOSPITAL | Age: 54
LOS: 4 days | Discharge: ANOTHER HOSPITAL | End: 2024-12-22
Attending: EMERGENCY MEDICINE | Admitting: INTERNAL MEDICINE
Payer: MEDICARE

## 2024-12-18 ENCOUNTER — HOSPITAL ENCOUNTER (INPATIENT)
Facility: HOSPITAL | Age: 54
LOS: 4 days | Discharge: NURSING, REHAB OR LONG-TERM CARE | DRG: 897 | End: 2024-12-22
Attending: EMERGENCY MEDICINE | Admitting: INTERNAL MEDICINE
Payer: MEDICARE

## 2024-12-18 DIAGNOSIS — R52 TOTAL BODY PAIN: ICD-10-CM

## 2024-12-18 DIAGNOSIS — D72.829 LEUKOCYTOSIS, UNSPECIFIED TYPE: ICD-10-CM

## 2024-12-18 DIAGNOSIS — R41.0 DELIRIUM: Primary | ICD-10-CM

## 2024-12-18 PROBLEM — E87.0 HYPERNATREMIA: Status: ACTIVE | Noted: 2024-12-18

## 2024-12-18 LAB
ALBUMIN SERPL-MCNC: 5.1 G/DL (ref 3.2–4.8)
ALBUMIN/GLOB SERPL: 2.1 {RATIO} (ref 1–2)
ALP LIVER SERPL-CCNC: 100 U/L
ALT SERPL-CCNC: 22 U/L
AMPHET UR QL SCN: NEGATIVE
ANION GAP SERPL CALC-SCNC: 13 MMOL/L (ref 0–18)
APAP SERPL-MCNC: <2 UG/ML (ref 10–20)
AST SERPL-CCNC: 40 U/L (ref ?–34)
BASOPHILS # BLD AUTO: 0.06 X10(3) UL (ref 0–0.2)
BASOPHILS NFR BLD AUTO: 0.4 %
BILIRUB SERPL-MCNC: 0.4 MG/DL (ref 0.3–1.2)
BILIRUB UR QL STRIP.AUTO: NEGATIVE
BUN BLD-MCNC: 20 MG/DL (ref 9–23)
CALCIUM BLD-MCNC: 10 MG/DL (ref 8.7–10.4)
CHLORIDE SERPL-SCNC: 111 MMOL/L (ref 98–112)
CLARITY UR REFRACT.AUTO: CLEAR
CO2 SERPL-SCNC: 22 MMOL/L (ref 21–32)
COCAINE UR QL: NEGATIVE
COLOR UR AUTO: YELLOW
CREAT BLD-MCNC: 0.81 MG/DL
CREAT UR-SCNC: 195.2 MG/DL
EGFRCR SERPLBLD CKD-EPI 2021: 85 ML/MIN/1.73M2 (ref 60–?)
EOSINOPHIL # BLD AUTO: 0 X10(3) UL (ref 0–0.7)
EOSINOPHIL NFR BLD AUTO: 0 %
ERYTHROCYTE [DISTWIDTH] IN BLOOD BY AUTOMATED COUNT: 15.4 %
ETHANOL SERPL-MCNC: <3 MG/DL (ref ?–3)
FENTANYL UR QL SCN: NEGATIVE
FLUAV + FLUBV RNA SPEC NAA+PROBE: NEGATIVE
FLUAV + FLUBV RNA SPEC NAA+PROBE: NEGATIVE
GLOBULIN PLAS-MCNC: 2.4 G/DL (ref 2–3.5)
GLUCOSE BLD-MCNC: 149 MG/DL (ref 70–99)
GLUCOSE UR STRIP.AUTO-MCNC: NORMAL MG/DL
HCT VFR BLD AUTO: 38.6 %
HGB BLD-MCNC: 12.5 G/DL
HYALINE CASTS #/AREA URNS AUTO: PRESENT /LPF
IMM GRANULOCYTES # BLD AUTO: 0.13 X10(3) UL (ref 0–1)
IMM GRANULOCYTES NFR BLD: 0.8 %
KETONES UR STRIP.AUTO-MCNC: 10 MG/DL
LEUKOCYTE ESTERASE UR QL STRIP.AUTO: 250
LIPASE SERPL-CCNC: 25 U/L (ref 12–53)
LITHIUM SERPL-SCNC: <0.2 MMOL/L (ref 0.6–1.2)
LYMPHOCYTES # BLD AUTO: 0.77 X10(3) UL (ref 1–4)
LYMPHOCYTES NFR BLD AUTO: 4.6 %
MCH RBC QN AUTO: 29.7 PG (ref 26–34)
MCHC RBC AUTO-ENTMCNC: 32.4 G/DL (ref 31–37)
MCV RBC AUTO: 91.7 FL
MDMA UR QL SCN: NEGATIVE
MONOCYTES # BLD AUTO: 1.42 X10(3) UL (ref 0.1–1)
MONOCYTES NFR BLD AUTO: 8.4 %
NEUTROPHILS # BLD AUTO: 14.5 X10 (3) UL (ref 1.5–7.7)
NEUTROPHILS # BLD AUTO: 14.5 X10(3) UL (ref 1.5–7.7)
NEUTROPHILS NFR BLD AUTO: 85.8 %
NITRITE UR QL STRIP.AUTO: NEGATIVE
OPIATES UR QL SCN: NEGATIVE
OSMOLALITY SERPL CALC.SUM OF ELEC: 307 MOSM/KG (ref 275–295)
OXYCODONE UR QL SCN: NEGATIVE
PH UR STRIP.AUTO: 6 [PH] (ref 5–8)
PLATELET # BLD AUTO: 351 10(3)UL (ref 150–450)
POTASSIUM SERPL-SCNC: 3.8 MMOL/L (ref 3.5–5.1)
PROT SERPL-MCNC: 7.5 G/DL (ref 5.7–8.2)
PROT UR STRIP.AUTO-MCNC: 300 MG/DL
RBC # BLD AUTO: 4.21 X10(6)UL
RSV RNA SPEC NAA+PROBE: NEGATIVE
SALICYLATES SERPL-MCNC: <3 MG/DL (ref 3–20)
SARS-COV-2 RNA RESP QL NAA+PROBE: NOT DETECTED
SODIUM SERPL-SCNC: 146 MMOL/L (ref 136–145)
SP GR UR STRIP.AUTO: 1.03 (ref 1–1.03)
UROBILINOGEN UR STRIP.AUTO-MCNC: NORMAL MG/DL
WBC # BLD AUTO: 16.9 X10(3) UL (ref 4–11)

## 2024-12-18 PROCEDURE — 74177 CT ABD & PELVIS W/CONTRAST: CPT | Performed by: EMERGENCY MEDICINE

## 2024-12-18 RX ORDER — DIPHENHYDRAMINE HYDROCHLORIDE 50 MG/ML
25 INJECTION INTRAMUSCULAR; INTRAVENOUS ONCE
Status: COMPLETED | OUTPATIENT
Start: 2024-12-18 | End: 2024-12-18

## 2024-12-18 RX ORDER — QUETIAPINE FUMARATE 200 MG/1
200 TABLET, FILM COATED ORAL NIGHTLY
COMMUNITY

## 2024-12-18 RX ORDER — ACETAMINOPHEN 500 MG
1000 TABLET ORAL ONCE
Status: COMPLETED | OUTPATIENT
Start: 2024-12-18 | End: 2024-12-20

## 2024-12-18 RX ORDER — SODIUM CHLORIDE 9 MG/ML
INJECTION, SOLUTION INTRAVENOUS CONTINUOUS
Status: ACTIVE | OUTPATIENT
Start: 2024-12-18 | End: 2024-12-18

## 2024-12-18 RX ORDER — LITHIUM CARBONATE 450 MG
450 TABLET, EXTENDED RELEASE ORAL NIGHTLY
COMMUNITY

## 2024-12-18 RX ORDER — AMLODIPINE BESYLATE 5 MG/1
5 TABLET ORAL DAILY
COMMUNITY

## 2024-12-18 RX ORDER — ONDANSETRON 2 MG/ML
4 INJECTION INTRAMUSCULAR; INTRAVENOUS EVERY 4 HOURS PRN
Status: DISPENSED | OUTPATIENT
Start: 2024-12-18 | End: 2024-12-18

## 2024-12-18 RX ORDER — CLONAZEPAM 0.5 MG/1
0.5 TABLET ORAL 2 TIMES DAILY PRN
COMMUNITY

## 2024-12-18 RX ORDER — SODIUM CHLORIDE 9 MG/ML
INJECTION, SOLUTION INTRAVENOUS CONTINUOUS
Status: DISCONTINUED | OUTPATIENT
Start: 2024-12-18 | End: 2024-12-22

## 2024-12-18 RX ORDER — METOPROLOL SUCCINATE 50 MG/1
50 TABLET, EXTENDED RELEASE ORAL DAILY
COMMUNITY

## 2024-12-18 RX ORDER — CARISOPRODOL 350 MG/1
350 TABLET ORAL 4 TIMES DAILY PRN
COMMUNITY

## 2024-12-18 RX ORDER — ONDANSETRON 2 MG/ML
4 INJECTION INTRAMUSCULAR; INTRAVENOUS ONCE
Status: COMPLETED | OUTPATIENT
Start: 2024-12-18 | End: 2024-12-18

## 2024-12-18 RX ORDER — ONDANSETRON 2 MG/ML
4 INJECTION INTRAMUSCULAR; INTRAVENOUS EVERY 4 HOURS PRN
Status: ACTIVE | OUTPATIENT
Start: 2024-12-18 | End: 2024-12-18

## 2024-12-18 RX ORDER — KETOROLAC TROMETHAMINE 15 MG/ML
15 INJECTION, SOLUTION INTRAMUSCULAR; INTRAVENOUS ONCE
Status: COMPLETED | OUTPATIENT
Start: 2024-12-18 | End: 2024-12-18

## 2024-12-18 RX ORDER — HALOPERIDOL 5 MG/ML
5 INJECTION INTRAMUSCULAR ONCE
Status: COMPLETED | OUTPATIENT
Start: 2024-12-18 | End: 2024-12-18

## 2024-12-18 RX ORDER — LORAZEPAM 2 MG/ML
2 INJECTION INTRAMUSCULAR ONCE
Status: COMPLETED | OUTPATIENT
Start: 2024-12-18 | End: 2024-12-18

## 2024-12-18 RX ORDER — QUETIAPINE FUMARATE 100 MG/1
100 TABLET, FILM COATED ORAL NIGHTLY
COMMUNITY

## 2024-12-18 NOTE — ED QUICK NOTES
Orders for admission, patient is aware of plan and ready to go upstairs. Any questions, please call ED RN Hever at extension [phone].     Patient Covid vaccination status: Fully vaccinated     COVID Test Ordered in ED: SARS-CoV-2/Flu A and B/RSV by PCR (GeneXpert)    COVID Suspicion at Admission: N/A    Running Infusions:    sodium chloride Stopped (12/18/24 8626)        Mental Status/LOC at time of transport: Alert    Other pertinent information:   CIWA score: N/A   NIH score:  N/A

## 2024-12-18 NOTE — ED PROVIDER NOTES
Patient Seen in: [facility] Emergency Department      History     Chief Complaint   Patient presents with    Abdomen/Flank Pain     Stated Complaint:     Subjective:   HPI      54-year-old female presents to the emergency department is unclear exactly what her initial complaint is I think it is possibly abdominal pain patient has a history of ulcerative colitis which she is moaning and repeating over and over again that she needs help and states that her entire body hurts that everything is bothering her she has a pan positive review of systems.  When I first go to initially evaluate her she states that she does not know what is hurting her and her body that is everything.    Objective:     Past Medical History:    Acne    Allergic rhinitis    Alopecia areata    Anxiety    Asthma (Formerly [facility])    Back problem    Bipolar affective (HCC)    Calculus of kidney    Depression    Fibromyalgia    GERD (gastroesophageal reflux disease)    Gestational diabetes (Formerly [facility])    Hemorrhoids    Hypothyroidism    Migraines    Nausea and vomiting, unspecified vomiting type    Organic hypersomnia, unspecified    AHI 2 RDI 2 REM AHI 2 SaO2 doroteo 88 %    Osteoarthritis    Pneumonia due to organism    Scoliosis    Seizure disorder (Formerly [facility])    absent seizures    Stroke (Formerly [facility])    2003+ mild R.sided weakness    Ulcerative colitis (Formerly [facility])    Visual impairment              Past Surgical History:   Procedure Laterality Date    Cholecystectomy  03/2010    Colonoscopy  2013    ulcerative colitis    Colonoscopy N/A 04/25/2017    Procedure: COLONOSCOPY;  Surgeon: Hipolito Dobbins MD;  Location:  ENDOSCOPY    Colonoscopy N/A 06/14/2021    Procedure: COLONOSCOPY with biopsy,;  Surgeon: Ramsey Muhammad MD;  Location:  ENDOSCOPY    Colposcopy, cervix w/upper adjacent vagina; w/biopsy(s), cervix  1999    Hernia surgery  1997    Umbilical Hernia Repair    Other surgical history N/A 06/21/2016    Procedure: ESOPHAGOGASTRODUODENOSCOPY (EGD);  Surgeon:  Jeff Scherer MD;  Location: [facility]    [facility]  2014    Upper gi endoscopy,exam                  Social History     Socioeconomic History    Marital status:    Tobacco Use    Smoking status: Former     Current packs/day: 0.00     Types: Cigarettes     Quit date: 1993     Years since quittin.8    Smokeless tobacco: Never    Tobacco comments:     Ages [age], [age]   Vaping Use    Vaping status: Some Days    Substances: THC, CBD, Flavoring, medical marijuana    Devices: Disposable, Refillable tank   Substance and Sexual Activity    Alcohol use: Not Currently    Drug use: Yes     Frequency: 2.0 times per week     Types: Cannabis     Comment: Medical Cannabis    Sexual activity: Not Currently     Partners: Male     Social Drivers of Health     Food Insecurity: No Food Insecurity (2024)    Food Insecurity     Food Insecurity: Never true   Transportation Needs: No Transportation Needs (2024)    Transportation Needs     Lack of Transportation: No   Housing Stability: Low Risk  (2024)    Housing Stability     Housing Instability: No                  Physical Exam     ED Triage Vitals   BP 24 1200 139/88   Pulse 24 1155 102   Resp 24 1155 20   Temp 24 1155 98 °F (36.7 °C)   Temp src 24 1155 Oral   SpO2 24 1200 100 %   O2 Device 24 1200 None (Room air)       Current Vitals:   Vital Signs  BP: (!) 181/97  Pulse: 76  Resp: 16  Temp: 98.3 °F (36.8 °C)  Temp src: Oral  MAP (mmHg): (!) 122    Oxygen Therapy  SpO2: 96 %  O2 Device: None (Room air)  O2 Flow Rate (L/min): 0 L/min  Pulse Oximetry Type: Continuous  Oximetry Probe Site Changed: No  Pulse Ox Probe Location: Right hand        Physical Exam  Vitals and nursing note reviewed.   Constitutional:       General: She is not in acute distress.     Appearance: She is well-developed. She is not diaphoretic.   HENT:      Head: Atraumatic.      Right Ear: External ear normal.      Left  Ear: External ear normal.      Nose: Nose normal.   Eyes:      General: No scleral icterus.        Right eye: No discharge.         Left eye: No discharge.      Conjunctiva/sclera: Conjunctivae normal.      Pupils: Pupils are equal, round, and reactive to light.   Neck:      Vascular: No JVD.   Cardiovascular:      Rate and Rhythm: Normal rate and regular rhythm.      Heart sounds: Normal heart sounds. No murmur heard.     No friction rub. No gallop.   Pulmonary:      Effort: Pulmonary effort is normal. No respiratory distress.      Breath sounds: Normal breath sounds. No stridor. No wheezing.   Chest:      Chest wall: No tenderness.   Abdominal:      General: Bowel sounds are normal. There is no distension.      Palpations: Abdomen is soft.      Tenderness: There is abdominal tenderness in the left upper quadrant. There is no guarding or rebound.      Comments: Nausea, intermittently belching,   Musculoskeletal:         General: No tenderness or deformity. Normal range of motion.      Cervical back: Normal range of motion and neck supple.   Lymphadenopathy:      Cervical: No cervical adenopathy.   Skin:     General: Skin is warm and dry.      Coloration: Skin is not pale.      Findings: No erythema or rash.   Neurological:      Mental Status: She is alert and oriented to person, place, and time.      Cranial Nerves: No cranial nerve deficit.      Coordination: Coordination normal.   Psychiatric:         Attention and Perception: She is inattentive.         Mood and Affect: Mood is anxious and elated. Affect is labile, blunt, tearful and inappropriate.         Speech: Speech is delayed and tangential.         Behavior: Behavior is agitated and hyperactive.         Cognition and Memory: Cognition is impaired. She exhibits impaired recent memory.         Judgment: Judgment is inappropriate.       Initially when I go to evaluate the patient's abdomen she actually pushes my hand away and will not let me evaluate her.   Before I go to evaluate her nursing staff was in the room and patient actually spit on my tech stating that she was nauseous and could not control the emesis bag so she spit on him.        ED Course     Labs Reviewed   COMP METABOLIC PANEL (14) - Abnormal; Notable for the following components:       Result Value    Glucose 149 (*)     Sodium 146 (*)     Calculated Osmolality 307 (*)     AST 40 (*)     Albumin 5.1 (*)     A/G Ratio 2.1 (*)     All other components within normal limits   CBC WITH DIFFERENTIAL WITH PLATELET - Abnormal; Notable for the following components:    WBC 16.9 (*)     Neutrophil Absolute Prelim 14.50 (*)     Neutrophil Absolute 14.50 (*)     Lymphocyte Absolute 0.77 (*)     Monocyte Absolute 1.42 (*)     All other components within normal limits   URINALYSIS WITH CULTURE REFLEX - Abnormal; Notable for the following components:    Ketones Urine 10 (*)     Blood Urine 2+ (*)     Protein Urine 300 (*)     Leukocyte Esterase Urine 250 (*)     WBC Urine 6-10 (*)     Bacteria Urine Rare (*)     Squamous Epi. Cells Few (*)     Hyaline Casts Present (*)     All other components within normal limits   LITHIUM (ESKALITH) - Abnormal; Notable for the following components:    Lithium <0.2 (*)     All other components within normal limits   ACETAMINOPHEN (TYLENOL), S - Abnormal; Notable for the following components:    Acetaminophen <2.0 (*)     All other components within normal limits   SALICYLATE, SERUM - Abnormal; Notable for the following components:    Salicylate <3.0 (*)     All other components within normal limits   DRUG SCREEN 8 W/OUT CONFIRMATION, URINE - Abnormal; Notable for the following components:    Benzodiazepines Urine Presumed Positive (*)     Cannabinoid Urine Presumed Positive (*)     All other components within normal limits    Narrative:     Results of the Urine Drug Screen should be used only for medical purposes.   CBC, PLATELET; NO DIFFERENTIAL - Abnormal; Notable for the following  components:    WBC 11.5 (*)     HGB 11.7 (*)     All other components within normal limits   BASIC METABOLIC PANEL (8) - Abnormal; Notable for the following components:    Glucose 112 (*)     Chloride 113 (*)     CO2 14.0 (*)     BUN 6 (*)     All other components within normal limits   PROCALCITONIN - Abnormal; Notable for the following components:    Procalcitonin 0.16 (*)     All other components within normal limits   AMMONIA, PLASMA - Abnormal; Notable for the following components:    Ammonia <10 (*)     All other components within normal limits   POTASSIUM - Abnormal; Notable for the following components:    Potassium 3.4 (*)     All other components within normal limits   COMP METABOLIC PANEL (14) - Abnormal; Notable for the following components:    Glucose 110 (*)     Potassium 3.4 (*)     CO2 20.0 (*)     Calculated Osmolality 301 (*)     A/G Ratio 2.3 (*)     All other components within normal limits   POCT GLUCOSE - Abnormal; Notable for the following components:    POC Glucose 107 (*)     All other components within normal limits   LIPASE - Normal   ETHYL ALCOHOL - Normal   MAGNESIUM - Normal   TSH W REFLEX TO FREE T4 - Normal   MAGNESIUM - Normal   POCT GLUCOSE - Normal   POCT GLUCOSE - Normal   POCT GLUCOSE - Normal   SARS-COV-2/FLU A AND B/RSV BY PCR (GENEXPERT) - Normal    Narrative:     This test is intended for the qualitative detection and differentiation of SARS-CoV-2, influenza A, influenza B, and respiratory syncytial virus (RSV) viral RNA in nasopharyngeal or nares swabs from individuals suspected of respiratory viral infection consistent with COVID-19 by their healthcare provider. Signs and symptoms of respiratory viral infection due to SARS-CoV-2, influenza, and RSV can be similar.    Test performed using the Xpert Xpress SARS-CoV-2/FLU/RSV (real time RT-PCR)  assay on the [employer] instrument, [employer], [place] [address].   This test is being used under the Food and Drug Administration's  Emergency Use Authorization.    The authorized Fact Sheet for Healthcare Providers for this assay is available upon request from the laboratory.   CBC, PLATELET; NO DIFFERENTIAL   POTASSIUM   RAINBOW DRAW LAVENDER   RAINBOW DRAW LIGHT GREEN   RAINBOW DRAW BLUE   RAINBOW DRAW GOLD   URINE CULTURE, ROUTINE   BLOOD CULTURE    Narrative:     Aerobic Bottle Volume - 9 mL   BLOOD CULTURE    Narrative:     Aerobic Bottle Volume - 10 mL  Anaerobic Bottle Volume - 10 mL   C. DIFFICILE(TOXIGENIC)PCR       ED Course as of 12/20/24 1111  ------------------------------------------------------------  Time: 12/18 1238  Value: WBC(!): 16.9  Comment: Clayton al- talking to self, moaning,stating \"please help me\" but then stating she needs clarification on legs and arms  Bizarre presentation    ------------------------------------------------------------  Time: 12/18 1500  Value: Leukocyte Esterase (!): 250  Comment: (Reviewed)  ------------------------------------------------------------  Time: 12/18 1500  Value: WBC Urine(!): 6-10  Comment: (Reviewed)  ------------------------------------------------------------  Time: 12/18 1500  Value: Bacteria Urine(!): Rare  Comment: (Reviewed)  ------------------------------------------------------------  Time: 12/18 1500  Value: SQUAM EPI CELLS UR(!): Few  Comment: (Reviewed)  ------------------------------------------------------------  Time: 12/18 1500  Value: Nitrite Urine: Negative  Comment: (Reviewed)  ------------------------------------------------------------  Time: 12/18 1501  Comment: 2 mg IV of lorazepam seem to help initially with the presentation patient stopped generally hyperventilating and babbling.  Urinalysis reveals what is most likely contaminated urine but out of an abundance of caution while we are waiting on cultures I think it is reasonable to give her 2 g of Rocephin as she does have an elevated white blood cell count I do think it is possible that the elevated  white blood cell count is actually a stress reaction.  Patient is receiving IV fluids, CT of the abdomen pelvis was done there is no signs of cystitis pyelonephritis colitis or significant illness.  ------------------------------------------------------------  Time: 12/18 1503  Comment: Patient is afebrile  ------------------------------------------------------------  Time: 12/18 1508  Comment: Patient's presentation was significantly traumatic with hyperventilation, constantly asking for help, but is seeming inability to pinpoint exactly what symptoms she was having that she needed help with although I do think it was possibly abdominal pain.  She was spitting but not vomiting on arrival.  She uses marijuana 7 days a week possibly this was hyperemesis or at least nausea associated with a cannabinoid syndrome, she also has a history of GERD and migraines and it looks like she has recently been on azithromycin which can certainly cause gastritis.  ------------------------------------------------------------  Time: 12/18 0830  Comment: Given that lithium level is undetectable I do have concerns the patient is likely not taking her medication she lives with her mother who is on her way back to the emergency department now I will discuss those concerns with the patient's mother.       CT ABDOMEN+PELVIS(CONTRAST ONLY)(CPT=74177)   Final Result   PROCEDURE:  CT ABDOMEN+PELVIS (CONTRAST ONLY) (CPT=74177)       COMPARISON:  [facility] , CT, CT ABDOMEN+PELVIS(CONTRAST ONLY)(CPT=74177),    3/24/2024, 6:14 PM.       INDICATIONS:  Abdominal pain and nausea with history of colitis       TECHNIQUE:  CT scanning was performed from the dome of the diaphragm to    the pubic symphysis with non-ionic intravenous contrast material. Post    contrast coronal MPR imaging was performed.  Dose reduction techniques    were used. Dose information is    transmitted to the ACR (American College of Radiology) NRDR (National    Radiology Data  Registry) which includes the Dose Index Registry.       PATIENT STATED HISTORY:(As transcribed by Technologist)  Patient is here    for general abdominal pain w/nausea. Patient has a hx of colitis.         CONTRAST USED:  100cc of Isovue 370       FINDINGS:  The exam is somewhat limited due to motion and lack of oral    contrast.   LIVER:  No enlargement, atrophy, abnormal density, or significant focal    lesion.     BILIARY:  Postsurgical changes of cholecystectomy noted.   PANCREAS:  Visualized portions are unremarkable.  No pancreatic ductal    dilatation is seen.  Motion artifact somewhat limits assessment.   SPLEEN:  No enlargement or focal lesion.     KIDNEYS:  No mass, obstruction, or calcification.     ADRENALS:  No mass or enlargement.     AORTA/VASCULAR:  No aneurysm or dissection.     RETROPERITONEUM:  No mass or adenopathy.     BOWEL/MESENTERY:  No dilated loops of small bowel are seen.  Much of the    bowel is decompressed, limiting assessment.  Lack of oral contrast also    limits assessment.  No evidence of obstruction.  There is a    normal-appearing appendix.   ABDOMINAL WALL:  Small bilateral inguinal hernias containing fat.   URINARY BLADDER:  No visible focal wall thickening, lesion, or calculus.     PELVIC NODES:  No adenopathy.     PELVIC ORGANS:  Unremarkable CT appearance.  Please note that uterus and    ovaries are not well assessed with CT.   BONES:  No bony lesion or fracture.     LUNG BASES:  No visible pulmonary or pleural disease.     OTHER:  Negative.                           =====   CONCLUSION:  No clear etiology of the patient's symptoms.  No free fluid    is seen within the abdomen or pelvis.  If clinical symptoms persist then    consider follow-up imaging.           LOCATION:  [id number]           Dictated by (CST): Jose L Graham MD on 12/18/2024 at 1:42 PM        Finalized by (CST): Jose L Graham MD on 12/18/2024 at 1:46 PM           Mother states that in the past at times when she was  becoming psychotic she would sleep for multiple days and possibly not take her medications patient thinks that she still supposed to be taking lithium but there is no trace of lithium and her blood.  She did not had recent fevers or chills her mother reports that she became altered last night.  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  On review of the patient's medical chart, not sure what the patient is taking and from who it does not seem like she is keeping any of her regularly scheduled appointments.  I am having a very difficult time pursing out what portion of this is a medical presentation versus a psychiatric presentation or something come back.  She does have a leukocytosis she has chronic migraines she does not seem to be meningitic in any way       MDM      54-year-old presents to the emergency department with a pan positive review of some symptoms.  I remain unaware exactly what medications that she is taking.  It seems she is having abdominal pain but she also thinks that she is confused possibly she denies any fevers or chills but states that she was up most of the night and thinks she may have been vomiting.  Differential diagnosis is broad and includes ulcerative colitis flareup because is unclear if she is taking any of her UC medications which her mother feels that she is supposed to be taking, this could be some sort of psychosis and she has been under incredible amount of stress recently as well as to be taking lithium but her lithium is not even showing up as having been taken.  She has been using a significant amount of cannabinoids because apparently she has been having difficulty filling her medications but on review of the medical chart she may be having difficulty filling the  medications because she is not going to her outpatient appointments.  She has got a leukocytosis of 16.9 we have to consider infection causing her all of her body pain.    Admission disposition: 12/18/2024  4:15 PM       After Haldol Benadryl and Ativan patient significantly improved and more interactive but still having some mild left upper quadrant abdominal pain without rebound or guarding mother came and patient's house is apparently incredibly messy and not safe to live and she supposed to be caring for her 30-year-old son and they are not even sure that she is able to care for herself or take her medications.  Upon extensive review of the medical chart I am unclear if patient is actually keeping her appointments with her PCP and her psychiatrist I am unclear who exactly is filling all of her medications and what she is actually taking at this point.  Patient is not able to answer any of those questions either nor is the patient's mother her sister is on the way to try and answer some of those.  Going to speak with the pharmacy technician and see if he can help trace some of these medications I spoke with the hospitalist because the patient is not safe to go home I have not completely ruled out all medical illness and I think that that is going to take time given her past medical history but certainly from a psychiatric standpoint there is seeming to be an alteration right now.  If patient has truly not been taking her medications perhaps this presentation is overusing her cannabinoids which she does admit to because she did not have all of her medications.  She does have chronic migraines.  While patient was given 2 mg of lorazepam, her urine was collected before she went and gave the urine sample.  She is benzodiazepine and cannabinoid positive so clearly seems to be getting some of her medications.    Medical Decision Making      Disposition and Plan     Clinical Impression:  1. Delirium    2. Total body  pain    3. Leukocytosis, unspecified type         Disposition:  Admit  12/18/2024  4:15 pm    Follow-up:  No follow-up provider specified.        Medications Prescribed:  Current Discharge Medication List              Supplementary Documentation:         Hospital Problems       Present on Admission  Date Reviewed: 2/17/2024            ICD-10-CM Noted POA    * (Principal) Delirium due to known physiological condition F05 12/18/2024 Unknown    Generalized anxiety disorder F41.1 12/19/2024 Unknown    Hypernatremia E87.0 12/18/2024 Yes    Leukocytosis, unspecified type D72.829 11/16/2019 Unknown    Total body pain R52 12/18/2024 Unknown

## 2024-12-18 NOTE — ED INITIAL ASSESSMENT (HPI)
Pt brought in by EMS for abdominal pain. Per EMS pt's mom states pt had abdominal pain that started today. Per EMS mom also states pt has a hx of colitis and has not been taking any of her medications for \"the past couple of days.\" Pt denies CP and SOB.

## 2024-12-18 NOTE — ED QUICK NOTES
Pt sitting upright in bed dry heaving, gagging w/ no vomiting. Pt redirected multiple times to vomit and spit into emesis bags. Pt then begins to attempt to spit, resulting in spitting in this writers arms. Pt educated on emesis bag and reasoning/process of emergency medicine. Pt redirected multiple times, pt says \"I'm sorry I'm sorry\".

## 2024-12-18 NOTE — ED QUICK NOTES
Pt linens changed d/t urinating on herself. per mom, pt is unable to ambulated w/ steady gait. Pt continuously needing to be redirected, however easily redirectable. Pt moved to  at this time to await bed assignment.

## 2024-12-19 PROBLEM — F41.1 GENERALIZED ANXIETY DISORDER: Status: ACTIVE | Noted: 2024-12-19

## 2024-12-19 PROBLEM — F05 DELIRIUM DUE TO KNOWN PHYSIOLOGICAL CONDITION: Status: ACTIVE | Noted: 2024-12-18

## 2024-12-19 LAB
AMMONIA PLAS-MCNC: <10 UMOL/L (ref 11–32)
ANION GAP SERPL CALC-SCNC: 14 MMOL/L (ref 0–18)
BUN BLD-MCNC: 6 MG/DL (ref 9–23)
CALCIUM BLD-MCNC: 8.7 MG/DL (ref 8.7–10.4)
CHLORIDE SERPL-SCNC: 113 MMOL/L (ref 98–112)
CO2 SERPL-SCNC: 14 MMOL/L (ref 21–32)
CREAT BLD-MCNC: 0.63 MG/DL
EGFRCR SERPLBLD CKD-EPI 2021: 104 ML/MIN/1.73M2 (ref 60–?)
ERYTHROCYTE [DISTWIDTH] IN BLOOD BY AUTOMATED COUNT: 15.6 %
GLUCOSE BLD-MCNC: 112 MG/DL (ref 70–99)
GLUCOSE BLD-MCNC: 87 MG/DL (ref 70–99)
GLUCOSE BLD-MCNC: 92 MG/DL (ref 70–99)
GLUCOSE BLD-MCNC: 94 MG/DL (ref 70–99)
HCT VFR BLD AUTO: 35.8 %
HGB BLD-MCNC: 11.7 G/DL
MAGNESIUM SERPL-MCNC: 1.8 MG/DL (ref 1.6–2.6)
MCH RBC QN AUTO: 30 PG (ref 26–34)
MCHC RBC AUTO-ENTMCNC: 32.7 G/DL (ref 31–37)
MCV RBC AUTO: 91.8 FL
OSMOLALITY SERPL CALC.SUM OF ELEC: 290 MOSM/KG (ref 275–295)
PLATELET # BLD AUTO: 288 10(3)UL (ref 150–450)
POTASSIUM SERPL-SCNC: 4.1 MMOL/L (ref 3.5–5.1)
PROCALCITONIN SERPL-MCNC: 0.16 NG/ML (ref ?–0.05)
RBC # BLD AUTO: 3.9 X10(6)UL
SODIUM SERPL-SCNC: 141 MMOL/L (ref 136–145)
TSI SER-ACNC: 4.44 UIU/ML (ref 0.55–4.78)
WBC # BLD AUTO: 11.5 X10(3) UL (ref 4–11)

## 2024-12-19 PROCEDURE — 90792 PSYCH DIAG EVAL W/MED SRVCS: CPT | Performed by: OTHER

## 2024-12-19 PROCEDURE — 99222 1ST HOSP IP/OBS MODERATE 55: CPT | Performed by: OTHER

## 2024-12-19 RX ORDER — ALPRAZOLAM 0.25 MG/1
0.25 TABLET ORAL ONCE
Status: DISCONTINUED | OUTPATIENT
Start: 2024-12-19 | End: 2024-12-22

## 2024-12-19 RX ORDER — AMLODIPINE BESYLATE 5 MG/1
5 TABLET ORAL DAILY
Status: DISCONTINUED | OUTPATIENT
Start: 2024-12-19 | End: 2024-12-22

## 2024-12-19 RX ORDER — ASPIRIN 81 MG/1
81 TABLET, CHEWABLE ORAL DAILY
Status: DISCONTINUED | OUTPATIENT
Start: 2024-12-19 | End: 2024-12-22

## 2024-12-19 RX ORDER — LEVOTHYROXINE SODIUM 125 UG/1
125 TABLET ORAL
Status: DISCONTINUED | OUTPATIENT
Start: 2024-12-19 | End: 2024-12-22

## 2024-12-19 RX ORDER — LORAZEPAM 2 MG/ML
0.25 INJECTION INTRAMUSCULAR ONCE
Status: COMPLETED | OUTPATIENT
Start: 2024-12-19 | End: 2024-12-19

## 2024-12-19 RX ORDER — MAGNESIUM OXIDE 400 MG/1
400 TABLET ORAL ONCE
Status: DISCONTINUED | OUTPATIENT
Start: 2024-12-19 | End: 2024-12-22

## 2024-12-19 RX ORDER — LORAZEPAM 2 MG/ML
1 INJECTION INTRAMUSCULAR ONCE AS NEEDED
Status: ACTIVE | OUTPATIENT
Start: 2024-12-19 | End: 2024-12-19

## 2024-12-19 RX ORDER — LORAZEPAM 2 MG/ML
0.5 INJECTION INTRAMUSCULAR ONCE AS NEEDED
Status: ACTIVE | OUTPATIENT
Start: 2024-12-19 | End: 2024-12-19

## 2024-12-19 RX ORDER — KETOROLAC TROMETHAMINE 15 MG/ML
15 INJECTION, SOLUTION INTRAMUSCULAR; INTRAVENOUS EVERY 6 HOURS PRN
Status: ACTIVE | OUTPATIENT
Start: 2024-12-19 | End: 2024-12-21

## 2024-12-19 RX ORDER — PANTOPRAZOLE SODIUM 40 MG/1
40 TABLET, DELAYED RELEASE ORAL
Status: DISCONTINUED | OUTPATIENT
Start: 2024-12-19 | End: 2024-12-19

## 2024-12-19 RX ORDER — KETOROLAC TROMETHAMINE 30 MG/ML
30 INJECTION, SOLUTION INTRAMUSCULAR; INTRAVENOUS EVERY 6 HOURS PRN
Status: DISPENSED | OUTPATIENT
Start: 2024-12-19 | End: 2024-12-21

## 2024-12-19 RX ORDER — HALOPERIDOL 5 MG/ML
2 INJECTION INTRAMUSCULAR ONCE AS NEEDED
Status: ACTIVE | OUTPATIENT
Start: 2024-12-19 | End: 2024-12-19

## 2024-12-19 RX ORDER — HEPARIN SODIUM 5000 [USP'U]/ML
5000 INJECTION, SOLUTION INTRAVENOUS; SUBCUTANEOUS EVERY 8 HOURS SCHEDULED
Status: DISCONTINUED | OUTPATIENT
Start: 2024-12-19 | End: 2024-12-22

## 2024-12-19 RX ORDER — POTASSIUM CHLORIDE 14.9 MG/ML
20 INJECTION INTRAVENOUS ONCE
Status: COMPLETED | OUTPATIENT
Start: 2024-12-19 | End: 2024-12-19

## 2024-12-19 RX ORDER — METOPROLOL SUCCINATE 50 MG/1
50 TABLET, EXTENDED RELEASE ORAL
Status: DISCONTINUED | OUTPATIENT
Start: 2024-12-19 | End: 2024-12-22

## 2024-12-19 RX ORDER — METOPROLOL TARTRATE 1 MG/ML
5 INJECTION, SOLUTION INTRAVENOUS EVERY 6 HOURS PRN
Status: DISCONTINUED | OUTPATIENT
Start: 2024-12-19 | End: 2024-12-22

## 2024-12-19 RX ORDER — LORAZEPAM 2 MG/ML
1 INJECTION INTRAMUSCULAR EVERY 6 HOURS PRN
Status: DISCONTINUED | OUTPATIENT
Start: 2024-12-19 | End: 2024-12-20

## 2024-12-19 RX ORDER — ONDANSETRON 2 MG/ML
4 INJECTION INTRAMUSCULAR; INTRAVENOUS EVERY 6 HOURS PRN
Status: DISCONTINUED | OUTPATIENT
Start: 2024-12-19 | End: 2024-12-22

## 2024-12-19 NOTE — PHYSICAL THERAPY NOTE
PHYSICAL THERAPY EVALUATION - INPATIENT     Room Number: 3614/3614-A  Evaluation Date: 12/19/2024  Type of Evaluation: Initial  Physician Order: PT Eval and Treat    Presenting Problem: Delirium due to know physilogical condition  Co-Morbidities : allergic rhinitis, anxiety, depression, bipolar, asthma, history of kidney stones, fibromyalgia, GERD, gestational diabetes, hypothyroidism, history of migraines, osteoarthritis, history of seizures, history of stroke with no residual symptoms, history ulcer colitis  Reason for Therapy: Mobility Dysfunction and Discharge Planning    PHYSICAL THERAPY ASSESSMENT   Patient is a 54 year old female admitted 12/18/2024 for Delirium due to know physilogical condition.  Prior to admission, patient's baseline is IND.  Patient is currently functioning below baseline with bed mobility, transfers, and gait.  Patient is requiring contact guard assist as a result of the following impairments: decreased endurance/aerobic capacity, impaired gait balance, and decreased muscular endurance.  Physical Therapy will continue to follow for duration of hospitalization.    Patient will benefit from continued skilled PT Services to promote return to prior level of function and safety with continuous assistance and gradual rehabilitative therapy .    PLAN DURING HOSPITALIZATION  Nursing Mobility Recommendation : 1 Assist  PT Device Recommendation: Rolling walker  PT Treatment Plan: Bed mobility;Body mechanics;Endurance;Gait training;Strengthening;Stair training;Transfer training;Balance training  Rehab Potential : Good  Frequency (Obs): 3-5x/week     CURRENT GOALS    Goal #1 Patient is able to demonstrate supine - sit EOB @ level: supervision     Goal #2 Patient is able to demonstrate transfers EOB to/from BSC at assistance level: supervision     Goal #3 Patient is able to ambulate 150 feet with assist device: walker - rolling at assistance level: supervision     Goal #4    Goal #5    Goal #6     Goal Comments: Goals established on 2024      PHYSICAL THERAPY MEDICAL/SOCIAL HISTORY  History related to current admission: Patient is a 54 year old female admitted on 2024 from Home for Delirium due to know physilogical condition.     HOME SITUATION  Type of Home: House  Home Layout: Two level                     Lives With: Parent(s)    Drives: Yes   Patient Regularly Uses: Reading glasses;Rolling walker      Prior Level of [place]: Pt was a poor historian. Pt lives at home with her mom. Pt was unable to clarify if she uses a assisted device to ambulate.     SUBJECTIVE  \"I get confused\"    OBJECTIVE  Precautions: Bed/chair alarm  Fall Risk: High fall risk    WEIGHT BEARING RESTRICTION     PAIN ASSESSMENT  Ratin  Location: Pt reports no pain       COGNITION  Overall Cognitive Status:  Impaired  Orientation Level:  oriented to place and oriented to person  Following Commands:  follows one step commands with repetition  Awareness of Deficits:  decreased awareness of deficits  Problem Solving:  assistance required to generate solutions and assistance required to implement solutions    RANGE OF MOTION AND STRENGTH ASSESSMENT  Upper extremity ROM and strength are within functional limits     Lower extremity ROM is within functional limits     Lower extremity strength is within functional limits     BALANCE  Static Sitting: Poor +  Dynamic Sitting: Poor +  Static Standing: Fair -  Dynamic Standing: Fair -    ADDITIONAL TESTS                                    ACTIVITY TOLERANCE                         O2 WALK       NEUROLOGICAL FINDINGS      During session pt was observed with increase eye movements. When attempting to test ocular motor pt was unable to follow.   Pt was observed with a curled R hand  Pt was observed with L side head rotation.                   AM-PAC '6-Clicks' INPATIENT SHORT FORM - BASIC MOBILITY  How much difficulty does the patient currently have...  Patient Difficulty:  Turning over in bed (including adjusting bedclothes, sheets and blankets)?: A Little   Patient Difficulty: Sitting down on and standing up from a chair with arms (e.g., wheelchair, bedside commode, etc.): A Little   Patient Difficulty: Moving from lying on back to sitting on the side of the bed?: A Little   How much help from another person does the patient currently need...   Help from Another: Moving to and from a bed to a chair (including a wheelchair)?: A Little   Help from Another: Need to walk in hospital room?: A Little   Help from Another: Climbing 3-5 steps with a railing?: A Lot     AM-PAC Score:  Raw Score: 17   Approx Degree of Impairment: 50.57%   Standardized Score (AM-PAC Scale): 42.13   CMS Modifier (G-Code): CK    FUNCTIONAL ABILITY STATUS  Gait Assessment   Functional Mobility/Gait Assessment  Gait Assistance: Minimum assistance  Distance (ft): 5  Assistive Device: Rolling walker    Skilled Therapy Provided     Bed Mobility:  Rolling: NT  Supine to sit: Mod A   Sit to supine: NT     Transfer Mobility:  Sit to stand: Min A   Stand to sit: Min A  Gait = Min A 5ft using a RW    Therapist's Comments: Pt was able to use the RW to go from bed to chair using the RW. Pt was observed to have poor safety awareness to complete bed to chair transfer  While sitting at the EOB pt was observed to retro pulse with at time pt able to sit upright unsupported.     Exercise/Education Provided:  Bed mobility  Body mechanics  Gait training  Transfer training    Patient End of Session: Up in chair;Needs met;Call light within reach;RN aware of session/findings;All patient questions and concerns addressed;Family present      Patient Evaluation Complexity Level:  History Moderate - 1 or 2 personal factors and/or co-morbidities   Examination of body systems Low -  addressing 1-2 elements   Clinical Presentation Low- Stable   Clinical Decision Making Low Complexity       PT Session Time: 23 minutes  Therapeutic Activity: 15  minutes

## 2024-12-19 NOTE — CONSULTS
[facility]  [facility] Neurology Consult Note    Marylin Walker Patient Status:  Inpatient    1970 MRN [id number]   Location [facility] 3NE-A Attending Luis Fairbanks MD   Hosp Day # 1 PCP Yoel Servin MD     REASON FOR EVALUATION:  Altered mental status    HISTORY OF PRESENT ILLNESS:  Ms. Walker is a 54-year-old woman with bipolar disorder, anxiety, depression, migraine, seizure disorder, reported history of stroke and hypothyroidism who was hospitalized for abdominal pain, nausea and vomiting.    She has significant difficulty giving a consistent and coherent history of the present illness, mostly answering \"I do not know.\"  She was clearly getting rather anxious during the interview and did better when we did slow breathing exercises.    She thinks her main problem is the pain she is experiencing \"on the inside\" and when asked if she is experiencing anything else abnormal, she variably says \"no\" or \"I do not know.\"  She does think that if her pain went away that she would feel back to normal.    She thinks there may be more stress in the home than usual but is unable to elaborate.    The patient's RN reports that, according to the patient's sister, the patient has been \"kind of noncompliant lately\" with medications.    PAST MEDICAL HISTORY:  Past Medical History:    Acne    Allergic rhinitis    Alopecia areata    Anxiety    Asthma ([facility])    Back problem    Bipolar affective (HCC)    Calculus of kidney    Depression    Fibromyalgia    GERD (gastroesophageal reflux disease)    Gestational diabetes ([facility])    Hemorrhoids    Hypothyroidism    Migraines    Nausea and vomiting, unspecified vomiting type    Organic hypersomnia, unspecified    AHI 2 RDI 2 REM AHI 2 SaO2 doroteo 88 %    Osteoarthritis    Pneumonia due to organism    Scoliosis    Seizure disorder ([facility])    absent seizures    Stroke ([facility])    + mild R.sided weakness    Ulcerative colitis ([facility])    Visual impairment       PAST SURGICAL  HISTORY:  Past Surgical History:   Procedure Laterality Date    Cholecystectomy  03/2010    Colonoscopy  2013    ulcerative colitis    Colonoscopy N/A 04/25/2017    Procedure: COLONOSCOPY;  Surgeon: Hipolito Dobbins MD;  Location:  ENDOSCOPY    Colonoscopy N/A 06/14/2021    Procedure: COLONOSCOPY with biopsy,;  Surgeon: Ramsey Muhammad MD;  Location:  ENDOSCOPY    Colposcopy, cervix w/upper adjacent vagina; w/biopsy(s), cervix  1999    Hernia surgery  1997    Umbilical Hernia Repair    Other surgical history N/A 06/21/2016    Procedure: ESOPHAGOGASTRODUODENOSCOPY (EGD);  Surgeon: Jeff Scherer MD;  Location[facility]  ENDOSCOPY    [facility]    Upper gi endoscopy,exam         FAMILY HISTORY:  family history includes Breast Cancer in her maternal grandmother and another family member; Colon Cancer in her father; Colon Polyps in her father; Heart Attack in her sister; Heart Disorder (age of onset: 54) in her sister; High Blood Pressure in her father and mother; High Cholesterol in her mother; Hypertension in her father and mother; Mental Disorder in her mother, son, and son.    SOCIAL HISTORY:   reports that she quit smoking about 31 years ago. Her smoking use included cigarettes. She has never used smokeless tobacco. She reports that she does not currently use alcohol. She reports current drug use. Frequency: 2.00 times per week. Drug: Cannabis.    ALLERGIES:  Allergies   Allergen Reactions    Depakote [Valproic Acid] OTHER (SEE COMMENTS)     Suicidal ideation    Kdc:Olanzapine SWELLING    Ketorolac Tromethamine OTHER (SEE COMMENTS) and NAUSEA AND VOMITING     gastritis  Gastritis & UC  gastritis  Gastritis & UC      Latex RASH and Dyspnea    Molds & Smuts ASTHMA, ITCHING, RASH, SHORTNESS OF BREATH, WHEEZING and OTHER (SEE COMMENTS)    Nsaids OTHER (SEE COMMENTS)     Must take Nsaids sparingly due to history of gastritis  Must take Nsaids sparingly due to history of gastritis      Olanzapine  SWELLING     Caused severe swelling and bruising    Penicillins NAUSEA AND VOMITING    Phenothiazines OTHER (SEE COMMENTS)     Extrapyramidal syndrome. No phenothiazines per neurology (documented during 1/19/18 Dr. Velázquez)  Extrapyramidal syndrome. No phenothiazines per neurology (documented during 1/19/18 Dr. Velázquez)  Extrapyramidal syndrome. No phenothiazines per neurology (documented during 1/19/18 Dr. Velázquez)    Amoxicillin NAUSEA AND VOMITING    Levofloxacin PAIN and OTHER (SEE COMMENTS)     Tendinitis in multiple joints  Tendinitis in multiple joints    Mushrooms NAUSEA AND VOMITING    Penicillin G Potassium NAUSEA AND VOMITING    Phenytoin ANXIETY, CONFUSION, DIZZINESS and HALLUCINATION    Sulfa Antibiotics NAUSEA AND VOMITING    Toradol NAUSEA AND VOMITING     Gastritis & UC    Trees, [place] OTHER (SEE COMMENTS)     Sneezing watery eyes    Triptans HALLUCINATION     Lesions in brain    Erythromycin OTHER (SEE COMMENTS)       MEDICATIONS:  No current outpatient medications on file.     Current Facility-Administered Medications   Medication Dose Route Frequency    cefTRIAXone (Rocephin) 2 g in sodium chloride 0.9% 100 mL IVPB-ADDV  2 g Intravenous Q24H    ondansetron (Zofran) 4 MG/2ML injection 4 mg  4 mg Intravenous Q6H PRN    ALPRAZolam (Xanax) tab 0.25 mg  0.25 mg Oral Once    aspirin chewable tab 81 mg  81 mg Oral Daily    amLODIPine (Norvasc) tab 5 mg  5 mg Oral Daily    levothyroxine (Synthroid) tab 125 mcg  125 mcg Oral Before breakfast    metoprolol succinate ER (Toprol XL) 24 hr tab 50 mg  50 mg Oral Daily Beta Blocker    ketorolac (Toradol) 15 MG/ML injection 15 mg  15 mg Intravenous Q6H PRN    Or    ketorolac (Toradol) 30 MG/ML injection 30 mg  30 mg Intravenous Q6H PRN    haloperidol lactate (Haldol) 5 MG/ML injection 2 mg  2 mg Intravenous Once PRN    LORazepam (Ativan) 2 mg/mL injection 0.5 mg  0.5 mg Intravenous Once PRN    Or    LORazepam (Ativan) 2 mg/mL injection 1 mg  1 mg Intravenous  Once PRN    heparin (Porcine) 5000 UNIT/ML injection 5,000 Units  5,000 Units Subcutaneous Q8H DUNIA    metoprolol (Lopressor) 5 mg/5mL injection 5 mg  5 mg Intravenous Q6H PRN    pantoprazole (Protonix) 40 mg in sodium chloride 0.9% PF 10 mL IV push  40 mg Intravenous Q24H    sodium chloride 0.9% infusion   Intravenous Continuous    influenza virus trivalent PF (Fluzone Trivalent) 0.5 mL IM injection (ages 6 months to 64 years) 0.5 mL  0.5 mL Intramuscular Prior to discharge    acetaminophen (Tylenol Extra Strength) tab 1,000 mg  1,000 mg Oral Once       REVIEW OF SYSTEMS:  A 10-point system was reviewed.  Pertinent positives and negatives are noted in HPI.      PHYSICAL EXAMINATION:  VITAL SIGNS: BP (!) 154/98 (BP Location: Left arm)   Pulse 91   Temp 98.1 °F (36.7 °C) (Oral)   Resp 18   Wt 190 lb 0.6 oz (86.2 kg)   LMP 02/20/2015 (Approximate)   SpO2 94%   BMI 33.66 kg/m²   GENERAL:  Patient is a 54 year old female in no acute distress.  HEART:  Regular rate and rhythm.  SKIN: Warm, dry, no rashes except for the left distal leg (ecchymosis)  PSYCH: Palpably anxious, infrequent eye contact    NEUROLOGICAL:   Mental status: Oriented to person, place, and time  Speech: Understanding appears to be intact but she is rather terse  Memory and comprehension: Intact  Cranial Nerves: Unable to comply with confrontational visual field assessment, PERRL 3mm brisk, EOMI, no nystagmus, facial sensation intact, face symmetric, tongue midline, shoulder shrug equal  Motor: tone, bulk, strength are normal in all flexors and extensors, very poor effort.  She has inconsistent tremoring in her face that abates with encouragement and breathing exercises, worse when asked to perform tasks.  Sensory: Intact to light touch in all limbs  Coordination: FTN intact  Tendon Reflexes: normal for habitus, no asymmetry  Gait: Deferred    DIAGNOSTIC DATA:  Labs:  Recent Labs   Lab 12/18/24  1208 12/19/24  0711   RBC 4.21 3.90   HGB 12.5  11.7*   HCT 38.6 35.8   MCV 91.7 91.8   MCH 29.7 30.0   MCHC 32.4 32.7   RDW 15.4 15.6   NEPRELIM 14.50*  --    WBC 16.9* 11.5*   .0 288.0         Recent Labs   Lab 12/18/24  1208 12/19/24  0523   * 112*   BUN 20 6*   CREATSERUM 0.81 0.63   EGFRCR 85 104   CA 10.0 8.7   * 141   K 3.8 4.1    113*   CO2 22.0 14.0*         IMAGING:  CT ABDOMEN+PELVIS(CONTRAST ONLY)(CPT=74177)    Result Date: 12/18/2024  CONCLUSION:  No clear etiology of the patient's symptoms.  No free fluid is seen within the abdomen or pelvis.  If clinical symptoms persist then consider follow-up imaging.   LOCATION:  [place] [id number]   Dictated by (CST): Jose L Graham MD on 12/18/2024 at 1:42 PM     Finalized by (CST): Jose L Graham MD on 12/18/2024 at 1:46 PM             ASSESSMENT:  Ms. Walker is likely experiencing multifactorial alteration in mentation, difficult to ascertain a distinct cause in the setting of premorbid significant psychiatric comorbidity and report of medication noncompliance, marijuana use, and probable urinary tract infection.    PLAN:  Principal Problem:    Delirium  Active Problems:    Leukocytosis, unspecified type    Hypernatremia    Total body pain    No further urgent neurological diagnostics indicated.  I would recommend getting her back on her prescribed medication regimen, treating the urinary tract infection, avoiding nonprescription psychoactive substances (such as marijuana) and focusing on good supportive care, including quality and regularity of sleep, nutrition, hydration with water, physical activity as tolerated.  If she is not improving in spite of this then we can revisit the situation.  We will plan to follow-up with her tomorrow to check in on her progress.    Titus Woodruff MD

## 2024-12-19 NOTE — SLP NOTE
ADULT SWALLOWING EVALUATION    ASSESSMENT    ASSESSMENT/OVERALL IMPRESSION:  Patient is a 55 y/o female admitted with abdomen/flank pain and PMHx significant for asthma, GERD, fibromyalgia, seizures, and stroke. SLP order received to evaluate oropharyngeal swallow d/t failed RN dysphagia screen. Patient known to this service for prior dysphagia assessment completed 1/1/22 which recommended a soft & bite-sized diet with thin liquids. Today, patient received alert, but confused, in bed. She reported recent difficulty with post-prandial emesis, but denied overt s/s of aspiration with PO intake. Patient noted to have tremulous jaw movement and neck extension positioning throughout my visit.    PO trials of thin liquids and pureed solids attempted. However, evaluation was limited d/t patient inability to adequately participate. Bolus retrieval was impaired with incomplete labial seal resulting in anterior bolus loss. She exhibited bolus holding with any food/liquid that remained in oral cavity and was unable to initiate AP bolus transit despite cues. She eventually spit out food/liquid.    Recommend patient remain NPO at this time given her inability to initiate AP bolus transit and pharyngeal swallow at this time. Patient was unable to verbalize her reasoning for not wanting to swallow and appeared anxious throughout. SLP will continue to follow and attempted to re-evaluate as appropriate. Education provided re: recommendations.         RECOMMENDATIONS   Diet Recommendations - Solids: NPO  Diet Recommendations - Liquids: NPO                              Medication Administration Recommendations: Non-oral  Treatment Plan/Recommendations: SLP to reassess    HISTORY   MEDICAL HISTORY  Reason for Referral: RN dysphagia screen    Problem List  Principal Problem:    Delirium  Active Problems:    Leukocytosis, unspecified type    Hypernatremia    Total body pain      Past Medical History  Past Medical History:    Acne     Allergic rhinitis    Alopecia areata    Anxiety    Asthma ([facility])    Back problem    Bipolar affective (HCC)    Calculus of kidney    Depression    Fibromyalgia    GERD (gastroesophageal reflux disease)    Gestational diabetes ([facility])    Hemorrhoids    Hypothyroidism    Migraines    Nausea and vomiting, unspecified vomiting type    Organic hypersomnia, unspecified    AHI 2 RDI 2 REM AHI 2 SaO2 doroteo 88 %    Osteoarthritis    Pneumonia due to organism    Scoliosis    Seizure disorder ([facility])    absent seizures    Stroke ([facility])    2003+ mild R.sided weakness    Ulcerative colitis ([facility])    Visual impairment          Diet Prior to Admission: Regular;Thin liquids       Patient/Family Goals: none stated    SWALLOWING HISTORY  Current Diet Consistency: NPO  Dysphagia History: as above  Imaging Results:   CT A+P 12/18/24  CONCLUSION:  No clear etiology of the patient's symptoms.  No free fluid is seen within the abdomen or pelvis.  If clinical symptoms persist then consider follow-up imaging.         LOCATION:  [place] [address]         Dictated by (CST): Jose L Graham MD on 12/18/2024 at 1:42 PM       Finalized by (CST): Jose L Graham MD on 12/18/2024 at 1:46 PM     SUBJECTIVE       OBJECTIVE   ORAL MOTOR EXAMINATION  Dentition: Functional  Symmetry: Within Functional Limits  Strength: Unable to assess     Range of Motion: Unable to assess       Voice Quality: Clear  Respiratory Status: Unlabored  Consistencies Trialed: Thin liquids;Puree  Method of Presentation: Staff/Clinician assistance  Patient Positioned: Upright;Midline    Oral Phase of Swallow: Impaired  Bolus Retrieval: Impaired  Bilabial Seal: Impaired  Bolus Formation: Impaired  Bolus Propulsion: Impaired          Pharyngeal Phase of Swallow:  (unable to assess)           (Please note: Silent aspiration cannot be evaluated clinically. Videofluoroscopic Swallow Study is required to rule-out silent aspiration.)       Comments: d/w RN              GOALS  Goal #1 SLP to re-evaluate.  In  Progress   Goal #2     Goal #3     Goal #4     Goal #5     Goal #6     Goal #7     Goal #8       FOLLOW UP  Treatment Plan/Recommendations: SLP to reassess     Follow Up Needed (Documentation Required): Yes  SLP Follow-up Date: 12/20/24    Thank you for your referral.   If you have any questions, please contact MALLORY Gurrola

## 2024-12-19 NOTE — CONSULTS
[facility] Consult Note:  Medication List Evaluation for Delirium    Marylin Walker is a 54 year old patient admitted 12/18/2024 for abdominal pain and AMS.  Pharmacy has been consulted to evaluate the current medications for those that could be contributing to the presence of delirium by Dr Fairbanks.    Current Facility-Administered Medications   Medication Dose Route Frequency    cefTRIAXone (Rocephin) 2 g in sodium chloride 0.9% 100 mL IVPB-ADDV  2 g Intravenous Q24H    ondansetron (Zofran) 4 MG/2ML injection 4 mg  4 mg Intravenous Q6H PRN    ALPRAZolam (Xanax) tab 0.25 mg  0.25 mg Oral Once    aspirin chewable tab 81 mg  81 mg Oral Daily    amLODIPine (Norvasc) tab 5 mg  5 mg Oral Daily    levothyroxine (Synthroid) tab 125 mcg  125 mcg Oral Before breakfast    metoprolol succinate ER (Toprol XL) 24 hr tab 50 mg  50 mg Oral Daily Beta Blocker    pantoprazole (Protonix) DR tab 40 mg  40 mg Oral Before breakfast    ketorolac (Toradol) 15 MG/ML injection 15 mg  15 mg Intravenous Q6H PRN    Or    ketorolac (Toradol) 30 MG/ML injection 30 mg  30 mg Intravenous Q6H PRN    haloperidol lactate (Haldol) 5 MG/ML injection 2 mg  2 mg Intravenous Once PRN    LORazepam (Ativan) 2 mg/mL injection 0.5 mg  0.5 mg Intravenous Once PRN    Or    LORazepam (Ativan) 2 mg/mL injection 1 mg  1 mg Intravenous Once PRN    heparin (Porcine) 5000 UNIT/ML injection 5,000 Units  5,000 Units Subcutaneous Q8H DUNIA    metoprolol (Lopressor) 5 mg/5mL injection 5 mg  5 mg Intravenous Q6H PRN    sodium chloride 0.9% infusion   Intravenous Continuous    influenza virus trivalent PF (Fluzone Trivalent) 0.5 mL IM injection (ages 6 months to 64 years) 0.5 mL  0.5 mL Intramuscular Prior to discharge    acetaminophen (Tylenol Extra Strength) tab 1,000 mg  1,000 mg Oral Once       Assessment:       Based on review of the above medications, the following have been reported to cause or prolong delirium:        (1)  clonazepam (any benzodiazepine)        (2)  bupropion (especially when combined with other medications that can increase the reuptake of dopamine       (3)  carisoprodol (especially if withdrawn quickly)       (4)  levetiracetam (most anti-epileptic medications)       (5)  lithium       (6)  oxcarbazepine       (7) quetiapine (rare, due to anti-cholinergic effects or in cases of overdose)       (8) pregabalin (rare)       (9) sertraline (case reports; rare)    Thank you for the consult.  Please let us know if pharmacy can provide further assistance in the care of this patient.    Jennyfer Farnsworth, PharmD  12/19/2024  8:26 AM

## 2024-12-19 NOTE — CONSULTS
[facility]  Report of Psychiatric Consultation    Marylin Walker Patient Status:  Inpatient    1970 MRN [id number]   [facility] 3NE-A Attending Luis Fairbanks MD    Day # 1 PCP Yoel Servin MD     Date of Admission: 2024  Date of Consult: 2024   Reason for Consultation: Second opinion     Impression: Patient is a 54-year-old female who presented to the ED yesterday.     Primary Psychiatric Diagnosis:  Delirium, multifactorial likely due to possible UTI, recent GI illness, poor nutritional status, etc.     Generalized anxiety disorder     Bipolar 2 disorder, current episode depressed, mild     Personality Traits: Deferred      Pertinent Medical Diagnoses: leukocytosis, recent GI illness (stool sample still needed), possible UTI, seizure disorder    Recommendations:  While strict NPO, initiate Ativan 1 mg Q6H PRN anxiety/agitation as ordered by Dr. Reyes.     Resume home psychiatric medications as reported by patient's sister once safe to take PO:   Seroquel 300 mg nightly   Klonopin 0.5 mg BID PRN   Lithium  mg nightly  Wellbutrin  mg daily  Trileptal 450 mg BID  Zoloft 150 mg daily   Lamotrigine 200 mg TID      Continue non-pharm delirium care: lights on, blinds open, and awake during the day; lights off, dark, and quiet environment at night. Frequent reorientation, cognitive stimulation when hypoactive, and decreased stimulation when agitated. PT when appropriate. Avoid/limit sedating medications if possible.    Patient is not in need of inpatient psychiatric care at this time. Expect her mental status to improve once delirium resolves and once she can resume her psychiatric medications.     Psychiatry will continue to follow.     Edie Orozco, YELENA, PMHNP-BC     History of Present Illness:  Patient is a 54-year-old female with bipolar 2 disorder who presented to the ED yesterday due to abdominal pain. Patient's mom reported that patient  was unable to take her medications for the past few days due to feeling nauseas at home. Patient does have a recent history of Lithium toxicity in March of 2024. While in the ED, lithium level was noted to be <0.2. Due to altered mental status, an ammonia level was checked which was negative. Psychiatry consulted today for second opinion.      At the time of visit this morning, patient describes her mood as \"just down right now\". She is tremulous at times and does frequently repeat \"I'm sorry\". She reports that we are in a nursing home and repeats \"I don't know\" when asked about the year or what brought her here. When reminded that she came to the hospital due to abdominal pain, she does not recall this. She does report feeling increased symptoms of depression for the past few weeks but denies feeling hopeless or having any thoughts that life is not worth living. She does confirm increased anxiety recently and endorses panic attacks but is unable to state when they occurred or how she was feeling. She does present as anxious and frequently tenses her neck during the conversation. She confirms a previous diagnosis of bipolar 2 disorder but is unable to state if she has had any recent hypomania symptoms. She denies any hallucinations but does endorse feeling that sometimes people are coming after her. Confirms that she feels safe while in the hospital. Reports that she was compliant with medications at home, however per reports this is not the case.     Did call patient's mother, Marina, for collateral information as patient is currently a poor historian. Marina reports that she received a call yesterday from patient's oldest son, Papo, who lives with her. Papo reported that patient appeared very out of it and was noted to be mumbling, crying, and incoherent. Marina reports that she last saw patient on Friday and did appear at baseline then. Over the past few months, Marina does endorse patient undergoing  significant stress at home. In July, Papo collapsed and required intubation and discharge to a rehab facility. Also called patient's sister, Kevin, to confirm current psychiatric medications as Kevin dispenses medications to patient. Kevin began dispensing medications to patient recently when her short-term memory got worse. Kevin does report that patient has not taken any medications since Monday morning but also reports that patient has not taken any Lithium in 4-5 days. This is due to the abdominal pain and nausea first starting about 1.5 weeks ago. Kevin saw patient on Saturday and Sunday and reported that she felt weak from being sick but overall appeared to be acting at baseline.     Patient seen again this afternoon with Dr. Reyes. She is up in the chair and is able to state that we are in [facility]. Again frequently responds with \"I don't know\" when asked to state the days of the week backwards or when asked what brought her to the hospital. At one point, she is noted to ruminate on something stuck in her chest and repeats \"help me\". She denies suicidal ideations. Does confirm a previous diagnosis of bipolar disorder but states she was diagnosed recently when she was actually initially diagnosed in 2003.     Past Psychiatric History:  Patient's mother, Marina, reports that patient sees a psychiatrist through [facility] every 3 months. Sister, Kevin, denies any recent inpatient psychiatric hospitalization in the past few years. Per Dr. Vinson's psychiatric evaluation in March 2024, \"Bipolar 2 disorder per psych notes from [place]. She saw her 1st psychotherapist at age 6 and was first hospitalized for depression at age 13. She has a hx of 4 suicide attempts by OD as a teenager. She has a hx of 10+ inpatient psych hospitalizations. In 2003, she was dx with Bipolar 2 disorder and with med changes has not needed the inpatient psych unit since then. Her psychiatrist Dr. Seferino Fernandez and psychotherapist  Ofelia Wells are both at [facility]. See Care Everywhere.\"    Substance Use History:  Sister reports patient uses cannabis from the dispensary on a daily basis. She is unsure of the amount but states she smokes at least once daily. BAL <3. UDS positive for benzodiazepines and cannabinoids.     Psych Family History:  Denies.     Social and Developmental History:   She is . Lives with her oldest son in an apartment. Has two sons. She is on disability currently and is not working. Did previously work in a grocery store bakery.     Past Medical History:    Acne    Allergic rhinitis    Alopecia areata    Anxiety    Asthma ([facility])    Back problem    Bipolar affective (HCC)    Calculus of kidney    Depression    Fibromyalgia    GERD (gastroesophageal reflux disease)    Gestational diabetes ([facility])    Hemorrhoids    Hypothyroidism    Migraines    Nausea and vomiting, unspecified vomiting type    Organic hypersomnia, unspecified    AHI 2 RDI 2 REM AHI 2 SaO2 doroteo 88 %    Osteoarthritis    Pneumonia due to organism    Scoliosis    Seizure disorder ([facility])    absent seizures    Stroke ([facility])    2003+ mild R.sided weakness    Ulcerative colitis ([facility])    Visual impairment     Past Surgical History:   Procedure Laterality Date    Cholecystectomy  03/2010    Colonoscopy  2013    ulcerative colitis    Colonoscopy N/A 04/25/2017    Procedure: COLONOSCOPY;  Surgeon: Hipolito Dobbins MD;  Location:  ENDOSCOPY    Colonoscopy N/A 06/14/2021    Procedure: COLONOSCOPY with biopsy,;  Surgeon: Ramsey Muhammad MD;  Location:  ENDOSCOPY    Colposcopy, cervix w/upper adjacent vagina; w/biopsy(s), cervix  1999    Hernia surgery  1997    Umbilical Hernia Repair    Other surgical history N/A 06/21/2016    Procedure: ESOPHAGOGASTRODUODENOSCOPY (EGD);  Surgeon: Jeff Scherer MD;  Location: [facility]    [place], iud  06/2014    Upper gi endoscopy,exam       Family History   Problem Relation Age of Onset    Breast Cancer Maternal  Grandmother         81    Breast Cancer Other         dx post menopausal    High Blood Pressure Father     Colon Cancer Father         59    Colon Polyps Father     Hypertension Father     High Blood Pressure Mother     High Cholesterol Mother     Hypertension Mother     Mental Disorder Mother         Depression    Heart Disorder Sister 54        MI - LAD stenting    Heart Attack Sister         12/2018 \" maker\"    Mental Disorder Son         ADHD, Bipolar II    Mental Disorder Son         Severe anxiety, Bipolar II      reports that she quit smoking about 31 years ago. Her smoking use included cigarettes. She has never used smokeless tobacco. She reports that she does not currently use alcohol. She reports current drug use. Frequency: 2.00 times per week. Drug: Cannabis.    Allergies:  Allergies[1]    Medications:    Current Facility-Administered Medications:     cefTRIAXone (Rocephin) 2 g in sodium chloride 0.9% 100 mL IVPB-ADDV, 2 g, Intravenous, Q24H    ondansetron (Zofran) 4 MG/2ML injection 4 mg, 4 mg, Intravenous, Q6H PRN    ALPRAZolam (Xanax) tab 0.25 mg, 0.25 mg, Oral, Once    aspirin chewable tab 81 mg, 81 mg, Oral, Daily    amLODIPine (Norvasc) tab 5 mg, 5 mg, Oral, Daily    levothyroxine (Synthroid) tab 125 mcg, 125 mcg, Oral, Before breakfast    metoprolol succinate ER (Toprol XL) 24 hr tab 50 mg, 50 mg, Oral, Daily Beta Blocker    pantoprazole (Protonix) DR tab 40 mg, 40 mg, Oral, Before breakfast    ketorolac (Toradol) 15 MG/ML injection 15 mg, 15 mg, Intravenous, Q6H PRN **OR** ketorolac (Toradol) 30 MG/ML injection 30 mg, 30 mg, Intravenous, Q6H PRN    haloperidol lactate (Haldol) 5 MG/ML injection 2 mg, 2 mg, Intravenous, Once PRN    LORazepam (Ativan) 2 mg/mL injection 0.5 mg, 0.5 mg, Intravenous, Once PRN **OR** LORazepam (Ativan) 2 mg/mL injection 1 mg, 1 mg, Intravenous, Once PRN    heparin (Porcine) 5000 UNIT/ML injection 5,000 Units, 5,000 Units, Subcutaneous, Q8H DUNIA    metoprolol  (Lopressor) 5 mg/5mL injection 5 mg, 5 mg, Intravenous, Q6H PRN    sodium chloride 0.9% infusion, , Intravenous, Continuous    influenza virus trivalent PF (Fluzone Trivalent) 0.5 mL IM injection (ages 6 months to 64 years) 0.5 mL, 0.5 mL, Intramuscular, Prior to discharge    acetaminophen (Tylenol Extra Strength) tab 1,000 mg, 1,000 mg, Oral, Once    Review of Systems   Unable to perform ROS: Psychiatric disorder     Psychiatry Review Systems: No voices or visions. No elevated mood, racing thoughts, decreased need for sleep, grandiose thoughts, increased participation in risky behaviors, or history of trauma.     Mental Status Exam:     Risk Assessment  Suicidal ideation: no suicidal ideation  Homicidal ideation: None noted    Appearance: disheveled  Behavior: tremors/tics at times   Attitude: cooperative  Gait: Not observed     Speech: normal rate, rhythm and volume    Mood: sad and anxious  Affect: Congruent    Thought process: illogical at times   Thought content: paranoid ideation and ruminations  Perceptions: no hallucinations  Associations: Intact    Orientation: Alert and oriented x 1  Attention and Concentration:   difficulty attending to interview  Memory: impaired   Language: Intact naming and repetition  Fund of Knowledge: Not assessed     Insight: limited   Judgment: limited     Objective    Vitals:    12/19/24 0500   BP: (!) 174/94   Pulse: 91   Resp: 18   Temp: 98.6 °F (37 °C)     Suicide Risk Assessments:  Overall level of suicide risk is low      Risk Factors: bipolar disorder diagnosis, increased anxiety and depression currently, history of suicide attempts      Environmental Factors: lives with son, increased stress at home, currently not taking psychiatric medications     Protective Factors: family     Laboratory Data:  Lab Results   Component Value Date    WBC 11.5 12/19/2024    HGB 11.7 12/19/2024    HCT 35.8 12/19/2024    .0 12/19/2024    CREATSERUM 0.63 12/19/2024    BUN 6 12/19/2024      12/19/2024    K 4.1 12/19/2024     12/19/2024    CO2 14.0 12/19/2024     12/19/2024    CA 8.7 12/19/2024    ALB 5.1 12/18/2024    ALKPHO 100 12/18/2024    BILT 0.4 12/18/2024    TP 7.5 12/18/2024    AST 40 12/18/2024    ALT 22 12/18/2024    LIP 25 12/18/2024    MG 1.8 12/19/2024    ETOH <3 12/18/2024       STUDIES:    Edie Orozco, APRN, PMHNP-BC  12/19/2024  8:31 AM         [1]   Allergies  Allergen Reactions    Depakote [Valproic Acid] OTHER (SEE COMMENTS)     Suicidal ideation    Kdc:Olanzapine SWELLING    Ketorolac Tromethamine OTHER (SEE COMMENTS) and NAUSEA AND VOMITING     gastritis  Gastritis & UC  gastritis  Gastritis & UC      Latex RASH and Dyspnea    Molds & Smuts ASTHMA, ITCHING, RASH, SHORTNESS OF BREATH, WHEEZING and OTHER (SEE COMMENTS)    Nsaids OTHER (SEE COMMENTS)     Must take Nsaids sparingly due to history of gastritis  Must take Nsaids sparingly due to history of gastritis      Olanzapine SWELLING     Caused severe swelling and bruising    Penicillins NAUSEA AND VOMITING    Phenothiazines OTHER (SEE COMMENTS)     Extrapyramidal syndrome. No phenothiazines per neurology (documented during 1/19/18 Dr. Velázquez)  Extrapyramidal syndrome. No phenothiazines per neurology (documented during 1/19/18 Dr. Velázquez)  Extrapyramidal syndrome. No phenothiazines per neurology (documented during 1/19/18 Dr. Velázquez)    Amoxicillin NAUSEA AND VOMITING    Levofloxacin PAIN and OTHER (SEE COMMENTS)     Tendinitis in multiple joints  Tendinitis in multiple joints    Mushrooms NAUSEA AND VOMITING    Penicillin G Potassium NAUSEA AND VOMITING    Phenytoin ANXIETY, CONFUSION, DIZZINESS and HALLUCINATION    Sulfa Antibiotics NAUSEA AND VOMITING    Toradol NAUSEA AND VOMITING     Gastritis & UC    Trees, [place] OTHER (SEE COMMENTS)     Sneezing watery eyes    Triptans HALLUCINATION     Lesions in brain    Erythromycin OTHER (SEE COMMENTS)

## 2024-12-19 NOTE — DIETARY NOTE
[facility]   part of [facility]   CLINICAL NUTRITION    Marylin Walker     Admitting diagnosis:  Delirium [R41.0]  Total body pain [R52]  Leukocytosis, unspecified type [D72.829]    Ht:    Wt: 86.2 kg (190 lb 0.6 oz).   Body mass index is 33.66 kg/m².  IBW: 52.3 kg    Wt Readings from Last 6 Encounters:   12/18/24 86.2 kg (190 lb 0.6 oz)   07/09/24 86.2 kg (190 lb)   03/24/24 84.5 kg (186 lb 4.6 oz)   03/18/24 89.4 kg (197 lb)   03/17/24 90 kg (198 lb 6.6 oz)   03/15/24 90.3 kg (199 lb 1.2 oz)        Labs/Meds reviewed    Diet:       Procedures    NPO     Percent Meals Eaten (last 3 days)       None              Pt chart reviewed d/t MST2. 55 y/o female with PMH bipolar, depression, kidney stones, GERD, gestational diabetes presents with AMS, abd pain. Pt visited stated she is unsure of any recent weight loss. Per chart review, pt's weight is stable, no significant weight loss recorded. Pt eating 3 meals per day PTA. Pt seen by SLP this AM, recommends NPO at this time d/t dysphagia. Will monitor diet advancement and follow up as appropriate.     Patient reports good appetite at this time.  Nursing notes reports   intake for last meal.  Tolerating po diet without diarrhea, emesis, or constipation.   No significant weight changes noted.     Patient is at low nutrition risk at this time.    Please consult if patient status changes or nutrition issues arise.      Dina Simpson, MS, RDN, LDN  Clinical Dietitian

## 2024-12-19 NOTE — H&P
General Medicine H&P     Chief Complaint   Patient presents with    Abdomen/Flank Pain        PCP: Yoel Servin MD    History of Present Illness: Patient is a 54 year old female with PMH including but not limited to allergic rhinitis, anxiety, depression, bipolar, asthma, history of kidney stones, fibromyalgia, GERD, gestational diabetes, hypothyroidism, history of migraines, osteoarthritis, history of seizures, history of stroke with no residual symptoms, history ulcer colitis presenting with AMS.     History unclear, per report, some possible abd pain/body hurting all over. CT in ED no obvious pathology. Admitted for further monitoring.     Admitted 3/24/2024-3/27/2024 for n/v.       Past Medical History:    Acne    Allergic rhinitis    Alopecia areata    Anxiety    Asthma (HCC)    Back problem    Bipolar affective (HCC)    Calculus of kidney    Depression    Fibromyalgia    GERD (gastroesophageal reflux disease)    Gestational diabetes (HCC)    Hemorrhoids    Hypothyroidism    Migraines    Nausea and vomiting, unspecified vomiting type    Organic hypersomnia, unspecified    AHI 2 RDI 2 REM AHI 2 SaO2 doroteo 88 %    Osteoarthritis    Pneumonia due to organism    Scoliosis    Seizure disorder (HCC)    absent seizures    Stroke (HCC)    2003+ mild R.sided weakness    Ulcerative colitis (HCC)    Visual impairment      Past Surgical History:   Procedure Laterality Date    Cholecystectomy  03/2010    Colonoscopy  2013    ulcerative colitis    Colonoscopy N/A 04/25/2017    Procedure: COLONOSCOPY;  Surgeon: Hipolito Dobbins MD;  Location:  ENDOSCOPY    Colonoscopy N/A 06/14/2021    Procedure: COLONOSCOPY with biopsy,;  Surgeon: Ramsey Muhammad MD;  Location:  ENDOSCOPY    Colposcopy, cervix w/upper adjacent vagina; w/biopsy(s), cervix  1999    Hernia surgery  1997    Umbilical Hernia Repair    Other surgical history N/A 06/21/2016    Procedure: ESOPHAGOGASTRODUODENOSCOPY (EGD);  Surgeon: Jeff Scherer,  MD;  Location:  ENDOSCOPY    Shaheed iud  2014    Upper gi endoscopy,exam          ALL:  Allergies[1]     cefTRIAXone, 2 g, Q24H  ALPRAZolam, 0.25 mg, Once  aspirin, 81 mg, Daily  amLODIPine, 5 mg, Daily  levothyroxine, 125 mcg, Daily  metoprolol succinate ER, 50 mg, Daily  pantoprazole, 40 mg, Before breakfast  acetaminophen, 1,000 mg, Once        Social History     Tobacco Use    Smoking status: Former     Current packs/day: 0.00     Types: Cigarettes     Quit date: 1993     Years since quittin.8    Smokeless tobacco: Never    Tobacco comments:     Ages [age], [age]   Substance Use Topics    Alcohol use: Not Currently        Fam Hx  Family History   Problem Relation Age of Onset    Breast Cancer Maternal Grandmother         81    Breast Cancer Other         dx post menopausal    High Blood Pressure Father     Colon Cancer Father         59    Colon Polyps Father     Hypertension Father     High Blood Pressure Mother     High Cholesterol Mother     Hypertension Mother     Mental Disorder Mother         Depression    Heart Disorder Sister 54        MI - LAD stenting    Heart Attack Sister         2018 \" maker\"    Mental Disorder Son         ADHD, Bipolar II    Mental Disorder Son         Severe anxiety, Bipolar II       Review of Systems  Comprehensive ROS reviewed and negative except for what's stated above.     OBJECTIVE:  BP (!) 174/94 (BP Location: Right arm)   Pulse 91   Temp 98.6 °F (37 °C) (Axillary)   Resp 18   Wt 190 lb 0.6 oz (86.2 kg)   LMP 2015 (Approximate)   SpO2 95%   BMI 33.66 kg/m²     General:  Flat affect, no distress   Head:  Normocephalic, without obvious abnormality, atraumatic.   Eyes:  Sclera anicteric, EOMs intact.    Nose: Nares normal,  Mucosa normal    Throat: Lips normal   Neck: Supple, symmetrical, trachea midline   Lungs:   Clear to auscultation bilaterally. Normal effort   Chest wall:  No tenderness or deformity   Heart:  Regular rate and rhythm,  S1, S2 normal, no murmur, rub or gallop appreciated   Abdomen:   Soft, tenderness reported, Bowel sounds normal. No masses,  No organomegaly.    Extremities/MSK: Extremities normal/normal movement, atraumatic, no cyanosis  or edema.   Skin: Skin color, texture, turgor normal. No rashes or lesions.    Neurologic: Moving all extremities spontaneously, no focal deficit appreciated.   Unable to follow commands for full neuro exam               LABS:   Lab Results   Component Value Date    WBC 16.9 12/18/2024    HGB 12.5 12/18/2024    HCT 38.6 12/18/2024    .0 12/18/2024    CREATSERUM 0.63 12/19/2024    BUN 6 12/19/2024     12/19/2024    K 4.1 12/19/2024     12/19/2024    CO2 14.0 12/19/2024     12/19/2024    CA 8.7 12/19/2024    ALB 5.1 12/18/2024    ALKPHO 100 12/18/2024    BILT 0.4 12/18/2024    TP 7.5 12/18/2024    AST 40 12/18/2024    ALT 22 12/18/2024    LIP 25 12/18/2024    MG 1.8 12/19/2024       Radiology: CT ABDOMEN+PELVIS(CONTRAST ONLY)(CPT=74177)    Result Date: 12/18/2024  PROCEDURE:  CT ABDOMEN+PELVIS (CONTRAST ONLY) (CPT=74177)  COMPARISON:  [facility] , CT, CT ABDOMEN+PELVIS(CONTRAST ONLY)(CPT=74177), 3/24/2024, 6:14 PM.  INDICATIONS:  Abdominal pain and nausea with history of colitis  TECHNIQUE:  CT scanning was performed from the dome of the diaphragm to the pubic symphysis with non-ionic intravenous contrast material. Post contrast coronal MPR imaging was performed.  Dose reduction techniques were used. Dose information is transmitted to the ACR (American College of Radiology) NRDR (National Radiology Data Registry) which includes the Dose Index Registry.  PATIENT STATED HISTORY:(As transcribed by Technologist)  Patient is here for general abdominal pain w/nausea. Patient has a hx of colitis.   CONTRAST USED:  100cc of Isovue 370  FINDINGS:  The exam is somewhat limited due to motion and lack of oral contrast. LIVER:  No enlargement, atrophy, abnormal density, or significant focal  lesion.  BILIARY:  Postsurgical changes of cholecystectomy noted. PANCREAS:  Visualized portions are unremarkable.  No pancreatic ductal dilatation is seen.  Motion artifact somewhat limits assessment. SPLEEN:  No enlargement or focal lesion.  KIDNEYS:  No mass, obstruction, or calcification.  ADRENALS:  No mass or enlargement.  AORTA/VASCULAR:  No aneurysm or dissection.  RETROPERITONEUM:  No mass or adenopathy.  BOWEL/MESENTERY:  No dilated loops of small bowel are seen.  Much of the bowel is decompressed, limiting assessment.  Lack of oral contrast also limits assessment.  No evidence of obstruction.  There is a normal-appearing appendix. ABDOMINAL WALL:  Small bilateral inguinal hernias containing fat. URINARY BLADDER:  No visible focal wall thickening, lesion, or calculus.  PELVIC NODES:  No adenopathy.  PELVIC ORGANS:  Unremarkable CT appearance.  Please note that uterus and ovaries are not well assessed with CT. BONES:  No bony lesion or fracture.  LUNG BASES:  No visible pulmonary or pleural disease.  OTHER:  Negative.             CONCLUSION:  No clear etiology of the patient's symptoms.  No free fluid is seen within the abdomen or pelvis.  If clinical symptoms persist then consider follow-up imaging.   LOCATION:  [id number]   Dictated by (CST): Jose L Graham MD on 12/18/2024 at 1:42 PM     Finalized by (CST): Jose L Graham MD on 12/18/2024 at 1:46 PM            ASSESSMENT / PLAN:      54 year old female with PMH including but not limited to allergic rhinitis, anxiety, depression, bipolar, asthma, history of kidney stones, fibromyalgia, GERD, gestational diabetes, hypothyroidism, history of migraines, osteoarthritis, history of seizures, history of stroke with no residual symptoms, history ulcer colitis presenting with AMS.       AMS  - multifactorial, possibly related to infxn, psych hx, meds (lack of taking and/or polypharmacy)  - no obvious CVA sxs but will have neuro eval  - PT/OT/SLP  - send ammonia  - pharm c/s  to review meds  - psych c/s, holding meds until eval but resume as appropriate once reviewed  - check levels of meds as needed    - has been using a significant amount of cannabinoids per report also  - after Haldol Benadryl and Ativan, patient improved and more interactive per ED       Possible UTI  - started on CTX  - blood cxs, PCT  - CMP mostly ok, follow AST  - WBC 16.9, tollow temps  - UDS noted       Anxiety  Depression  Bipolar  - psych eval  -bupropion  -sertraline  -clonazepam  -lamotrigine  -oxcarbazepine  -lithium, levels undetectable on admission         Hypothyroidism  -resume home synthroid      Asthma  -no active sx  -continue inhalers     GERD  -pantoprazole      HTN  -sbp elevated   -amlodipine, BB  - add prns if still up     Seizure  -lamictal  -Keppra  -trileptal  - neuro eval r/o Seizure activity if needed c EEG      UC  -mealamine     Hx CVA  -asa  -statin       # Proph  - sqh      D/w Sherley De Jesus MD and RN     Luis Fairbanks MD  [facility] Hospitalist  Pager: [phone]  12/18/2024  7:26 PM         [1]   Allergies  Allergen Reactions    Depakote [Valproic Acid] OTHER (SEE COMMENTS)     Suicidal ideation    Kdc:Olanzapine SWELLING    Ketorolac Tromethamine OTHER (SEE COMMENTS) and NAUSEA AND VOMITING     gastritis  Gastritis & UC  gastritis  Gastritis & UC      Latex RASH and Dyspnea    M[employer]uts ASTHMA, ITCHING, RASH, SHORTNESS OF BREATH, WHEEZING and OTHER (SEE COMMENTS)    Nsaids OTHER (SEE COMMENTS)     Must take Nsaids sparingly due to history of gastritis  Must take Nsaids sparingly due to history of gastritis      Olanzapine SWELLING     Caused severe swelling and bruising    Penicillins NAUSEA AND VOMITING    Phenothiazines OTHER (SEE COMMENTS)     Extrapyramidal syndrome. No phenothiazines per neurology (documented during 1/19/18 Dr. Velázquez)  Extrapyramidal syndrome. No phenothiazines per neurology (documented during 1/19/18 Dr. Velázquez)  Extrapyramidal syndrome.  No phenothiazines per neurology (documented during 1/19/18 Dr. Velázquez)    Amoxicillin NAUSEA AND VOMITING    Levofloxacin PAIN and OTHER (SEE COMMENTS)     Tendinitis in multiple joints  Tendinitis in multiple joints    Mushrooms NAUSEA AND VOMITING    Penicillin G Potassium NAUSEA AND VOMITING    Phenytoin ANXIETY, CONFUSION, DIZZINESS and HALLUCINATION    Sulfa Antibiotics NAUSEA AND VOMITING    Toradol NAUSEA AND VOMITING     Gastritis & UC    Trees, [place] OTHER (SEE COMMENTS)     Sneezing watery eyes    Triptans HALLUCINATION     Lesions in brain    Erythromycin OTHER (SEE COMMENTS)

## 2024-12-19 NOTE — OCCUPATIONAL THERAPY NOTE
OCCUPATIONAL THERAPY EVALUATION - INPATIENT     Room Number: 3614/3614-A  Evaluation Date: 12/19/2024  Type of Evaluation: Initial  Presenting Problem: Delirium, total body pain, generalized anxiety disorder    Physician Order: IP Consult to Occupational Therapy  Reason for Therapy: ADL/IADL Dysfunction and Discharge Planning    OCCUPATIONAL THERAPY ASSESSMENT   Patient is currently functioning below baseline with toileting, bathing, lower body dressing, grooming, bed mobility, transfers, static sitting balance, dynamic sitting balance, static standing balance, dynamic standing balance, maintaining seated position, functional standing tolerance, and energy conservation strategies. Prior to admission, patient's baseline is mod I.  Patient is requiring maximum assistance as a result of the following impairments: decreased functional reach, pain, impaired dynamic standing balance, impaired coordination, impaired motor planning, difficulty maintaining precautions, cognitive deficits (attention, orientation, motor planning), decreased insight to deficits, and decreased safety awareness. Occupational Therapy will continue to follow for duration of hospitalization.    Patient will benefit from continued skilled OT Services to promote return to prior level of function and safety with continuous assistance and gradual rehabilitative therapy    History Related to Current Admission: Patient is a 54 year old female admitted on 12/18/2024 with Presenting Problem: Delirium, total body pain, generalized anxiety disorder. Co-Morbidities : allergic rhinitis, anxiety, depression, bipolar, asthma, history of kidney stones, fibromyalgia, GERD, gestational diabetes, hypothyroidism, history of migraines, osteoarthritis, history of seizures, history of stroke with no residual symptoms, history ulcer colitis    Imaging  CT ABDOMEN+PELVIS(CONTRAST ONLY)(CPT=74177)  Result Date: 12/18/2024  CONCLUSION:  No clear etiology of the patient's  symptoms.  No free fluid is seen within the abdomen or pelvis.     Recommendations for nursing staff:   Transfers: two person and KELSIE or Sherley Cheung   Toileting location: commode    EVALUATION SESSION:  Patient Start of Session: supine    FUNCTIONAL TRANSFER ASSESSMENT  Sit to Stand: Edge of Bed; Chair  Edge of Bed: Moderate Assist  Chair: Moderate Assist  Toilet Transfer: Moderate Assist    BED MOBILITY  Rolling: Minimal Assist  Supine to Sit : Minimal Assist  Sit to Supine (OT): Minimal Assist  Scooting: min A    BALANCE ASSESSMENT  Static Sitting: Supervision  Static Standing: Moderate Assist    FUNCTIONAL ADL ASSESSMENT  Eating: Moderate Assist  Grooming Standing: Moderate Assist  LB Dressing Seated: Moderate Assist  Toileting Seated: Moderate Assist    ACTIVITY TOLERANCE:   BP: (!) 168/99  sitting    O2 SATURATIONS  Oxygen Therapy  SPO2% on Oxygen at Rest: 95  SPO2% Ambulation on Room Air: 95    COGNITION  Arousal/Alertness:  appropriate responses to stimuli  Attention Span:  attends with cues to redirect, difficulty attending to directions, and difficulty dividing attention  Orientation Level:  oriented to place, oriented to person, disoriented to time, and disoriented to situation  Memory:  impaired working memory, decreased recall of precautions, and decreased recall of recent events  Following Commands:  follows one step commands with increased time and follows one step commands with repetition  Initiation: cues to initiate tasks  Motor Planning: impaired  Perseveration: not present  Safety Judgement:  decreased awareness of need for safety  Awareness of Errors:  decreased awareness of errors   Awareness of Deficits:  decreased awareness of deficits  Problem Solving:  assistance required to identify errors made, assistance required to generate solutions, and assistance required to implement solutions    COGNITION ASSESSMENTS     VISION  Patient reports she wears glasses  Patient was unable to read clock on  wall on right or vitals monitor on left.    Patient unable to verbalize what issues she is having with vision and states she does not know.    PERCEPTION  Left Attention:   intact  Right Attention:   intact  Position in Space:   impaired    Communication: Able to make needs known    Behavioral/Emotional/Social: pleasant, cooperative; easily distracted    UPPER EXTREMITY  ROM: within functional limits except for the following:  Right Shoulder flexion 3/4 active; full PROM  Left Shoulder flexion 3/4 active: full ROM  Strength: is within functional limits except for the following;  Right Shoulder flexion  3+/5  Left Shoulder flexion  3+/5  Coordination  Gross motor: impaired  Fine motor: impaired   Sensation: Light touch:  intact  Proprioception:  intact  Stereognosis:  intact    Neurological findings:  Neurological Findings: Coordination - Finger to Nose;Coordination - Rapid Alternating Movement;Coordination - Finger Opposition  Coordination - Finger to Nose: Left decreased speed;Right decreased speed;Right decreased accuracy  Coordination - Rapid Alternating Movement: Left decreased speed;Right decreased speed;Right decreased accuracy  Coordination - Finger Opposition: Left decreased speed;Right decreased speed;Right decreased accuracy       EDUCATION PROVIDED  Patient Education : Plan of Care; Role of Occupational Therapy; Discharge Recommendations; DME Recommendations; Fall Prevention; Functional Transfer Techniques; Energy Conservation; Proper Body Mechanics; Posture/Positioning  Patient's Response to Education: Verbalized Understanding; Requires Further Education    Equipment used: RW  Demonstrates functional use, Would benefit from additional trial      Therapist comments: OT educated patient on safety,  sequencing, energy conservation, pain management, home modifications and adaptive equipment to increase independence with ADLs.    Patient w/ difficulty answering any questions d/t easily distracted.  Patient's  attention improved once door was closed and TV was turned off.  Patient frequently needs multiple choice to answer questions.  Attempted formal cognitive testing, however patient unable to attend to testing.    Noted right hand kept in flexed posture along with wrist flexion.  Patient is able to open and hold extension of digits and wrist without external support.    Patient w/ posterior lean while standing.  Mod A and use of walker to transfer from EOB to chair.    RUE>LUE, face and eyes w/ jerky movement and twitching movements.  Noted R>L eyes w/ nystagmus  Patient End of Session: Up in chair;Needs met;Call light within reach;RN aware of session/findings;All patient questions and concerns addressed;Alarm set;Discussed recommendations with /    OCCUPATIONAL PROFILE    HOME SITUATION  Type of Home: House  Home Layout: Two level  Lives With: Parent(s)    Toilet and Equipment: Standard height toilet  Shower/Tub and Equipment: Walk-in shower       Occupation/Status: not working  Hand Dominance: Right  Drives: Yes  Patient Regularly Uses: Reading glasses;Rolling walker    Prior Level of Function: Mod independent with all ADLs and functional mobility with use of cane.  Patient reports she typically does not need assist from anyone at home.      SUBJECTIVE   PAIN ASSESSMENT  Rating: Unable to rate  Location: abdominal, back, headache  Management Techniques: Nurse notified;Repositioning    OBJECTIVE  Precautions: Bed/chair alarm  Fall Risk: High fall risk    ASSESSMENTS  AM-PAC ‘6-Clicks’ Inpatient Daily Activity Short Form  -   Putting on and taking off regular lower body clothing?: A Lot  -   Bathing (including washing, rinsing, drying)?: A Lot  -   Toileting, which includes using toilet, bedpan or urinal? : A Lot  -   Putting on and taking off regular upper body clothing?: A Lot  -   Taking care of personal grooming such as brushing teeth?: A Lot  -   Eating meals?: A Lot    AM-PAC  Score:  Score: 12  Approx Degree of Impairment: 66.57%  Standardized Score (AM-PAC Scale): 30.6    ADDITIONAL TESTS     NEUROLOGICAL FINDINGS  Coordination - Finger to Nose: Left decreased speed; Right decreased speed; Right decreased accuracy  Coordination - Rapid Alternating Movement: Left decreased speed; Right decreased speed; Right decreased accuracy  Coordination - Finger Opposition: Left decreased speed; Right decreased speed; Right decreased accuracy     PLAN  OT Device Recommendations: TBD  OT Treatment Plan: Balance activities;Energy conservation/work simplification techniques;ADL training;Functional transfer training;UE strengthening/ROM;Endurance training;Patient/Family education;Patient/Family training;Cognitive reorientation;Equipment eval/education;Compensatory technique education  Rehab Potential : Good  Frequency: 3-5x/week  Number of Visits to Meet Established Goals: 7    ADL GOALS:  Patient will perform lower body dressing w/ mod I and with adaptive equipment PRN  Patient will perform toileting with mod I and with adaptive equipment PRN.     Functional Transfer Goals:  Patient will transfer from sit to supine:  with mod I  Patient will transfer from supine to sit:  with mod I  Patient will transfer to toilet:  with mod I      Patient Evaluation Complexity Level:   Occupational Profile/Medical History MODERATE - Expanded review of history including review of medical or therapy record   Specific performance deficits impacting engagement in ADL/IADL MODERATE  3 - 5 performance deficits   Client Assessment/Performance Deficits MODERATE - Comorbidities and min to mod modifications of tasks    Clinical Decision Making MODERATE - Analysis of occupational profile, detailed assessments, several treatment options    Overall Complexity MODERATE     OT Session Time: 30 minutes  Self-Care Home Management: 10 minutes

## 2024-12-19 NOTE — PLAN OF CARE
NURSING ADMISSION NOTE      Patient admitted via Cart  Oriented to room.  Safety precautions initiated.  Bed in low position.  Call light in reach.    Assumed care at 1930  Admission navigator completed by this RN  Pt A&Ox3, disoriented to situation and often confused  TOO GARDINER on tele  Reports pain and nausea, medications given per MAR  Denies SOB  Reports having loose stools for past week, pending stool sample for r/o cdiff  NPO, failed RN dysphagia screening  PT/OT/SLP to see  0.9 NS @ 125 ml/hr  Safety and isolation precautions maintained  Pending further orders  All needs met at this time

## 2024-12-19 NOTE — PROGRESS NOTES
DM Hospitalist Progress Note     PCP: Yoel Servin MD    Chief Complaint: follow-up   Follow up for: The primary encounter diagnosis was Delirium. Diagnoses of Total body pain and Leukocytosis, unspecified type were also pertinent to this visit.    Overnight/Interim Events:      SUBJECTIVE:  Pt sitting in chair, oriented to name/EH but not date. Reports not urinating.    OBJECTIVE:  Temp:  [98 °F (36.7 °C)-98.8 °F (37.1 °C)] 98 °F (36.7 °C)  Pulse:  [90-94] 94  Resp:  [18-21] 18  BP: (150-183)/(94-99) 168/99  SpO2:  [93 %-98 %] 93 %    Intake/Output:    Intake/Output Summary (Last 24 hours) at 12/19/2024 1735  Last data filed at 12/19/2024 1130  Gross per 24 hour   Intake 1000 ml   Output 1850 ml   Net -850 ml       Last 3 Weights   12/18/24 1944 190 lb 0.6 oz (86.2 kg)   12/18/24 1155 190 lb 0.6 oz (86.2 kg)   07/09/24 1715 190 lb (86.2 kg)   03/24/24 2146 186 lb 4.6 oz (84.5 kg)   03/24/24 1536 200 lb (90.7 kg)       Exam    General: Alert, no distress, appears stated age.     Head:  Normocephalic, without obvious abnormality, atraumatic.   Eyes:  Sclera anicteric, EOMs intact.    Nose: Nares normal,  Mucosa normal    Throat: Lips normal   Neck: Supple, symmetrical, trachea midline   Lungs:   Clear to auscultation bilaterally. Normal effort   Chest wall:  No tenderness or deformity   Heart:  Regular rate and rhythm, S1, S2 normal, no murmur, rub or gallop appreciated   Abdomen:   Soft, NT/ND, Bowel sounds normal. No masses,  No organomegaly.    Extremities: Extremities normal, atraumatic, no cyanosis or LE edema.   Skin: Skin color, texture, turgor normal. No rashes or lesions.    Neurologic: Moving all extremities spontaneously, no focal deficit appreciated      Data Review:       Labs:     Recent Labs   Lab 12/18/24  1208 12/19/24  0711   WBC 16.9* 11.5*   HGB 12.5 11.7*   MCV 91.7 91.8   .0 288.0       Recent Labs   Lab 12/18/24  1208 12/19/24  0523   * 141   K 3.8 4.1    113*   CO2  22.0 14.0*   BUN 20 6*   CREATSERUM 0.81 0.63   CA 10.0 8.7   MG  --  1.8   * 112*       Recent Labs   Lab 12/18/24  1208   ALT 22   AST 40*   ALB 5.1*       Recent Labs   Lab 12/19/24  1349   PGLU 92       No results for input(s): \"TROP\" in the last 168 hours.      Meds:      cefTRIAXone  2 g Intravenous Q24H    ALPRAZolam  0.25 mg Oral Once    aspirin  81 mg Oral Daily    amLODIPine  5 mg Oral Daily    levothyroxine  125 mcg Oral Before breakfast    metoprolol succinate ER  50 mg Oral Daily Beta Blocker    heparin  5,000 Units Subcutaneous Q8H DUNIA    pantoprazole  40 mg Intravenous Q24H    acetaminophen  1,000 mg Oral Once      sodium chloride 125 mL/hr at 12/19/24 0443       ondansetron    ketorolac **OR** ketorolac    haloperidol lactate    LORazepam **OR** LORazepam    metoprolol    LORazepam    influenza virus vaccine PF       Assessment/Plan:     54 year old female with PMH including but not limited to allergic rhinitis, anxiety, depression, bipolar, asthma, history of kidney stones, fibromyalgia, GERD, gestational diabetes, hypothyroidism, history of migraines, osteoarthritis, history of seizures, history of stroke with no residual symptoms, history ulcer colitis presenting with AMS.         AMS  - multifactorial, possibly related to infxn, psych hx, meds (lack of taking prescribed med and/or polypharmacy), cannabinoids use also interacting   - no obvious CVA sxs but will have neuro eval --> apprec, no further w/u   - PT/OT/SLP  - ammonia <10  - pharm c/s to review meds-->apprec, d/w Javad, PharmD, on multiple meds and many can cause drowsniess  - psych c/s, resume meds as appropriate   - check levels of meds as needed    - has been using a significant amount of cannabinoids per report also; encouraged cessation; pt reports she uses \"some\" but has for years          Possible UTI  - started on CTX, Ucx NG, UA abn  - can stop abx tmrw as having some improvement so far   - blood cxs, PCT 0.16  - CMP  mostly ok, follow AST  - WBC 16.9 to 11.5, tollow temps (afebrile thus far)  - UDS noted         Anxiety  Depression  Bipolar  - psych eval: Resume home psychiatric medications as reported by patient's sister once safe to take PO:   Seroquel 300 mg nightly   Klonopin 0.5 mg BID PRN   Lithium  mg nightly  Wellbutrin  mg daily  Trileptal 450 mg BID  Zoloft 150 mg daily   Lamotrigine 200 mg TID    -lithium, levels undetectable on admission        Metabolic acidosis  - will follow, ensure meds not contributing if took excessive       Hypothyroidism  -resume home synthroid   -TSH wnl      Asthma  -no active sx  -continue inhalers     GERD  -pantoprazole      HTN  -sbp elevated 140-180s  -amlodipine, BB  - IV prns if still up     Seizure  -lamictal  -Keppra  -trileptal  - neuro eval r/o Seizure activity if needed c EEG      UC  -mealamine     Hx CVA  -asa  -statin      FEN  -NPO until SLP eval  - ativan helped some muscle twitches to jaw per RN, now started on diet      # Proph  - sqh      Dispo: med/behavioral     Questions/concerns were discussed with patient by bedside. D/w RN. Julianne hospitalist to resume care in AM, will discuss plan with them      Total Time spent with patient and coordinating care:  80 minutes [phone] including discussion of meds c pharm, causes of AMS c pt/staff.    Luis Fairbanks MD  [facility] Hospitalist  [phone]  12/19/2024  5:35 PM

## 2024-12-20 LAB
ALBUMIN SERPL-MCNC: 4.5 G/DL (ref 3.2–4.8)
ALBUMIN/GLOB SERPL: 2.3 {RATIO} (ref 1–2)
ALP LIVER SERPL-CCNC: 85 U/L
ALT SERPL-CCNC: 23 U/L
ANION GAP SERPL CALC-SCNC: 15 MMOL/L (ref 0–18)
AST SERPL-CCNC: 29 U/L (ref ?–34)
BILIRUB SERPL-MCNC: 0.4 MG/DL (ref 0.3–1.2)
BUN BLD-MCNC: 13 MG/DL (ref 9–23)
C DIFF TOX B STL QL: NEGATIVE
CALCIUM BLD-MCNC: 8.9 MG/DL (ref 8.7–10.4)
CHLORIDE SERPL-SCNC: 110 MMOL/L (ref 98–112)
CO2 SERPL-SCNC: 20 MMOL/L (ref 21–32)
CREAT BLD-MCNC: 0.68 MG/DL
EGFRCR SERPLBLD CKD-EPI 2021: 102 ML/MIN/1.73M2 (ref 60–?)
ERYTHROCYTE [DISTWIDTH] IN BLOOD BY AUTOMATED COUNT: 15.4 %
GLOBULIN PLAS-MCNC: 2 G/DL (ref 2–3.5)
GLUCOSE BLD-MCNC: 107 MG/DL (ref 70–99)
GLUCOSE BLD-MCNC: 110 MG/DL (ref 70–99)
HCT VFR BLD AUTO: 37.4 %
HGB BLD-MCNC: 12.2 G/DL
MAGNESIUM SERPL-MCNC: 1.9 MG/DL (ref 1.6–2.6)
MCH RBC QN AUTO: 29.8 PG (ref 26–34)
MCHC RBC AUTO-ENTMCNC: 32.6 G/DL (ref 31–37)
MCV RBC AUTO: 91.4 FL
OSMOLALITY SERPL CALC.SUM OF ELEC: 301 MOSM/KG (ref 275–295)
PLATELET # BLD AUTO: 307 10(3)UL (ref 150–450)
POTASSIUM SERPL-SCNC: 3.3 MMOL/L (ref 3.5–5.1)
POTASSIUM SERPL-SCNC: 3.4 MMOL/L (ref 3.5–5.1)
POTASSIUM SERPL-SCNC: 3.4 MMOL/L (ref 3.5–5.1)
PROT SERPL-MCNC: 6.5 G/DL (ref 5.7–8.2)
RBC # BLD AUTO: 4.09 X10(6)UL
SODIUM SERPL-SCNC: 145 MMOL/L (ref 136–145)
WBC # BLD AUTO: 9.1 X10(3) UL (ref 4–11)

## 2024-12-20 PROCEDURE — 99232 SBSQ HOSP IP/OBS MODERATE 35: CPT | Performed by: OTHER

## 2024-12-20 PROCEDURE — 99231 SBSQ HOSP IP/OBS SF/LOW 25: CPT | Performed by: OTHER

## 2024-12-20 RX ORDER — BUTALBITAL, ACETAMINOPHEN AND CAFFEINE 50; 325; 40 MG/1; MG/1; MG/1
1 TABLET ORAL EVERY 4 HOURS PRN
Status: DISCONTINUED | OUTPATIENT
Start: 2024-12-20 | End: 2024-12-22

## 2024-12-20 RX ORDER — LAMOTRIGINE 100 MG/1
200 TABLET ORAL 3 TIMES DAILY
Status: DISCONTINUED | OUTPATIENT
Start: 2024-12-20 | End: 2024-12-22

## 2024-12-20 RX ORDER — QUETIAPINE FUMARATE 100 MG/1
300 TABLET, FILM COATED ORAL NIGHTLY
Status: DISCONTINUED | OUTPATIENT
Start: 2024-12-20 | End: 2024-12-22

## 2024-12-20 RX ORDER — CLONAZEPAM 0.5 MG/1
0.5 TABLET ORAL 2 TIMES DAILY PRN
Status: DISCONTINUED | OUTPATIENT
Start: 2024-12-20 | End: 2024-12-22

## 2024-12-20 RX ORDER — POTASSIUM CHLORIDE 1.5 G/1.58G
40 POWDER, FOR SOLUTION ORAL EVERY 4 HOURS
Status: COMPLETED | OUTPATIENT
Start: 2024-12-20 | End: 2024-12-20

## 2024-12-20 RX ORDER — LITHIUM CARBONATE 450 MG
450 TABLET, EXTENDED RELEASE ORAL NIGHTLY
Status: DISCONTINUED | OUTPATIENT
Start: 2024-12-20 | End: 2024-12-22

## 2024-12-20 RX ORDER — ACETAMINOPHEN 500 MG
TABLET ORAL
Status: COMPLETED
Start: 2024-12-20 | End: 2024-12-20

## 2024-12-20 NOTE — BH PROGRESS NOTE
2024    Seen in person at [facility] floor     Interval Chief Complaint: f/u on delirium, bipolar, anxiety     Subjective:  Doing better today. More alert, less anxious. Oriented to person, place, month not year. Has 1/3 recall with better attention. She passed swallow eval and eating regular diet. No hallucinations or paranoia. No aggression. Feels safe here.     We reviewed her meds and she confirms her meds. I did confirm she has a h/o seizure d/o and I advised that wellbutrin in contraindicated in those with seizures. She agreed to stop it and resume rest of meds.     MEDS:    [COMPLETED] potassium chloride (Klor-Con) 20 MEQ oral powder 40 mEq  40 mEq Oral Q4H    QUEtiapine (SEROquel) tab 300 mg  300 mg Oral Nightly    lamoTRIgine (LaMICtal) tab 200 mg  200 mg Oral TID    sertraline (Zoloft) tab 150 mg  150 mg Oral Daily    lithium ER (ESKALITH) CR tab 450 mg  450 mg Oral Nightly    OXcarbazepine (Trileptal) tab 450 mg  450 mg Oral BID    clonazePAM (KlonoPIN) tab 0.5 mg  0.5 mg Oral BID PRN    [COMPLETED] potassium chloride 20 mEq/100mL IVPB premix 20 mEq  20 mEq Intravenous Once    cefTRIAXone (Rocephin) 2 g in sodium chloride 0.9% 100 mL IVPB-ADDV  2 g Intravenous Q24H    ondansetron (Zofran) 4 MG/2ML injection 4 mg  4 mg Intravenous Q6H PRN    ALPRAZolam (Xanax) tab 0.25 mg  0.25 mg Oral Once    [COMPLETED] LORazepam (Ativan) 2 mg/mL injection 0.25 mg  0.25 mg Intravenous Once    aspirin chewable tab 81 mg  81 mg Oral Daily    amLODIPine (Norvasc) tab 5 mg  5 mg Oral Daily    levothyroxine (Synthroid) tab 125 mcg  125 mcg Oral Before breakfast    metoprolol succinate ER (Toprol XL) 24 hr tab 50 mg  50 mg Oral Daily Beta Blocker    ketorolac (Toradol) 15 MG/ML injection 15 mg  15 mg Intravenous Q6H PRN    Or    ketorolac (Toradol) 30 MG/ML injection 30 mg  30 mg Intravenous Q6H PRN    [] haloperidol lactate (Haldol) 5 MG/ML injection 2 mg  2 mg Intravenous Once PRN    [] LORazepam (Ativan)  2 mg/mL injection 0.5 mg  0.5 mg Intravenous Once PRN    Or    [] LORazepam (Ativan) 2 mg/mL injection 1 mg  1 mg Intravenous Once PRN    heparin (Porcine) 5000 UNIT/ML injection 5,000 Units  5,000 Units Subcutaneous Q8H DUNIA    metoprolol (Lopressor) 5 mg/5mL injection 5 mg  5 mg Intravenous Q6H PRN    pantoprazole (Protonix) 40 mg in sodium chloride 0.9% PF 10 mL IV push  40 mg Intravenous Q24H    magnesium oxide (Mag-Ox) tab 400 mg  400 mg Oral Once    sodium chloride 0.9% infusion   Intravenous Continuous    [COMPLETED] ondansetron (Zofran) 4 MG/2ML injection 4 mg  4 mg Intravenous Once    [COMPLETED] LORazepam (Ativan) 2 mg/mL injection 2 mg  2 mg Intravenous Once    [COMPLETED] iopamidol 76% (ISOVUE-370) injection for power injector  100 mL Intravenous ONCE PRN    [COMPLETED] haloperidol lactate (Haldol) 5 MG/ML injection 5 mg  5 mg Intravenous Once    [COMPLETED] diphenhydrAMINE (Benadryl) 50 mg/mL  injection 25 mg  25 mg Intravenous Once    [COMPLETED] cefTRIAXone (Rocephin) 2 g in sodium chloride 0.9% 100 mL IVPB-ADDV  2 g Intravenous Once    [COMPLETED] sodium chloride 0.9 % IV bolus 1,000 mL  1,000 mL Intravenous Once    [] sodium chloride 0.9% infusion   Intravenous Continuous    [] ondansetron (Zofran) 4 MG/2ML injection 4 mg  4 mg Intravenous Q4H PRN    [] sodium chloride 0.9% infusion   Intravenous Continuous    [] ondansetron (Zofran) 4 MG/2ML injection 4 mg  4 mg Intravenous Q4H PRN    influenza virus trivalent PF (Fluzone Trivalent) 0.5 mL IM injection (ages 6 months to 64 years) 0.5 mL  0.5 mL Intramuscular Prior to discharge    [COMPLETED] acetaminophen (Tylenol Extra Strength) tab 1,000 mg  1,000 mg Oral Once    [COMPLETED] ketorolac (Toradol) 15 MG/ML injection 15 mg  15 mg Intravenous Once      Vitals:    24 1132   BP: (!) 176/96   Pulse: 73   Resp: 18   Temp: 98.2 °F (36.8 °C)     ROS:   As above. Otherwise negative on 14 system exam.    MSE:    Conscious/Orientation: A + O x person, place, situation not time  Appearance: good grooming  Behavior: no psychomotor abnormalities, good eye contact and engagement with interview.  Speech: normal rate, rhythm, and volume  Mood: \"better\"  Affect: congruent, brighter, less anxious, smiling  Thought process: linear, logical, goal directed  Thought content:   No delusions, obsessions, or other thought abnormalities  Suicidal ideation: denies  Homicidal ideation: denies  Perceptions: no hallucinations  Insight: fair  Judgment: fair  Loosening of associations: none  Naming:not tested  Fund of knowledge: not tested  Short term memory: 1/3 recall  Recent memory:better, able to recall meeting me before  Long term memory: better, aware of home address    Delirium-improved  Bipolar 2, CHELO-stable    Plan:  Restart home psych meds except wellbutrin. This med contraindicated in those with seizures  Med rec completed  Ok to dc home once medically cleared  Will sign off  ~25mins spent on case with >50% time spent on counseling/coordination of care    Arsenio Reyes DO  Board Certified Adult and Geriatric Psychiatrist  Medical Director, Geriatric Psychiatry, [facility]   Medical Director, Psychiatric Consultation Service, [facility]

## 2024-12-20 NOTE — PROGRESS NOTES
Assume care around 0700, A/Ox3. Confused at times. Mentation improved throughout shift. NSR on tele, room air, Up with 1 assist stand and pivot to chair. Failed bedside nursing swallow in AM. Was able to pass bedside nursing swallow in evening once mentation had improved and facial tremor disappeared. Regular diet, soft easy to chew and thin liquids. IV roceph for UTI. .9ns@125. Plan of care ongoing.

## 2024-12-20 NOTE — PHYSICAL THERAPY NOTE
PHYSICAL THERAPY TREATMENT NOTE - INPATIENT    Room Number: 3614/3614-A     Session: 1     Number of Visits to Meet Established Goals: 3    Presenting Problem: Delirium due to know physilogical condition  Co-Morbidities : allergic rhinitis, anxiety, depression, bipolar, asthma, history of kidney stones, fibromyalgia, GERD, gestational diabetes, hypothyroidism, history of migraines, osteoarthritis, history of seizures, history of stroke with no residual symptoms, history ulcer colitis    PHYSICAL THERAPY MEDICAL/SOCIAL HISTORY  History related to current admission: Patient is a 54 year old female admitted on 12/18/2024 from Home for Delirium due to know physilogical condition.      HOME SITUATION  Type of Home: House  Home Layout: Two level                     Lives With: Parent(s)    Drives: Yes   Patient Regularly Uses: Reading glasses;Rolling walker       Prior Level of Pless[place]: Pt was a poor historian. Pt lives at home with her mom. Pt was unable to clarify if she uses a assisted device to ambulate.     PHYSICAL THERAPY ASSESSMENT   Patient demonstrates fair progress this session, goals  remain in progress.      Patient is requiring minimal assist as a result of the following impairments: decreased endurance/aerobic capacity, pain, impaired Standing and gait balance, and decreased muscular endurance.     Patient continues to function below baseline with bed mobility, transfers, and gait.  Next session anticipate patient to progress bed mobility, transfers, and gait.  Physical Therapy will continue to follow patient for duration of hospitalization.    Patient continues to benefit from continued skilled PT services: to facilitate return to prior level of function as patient demonstrates high motivation with excellent tolerance to an intensive therapy program .Pt was observed with improved mentation and was able to follow one step command    PLAN DURING HOSPITALIZATION  Nursing Mobility Recommendation : 1  Assist  PT Device Recommendation: Rolling walker  PT Treatment Plan: Bed mobility;Body mechanics;Endurance;Gait training;Strengthening;Stair training;Transfer training;Balance training  Frequency (Obs): 3-5x/week     CURRENT GOALS     Goal #1 Patient is able to demonstrate supine - sit EOB @ level: supervision      Goal #2 Patient is able to demonstrate transfers [place] to[place]from [facility] [place] assistance level: supervision      Goal #3 Patient is able to ambulate 150 feet with assist device: walker - rolling at assistance level: supervision      Goal #4     Goal #5     Goal #6     Goal Comments: Goals established on 2024 all goals ongoing    SUBJECTIVE  \"I feel better\"    OBJECTIVE  Precautions: Bed/chair alarm    WEIGHT BEARING RESTRICTION     PAIN ASSESSMENT   Ratin  Location: Pt reports no apin       BALANCE                                                                                                                       Static Sitting: Fair -  Dynamic Sitting: Fair -           Static Standing: Fair -  Dynamic Standing: Poor +    ACTIVITY TOLERANCE                         O2 WALK       AM-PAC '6-Clicks' INPATIENT SHORT FORM - BASIC MOBILITY  How much difficulty does the patient currently have...  Patient Difficulty: Turning over in bed (including adjusting bedclothes, sheets and blankets)?: A Little   Patient Difficulty: Sitting down on and standing up from a chair with arms (e.g., wheelchair, bedside commode, etc.): A Little   Patient Difficulty: Moving from lying on back to sitting on the side of the bed?: A Little   How much help from another person does the patient currently need...   Help from Another: Moving to and from a bed to a chair (including a wheelchair)?: A Little   Help from Another: Need to walk in hospital room?: A Little   Help from Another: Climbing 3-5 steps with a railing?: A Lot     AM-PAC Score:  Raw Score: 17   Approx Degree of Impairment: 50.57%   Standardized Score (AM-PAC  Scale): 42.13   CMS Modifier (G-Code): CK    FUNCTIONAL ABILITY STATUS  Gait Assessment   Functional Mobility/Gait Assessment  Gait Assistance: Minimum assistance  Distance (ft): 20  Assistive Device: Rolling walker  Pattern: Shuffle    Skilled Therapy Provided    Bed Mobility:  Rolling: NT   Supine<>Sit: Min A   Sit<>Supine: NT     Transfer Mobility:  Sit<>Stand: Min a   Stand<>Sit: Min A   Gait: Min A20ft using RW    Therapist's Comments: While ambulating pt was observed to continue to ambulate gingerly using the RW. Pt continuously requires a 1 person min assist to maintain balance by providing cues to bring her COG over NANCY. While ambulating pt had one instance of LOB backwards that required assistance to regain her balance. Pt continues to have a neck lateral flexion on the L, pt reported that when her head is midline she has increase dizziness. Pt will benefit with intense rehab as pt has goals for community discharge with pt demonstrate better participation and carryover.     Pt was observed with improved mentation and provided a current home PLOF. Pt lives in an apt with her son. Pt is the primary caregiver for her son as well as assisting in taking care of her grandchild. Pt is able to ambulate using no assisted device. Pt drives. Pt reports that she had a history of a TBI in 2018 due to a fall on the stairs.         Patient End of Session: Up in chair;Needs met;Call light within reach;RN aware of session/findings;All patient questions and concerns addressed;Alarm set    PT Session Time: 23 minutes  Therapeutic Activity: 15 minutes

## 2024-12-20 NOTE — CM/SW NOTE
12/20/24 1500   CM/SW Referral Data   Referral Source Social Work (self-referral)   Reason for Referral Discharge planning   Informant Patient;EMR;Clinical Staff Member   Medical Hx   Does patient have an established PCP? Yes   Patient Info   Patient's Current Mental Status at Time of Assessment Oriented;Alert   Patient's Home Environment Condo/Apt with elevator   Patient lives with Son   Patient Status Prior to Admission   Services in place prior to admission DME/Supplies at home   Type of DME/Supplies Standard Walker;Wheelchair;Straight Cane   Discharge Needs   Anticipated D/C needs To be determined   Services Requested   PMR Consult Requested Consult ordered     HOME SITUATION  Type of Home: House  Home Layout: Two level                     Lives With: Parent(s)    Drives: Yes   Patient Regularly Uses: Reading glasses;Rolling walker       Prior Level of Miner per PT eval: Pt was a poor historian. Pt lives at home with her mom. Pt was unable to clarify if she uses a assisted device to ambulate.  (Per PT eval)    Pt is a 55 y/o female admitted with delirium due to known physiological condition. SW spoke with therapy team who stated anticipated need has changed. Therapy stated pt would benefit from an intensive therapy program at discharge due to her vestibular issues. Per chart notes, pt is more alert today. SW met with pt at bedside to discuss discharge planning.     Pt lives with her oldest son in an apartment. 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 Pt stated her oldest son does not assist her with ADLs and she is typically assisting her son. Pt stated she has a walker, cane, and wheelchair at home. Pt stated her sister assists her with medication set up weekly. Pt stated she has groceries delivered and her son assists with ordering groceries. Pt stated on her 'bad days' she tends to lay in bed but usually furniture walks around her apartment.     Discussed  an intensive therapy program at discharge. Pt agreeable and requested [facility]. Pt stated she has been to [facility] in the past and used to drive her son to [facility] for outpatient therapy. SW informed pt PMR will need to assess pt to determine if she is appropriate for AIR. Informed pt if she is not appropriate for AIR, she could consider [facility] or home w/HH. Pt stated if she is not appropriate for AR she would need to speak with her son/HCPOA regarding potential facilities. Pt stated she will update her family that she will be assessed by PMR. Pt denied any further needs at this time.     PMR consult placed and pending, await recommendations. HERMINIO will continue to follow.     JUAN M Elliott  Discharge Planner

## 2024-12-20 NOTE — PLAN OF CARE
Assumed patient care @1930  A&O x2, disoriented to time and situation  VSS, NSR on tele, on RA  Contact + isolation for r/o C-Diff  Cardiac electrolyte replacement protocol  R forearm PIV, infusing 0.9% @125mL/hr  Regular, soft/easy to chew diet, tolerating well  Incontinent x2  Brief and purewhick in place  Up 1x assist pivot   Fall and aspiration precautions in place  Bed alarm on, in lowest position, call light within reach and all needs met at this time    2340: Patients blood pressure 182/96, PRN Metoprolol given  0040: Blood pressure now 170/93, MD notified and no new orders at this time    Problem: METABOLIC/FLUID AND ELECTROLYTES - ADULT  Goal: Glucose maintained within prescribed range  Description: INTERVENTIONS:  - Monitor Blood Glucose as ordered  - Assess for signs and symptoms of hyperglycemia and hypoglycemia  - Administer ordered medications to maintain glucose within target range  - Assess barriers to adequate nutritional intake and initiate nutrition consult as needed  - Instruct patient on self management of diabetes  Outcome: Progressing     Problem: SAFETY ADULT - FALL  Goal: Free from fall injury  Description: INTERVENTIONS:  - Assess pt frequently for physical needs  - Identify cognitive and physical deficits and behaviors that affect risk of falls.  - [place] fall precautions as indicated by assessment.  - Educate pt/family on patient safety including physical limitations  - Instruct pt to call for assistance with activity based on assessment  - Modify environment to reduce risk of injury  - Provide assistive devices as appropriate  - Consider OT/PT consult to assist with strengthening/mobility  - Encourage toileting schedule  Outcome: Progressing     Problem: NEUROLOGICAL - ADULT  Goal: Achieves stable or improved neurological status  Description: INTERVENTIONS  - Assess for and report changes in neurological status  - Initiate measures to prevent increased intracranial pressure  -  Maintain blood pressure and fluid volume within ordered parameters to optimize cerebral perfusion and minimize risk of hemorrhage  - Monitor temperature, glucose, and sodium. Initiate appropriate interventions as ordered  Outcome: Progressing  Goal: Achieves maximal functionality and self care  Description: INTERVENTIONS  - Monitor swallowing and airway patency with patient fatigue and changes in neurological status  - Encourage and assist patient to increase activity and self care with guidance from PT/OT  - Encourage visually impaired, hearing impaired and aphasic patients to use assistive/communication devices  Outcome: Progressing

## 2024-12-20 NOTE — PROGRESS NOTES
[facility] Hospitalist Progress Note     PCP: Yoel Servin MD    Chief Complaint: follow-up   Follow up for: The primary encounter diagnosis was Delirium. Diagnoses of Total body pain and Leukocytosis, unspecified type were also pertinent to this visit.    Overnight/Interim Events:      SUBJECTIVE:  Pt seen and examined  Sitting in bed  Oriented to person and place but not date  Afebrile  No new FND on exam        OBJECTIVE:  Temp:  [98 °F (36.7 °C)-99.7 °F (37.6 °C)] 98.7 °F (37.1 °C)  Pulse:  [80-94] 84  Resp:  [16-18] 18  BP: (161-182)/() 163/102  SpO2:  [91 %-94 %] 92 %    Intake/Output:    Intake/Output Summary (Last 24 hours) at 12/20/2024 0738  Last data filed at 12/20/2024 0546  Gross per 24 hour   Intake 3085 ml   Output 1250 ml   Net 1835 ml       Last 3 Weights   12/18/24 1944 190 lb 0.6 oz (86.2 kg)   12/18/24 1155 190 lb 0.6 oz (86.2 kg)   07/09/24 1715 190 lb (86.2 kg)   03/24/24 2146 186 lb 4.6 oz (84.5 kg)   03/24/24 1536 200 lb (90.7 kg)       Exam    General: Alert, no distress, appears stated age.     Head:  Normocephalic, without obvious abnormality, atraumatic.   Eyes:  Sclera anicteric, EOMs intact.    Nose: Nares normal,  Mucosa normal    Throat: Lips normal   Neck: Supple, symmetrical, trachea midline   Lungs:   Clear to auscultation bilaterally. Normal effort   Chest wall:  No tenderness or deformity   Heart:  Regular rate and rhythm, S1, S2 normal, no murmur, rub or gallop appreciated   Abdomen:   Soft, NT/ND, Bowel sounds normal. No masses,  No organomegaly.    Extremities: Extremities normal, atraumatic, no cyanosis or LE edema.   Skin: Skin color, texture, turgor normal. No rashes or lesions.    Neurologic: Moving all extremities spontaneously, no focal deficit appreciated      Data Review:       Labs:     Recent Labs   Lab 12/18/24  1208 12/19/24  0711 12/20/24  0602   WBC 16.9* 11.5* 9.1   HGB 12.5 11.7* 12.2   MCV 91.7 91.8 91.4   .0 288.0 307.0       Recent Labs   Lab  12/18/24  1208 12/19/24  0523 12/20/24  0602   * 141 145   K 3.8 4.1 3.4*  3.4*    113* 110   CO2 22.0 14.0* 20.0*   BUN 20 6* 13   CREATSERUM 0.81 0.63 0.68   CA 10.0 8.7 8.9   MG  --  1.8 1.9   * 112* 110*       Recent Labs   Lab 12/18/24  1208 12/20/24  0602   ALT 22 23   AST 40* 29   ALB 5.1* 4.5       Recent Labs   Lab 12/19/24  1349 12/19/24  1806 12/19/24  2342 12/20/24  0526   PGLU 92 87 94 107*       No results for input(s): \"TROP\" in the last 168 hours.      Meds:      cefTRIAXone  2 g Intravenous Q24H    ALPRAZolam  0.25 mg Oral Once    aspirin  81 mg Oral Daily    amLODIPine  5 mg Oral Daily    levothyroxine  125 mcg Oral Before breakfast    metoprolol succinate ER  50 mg Oral Daily Beta Blocker    heparin  5,000 Units Subcutaneous Q8H DUNIA    pantoprazole  40 mg Intravenous Q24H    magnesium oxide  400 mg Oral Once      sodium chloride 125 mL/hr at 12/19/24 0443       ondansetron    ketorolac **OR** ketorolac    metoprolol    LORazepam    influenza virus vaccine PF       Assessment/Plan:     54 year old female with PMH including but not limited to allergic rhinitis, anxiety, depression, bipolar, asthma, history of kidney stones, fibromyalgia, GERD, gestational diabetes, hypothyroidism, history of migraines, osteoarthritis, history of seizures, history of stroke with no residual symptoms, history ulcer colitis presenting with AMS.         AMS  - multifactorial, possibly related to infxn, psych hx, meds (lack of taking prescribed med and/or polypharmacy), cannabinoids use also interacting   - no obvious CVA sxs but will have neuro eval --> apprec, no further w/u   - PT/OT/SLP  - ammonia <10  - pharm c/s to review meds-->apprec, d/w Ravinder Farnsworth, on multiple meds and many can cause drowsniess  - psych c/s, resume meds as appropriate   - check levels of meds as needed    - has been using a significant amount of cannabinoids per report also; encouraged cessation; pt reports she uses  \"some\" but has for years       Possible UTI  - started on CTX, Ucx NG, UA abn  - can stop abx tmrw as having some improvement so far   - blood cxs, PCT 0.16  - CMP mostly ok, follow AST  - WBC 16.9 to 11.5, tollow temps (afebrile thus far)  - UDS noted         Anxiety  Depression  Bipolar  - psych eval: Resume home psychiatric medications as reported by patient's sister once safe to take PO:   Seroquel 300 mg nightly   Klonopin 0.5 mg BID PRN   Lithium  mg nightly  Wellbutrin  mg daily  Trileptal 450 mg BID  Zoloft 150 mg daily   Lamotrigine 200 mg TID    -lithium, levels undetectable on admission        Metabolic acidosis  - will follow, ensure meds not contributing if took excessive       Hypothyroidism  -resume home synthroid   -TSH wnl      Asthma  -no active sx  -continue inhalers     GERD  -pantoprazole      HTN  -sbp elevated 140-180s  -amlodipine, BB  - IV prns if still up     Seizure  -lamictal  -Keppra  -trileptal  - neuro eval r/o Seizure activity if needed c EEG      UC  -mealamine     Hx CVA  -asa  -statin      FEN  -NPO until SLP eval  - ativan helped some muscle twitches to jaw per RN, now started on diet      # Proph  - sqh    DISPO:  DC planning in process    Questions/concerns were discussed with patient by bedside. D/w RN. Julianne hospitalist to resume care in AM, will discuss plan with them      Jing Whitaker Hospitalist  Pager [phone]  Answering Service number: [phone]

## 2024-12-20 NOTE — PROGRESS NOTES
[facility]  [facility] Neurology Consult Note    Marylin Walker Patient Status:  Inpatient    1970 MRN [id number]   Location [facility] 3NE-A Attending Luis Fairbanks MD   Hosp Day # 2 PCP Yoel Servin MD     SUBJECTIVE:  No new complaints today, abdominal pain is feeling better.     MEDICATIONS:  No current outpatient medications on file.     Current Facility-Administered Medications   Medication Dose Route Frequency    potassium chloride (Klor-Con) 20 MEQ oral powder 40 mEq  40 mEq Oral Q4H    cefTRIAXone (Rocephin) 2 g in sodium chloride 0.9% 100 mL IVPB-ADDV  2 g Intravenous Q24H    ondansetron (Zofran) 4 MG/2ML injection 4 mg  4 mg Intravenous Q6H PRN    ALPRAZolam (Xanax) tab 0.25 mg  0.25 mg Oral Once    aspirin chewable tab 81 mg  81 mg Oral Daily    amLODIPine (Norvasc) tab 5 mg  5 mg Oral Daily    levothyroxine (Synthroid) tab 125 mcg  125 mcg Oral Before breakfast    metoprolol succinate ER (Toprol XL) 24 hr tab 50 mg  50 mg Oral Daily Beta Blocker    ketorolac (Toradol) 15 MG/ML injection 15 mg  15 mg Intravenous Q6H PRN    Or    ketorolac (Toradol) 30 MG/ML injection 30 mg  30 mg Intravenous Q6H PRN    heparin (Porcine) 5000 UNIT/ML injection 5,000 Units  5,000 Units Subcutaneous Q8H DUNIA    metoprolol (Lopressor) 5 mg/5mL injection 5 mg  5 mg Intravenous Q6H PRN    pantoprazole (Protonix) 40 mg in sodium chloride 0.9% PF 10 mL IV push  40 mg Intravenous Q24H    LORazepam (Ativan) 2 mg/mL injection 1 mg  1 mg Intravenous Q6H PRN    magnesium oxide (Mag-Ox) tab 400 mg  400 mg Oral Once    sodium chloride 0.9% infusion   Intravenous Continuous    influenza virus trivalent PF (Fluzone Trivalent) 0.5 mL IM injection (ages 6 months to 64 years) 0.5 mL  0.5 mL Intramuscular Prior to discharge       PHYSICAL EXAMINATION:  VITAL SIGNS: BP (!) 181/97 (BP Location: Left arm)   Pulse 76   Temp 98.3 °F (36.8 °C) (Oral)   Resp 16   Wt 190 lb 0.6 oz (86.2 kg)   LMP 2015  (Approximate)   SpO2 96%   BMI 33.66 kg/m²   GENERAL:  Patient is a 54 year old female in no acute distress.  HEART:  Regular rate and rhythm.  SKIN: Warm, dry, no rashes  PSYCH: Normal mood, behavior, affect    NEUROLOGICAL:   Mental status: Oriented to person, place, not time  Speech & Language: More fluent than yesterday, no dysarthria  Comprehension: Intact  Gait: Deferred    DIAGNOSTIC DATA:  Labs:  Recent Labs   Lab 12/18/24  1208 12/19/24  0711 12/20/24  0602   RBC 4.21 3.90 4.09   HGB 12.5 11.7* 12.2   HCT 38.6 35.8 37.4   MCV 91.7 91.8 91.4   MCH 29.7 30.0 29.8   MCHC 32.4 32.7 32.6   RDW 15.4 15.6 15.4   NEPRELIM 14.50*  --   --    WBC 16.9* 11.5* 9.1   .0 288.0 307.0         Recent Labs   Lab 12/18/24  1208 12/19/24  0523 12/20/24  0602   * 112* 110*   BUN 20 6* 13   CREATSERUM 0.81 0.63 0.68   EGFRCR 85 104 102   CA 10.0 8.7 8.9   * 141 145   K 3.8 4.1 3.4*  3.4*    113* 110   CO2 22.0 14.0* 20.0*         IMAGING:  CT ABDOMEN+PELVIS(CONTRAST ONLY)(CPT=74177)    Result Date: 12/18/2024  CONCLUSION:  No clear etiology of the patient's symptoms.  No free fluid is seen within the abdomen or pelvis.  If clinical symptoms persist then consider follow-up imaging.   LOCATION:  [place] [address]   Dictated by (CST): Jose L Graham MD on 12/18/2024 at 1:42 PM     Finalized by (CST): Jose L Graham MD on 12/18/2024 at 1:46 PM             ASSESSMENT:  Ms. Walker is a 54-year-old woman with significant chronic psychiatric comorbidity who had a mixed picture of probable hypoactive delirium and encephalopathy in the setting of nausea, vomiting causing medication noncompliance or poor nutrition and hydration recently.    PLAN:  Principal Problem:    Delirium due to known physiological condition  Active Problems:    Leukocytosis, unspecified type    Hypernatremia    Total body pain    Generalized anxiety disorder  She is clearly improving with supportive care.  Would recommend trying to keep her blood pressure  under better control to avoid acute and chronic complications, but otherwise continue present management.    Neurology will sign off but please call back anytime as needed.    Titus Woodruff MD

## 2024-12-20 NOTE — CM/SW NOTE
SW self-referred for discharge planning. Chart reviewed and noted therapy is anticipating pt will benefit from gradual rehabilitative therapy at discharge. Per Psych APRN note, 'pt is not in need of inpatient psychiatric care at this time.'     Reviewed Medicare coverage for NAA including need for medically necessary 3 midnight inpatient hospital stay.  Pt was made inpatient status on 1218/24 and would need to have a medical need to remain in the hospital until 12/21/24 in order to have Medicare coverage for NAA.  If pt does not meet this criteria, pt could elect to pay privately for NH.    Request sent to [facility] for review. Referrals sent in [facility]. PASRR needed. SW will f/u with pt/family regarding discharge plan.    JUAN M Elliott  Discharge Planner

## 2024-12-20 NOTE — SLP NOTE
ADULT SWALLOWING EVALUATION    ASSESSMENT    ASSESSMENT/OVERALL IMPRESSION:  Patient seen for swallowing re-evaluation. Following evaluation yesterday, her alertness improved and she has been noted to tolerate soft solids and thin liquids. She did experience some difficulty swallowing pills with water but with improved tolerance with pills with puree per RN. Patient is alert and up in bed. She does exhibit mild mandibular and lingual tremor. Patient notes this has been present for approximately 2 months and does impact communication and swallowing. She notes she has been avoiding some hard solids at baseline.     Oral mechanism exam grossly unremarkable for focal finding though patient with generally reduced strength, tone and rate of motion. Speech is 100% intelligible though she is soft spoken. Patient with intact oral retrieval and containment with SLP presenting soft solid and patient self presenting thin liquids by straw. She self limited to small, single sip. Oral prep and transit appeared functional. Mastication of solids WFL with complete oral clearance. Laryngeal excursion judged to be of adequate strength and timeliness to palpation. There were no overt signs of aspiration noted.    Recommend continue soft/easy to chew solids and thin liquids observing aspiration precautions including upright position, slow rate, small bites and sips, alternate consistencies. Recommend pills one at a time whole in puree or with thin liquids as tolerated. Discussed all above in detail with patient who was in agreement. Will continue to follow.          RECOMMENDATIONS   Diet Recommendations - Solids: Soft/ Easy to chew  Diet Recommendations - Liquids: Thin Liquids                           Aspiration Precautions: Upright position;Slow rate;Small bites;Small sips  Medication Administration Recommendations: One pill at a time;Whole in puree (vs thin liquids as tolerated)  Treatment Plan/Recommendations: Aspiration  precautions    HISTORY   MEDICAL HISTORY  Reason for Referral: RN dysphagia screen    Problem List  Principal Problem:    Delirium due to known physiological condition  Active Problems:    Leukocytosis, unspecified type    Hypernatremia    Total body pain    Generalized anxiety disorder      Past Medical History  Past Medical History:    Acne    Allergic rhinitis    Alopecia areata    Anxiety    Asthma ([facility])    Back problem    Bipolar affective (HCC)    Calculus of kidney    Depression    Fibromyalgia    GERD (gastroesophageal reflux disease)    Gestational diabetes (HCC)    Hemorrhoids    Hypothyroidism    Migraines    Nausea and vomiting, unspecified vomiting type    Organic hypersomnia, unspecified    AHI 2 RDI 2 REM AHI 2 SaO2 doroteo 88 %    Osteoarthritis    Pneumonia due to organism    Scoliosis    Seizure disorder (HCC)    absent seizures    Stroke ([facility])    2003+ mild R.sided weakness    Ulcerative colitis ([facility])    Visual impairment          Diet Prior to Admission: Regular;Thin liquids       Patient/Family Goals: to get better    SWALLOWING HISTORY  Current Diet Consistency: Soft/ Easy to Chew;Thin liquids  Dysphagia History: none previous  Imaging Results:   CT Abdomen + Pelvis from 12/18/24 revealed:  FINDINGS:  The exam is somewhat limited due to motion and lack of oral contrast.  LIVER:  No enlargement, atrophy, abnormal density, or significant focal lesion.    BILIARY:  Postsurgical changes of cholecystectomy noted.  PANCREAS:  Visualized portions are unremarkable.  No pancreatic ductal dilatation is seen.  Motion artifact somewhat limits assessment.  SPLEEN:  No enlargement or focal lesion.    KIDNEYS:  No mass, obstruction, or calcification.    ADRENALS:  No mass or enlargement.    AORTA/VASCULAR:  No aneurysm or dissection.    RETROPERITONEUM:  No mass or adenopathy.    BOWEL/MESENTERY:  No dilated loops of small bowel are seen.  Much of the bowel is decompressed, limiting assessment.  Lack of oral  contrast also limits assessment.  No evidence of obstruction.  There is a normal-appearing appendix.  ABDOMINAL WALL:  Small bilateral inguinal hernias containing fat.  URINARY BLADDER:  No visible focal wall thickening, lesion, or calculus.    PELVIC NODES:  No adenopathy.    PELVIC ORGANS:  Unremarkable CT appearance.  Please note that uterus and ovaries are not well assessed with CT.  BONES:  No bony lesion or fracture.    LUNG BASES:  No visible pulmonary or pleural disease.    OTHER:  Negative.                     Impression   CONCLUSION:  No clear etiology of the patient's symptoms.  No free fluid is seen within the abdomen or pelvis.  If clinical symptoms persist then consider follow-up imaging.        LOCATION:  [place] [address]        Dictated by (CST): Jose L Graham MD on 12/18/2024 at 1:42 PM      Finalized by (CST): Jose L Graham MD on 12/18/2024 at 1:46 PM       SUBJECTIVE       OBJECTIVE   ORAL MOTOR EXAMINATION  Dentition: Functional  Symmetry: Within Functional Limits  Strength: Within Functional Limits  Tone: Within Functional Limits  Range of Motion: Overall reduced  Rate of Motion: Reduced    Voice Quality: Clear  Respiratory Status: Unlabored  Consistencies Trialed: Thin liquids;Soft solid  Method of Presentation: Self presentation;Straw;Single sips (self limited to single sip)  Patient Positioned: Upright;Midline (in bed)    Oral Phase of Swallow: Within Functional Limits  Bolus Retrieval: Intact  Bilabial Seal: Intact  Bolus Formation: Intact  Bolus Propulsion: Intact  Mastication: Intact       Pharyngeal Phase of Swallow: Within Functional Limits           (Please note: Silent aspiration cannot be evaluated clinically. Videofluoroscopic Swallow Study is required to rule-out silent aspiration.)    Esophageal Phase of Swallow: No complaints consistent with possible esophageal involvement              GOALS  Goal #1 The patient will tolerate soft/easy to chew consistency and thin liquids without overt signs or  symptoms of aspiration with 95 % accuracy over 1-2 session(s).  In Progress   Goal #2 The patient/family/caregiver will demonstrate understanding and implementation of aspiration precautions and swallow strategies independently over 1-2 session(s).    In Progress     FOLLOW UP  Treatment Plan/Recommendations: Aspiration precautions  Duration: 1 week  Follow Up Needed (Documentation Required): Yes  SLP Follow-up Date: 12/23/24    Thank you for your referral.   If you have any questions, please contact Andres Frey MS [facility]-SLP  [place] [phone]

## 2024-12-20 NOTE — OCCUPATIONAL THERAPY NOTE
OCCUPATIONAL THERAPY TREATMENT NOTE - INPATIENT     Room Number: 3614/3614-A  Session: 1   Number of Visits to Meet Established Goals: 7    Presenting Problem: Delirium, total body pain, generalized anxiety disorder    HOME SITUATION  Type of Home: House  Home Layout: Two level  Lives With: adult son that pt assists w/ IADLs  Toilet and Equipment: Standard height toilet  Shower/Tub and Equipment: Walk-in shower   Occupation/Status: not working  Hand Dominance: Right  Drives: Yes  Patient Regularly Uses: Reading glasses;Rolling walker   Prior Level of Function: Mod independent with all ADLs and functional mobility with use of cane.  Patient reports she typically does not need assist from anyone at home.      ASSESSMENT   Patient demonstrates good  progress this session, goals remain in progress.  Patient w/ improved mentation this session, able to follow one and two-step motor commands consistently.      Patient continues to function below baseline with self-care and functional mobility.   Contributing factors to remaining limitations include decreased functional strength, decreased functional reach, decreased endurance, impaired standing balance, impaired coordination, impaired motor planning, cognitive deficits (attention, problem solving), decreased insight to deficits, and decreased safety awareness.  Next session anticipate patient to progress toileting, lower body dressing, grooming, bed mobility, transfers, and functional standing tolerance.  Occupational Therapy will continue to follow patient for duration of hospitalization.    Patient continues to benefit from continued skilled OT services: to facilitate return to prior level of function as patient demonstrates high motivation with excellent tolerance to an intensive therapy program.     History:   Patient is a 54 year old female admitted on 12/18/2024 with Presenting Problem: Delirium, total body pain, generalized anxiety disorder. Co-Morbidities :  allergic rhinitis, anxiety, depression, bipolar, asthma, history of kidney stones, fibromyalgia, GERD, gestational diabetes, hypothyroidism, history of migraines, osteoarthritis, history of seizures, history of stroke with no residual symptoms, history ulcer colitis     Imaging  CT ABDOMEN+PELVIS(CONTRAST ONLY)(CPT=74177)  Result Date: 12/18/2024  CONCLUSION:  No clear etiology of the patient's symptoms.  No free fluid is seen within the abdomen or pelvis    WEIGHT BEARING RESTRICTION       Recommendations for nursing staff:   Transfers: one person and RW  Toileting location: toilet w/ riser    TREATMENT SESSION:  Patient Start of Session: supine    FUNCTIONAL TRANSFER ASSESSMENT  Sit to Stand: Edge of Bed; Chair  Edge of Bed: Minimal Assist  Chair: Minimal Assist  Toilet Transfer: Minimal Assist    BED MOBILITY  Rolling: Minimal Assist  Supine to Sit : Minimal Assist  Sit to Supine (OT): Minimal Assist  Scooting: min A    BALANCE ASSESSMENT  Static Sitting: Supervision  Static Standing: Minimal Assist    FUNCTIONAL ADL ASSESSMENT  Eating: Stand-by Assist  Grooming Standing: Minimal Assist  LB Dressing Seated: Moderate Assist  Toileting Seated: Moderate Assist    ACTIVITY TOLERANCE:   Pulse: 72  Heart Rate Source: Monitor     BP: (!) 178/96  BP Location: Left arm  BP Method: Automatic  Patient Position: Sitting    O2 SATURATIONS  Oxygen Therapy  SPO2% on Oxygen at Rest: 95  SPO2% Ambulation on Room Air: 95    EDUCATION PROVIDED  Patient Education : Discharge Recommendations; Plan of Care; Role of Occupational Therapy; Functional Transfer Techniques; Fall Prevention  Patient's Response to Education: Verbalized Understanding; Requires Further Education    Equipment used: RW, RTS  Demonstrates functional use, Would benefit from additional trial      Exercises:    Exercises Repetitions Comments   Scapular elevation     Scapular retraction     Shoulder rolls     Shoulder flexion     Shoulder abduction     Shoulder  internal/external rotation     Forward punch     Elbow flexion     Elbow extension     Forearm pronation/supination     Wrist flexion/extension     Gross grasp/fist pumps     Ankle pumps     Knee extension     Marching         Therapist comments: patient alert, oriented x 3.  Patient follows multi-step motor commands with cues to stay on task d/t tangential conversation.  Noted +nystagmus while patient in sitting and standing and pt reports she has h/o vestibular issues since fall resulting in TBI a few years ago.  States vestibular migraines and nystagmus typically occur when patient is stressed.  Patient maintains head tilted to the left and reports this is where she feels stable.    Patient engaged in grooming with BUE w/ fair quality   Patient End of Session: Up in chair;With  staff;Needs met;Call light within reach;RN aware of session/findings;All patient questions and concerns addressed;Alarm set;Discussed recommendations with /    SUBJECTIVE  PAIN ASSESSMENT  Rating: Unable to rate  Location: back  Management Techniques: Activity promotion;Repositioning;Relaxation;Nurse notified     OBJECTIVE  Precautions: Bed/chair alarm    AM-PAC ‘6-Clicks’ Inpatient Daily Activity Short Form  -   Putting on and taking off regular lower body clothing?: A Lot  -   Bathing (including washing, rinsing, drying)?: A Lot  -   Toileting, which includes using toilet, bedpan or urinal? : A Lot  -   Putting on and taking off regular upper body clothing?: A Little  -   Taking care of personal grooming such as brushing teeth?: A Little  -   Eating meals?: A Little    AM-PAC Score:  Score: 15  Approx Degree of Impairment: 56.46%  Standardized Score (AM-PAC Scale): 34.69    PLAN  OT Device Recommendations: TBD  OT Treatment Plan: Balance activities;Energy conservation/work simplification techniques;ADL training;Functional transfer training;UE strengthening/ROM;Endurance training;Patient/Family  education;Patient/Family training;Cognitive reorientation;Equipment eval/education;Compensatory technique education  Rehab Potential : Good  Frequency: 3-5x/week    (ongoing 12/20/2024)  ADL GOALS:  Patient will perform lower body dressing w/ mod I and with adaptive equipment PRN  Patient will perform toileting with mod I and with adaptive equipment PRN.     Functional Transfer Goals:  Patient will transfer from sit to supine:  with mod I  Patient will transfer from supine to sit:  with mod I  Patient will transfer to toilet:  with mod I    OT Session Time: 30 minutes  Self-Care Home Management: 15 minutes

## 2024-12-21 LAB
ANION GAP SERPL CALC-SCNC: 10 MMOL/L (ref 0–18)
BUN BLD-MCNC: 8 MG/DL (ref 9–23)
CALCIUM BLD-MCNC: 8.8 MG/DL (ref 8.7–10.4)
CHLORIDE SERPL-SCNC: 110 MMOL/L (ref 98–112)
CO2 SERPL-SCNC: 23 MMOL/L (ref 21–32)
CREAT BLD-MCNC: 0.66 MG/DL
EGFRCR SERPLBLD CKD-EPI 2021: 103 ML/MIN/1.73M2 (ref 60–?)
GLUCOSE BLD-MCNC: 81 MG/DL (ref 70–99)
GLUCOSE BLD-MCNC: 82 MG/DL (ref 70–99)
GLUCOSE BLD-MCNC: 87 MG/DL (ref 70–99)
OSMOLALITY SERPL CALC.SUM OF ELEC: 293 MOSM/KG (ref 275–295)
POTASSIUM SERPL-SCNC: 3.1 MMOL/L (ref 3.5–5.1)
SODIUM SERPL-SCNC: 143 MMOL/L (ref 136–145)

## 2024-12-21 RX ORDER — POTASSIUM CHLORIDE 14.9 MG/ML
20 INJECTION INTRAVENOUS ONCE
Status: COMPLETED | OUTPATIENT
Start: 2024-12-22 | End: 2024-12-22

## 2024-12-21 RX ORDER — LANSOPRAZOLE 30 MG/1
30 TABLET, ORALLY DISINTEGRATING, DELAYED RELEASE ORAL
Status: DISCONTINUED | OUTPATIENT
Start: 2024-12-22 | End: 2024-12-22

## 2024-12-21 NOTE — PLAN OF CARE
Assumed care @ 1930  A&ox4; confused at times  RA  NSR on tele  PIV NS 83 ml/hr  Meds in MAR given crushed in ice cream   Soft easy to chew diet  Safety precautions in place  Continuing to meet pt needs  Need further details reference flowsheets       Problem: METABOLIC/FLUID AND ELECTROLYTES - ADULT  Goal: Glucose maintained within prescribed range  Description: INTERVENTIONS:  - Monitor Blood Glucose as ordered  - Assess for signs and symptoms of hyperglycemia and hypoglycemia  - Administer ordered medications to maintain glucose within target range  - Assess barriers to adequate nutritional intake and initiate nutrition consult as needed  - Instruct patient on self management of diabetes  Outcome: Progressing     Problem: SAFETY ADULT - FALL  Goal: Free from fall injury  Description: INTERVENTIONS:  - Assess pt frequently for physical needs  - Identify cognitive and physical deficits and behaviors that affect risk of falls.  - [place] fall precautions as indicated by assessment.  - Educate pt/family on patient safety including physical limitations  - Instruct pt to call for assistance with activity based on assessment  - Modify environment to reduce risk of injury  - Provide assistive devices as appropriate  - Consider OT/PT consult to assist with strengthening/mobility  - Encourage toileting schedule  Outcome: Progressing     Problem: NEUROLOGICAL - ADULT  Goal: Achieves stable or improved neurological status  Description: INTERVENTIONS  - Assess for and report changes in neurological status  - Initiate measures to prevent increased intracranial pressure  - Maintain blood pressure and fluid volume within ordered parameters to optimize cerebral perfusion and minimize risk of hemorrhage  - Monitor temperature, glucose, and sodium. Initiate appropriate interventions as ordered  Outcome: Progressing  Goal: Achieves maximal functionality and self care  Description: INTERVENTIONS  - Monitor swallowing and airway  patency with patient fatigue and changes in neurological status  - Encourage and assist patient to increase activity and self care with guidance from PT/OT  - Encourage visually impaired, hearing impaired and aphasic patients to use assistive/communication devices  Outcome: Progressing

## 2024-12-21 NOTE — CONSULTS
[facility]   part of [facility]     PMR Referral      Marylin Walker Patient Status:  Inpatient    1970 MRN [id number]   Location [facility] 3NE-A Attending Luis Fairbanks MD   Hosp Day # 3 PCP Yoel Servin MD     Patient Identification  Marylin Walker is a 54 year old female.  :  1970  Admit Date:  2024  Attending Provider:  Luis Fairbanks MD                                  Primary Care Physician:  Yoel Servin MD   Admitting Diagnosis: Delirium [R41.0]  Total body pain [R52]  Leukocytosis, unspecified type [D72.829]    Subjective:      Reason for Consultation: Impaired ADL and mobility dysfuction due to AMS  Requesting Attending: Luis Fairbanks MD    History of present illness:  Marylin Walker is a 54 year old female with PMH including but not limited to allergic rhinitis, anxiety, depression, bipolar, asthma, history of kidney stones, fibromyalgia, GERD, gestational diabetes, hypothyroidism, history of migraines, osteoarthritis, history of seizures, history of stroke with no residual symptoms, history ulcer colitis presenting with AMS.      History unclear, per report, some possible abd pain/body hurting all over. CT in ED no obvious pathology. Admitted for further monitoring.     PM&R was consulted for assessment of rehabilitation recommendations due to AMS.     PMHx   Past Medical History:    Acne    Allergic rhinitis    Alopecia areata    Anxiety    Asthma (HCC)    Back problem    Bipolar affective (HCC)    Calculus of kidney    Depression    Fibromyalgia    GERD (gastroesophageal reflux disease)    Gestational diabetes (HCC)    Hemorrhoids    Hypothyroidism    Migraines    Nausea and vomiting, unspecified vomiting type    Organic hypersomnia, unspecified    AHI 2 RDI 2 REM AHI 2 SaO2 doroteo 88 %    Osteoarthritis    Pneumonia due to organism    Scoliosis    Seizure disorder (HCC)    absent seizures    Stroke ([facility])    + mild  R.sided weakness    Ulcerative colitis (HCC)    Visual impairment       PSHx  Past Surgical History:   Procedure Laterality Date    Cholecystectomy  2010    Colonoscopy      ulcerative colitis    Colonoscopy N/A 2017    Procedure: COLONOSCOPY;  Surgeon: Hipolito Dobbins MD;  Location:  ENDOSCOPY    Colonoscopy N/A 2021    Procedure: COLONOSCOPY with biopsy,;  Surgeon: Ramsey Muhammad MD;  Location:  ENDOSCOPY    Colposcopy, cervix w/upper adjacent vagina; w/biopsy(s), cervix      Hernia surgery      Umbilical Hernia Repair    Other surgical history N/A 2016    Procedure: ESOPHAGOGASTRODUODENOSCOPY (EGD);  Surgeon: Jeff Scherer MD;  Location:  ENDOSCOPY    [place], iud  2014    Upper gi endoscopy,exam         ALLERGIES:    Allergies[1]            MEDICATIONS:   Medications reviewed  No current outpatient medications on file.    Family History  Family History   Problem Relation Age of Onset    Breast Cancer Maternal Grandmother         81    Breast Cancer Other         dx post menopausal    High Blood Pressure Father     Colon Cancer Father         59    Colon Polyps Father     Hypertension Father     High Blood Pressure Mother     High Cholesterol Mother     Hypertension Mother     Mental Disorder Mother         Depression    Heart Disorder Sister 54        MI - LAD stenting    Heart Attack Sister         2018 \" maker\"    Mental Disorder Son         ADHD, Bipolar II    Mental Disorder Son         Severe anxiety, Bipolar II         Social history  Social History     Socioeconomic History    Marital status:      Spouse name: Not on file    Number of children: Not on file    Years of education: Not on file    Highest education level: Not on file   Occupational History    Not on file   Tobacco Use    Smoking status: Former     Current packs/day: 0.00     Types: Cigarettes     Quit date: 1993     Years since quittin.8    Smokeless tobacco:  Never    Tobacco comments:     Ages [age], [age]   Vaping Use    Vaping status: Some Days    Substances: THC, CBD, Flavoring, medical marijuana    Devices: Disposable, Refillable tank   Substance and Sexual Activity    Alcohol use: Not Currently    Drug use: Yes     Frequency: 2.0 times per week     Types: Cannabis     Comment: Medical Cannabis    Sexual activity: Not Currently     Partners: Male   Other Topics Concern    Not on file   Social History Narrative    Not on file     Social Drivers of Health     Financial Resource Strain: Not on file   Food Insecurity: No Food Insecurity (12/18/2024)    Food Insecurity     Food Insecurity: Never true   Transportation Needs: No Transportation Needs (12/18/2024)    Transportation Needs     Lack of Transportation: No     Car Seat: Not on file   Physical Activity: Not on file   Stress: Not on file   Social Connections: Not on file   Housing Stability: Low Risk  (12/18/2024)    Housing Stability     Housing Instability: No     Housing Instability Emergency: Not on file     Crib or Bassinette: Not on file       HOME SITUATION  Type of Home: House  Home Layout: Two level  Lives With: adult son that pt assists w/ IADLs  Toilet and Equipment: Standard height toilet  Shower/Tub and Equipment: Walk-in shower   Occupation/Status: not working  Hand Dominance: Right  Drives: Yes  Patient Regularly Uses: Reading glasses;Rolling walker   FUNCTIONAL STATUS:     Prior Level of Function: Mod independent with all ADLs and functional mobility with use of cane.  Patient reports she typically does not need assist from anyone at home.      Current:  Physical Therapy 12/20:  Gait Assessment   Functional Mobility/Gait Assessment  Gait Assistance: Minimum assistance  Distance (ft): 20  Assistive Device: Rolling walker  Pattern: Shuffle     Skilled Therapy Provided     Bed Mobility:  Rolling: NT         Supine<>Sit: Min A       Sit<>Supine: NT               Transfer Mobility:  Sit<>Stand: Min a           Stand<>Sit: Min A         Gait: Min A20ft using RW    Occupational Therapy 12/20:  OBJECTIVE  Precautions: Bed/chair alarm     AM-PAC ‘6-Clicks’ Inpatient Daily Activity Short Form  -   Putting on and taking off regular lower body clothing?: A Lot  -   Bathing (including washing, rinsing, drying)?: A Lot  -   Toileting, which includes using toilet, bedpan or urinal? : A Lot  -   Putting on and taking off regular upper body clothing?: A Little  -   Taking care of personal grooming such as brushing teeth?: A Little  -   Eating meals?: A Little     AM-PAC Score:  Score: 15  Approx Degree of Impairment: 56.46%  Standardized Score (AM-PAC Scale): 34.69        ROS:       Review of systems was negative except as per HPI.     Objective  BP (!) 173/94 (BP Location: Right arm)   Pulse 58   Temp 97.9 °F (36.6 °C) (Oral)   Resp 18   Wt 190 lb 0.6 oz (86.2 kg)   LMP 02/20/2015 (Approximate)   SpO2 97%   BMI 33.66 kg/m²     Virtual consult, so no physical exam    DATA    Labs:  Lab Results   Component Value Date    K 3.3 12/20/2024    PGLU 81 12/21/2024       Imaging:  CT ABDOMEN+PELVIS(CONTRAST ONLY)(CPT=74177)    Result Date: 12/18/2024  PROCEDURE:  CT ABDOMEN+PELVIS (CONTRAST ONLY) (CPT=74177)  COMPARISON:  [facility] , CT, CT ABDOMEN+PELVIS(CONTRAST ONLY)(CPT=74177), 3/24/2024, 6:14 PM.  INDICATIONS:  Abdominal pain and nausea with history of colitis  TECHNIQUE:  CT scanning was performed from the dome of the diaphragm to the pubic symphysis with non-ionic intravenous contrast material. Post contrast coronal MPR imaging was performed.  Dose reduction techniques were used. Dose information is transmitted to the ACR (American College of Radiology) NRDR (National Radiology Data Registry) which includes the Dose Index Registry.  PATIENT STATED HISTORY:(As transcribed by Technologist)  Patient is here for general abdominal pain w/nausea. Patient has a hx of colitis.   CONTRAST USED:  100cc of Isovue 370  FINDINGS:  The  exam is somewhat limited due to motion and lack of oral contrast. LIVER:  No enlargement, atrophy, abnormal density, or significant focal lesion.  BILIARY:  Postsurgical changes of cholecystectomy noted. PANCREAS:  Visualized portions are unremarkable.  No pancreatic ductal dilatation is seen.  Motion artifact somewhat limits assessment. SPLEEN:  No enlargement or focal lesion.  KIDNEYS:  No mass, obstruction, or calcification.  ADRENALS:  No mass or enlargement.  AORTA/VASCULAR:  No aneurysm or dissection.  RETROPERITONEUM:  No mass or adenopathy.  BOWEL/MESENTERY:  No dilated loops of small bowel are seen.  Much of the bowel is decompressed, limiting assessment.  Lack of oral contrast also limits assessment.  No evidence of obstruction.  There is a normal-appearing appendix. ABDOMINAL WALL:  Small bilateral inguinal hernias containing fat. URINARY BLADDER:  No visible focal wall thickening, lesion, or calculus.  PELVIC NODES:  No adenopathy.  PELVIC ORGANS:  Unremarkable CT appearance.  Please note that uterus and ovaries are not well assessed with CT. BONES:  No bony lesion or fracture.  LUNG BASES:  No visible pulmonary or pleural disease.  OTHER:  Negative.             CONCLUSION:  No clear etiology of the patient's symptoms.  No free fluid is seen within the abdomen or pelvis.  If clinical symptoms persist then consider follow-up imaging.   LOCATION:  [id number]   Dictated by (CST): Jose L Graham MD on 12/18/2024 at 1:42 PM     Finalized by (CST): Jose L Graham MD on 12/18/2024 at 1:46 PM          ASSESSMENT/RECOMMENDATIONS:                                      Rehab diagnosis: Impaired mobility and activities of daily living dysfunction secondary to AMS    Medical comorbidities: allergic rhinitis, anxiety, depression, bipolar, asthma, history of kidney stones, fibromyalgia, GERD, gestational diabetes, hypothyroidism, history of migraines, osteoarthritis, history of seizures, history of stroke     Recommended  Disposition:   -Acute inpatient rehabilitation to maximize patient independence, provide care giver training, and evaluate for home equipment needs.     - Medical necessity includes thromboembolic risk, fall risk, wound/incision management, blood glucose management, prevention of pressure ulcer, prevention of aspiration pneumonia, monitor kidney function, monitor heart rate, blood pressure management, monitor respiratory status. This treatment cannot be provided at a lower level of care.  - 24 hour rehabilitation nursing care also beneficial for medication management, pressure sore prevention, as well as bladder and bowel management.   - Patient would benefit and is able to participate in 3 hours of therapy at least 5 days per week. Patient is anticipated to discharge to home when acute inpatient rehabilitation course is complete.  - Estimated length of stay is 2 weeks. Rehabilitation potential is fair. Discharge goal is home at the supervision level.      Active Medical Issues:  - AMS  -UTI        Thank you for the opportunity to be involved in the care of this patient. Please feel free to contact me via [employer] with questions or concerns. We will continue to follow with you regarding rehabilitation needs.    Maico Garcia MD   PMR               [1]   Allergies  Allergen Reactions    Depakote [Valproic Acid] OTHER (SEE COMMENTS)     Suicidal ideation    Kdc:Olanzapine SWELLING    Ketorolac Tromethamine OTHER (SEE COMMENTS) and NAUSEA AND VOMITING     gastritis  Gastritis & UC  gastritis  Gastritis & UC      Latex RASH and Dyspnea    Molds & Smuts ASTHMA, ITCHING, RASH, SHORTNESS OF BREATH, WHEEZING and OTHER (SEE COMMENTS)    Nsaids OTHER (SEE COMMENTS)     Must take Nsaids sparingly due to history of gastritis  Must take Nsaids sparingly due to history of gastritis      Olanzapine SWELLING     Caused severe swelling and bruising    Penicillins NAUSEA AND VOMITING    Phenothiazines OTHER (SEE COMMENTS)      Extrapyramidal syndrome. No phenothiazines per neurology (documented during 1/19/18 Dr. Velázquez)  Extrapyramidal syndrome. No phenothiazines per neurology (documented during 1/19/18 Dr. Velázquez)  Extrapyramidal syndrome. No phenothiazines per neurology (documented during 1/19/18 Dr. Velázquez)    Amoxicillin NAUSEA AND VOMITING    Levofloxacin PAIN and OTHER (SEE COMMENTS)     Tendinitis in multiple joints  Tendinitis in multiple joints    Mushrooms NAUSEA AND VOMITING    Penicillin G Potassium NAUSEA AND VOMITING    Phenytoin ANXIETY, CONFUSION, DIZZINESS and HALLUCINATION    Sulfa Antibiotics NAUSEA AND VOMITING    Toradol NAUSEA AND VOMITING     Gastritis & UC    Trees, Jerome OTHER (SEE COMMENTS)     Sneezing watery eyes    Triptans HALLUCINATION     Lesions in brain    Erythromycin OTHER (SEE COMMENTS)

## 2024-12-21 NOTE — DISCHARGE INSTRUCTIONS
[facility]  [id number] [id number]  Phone: [phone]  Fax: [phone]

## 2024-12-21 NOTE — PROGRESS NOTES
Assumed care around 0730, A/Ox4, patient much more conversant today. R/A, NSR:SB on tele. BP's better today. 140's systolic. Did have BP spike in the 170's systolic late afternoon, possibly related to migraine at the time. BP back to 140's systolic upon recheck without PRN intervention. Patient now taking pills whole with water. Soft easy to chew diet. IV in RFA infiltrated. New LFA IV obtained. Up with 1 assist and walker. Hopeful for discharge to [facility] tomorrow. Plan of care ongoing.

## 2024-12-21 NOTE — PROGRESS NOTES
[facility] Hospitalist Progress Note     PCP: Yoel Servin MD    Chief Complaint: follow-up   Follow up for: The primary encounter diagnosis was Delirium. Diagnoses of Total body pain and Leukocytosis, unspecified type were also pertinent to this visit.    Overnight/Interim Events:      SUBJECTIVE:  Pt seen and examined  Sitting in bed  Oriented x3/4  Much better this am  Afebrile  No new FND on exam        OBJECTIVE:  Temp:  [97.9 °F (36.6 °C)-98.9 °F (37.2 °C)] 97.9 °F (36.6 °C)  Pulse:  [58-76] 58  Resp:  [16-18] 18  BP: (165-178)/(83-99) 173/94  SpO2:  [92 %-98 %] 97 %    Intake/Output:    Intake/Output Summary (Last 24 hours) at 12/21/2024 0757  Last data filed at 12/21/2024 0003  Gross per 24 hour   Intake --   Output 800 ml   Net -800 ml       Last 3 Weights   12/18/24 1944 190 lb 0.6 oz (86.2 kg)   12/18/24 1155 190 lb 0.6 oz (86.2 kg)   07/09/24 1715 190 lb (86.2 kg)   03/24/24 2146 186 lb 4.6 oz (84.5 kg)   03/24/24 1536 200 lb (90.7 kg)       Exam    General: Alert, no distress, appears stated age.     Head:  Normocephalic, without obvious abnormality, atraumatic.   Eyes:  Sclera anicteric, EOMs intact.    Nose: Nares normal,  Mucosa normal    Throat: Lips normal   Neck: Supple, symmetrical, trachea midline   Lungs:   Clear to auscultation bilaterally. Normal effort   Chest wall:  No tenderness or deformity   Heart:  Regular rate and rhythm, S1, S2 normal, no murmur, rub or gallop appreciated   Abdomen:   Soft, NT/ND, Bowel sounds normal. No masses,  No organomegaly.    Extremities: Extremities normal, atraumatic, no cyanosis or LE edema.   Skin: Skin color, texture, turgor normal. No rashes or lesions.    Neurologic: Moving all extremities spontaneously, no focal deficit appreciated      Data Review:       Labs:     Recent Labs   Lab 12/18/24  1208 12/19/24  0711 12/20/24  0602   WBC 16.9* 11.5* 9.1   HGB 12.5 11.7* 12.2   MCV 91.7 91.8 91.4   .0 288.0 307.0       Recent Labs   Lab 12/18/24  1208  12/19/24  0523 12/20/24  0602 12/20/24  1602   * 141 145  --    K 3.8 4.1 3.4*  3.4* 3.3*    113* 110  --    CO2 22.0 14.0* 20.0*  --    BUN 20 6* 13  --    CREATSERUM 0.81 0.63 0.68  --    CA 10.0 8.7 8.9  --    MG  --  1.8 1.9  --    * 112* 110*  --        Recent Labs   Lab 12/18/24  1208 12/20/24  0602   ALT 22 23   AST 40* 29   ALB 5.1* 4.5       Recent Labs   Lab 12/19/24  1806 12/19/24  2342 12/20/24  0526 12/21/24  0000 12/21/24  0534   PGLU 87 94 107* 87 81       No results for input(s): \"TROP\" in the last 168 hours.      Meds:      QUEtiapine  300 mg Oral Nightly    lamoTRIgine  200 mg Oral TID    sertraline  150 mg Oral Daily    lithium ER  450 mg Oral Nightly    OXcarbazepine  450 mg Oral BID    cefTRIAXone  2 g Intravenous Q24H    ALPRAZolam  0.25 mg Oral Once    aspirin  81 mg Oral Daily    amLODIPine  5 mg Oral Daily    levothyroxine  125 mcg Oral Before breakfast    metoprolol succinate ER  50 mg Oral Daily Beta Blocker    heparin  5,000 Units Subcutaneous Q8H DUNIA    pantoprazole  40 mg Intravenous Q24H    magnesium oxide  400 mg Oral Once      sodium chloride 125 mL/hr at 12/21/24 0553       clonazePAM    butalbital-acetaminophen-caffeine    ondansetron    metoprolol    influenza virus vaccine PF       Assessment/Plan:     54 year old female with PMH including but not limited to allergic rhinitis, anxiety, depression, bipolar, asthma, history of kidney stones, fibromyalgia, GERD, gestational diabetes, hypothyroidism, history of migraines, osteoarthritis, history of seizures, history of stroke with no residual symptoms, history ulcer colitis presenting with AMS.         AMS  - multifactorial, possibly related to infxn, psych hx, meds (lack of taking prescribed med and/or polypharmacy), cannabinoids use also interacting   - no obvious CVA sxs but will have neuro eval --> apprec, no further w/u   - PT/OT/SLP  - ammonia <10  - pharm c/s to review meds-->derrick, d/w Todd FarnsworthD, on  multiple meds and many can cause drowsniess  - psych c/s, resume meds as appropriate   - check levels of meds as needed    - has been using a significant amount of cannabinoids per report also; encouraged cessation; pt reports she uses \"some\" but has for years   -neuro consult >> apprec recs >> signed off yesterday  -psych signed off yest  -PT/OT rec acute rehab >> PMR consult requested      Possible UTI  - started on CTX, Ucx NG, UA abn  - can stop abx tmrw as having some improvement so far   - blood cxs, PCT 0.16  - CMP mostly ok, follow AST  - WBC 16.9 to 11.5, tollow temps (afebrile thus far)  - UDS noted         Anxiety  Depression  Bipolar  - psych eval: Resume home psychiatric medications as reported by patient's sister once safe to take PO:   Seroquel 300 mg nightly   Klonopin 0.5 mg BID PRN   Lithium  mg nightly  Wellbutrin  mg daily  Trileptal 450 mg BID  Zoloft 150 mg daily   Lamotrigine 200 mg TID    -lithium, levels undetectable on admission  -psych following >> ok with dc home  >>signed off        Metabolic acidosis  - will follow, ensure meds not contributing if took excessive       Hypothyroidism  -resume home synthroid   -TSH wnl      Asthma  -no active sx  -continue inhalers     GERD  -pantoprazole      HTN  -BP all elevated into the 170s   -cont amlodipine and BB  -may need to adjust doses   -may need to add additional med  -IV prns if still up     Seizure  -lamictal  -Keppra  -trileptal  - neuro eval r/o Seizure activity if needed c EEG      UC  -mealamine     Hx CVA  -asa  -statin      FEN  -NPO until SLP eval  - ativan helped some muscle twitches to jaw per RN, now started on diet      # Proph  - sqh    DISPO:  DC planning in process  Neuro and psych signed off  PT/OT recs Acute rehab >> PMR consult requested  Waiting on dc plan per PMR  SW to help with placement      Questions/concerns were discussed with patient by bedside. D/w RN. [facility] hospitalist to resume care in AM, will  discuss plan with them      Jing Orta MD  [facility]ist  Pager [phone]  Answering Service number: [phone]

## 2024-12-21 NOTE — CM/SW NOTE
SW noted PMR recommending [facility].    Per cart review, SW noted pt's preference is for [facility]. SW sent referral to [facility] via [facility]. Awaiting response. Will need to notify pt is [facility] is able to accept.     to remain available for support and/or discharge planning.    JUAN M Lara  Discharge Planner  [phone]

## 2024-12-21 NOTE — CM/SW NOTE
12/21/24 1405   Choice of Post-Acute Provider   Patient/family choice [facility]     SW was notified by  liaison, Kiarra Garcia, that they are able to accept pt for AR. Kiarra did inform SW that they need pt's BP to be more controlled prior to DC and that pt would need to supply her own Lithium as they do not carry the one she is on.    HERMINIO updated RN on the above.    HERMINIO called pt's room and spoke with pt about DC plan. HERMINIO notified pt that [facility] () is able to accept her for AR.    Pt agreeable with going to  at DC. SW did informed pt of 's request for pt's BP to more controlled and that pt would need to supply her own Lithium. Pt verbalized understanding and is agreeable with this.    HERMINIO reserved [facility] in [facility].    Plan is for DC to  once medically cleared and BP is more controlled.     to remain available for support and/or discharge planning.    JUAN M Lara  Discharge Planner  [phone]

## 2024-12-22 VITALS
DIASTOLIC BLOOD PRESSURE: 83 MMHG | BODY MASS INDEX: 34 KG/M2 | RESPIRATION RATE: 16 BRPM | HEART RATE: 62 BPM | SYSTOLIC BLOOD PRESSURE: 154 MMHG | OXYGEN SATURATION: 97 % | WEIGHT: 190.06 LBS | TEMPERATURE: 98 F

## 2024-12-22 LAB — POTASSIUM SERPL-SCNC: 3.9 MMOL/L (ref 3.5–5.1)

## 2024-12-22 RX ORDER — LEVETIRACETAM 500 MG/1
500 TABLET ORAL 2 TIMES DAILY
Qty: 180 TABLET | Refills: 2 | Status: SHIPPED | OUTPATIENT
Start: 2024-12-22

## 2024-12-22 NOTE — PROGRESS NOTES
[facility] Hospitalist Progress Note     PCP: Yoel Servin MD    Chief Complaint: follow-up   Follow up for: The primary encounter diagnosis was Delirium. Diagnoses of Total body pain and Leukocytosis, unspecified type were also pertinent to this visit.    Overnight/Interim Events:      SUBJECTIVE:  Pt seen and examined  Sitting in bed  Oriented x3/4  Much better this am  Afebrile  No new FND on exam        OBJECTIVE:  Temp:  [97.6 °F (36.4 °C)-98.1 °F (36.7 °C)] 98 °F (36.7 °C)  Pulse:  [49-64] 57  Resp:  [18] 18  BP: (107-163)/(70-95) 114/79  SpO2:  [94 %-96 %] 96 %    Intake/Output:    Intake/Output Summary (Last 24 hours) at 12/22/2024 0805  Last data filed at 12/22/2024 0540  Gross per 24 hour   Intake 3856 ml   Output 851 ml   Net 3005 ml       Last 3 Weights   12/18/24 1944 190 lb 0.6 oz (86.2 kg)   12/18/24 1155 190 lb 0.6 oz (86.2 kg)   07/09/24 1715 190 lb (86.2 kg)   03/24/24 2146 186 lb 4.6 oz (84.5 kg)   03/24/24 1536 200 lb (90.7 kg)       Exam    General: Alert, no distress, appears stated age.     Head:  Normocephalic, without obvious abnormality, atraumatic.   Eyes:  Sclera anicteric, EOMs intact.    Nose: Nares normal,  Mucosa normal    Throat: Lips normal   Neck: Supple, symmetrical, trachea midline   Lungs:   Clear to auscultation bilaterally. Normal effort   Chest wall:  No tenderness or deformity   Heart:  Regular rate and rhythm, S1, S2 normal, no murmur, rub or gallop appreciated   Abdomen:   Soft, NT/ND, Bowel sounds normal. No masses,  No organomegaly.    Extremities: Extremities normal, atraumatic, no cyanosis or LE edema.   Skin: Skin color, texture, turgor normal. No rashes or lesions.    Neurologic: Moving all extremities spontaneously, no focal deficit appreciated      Data Review:       Labs:     Recent Labs   Lab 12/18/24  1208 12/19/24  0711 12/20/24  0602   WBC 16.9* 11.5* 9.1   HGB 12.5 11.7* 12.2   MCV 91.7 91.8 91.4   .0 288.0 307.0       Recent Labs   Lab 12/18/24  1208  12/19/24  0523 12/20/24  0602 12/20/24  1602 12/21/24  2123   * 141 145  --  143   K 3.8 4.1 3.4*  3.4* 3.3* 3.1*    113* 110  --  110   CO2 22.0 14.0* 20.0*  --  23.0   BUN 20 6* 13  --  8*   CREATSERUM 0.81 0.63 0.68  --  0.66   CA 10.0 8.7 8.9  --  8.8   MG  --  1.8 1.9  --   --    * 112* 110*  --  82       Recent Labs   Lab 12/18/24  1208 12/20/24  0602   ALT 22 23   AST 40* 29   ALB 5.1* 4.5       Recent Labs   Lab 12/19/24  1806 12/19/24  2342 12/20/24  0526 12/21/24  0000 12/21/24  0534   PGLU 87 94 107* 87 81       No results for input(s): \"TROP\" in the last 168 hours.      Meds:      lansoprazole  30 mg Oral QAM AC    QUEtiapine  300 mg Oral Nightly    lamoTRIgine  200 mg Oral TID    sertraline  150 mg Oral Daily    lithium ER  450 mg Oral Nightly    OXcarbazepine  450 mg Oral BID    ALPRAZolam  0.25 mg Oral Once    aspirin  81 mg Oral Daily    amLODIPine  5 mg Oral Daily    levothyroxine  125 mcg Oral Before breakfast    metoprolol succinate ER  50 mg Oral Daily Beta Blocker    heparin  5,000 Units Subcutaneous Q8H DUNIA    magnesium oxide  400 mg Oral Once      sodium chloride 125 mL/hr at 12/22/24 0241       clonazePAM    butalbital-acetaminophen-caffeine    ondansetron    metoprolol    influenza virus vaccine PF       Assessment/Plan:     54 year old female with PMH including but not limited to allergic rhinitis, anxiety, depression, bipolar, asthma, history of kidney stones, fibromyalgia, GERD, gestational diabetes, hypothyroidism, history of migraines, osteoarthritis, history of seizures, history of stroke with no residual symptoms, history ulcer colitis presenting with AMS.         AMS - improved  - multifactorial, possibly related to infxn, psych hx, meds (lack of taking prescribed med and/or polypharmacy), cannabinoids use also interacting   - no obvious CVA sxs but will have neuro eval --> apprec, no further w/u   - PT/OT/SLP  - ammonia <10  - pharm c/s to review meds-->apprec,  d/w Javad, PharmD, on multiple meds and many can cause drowsniess  - psych c/s, resume meds as appropriate   - check levels of meds as needed    - has been using a significant amount of cannabinoids per report also; encouraged cessation; pt reports she uses \"some\" but has for years   -neuro consult >> apprec recs >> signed off yesterday  -psych signed off yest  -PT/OT rec acute rehab >> PMR consult accepted to acute rehab >> plan for       Possible UTI  - started on CTX, Ucx NG, UA abn  - can stop abx tmrw as having some improvement so far   - blood cxs, PCT 0.16  - CMP mostly ok, follow AST  - WBC 16.9 to 11.5, tollow temps (afebrile thus far)  - UDS noted   -urine cx neg >> stopped abx        Anxiety  Depression  Bipolar  - psych eval: Resume home psychiatric medications as reported by patient's sister once safe to take PO:   Seroquel 300 mg nightly   Klonopin 0.5 mg BID PRN   Lithium  mg nightly  Wellbutrin  mg daily  Trileptal 450 mg BID  Zoloft 150 mg daily   Lamotrigine 200 mg TID    -lithium, levels undetectable on admission  -psych following >> ok with dc home  >>signed off        Metabolic acidosis  - will follow, ensure meds not contributing if took excessive       Hypothyroidism  -resume home synthroid   -TSH wnl      Asthma  -no active sx  -continue inhalers     GERD  -pantoprazole      HTN  -BP now improved  -cont amlodipine and BB  -may need to adjust doses   -may need to add additional med  -IV prns if still up     Seizure  -lamictal  -Keppra  -trileptal  - neuro eval r/o Seizure activity if needed c EEG      UC  -mesalamine     Hx CVA  -asa  -statin      FEN  -swallowing   - ativan helped some muscle twitches to jaw per RN, now started on diet      # Proph  - sqh    DISPO:  DC planning in process  Neuro and psych signed off  PT/OT recs Acute rehab >> PMR consult requested  Accepted at   Plan to dc today to   SW to help with placement      Questions/concerns were discussed with  patient by bedside. D/w RN. [facility]ne hospitalist to resume care in AM, will discuss plan with them      Jing Whitaker Hospitalist  Pager [phone]  Answering Service number: [phone]

## 2024-12-22 NOTE — CM/SW NOTE
12/22/24 1200   Discharge disposition   Expected discharge disposition [facility]   Post Acute Care Provider [facility]   Discharge transportation Aleksandr Gill     Informed by RN that patient is medically cleared for discharge. Spoke with Bety from  who confirmed bed available today. Spoke with Aleksandr gill (she states she has Medicaid to cover [facility]) and scheduled  for 2pm today. PCS form completed and available for RN to print. Spoke with patient who is agreeable with discharge today. She states her sister will provide her [place] prescription when she gets to . SW will remain available.      [facility]   Room 2271   number for report is [phone]     [facility]  [phone] or [phone]      JUAN M Herndon  Discharge Planner  [phone]

## 2024-12-22 NOTE — PLAN OF CARE
Assumed patient care @1930  A&O x4  VSS, NSR/SB on tele, HR 50 - 70's, on RA  Cardiac electrolyte replacement protocol  K - 3.1, replaced and redraw in AM  L forearm PIV, infusing 0.9% NS @125mL/hr  Regular, soft/easy to chew diet, tolerating well  Incontinent x2  Purewhick and brief in place  Up 1 assist with walker  Fall & seizure precautions in place  Complaints of pain, Fioricet given per MAR with relief  Bed alarm on, in lowest position, call light within reach and all needs met at this time    Problem: METABOLIC/FLUID AND ELECTROLYTES - ADULT  Goal: Glucose maintained within prescribed range  Description: INTERVENTIONS:  - Monitor Blood Glucose as ordered  - Assess for signs and symptoms of hyperglycemia and hypoglycemia  - Administer ordered medications to maintain glucose within target range  - Assess barriers to adequate nutritional intake and initiate nutrition consult as needed  - Instruct patient on self management of diabetes  Outcome: Progressing     Problem: SAFETY ADULT - FALL  Goal: Free from fall injury  Description: INTERVENTIONS:  - Assess pt frequently for physical needs  - Identify cognitive and physical deficits and behaviors that affect risk of falls.  - [place] fall precautions as indicated by assessment.  - Educate pt/family on patient safety including physical limitations  - Instruct pt to call for assistance with activity based on assessment  - Modify environment to reduce risk of injury  - Provide assistive devices as appropriate  - Consider OT/PT consult to assist with strengthening/mobility  - Encourage toileting schedule  Outcome: Progressing     Problem: NEUROLOGICAL - ADULT  Goal: Achieves stable or improved neurological status  Description: INTERVENTIONS  - Assess for and report changes in neurological status  - Initiate measures to prevent increased intracranial pressure  - Maintain blood pressure and fluid volume within ordered parameters to optimize cerebral perfusion and  minimize risk of hemorrhage  - Monitor temperature, glucose, and sodium. Initiate appropriate interventions as ordered  Outcome: Progressing  Goal: Achieves maximal functionality and self care  Description: INTERVENTIONS  - Monitor swallowing and airway patency with patient fatigue and changes in neurological status  - Encourage and assist patient to increase activity and self care with guidance from PT/OT  - Encourage visually impaired, hearing impaired and aphasic patients to use assistive/communication devices  Outcome: Progressing

## 2024-12-22 NOTE — PLAN OF CARE
Received pt in bed, aox4. Follows commands. Up to bathroom with 1 assist. Complains of nausea and migraine, MD aware. PRN Fioricet given. Okay to DC to [facility].     Problem: SAFETY ADULT - FALL  Goal: Free from fall injury  Description: INTERVENTIONS:  - Assess pt frequently for physical needs  - Identify cognitive and physical deficits and behaviors that affect risk of falls.  - [place] fall precautions as indicated by assessment.  - Educate pt/family on patient safety including physical limitations  - Instruct pt to call for assistance with activity based on assessment  - Modify environment to reduce risk of injury  - Provide assistive devices as appropriate  - Consider OT/PT consult to assist with strengthening/mobility  - Encourage toileting schedule  Outcome: Progressing     Problem: NEUROLOGICAL - ADULT  Goal: Achieves stable or improved neurological status  Description: INTERVENTIONS  - Assess for and report changes in neurological status  - Initiate measures to prevent increased intracranial pressure  - Maintain blood pressure and fluid volume within ordered parameters to optimize cerebral perfusion and minimize risk of hemorrhage  - Monitor temperature, glucose, and sodium. Initiate appropriate interventions as ordered  Outcome: Progressing  Goal: Achieves maximal functionality and self care  Description: INTERVENTIONS  - Monitor swallowing and airway patency with patient fatigue and changes in neurological status  - Encourage and assist patient to increase activity and self care with guidance from PT/OT  - Encourage visually impaired, hearing impaired and aphasic patients to use assistive/communication devices  Outcome: Progressing

## 2024-12-22 NOTE — PROGRESS NOTES
NURSING DISCHARGE NOTE    Discharged Rehab facility via Ambulance.  Accompanied by Support staff  Belongings Taken by patient/family.  Peripheral IV discontinued. AVS and other transfer paperwork sent with the patient to [facility]. All questions answered at this time.

## 2025-02-24 ENCOUNTER — LAB ENCOUNTER (OUTPATIENT)
Dept: LAB | Facility: HOSPITAL | Age: 55
End: 2025-02-24
Attending: INTERNAL MEDICINE
Payer: MEDICARE

## 2025-02-24 DIAGNOSIS — K51.50 LEFT SIDED ULCERATIVE COLITIS WITHOUT COMPLICATION (HCC): ICD-10-CM

## 2025-02-24 LAB
ALBUMIN SERPL-MCNC: 5 G/DL (ref 3.2–4.8)
ALBUMIN/GLOB SERPL: 2.8 {RATIO} (ref 1–2)
ALP LIVER SERPL-CCNC: 113 U/L
ALT SERPL-CCNC: 13 U/L
ANION GAP SERPL CALC-SCNC: 7 MMOL/L (ref 0–18)
AST SERPL-CCNC: 13 U/L (ref ?–34)
BASOPHILS # BLD AUTO: 0.07 X10(3) UL (ref 0–0.2)
BASOPHILS NFR BLD AUTO: 0.7 %
BILIRUB SERPL-MCNC: <0.2 MG/DL (ref 0.3–1.2)
BUN BLD-MCNC: 31 MG/DL (ref 9–23)
CALCIUM BLD-MCNC: 9.7 MG/DL (ref 8.7–10.6)
CHLORIDE SERPL-SCNC: 106 MMOL/L (ref 98–112)
CO2 SERPL-SCNC: 28 MMOL/L (ref 21–32)
CREAT BLD-MCNC: 0.87 MG/DL
CRP SERPL-MCNC: <0.4 MG/DL (ref ?–0.5)
EGFRCR SERPLBLD CKD-EPI 2021: 78 ML/MIN/1.73M2 (ref 60–?)
EOSINOPHIL # BLD AUTO: 0.15 X10(3) UL (ref 0–0.7)
EOSINOPHIL NFR BLD AUTO: 1.5 %
ERYTHROCYTE [DISTWIDTH] IN BLOOD BY AUTOMATED COUNT: 14.1 %
FASTING STATUS PATIENT QL REPORTED: NO
GLOBULIN PLAS-MCNC: 1.8 G/DL (ref 2–3.5)
GLUCOSE BLD-MCNC: 82 MG/DL (ref 70–99)
HCT VFR BLD AUTO: 39.4 %
HGB BLD-MCNC: 12.3 G/DL
IMM GRANULOCYTES # BLD AUTO: 0.04 X10(3) UL (ref 0–1)
IMM GRANULOCYTES NFR BLD: 0.4 %
LYMPHOCYTES # BLD AUTO: 1.85 X10(3) UL (ref 1–4)
LYMPHOCYTES NFR BLD AUTO: 18.6 %
MCH RBC QN AUTO: 29.2 PG (ref 26–34)
MCHC RBC AUTO-ENTMCNC: 31.2 G/DL (ref 31–37)
MCV RBC AUTO: 93.6 FL
MONOCYTES # BLD AUTO: 0.75 X10(3) UL (ref 0.1–1)
MONOCYTES NFR BLD AUTO: 7.5 %
NEUTROPHILS # BLD AUTO: 7.1 X10 (3) UL (ref 1.5–7.7)
NEUTROPHILS # BLD AUTO: 7.1 X10(3) UL (ref 1.5–7.7)
NEUTROPHILS NFR BLD AUTO: 71.3 %
OSMOLALITY SERPL CALC.SUM OF ELEC: 298 MOSM/KG (ref 275–295)
PLATELET # BLD AUTO: 328 10(3)UL (ref 150–450)
POTASSIUM SERPL-SCNC: 4.4 MMOL/L (ref 3.5–5.1)
PROT SERPL-MCNC: 6.8 G/DL (ref 5.7–8.2)
RBC # BLD AUTO: 4.21 X10(6)UL
SODIUM SERPL-SCNC: 141 MMOL/L (ref 136–145)
WBC # BLD AUTO: 10 X10(3) UL (ref 4–11)

## 2025-02-24 PROCEDURE — 36415 COLL VENOUS BLD VENIPUNCTURE: CPT

## 2025-02-24 PROCEDURE — 80053 COMPREHEN METABOLIC PANEL: CPT

## 2025-02-24 PROCEDURE — 86140 C-REACTIVE PROTEIN: CPT

## 2025-02-24 PROCEDURE — 85025 COMPLETE CBC W/AUTO DIFF WBC: CPT

## 2025-02-27 ENCOUNTER — APPOINTMENT (OUTPATIENT)
Dept: CT IMAGING | Facility: HOSPITAL | Age: 55
End: 2025-02-27
Attending: EMERGENCY MEDICINE
Payer: MEDICARE

## 2025-02-27 ENCOUNTER — HOSPITAL ENCOUNTER (INPATIENT)
Facility: HOSPITAL | Age: 55
LOS: 1 days | Discharge: HOME | End: 2025-03-01
Attending: EMERGENCY MEDICINE | Admitting: INTERNAL MEDICINE
Payer: MEDICARE

## 2025-02-27 ENCOUNTER — APPOINTMENT (OUTPATIENT)
Dept: GENERAL RADIOLOGY | Facility: HOSPITAL | Age: 55
End: 2025-02-27
Attending: EMERGENCY MEDICINE
Payer: MEDICARE

## 2025-02-27 DIAGNOSIS — J11.1 INFLUENZA: Primary | ICD-10-CM

## 2025-02-27 DIAGNOSIS — R41.0 DELIRIUM: ICD-10-CM

## 2025-02-27 DIAGNOSIS — J18.9 PNEUMONIA DUE TO INFECTIOUS ORGANISM, UNSPECIFIED LATERALITY, UNSPECIFIED PART OF LUNG: ICD-10-CM

## 2025-02-27 DIAGNOSIS — R11.11 VOMITING WITHOUT NAUSEA, UNSPECIFIED VOMITING TYPE: ICD-10-CM

## 2025-02-27 DIAGNOSIS — J96.01 ACUTE HYPOXIC RESPIRATORY FAILURE (HCC): ICD-10-CM

## 2025-02-27 LAB
ALBUMIN SERPL-MCNC: 4.3 G/DL (ref 3.2–4.8)
ALBUMIN/GLOB SERPL: 2.3 {RATIO} (ref 1–2)
ALP LIVER SERPL-CCNC: 74 U/L
ALT SERPL-CCNC: 12 U/L
AMPHET UR QL SCN: NEGATIVE
ANION GAP SERPL CALC-SCNC: 7 MMOL/L (ref 0–18)
AST SERPL-CCNC: 17 U/L (ref ?–34)
BASOPHILS # BLD AUTO: 0.04 X10(3) UL (ref 0–0.2)
BASOPHILS NFR BLD AUTO: 0.7 %
BENZODIAZ UR QL SCN: NEGATIVE
BILIRUB SERPL-MCNC: <0.2 MG/DL (ref 0.3–1.2)
BILIRUB UR QL STRIP.AUTO: NEGATIVE
BUN BLD-MCNC: 13 MG/DL (ref 9–23)
CALCIUM BLD-MCNC: 8.5 MG/DL (ref 8.7–10.6)
CHLORIDE SERPL-SCNC: 109 MMOL/L (ref 98–112)
CLARITY UR REFRACT.AUTO: CLEAR
CO2 SERPL-SCNC: 24 MMOL/L (ref 21–32)
COCAINE UR QL: NEGATIVE
COLOR UR AUTO: YELLOW
CREAT BLD-MCNC: 0.96 MG/DL
CREAT UR-SCNC: 113.8 MG/DL
EGFRCR SERPLBLD CKD-EPI 2021: 69 ML/MIN/1.73M2 (ref 60–?)
EOSINOPHIL # BLD AUTO: 0 X10(3) UL (ref 0–0.7)
EOSINOPHIL NFR BLD AUTO: 0 %
ERYTHROCYTE [DISTWIDTH] IN BLOOD BY AUTOMATED COUNT: 14.4 %
FENTANYL UR QL SCN: NEGATIVE
FLUAV + FLUBV RNA SPEC NAA+PROBE: NEGATIVE
FLUAV + FLUBV RNA SPEC NAA+PROBE: POSITIVE
GLOBULIN PLAS-MCNC: 1.9 G/DL (ref 2–3.5)
GLUCOSE BLD-MCNC: 116 MG/DL (ref 70–99)
GLUCOSE UR STRIP.AUTO-MCNC: NORMAL MG/DL
HCT VFR BLD AUTO: 36.5 %
HGB BLD-MCNC: 11.7 G/DL
HYALINE CASTS #/AREA URNS AUTO: PRESENT /LPF
IMM GRANULOCYTES # BLD AUTO: 0.02 X10(3) UL (ref 0–1)
IMM GRANULOCYTES NFR BLD: 0.3 %
LACTATE SERPL-SCNC: 0.9 MMOL/L (ref 0.5–2)
LEUKOCYTE ESTERASE UR QL STRIP.AUTO: NEGATIVE
LYMPHOCYTES # BLD AUTO: 0.54 X10(3) UL (ref 1–4)
LYMPHOCYTES NFR BLD AUTO: 9 %
MCH RBC QN AUTO: 29.6 PG (ref 26–34)
MCHC RBC AUTO-ENTMCNC: 32.1 G/DL (ref 31–37)
MCV RBC AUTO: 92.4 FL
MONOCYTES # BLD AUTO: 0.93 X10(3) UL (ref 0.1–1)
MONOCYTES NFR BLD AUTO: 15.6 %
NEUTROPHILS # BLD AUTO: 4.44 X10 (3) UL (ref 1.5–7.7)
NEUTROPHILS # BLD AUTO: 4.44 X10(3) UL (ref 1.5–7.7)
NEUTROPHILS NFR BLD AUTO: 74.4 %
NITRITE UR QL STRIP.AUTO: NEGATIVE
OSMOLALITY SERPL CALC.SUM OF ELEC: 291 MOSM/KG (ref 275–295)
OXYCODONE UR QL SCN: NEGATIVE
PH UR STRIP.AUTO: 6 [PH] (ref 5–8)
PLATELET # BLD AUTO: 238 10(3)UL (ref 150–450)
POTASSIUM SERPL-SCNC: 3.2 MMOL/L (ref 3.5–5.1)
PROT SERPL-MCNC: 6.2 G/DL (ref 5.7–8.2)
PROT UR STRIP.AUTO-MCNC: 30 MG/DL
RBC # BLD AUTO: 3.95 X10(6)UL
RBC UR QL AUTO: NEGATIVE
RSV RNA SPEC NAA+PROBE: NEGATIVE
SARS-COV-2 RNA RESP QL NAA+PROBE: NOT DETECTED
SODIUM SERPL-SCNC: 140 MMOL/L (ref 136–145)
SP GR UR STRIP.AUTO: 1.02 (ref 1–1.03)
UROBILINOGEN UR STRIP.AUTO-MCNC: NORMAL MG/DL
WBC # BLD AUTO: 6 X10(3) UL (ref 4–11)

## 2025-02-27 PROCEDURE — 87040 BLOOD CULTURE FOR BACTERIA: CPT | Performed by: EMERGENCY MEDICINE

## 2025-02-27 PROCEDURE — 71045 X-RAY EXAM CHEST 1 VIEW: CPT | Performed by: EMERGENCY MEDICINE

## 2025-02-27 PROCEDURE — 99285 EMERGENCY DEPT VISIT HI MDM: CPT

## 2025-02-27 PROCEDURE — 83605 ASSAY OF LACTIC ACID: CPT | Performed by: EMERGENCY MEDICINE

## 2025-02-27 PROCEDURE — 70450 CT HEAD/BRAIN W/O DYE: CPT | Performed by: EMERGENCY MEDICINE

## 2025-02-27 PROCEDURE — 80307 DRUG TEST PRSMV CHEM ANLYZR: CPT | Performed by: EMERGENCY MEDICINE

## 2025-02-27 PROCEDURE — 87205 SMEAR GRAM STAIN: CPT | Performed by: EMERGENCY MEDICINE

## 2025-02-27 PROCEDURE — 96366 THER/PROPH/DIAG IV INF ADDON: CPT

## 2025-02-27 PROCEDURE — 0241U SARS-COV-2/FLU A AND B/RSV BY PCR (GENEXPERT): CPT | Performed by: EMERGENCY MEDICINE

## 2025-02-27 PROCEDURE — 81001 URINALYSIS AUTO W/SCOPE: CPT | Performed by: EMERGENCY MEDICINE

## 2025-02-27 PROCEDURE — 96365 THER/PROPH/DIAG IV INF INIT: CPT

## 2025-02-27 PROCEDURE — 93010 ELECTROCARDIOGRAM REPORT: CPT

## 2025-02-27 PROCEDURE — 87077 CULTURE AEROBIC IDENTIFY: CPT | Performed by: EMERGENCY MEDICINE

## 2025-02-27 PROCEDURE — 85025 COMPLETE CBC W/AUTO DIFF WBC: CPT | Performed by: EMERGENCY MEDICINE

## 2025-02-27 PROCEDURE — 87150 DNA/RNA AMPLIFIED PROBE: CPT | Performed by: EMERGENCY MEDICINE

## 2025-02-27 PROCEDURE — 84145 PROCALCITONIN (PCT): CPT

## 2025-02-27 PROCEDURE — 93005 ELECTROCARDIOGRAM TRACING: CPT

## 2025-02-27 PROCEDURE — 80053 COMPREHEN METABOLIC PANEL: CPT | Performed by: EMERGENCY MEDICINE

## 2025-02-27 RX ORDER — OSELTAMIVIR PHOSPHATE 6 MG/ML
75 FOR SUSPENSION ORAL ONCE
Status: DISCONTINUED | OUTPATIENT
Start: 2025-02-27 | End: 2025-03-01

## 2025-02-28 PROBLEM — R41.82 AMS (ALTERED MENTAL STATUS): Status: ACTIVE | Noted: 2025-02-28

## 2025-02-28 PROBLEM — J96.01 ACUTE HYPOXIC RESPIRATORY FAILURE (HCC): Status: ACTIVE | Noted: 2025-02-28

## 2025-02-28 PROBLEM — R41.0 DELIRIUM: Status: ACTIVE | Noted: 2025-02-28

## 2025-02-28 PROBLEM — J18.9 PNEUMONIA DUE TO INFECTIOUS ORGANISM, UNSPECIFIED LATERALITY, UNSPECIFIED PART OF LUNG: Status: ACTIVE | Noted: 2025-02-28

## 2025-02-28 PROBLEM — R11.11 VOMITING WITHOUT NAUSEA, UNSPECIFIED VOMITING TYPE: Status: ACTIVE | Noted: 2025-02-28

## 2025-02-28 LAB
ATRIAL RATE: 68 BPM
MRSA DNA SPEC QL NAA+PROBE: NEGATIVE
P AXIS: 82 DEGREES
P-R INTERVAL: 162 MS
PROCALCITONIN SERPL-MCNC: 0.09 NG/ML (ref ?–0.05)
Q-T INTERVAL: 406 MS
QRS DURATION: 102 MS
QTC CALCULATION (BEZET): 431 MS
R AXIS: -55 DEGREES
T AXIS: 269 DEGREES
VENTRICULAR RATE: 68 BPM

## 2025-02-28 PROCEDURE — 96375 TX/PRO/DX INJ NEW DRUG ADDON: CPT

## 2025-02-28 PROCEDURE — 87641 MR-STAPH DNA AMP PROBE: CPT

## 2025-02-28 RX ORDER — FLUTICASONE PROPIONATE AND SALMETEROL 250; 50 UG/1; UG/1
1 POWDER RESPIRATORY (INHALATION) 2 TIMES DAILY
Status: DISCONTINUED | OUTPATIENT
Start: 2025-02-28 | End: 2025-03-01

## 2025-02-28 RX ORDER — OSELTAMIVIR PHOSPHATE 30 MG/1
30 CAPSULE ORAL 2 TIMES DAILY
Status: DISCONTINUED | OUTPATIENT
Start: 2025-02-28 | End: 2025-03-01

## 2025-02-28 RX ORDER — METOPROLOL SUCCINATE 50 MG/1
50 TABLET, EXTENDED RELEASE ORAL
Status: DISCONTINUED | OUTPATIENT
Start: 2025-02-28 | End: 2025-03-01

## 2025-02-28 RX ORDER — PANTOPRAZOLE SODIUM 40 MG/1
40 TABLET, DELAYED RELEASE ORAL
Status: DISCONTINUED | OUTPATIENT
Start: 2025-02-28 | End: 2025-03-01

## 2025-02-28 RX ORDER — POTASSIUM CHLORIDE 1500 MG/1
40 TABLET, EXTENDED RELEASE ORAL ONCE
Status: COMPLETED | OUTPATIENT
Start: 2025-02-28 | End: 2025-02-28

## 2025-02-28 RX ORDER — CLONAZEPAM 0.5 MG/1
0.5 TABLET ORAL 2 TIMES DAILY PRN
Status: DISCONTINUED | OUTPATIENT
Start: 2025-02-28 | End: 2025-03-01

## 2025-02-28 RX ORDER — ASPIRIN 81 MG/1
81 TABLET ORAL DAILY
Status: DISCONTINUED | OUTPATIENT
Start: 2025-02-28 | End: 2025-03-01

## 2025-02-28 RX ORDER — LEVETIRACETAM 500 MG/1
500 TABLET ORAL 2 TIMES DAILY
Status: DISCONTINUED | OUTPATIENT
Start: 2025-02-28 | End: 2025-03-01

## 2025-02-28 RX ORDER — HYDROMORPHONE HYDROCHLORIDE 2 MG/1
2 TABLET ORAL EVERY 6 HOURS PRN
Status: DISCONTINUED | OUTPATIENT
Start: 2025-02-28 | End: 2025-03-01

## 2025-02-28 RX ORDER — CETIRIZINE HYDROCHLORIDE 10 MG/1
10 TABLET ORAL DAILY
Status: DISCONTINUED | OUTPATIENT
Start: 2025-02-28 | End: 2025-03-01

## 2025-02-28 RX ORDER — QUETIAPINE FUMARATE 100 MG/1
300 TABLET, FILM COATED ORAL NIGHTLY
Status: DISCONTINUED | OUTPATIENT
Start: 2025-02-28 | End: 2025-03-01

## 2025-02-28 RX ORDER — ACETAMINOPHEN 500 MG
500 TABLET ORAL EVERY 4 HOURS PRN
Status: DISCONTINUED | OUTPATIENT
Start: 2025-02-28 | End: 2025-03-01

## 2025-02-28 RX ORDER — SODIUM CHLORIDE 9 MG/ML
INJECTION, SOLUTION INTRAVENOUS CONTINUOUS
Status: DISCONTINUED | OUTPATIENT
Start: 2025-02-28 | End: 2025-03-01

## 2025-02-28 RX ORDER — MECLIZINE HCL 12.5 MG 12.5 MG/1
12.5 TABLET ORAL 3 TIMES DAILY PRN
Status: DISCONTINUED | OUTPATIENT
Start: 2025-02-28 | End: 2025-03-01

## 2025-02-28 RX ORDER — PROCHLORPERAZINE EDISYLATE 5 MG/ML
5 INJECTION INTRAMUSCULAR; INTRAVENOUS EVERY 8 HOURS PRN
Status: DISCONTINUED | OUTPATIENT
Start: 2025-02-28 | End: 2025-03-01

## 2025-02-28 RX ORDER — ENOXAPARIN SODIUM 100 MG/ML
40 INJECTION SUBCUTANEOUS DAILY
Status: DISCONTINUED | OUTPATIENT
Start: 2025-02-28 | End: 2025-03-01

## 2025-02-28 RX ORDER — ONDANSETRON 2 MG/ML
4 INJECTION INTRAMUSCULAR; INTRAVENOUS EVERY 6 HOURS PRN
Status: DISCONTINUED | OUTPATIENT
Start: 2025-02-28 | End: 2025-03-01

## 2025-02-28 RX ORDER — ALBUTEROL SULFATE 90 UG/1
2 INHALANT RESPIRATORY (INHALATION) EVERY 6 HOURS PRN
Status: DISCONTINUED | OUTPATIENT
Start: 2025-02-28 | End: 2025-03-01

## 2025-02-28 RX ORDER — LAMOTRIGINE 100 MG/1
200 TABLET ORAL 2 TIMES DAILY
Status: DISCONTINUED | OUTPATIENT
Start: 2025-02-28 | End: 2025-03-01

## 2025-02-28 RX ORDER — LITHIUM CARBONATE 450 MG
450 TABLET, EXTENDED RELEASE ORAL NIGHTLY
Status: DISCONTINUED | OUTPATIENT
Start: 2025-02-28 | End: 2025-03-01

## 2025-02-28 RX ORDER — FLUTICASONE PROPIONATE 50 MCG
2 SPRAY, SUSPENSION (ML) NASAL DAILY
Status: DISCONTINUED | OUTPATIENT
Start: 2025-02-28 | End: 2025-03-01

## 2025-02-28 NOTE — ED INITIAL ASSESSMENT (HPI)
Pt here by EMS after sister called after finding pt vomiting on the floor all day and not answering questions appropriately on when she last ate or drank. Pt came with vomit all over face and hair.

## 2025-02-28 NOTE — H&P
[facility] Hospitalist H&P       CC:   Chief Complaint   Patient presents with    Nausea/Vomiting/Diarrhea        PCP: Yoel Servin MD    History of Present Illness: Patient is a 54 year old female with PMH of allergic rhinitis, anxiety, depression, bipolar disorder, asthma, kidney stones, fibromyalgia, GERD, gestational diabetes, hypothyroidism, migraines, OA, seizures, stroke with no residual symptoms, prior colitis was presented to the hospital with nausea/vomiting/altered mental status.  Patient does have psychiatric history, sister notes that patient becomes like this when she is off her medications.  Patient was also noted to have a fever yesterday and was prepping for colonoscopy.    On arrival, patient was febrile, potassium 3.2, hemoglobin 11.7.  Patient tested positive for influenza A infection.  Chest x-ray shows atelectasis/consolidation in 1/lower left lung with possible pneumonia.  Started on Rocephin/doxy along with Tamiflu and admitted to the hospital for further management.    PMH  Past Medical History:    Abdominal pain    Acne    Allergic rhinitis    Alopecia areata    Anxiety    Asthma ([facility])    Back problem    Bipolar affective ([facility])    Calculus of kidney    Depression    Diarrhea, unspecified    Fatigue    Fibromyalgia    GERD (gastroesophageal reflux disease)    Gestational diabetes ([facility])    Heartburn    Hemorrhoids    Hypothyroidism    Migraines    Nausea    Nausea and vomiting, unspecified vomiting type    Organic hypersomnia, unspecified    AHI 2 RDI 2 REM AHI 2 SaO2 doroteo 88 %    Osteoarthritis    Pneumonia due to organism    Scoliosis    Seizure disorder ([facility])    absent seizures    Stool incontinence    Stroke ([facility])    2003+ mild R.sided weakness    Ulcerative colitis ([facility])    Visual impairment    Vomiting    Weight gain    Weight loss        PSH  Past Surgical History:   Procedure Laterality Date    Cholecystectomy  03/2010    Colonoscopy  2013    ulcerative colitis     Colonoscopy N/A 2017    Procedure: COLONOSCOPY;  Surgeon: Hipolito Dobbins MD;  Location:  ENDOSCOPY    Colonoscopy N/A 2021    Procedure: COLONOSCOPY with biopsy,;  Surgeon: Ramsey Muhammad MD;  Location:  ENDOSCOPY    Colposcopy, cervix w/upper adjacent vagina; w/biopsy(s), cervix      Hernia surgery      Umbilical Hernia Repair    Other surgical history N/A 2016    Procedure: ESOPHAGOGASTRODUODENOSCOPY (EGD);  Surgeon: Jeff Scherer MD;  Location:  ENDOSCOPY    [place], iud  2014    Upper gi endoscopy,exam          ALL:  Allergies[1]     Home Medications:  Medications Taking[2]      Soc Hx  Social History     Tobacco Use    Smoking status: Former     Current packs/day: 0.00     Types: Cigarettes     Quit date: 1993     Years since quittin.0     Passive exposure: Past    Smokeless tobacco: Never    Tobacco comments:     Ages [age], [age]   Substance Use Topics    Alcohol use: Not Currently        Fam Hx  Family History   Problem Relation Age of Onset    Breast Cancer Maternal Grandmother         81    Breast Cancer Other         dx post menopausal    High Blood Pressure Father     Colon Cancer Father         59    Colon Polyps Father     Hypertension Father     High Blood Pressure Mother     High Cholesterol Mother     Hypertension Mother     Mental Disorder Mother         Depression    Heart Disorder Sister 54        MI - LAD stenting    Heart Attack Sister         2018 \" maker\"    Mental Disorder Son         ADHD, Bipolar II    Mental Disorder Son         Severe anxiety, Bipolar II       Review of Systems  Comprehensive ROS reviewed and negative except for what's stated above.     OBJECTIVE:  /65 (BP Location: Left arm)   Pulse 67   Temp 98.3 °F (36.8 °C) (Oral)   Resp 19   Wt 184 lb (83.5 kg)   LMP 2015 (Approximate)   SpO2 94%   BMI 32.59 kg/m²     Gen: Alert, no acute distress  Heent: Normocephalic, atraumatic, neck supple,  EOMI, PERRLA  Pulm: Lungs CTAB, normal respiratory effort  CV:  Regular rate and rhythm, no murmurs/rubs/gallops  Abd: Soft, nontender, nondistended, bowel sounds present  Extremities: No peripheral edema, no clubbing, pulses intact   Skin: No rashes or lesions  Neuro: AOx2, no focal neurologic deficits, CN II-XII grossly intact  Psych: Pleasant    Diagnostic Data:    CBC/Chem  Recent Labs   Lab 02/24/25  1525 02/27/25 1913   WBC 10.0 6.0   HGB 12.3 11.7*   MCV 93.6 92.4   .0 238.0       Recent Labs   Lab 02/24/25  1525 02/27/25 1913    140   K 4.4 3.2*    109   CO2 28.0 24.0   BUN 31* 13   CREATSERUM 0.87 0.96   GLU 82 116*   CA 9.7 8.5*       Recent Labs   Lab 02/24/25  1525 02/27/25 1913   ALT 13 12   AST 13 17   ALB 5.0* 4.3       No results for input(s): \"TROP\" in the last 168 hours.      CXR: image personally reviewed and independently interpreted, shows left middle/lower lobe infiltrate versus consolidation.    Radiology: CT BRAIN OR HEAD (CPT=70450)    Result Date: 2/27/2025  CONCLUSION:  No acute intracranial abnormality identified.   LOCATION:  [facility]   Dictated by (CST): Lazaro Savage MD on 2/27/2025 at 8:33 PM     Finalized by (CST): Lazaro Savage MD on 2/27/2025 at 8:34 PM       XR CHEST AP PORTABLE  (CPT=71045)    Result Date: 2/27/2025  CONCLUSION:  Atelectasis/consolidation in the mid to lower left lung is concerning for pneumonia.  Clinical correlation recommended along with follow-up until complete resolution.  Probable small left pleural effusion.  Mild atelectasis/scarring at the right lung base with elevation the right diaphragm.    LOCATION:  [facility]      Dictated by (CST): Lazaro Savage MD on 2/27/2025 at 7:10 PM     Finalized by (CST): Lazaro Savage MD on 2/27/2025 at 7:10 PM          ASSESSMENT / PLAN:     54 year old female with PMH of allergic rhinitis, anxiety, depression, bipolar disorder, asthma, kidney stones, fibromyalgia, GERD, gestational diabetes,  hypothyroidism, migraines, OA, seizures, stroke with no residual symptoms, prior colitis was presented to the hospital with nausea/vomiting/altered mental status.    Influenza A infection with superimposed bacterial pneumonia  -Left lower lobe pneumonia seen on x-ray  -Continue Tamiflu, Rocephin, doxycycline (given other QT prolonging medications)   -Will obtain procalcitonin, strep pneumo antigen, Legionella, MRSA swab, sputum culture  -Continue to monitor respiratory status    Acute metabolic encephalopathy  -Mild altered mental status in the setting of infection, also exacerbated given history of other psychiatric conditions and multiple medications.  -Will continue to monitor mentation  -PT/OT    Bipolar disorder  Anxiety  Depression  Fibromyalgia  History of seizures  -Continue home seizure meds and antipsychotics    Hypertension  -Continue home beta-blocker, amlodipine    GERD  -Continue home PPI    Hypothyroidism  -Continue home levothyroxine    Dispo: Inpatient    Outpatient or previous hospital records reviewed. Questions/concerns were discussed with patient and/or family by bedside.   A total of 77 minutes were taken with patient and coordinating care.     Donta Perdomo DO  [facility] Hospitalist  Contact via [place]      Advanced Care Planning    While discussing goals of care with the patient and their family, patient voluntarily participated in an advanced care planning discussion. The following was discussed: Patient would like to be full code.  Discussed treatment send patient is okay with all treatments as outlined above.  She lives at home with her son and would like to get back to her usual living situation.    16 minutes were spent in discussing advanced care planning. This time was exclusive of the documented time for this visit.      Supplementary Documentation:   DVT Mechanical Prophylaxis:   SCDs,    DVT Pharmacologic Prophylaxis   Medication    enoxaparin (Lovenox)  40 MG/0.4ML SUBQ injection 40 mg                Code Status: Full Code  Montes: No urinary catheter in place  Montes Duration (in days):   Central line:    IKER:              [1]   Allergies  Allergen Reactions    Depakote [Valproic Acid] OTHER (SEE COMMENTS)     Suicidal ideation    Kdc:Olanzapine SWELLING    Ketorolac Tromethamine OTHER (SEE COMMENTS) and NAUSEA AND VOMITING     gastritis  Gastritis & UC  gastritis  Gastritis & UC      Latex RASH and Dyspnea    Latex OTHER (SEE COMMENTS), RASH and SHORTNESS OF BREATH    [employer] ASTHMA, ITCHING, RASH, SHORTNESS OF BREATH, WHEEZING and OTHER (SEE COMMENTS)    Nsaids OTHER (SEE COMMENTS)     Must take Nsaids sparingly due to history of gastritis  Must take Nsaids sparingly due to history of gastritis      Olanzapine SWELLING     Caused severe swelling and bruising    Penicillins NAUSEA AND VOMITING    Phenothiazines OTHER (SEE COMMENTS)     Extrapyramidal syndrome. No phenothiazines per neurology (documented during 1/19/18 Dr. Velázquez)  Extrapyramidal syndrome. No phenothiazines per neurology (documented during 1/19/18 Dr. Velázquez)  Extrapyramidal syndrome. No phenothiazines per neurology (documented during 1/19/18 Dr. Velázquez)    Amoxicillin NAUSEA AND VOMITING    Levofloxacin PAIN and OTHER (SEE COMMENTS)     Tendinitis in multiple joints  Tendinitis in multiple joints    Mushrooms NAUSEA AND VOMITING    Penicillin G Potassium NAUSEA AND VOMITING    Phenytoin ANXIETY, CONFUSION, DIZZINESS and HALLUCINATION    Sulfa Antibiotics NAUSEA AND VOMITING    Toradol NAUSEA AND VOMITING     Gastritis & UC    [place], [place] OTHER (SEE COMMENTS)     Sneezing watery eyes    Triptans HALLUCINATION     Lesions in brain    Erythromycin OTHER (SEE COMMENTS)   [2]   No outpatient medications have been marked as taking for the 2/27/25 encounter (Hospital Encounter).

## 2025-02-28 NOTE — ED PROVIDER NOTES
Patient Seen in: [facility] Emergency Department      History     Chief Complaint   Patient presents with    Nausea/Vomiting/Diarrhea     Stated Complaint:     Subjective:   HPI      Patient for nausea vomiting and altered mental status.  History is provided by the sister at bedside the patient is altered and not answering questions.  Patient does have a psychiatric history.  The sister states that when she gets ill or when she gets off her medications she \"gets like this\".      This patient had a fever yesterday and today was prepping for a colonoscopy.  She is unsure if she took any of her medications.    She started  Vomiting, became more withdrawn so she was brought to the ER.    Objective:     No pertinent past medical history.            No pertinent past surgical history.              No pertinent social history.                Physical Exam     ED Triage Vitals [02/27/25 1839]   /65   Pulse 69   Resp 21   Temp 97.7 °F (36.5 °C)   Temp src Oral   SpO2 92 %   O2 Device None (Room air)       Current Vitals:   Vital Signs  BP: 143/79  Pulse: 77  Resp: 22  Temp: 97.7 °F (36.5 °C)  Temp src: Oral  MAP (mmHg): 99    Oxygen Therapy  SpO2: 99 %  O2 Device: None (Room air)        Physical Exam        Physical Exam   Constitutional: Patient is drowsy but will wake to verbal or physical stimuli  Head: Normocephalic and atraumatic.  Vomit all of her face and hair  Eyes: Conjunctivae are normal. Pupils are equal, round, and reactive to light.   Neck: Normal range of motion. No JVD  Cardiovascular: Normal rate, regular rhythm  Pulmonary/Chest: Coarse crackles throughout  Abdominal: Soft. Not distended.  Neurological:   Patient will respond to aggressive verbal and physical stimuli, speaks but is not really making much sense.  She will withdraw her extremities to stimulation.      ED Course     Labs Reviewed   URINALYSIS WITH CULTURE REFLEX - Abnormal; Notable for the following components:       Result Value     Ketones Urine Trace (*)     Protein Urine 30 (*)     Hyaline Casts Present (*)     All other components within normal limits   DRUG SCREEN 8 W/OUT CONFIRMATION, URINE - Abnormal; Notable for the following components:    Cannabinoid Urine Presumed Positive (*)     Ecstasy Urine Presumed Positive (*)     Opiate Urine Presumed Positive (*)     All other components within normal limits    Narrative:     Results of the Urine Drug Screen should be used only for medical purposes.   COMP METABOLIC PANEL (14) - Abnormal; Notable for the following components:    Glucose 116 (*)     Potassium 3.2 (*)     Calcium, Total 8.5 (*)     Bilirubin, Total <0.2 (*)     Globulin  1.9 (*)     A/G Ratio 2.3 (*)     All other components within normal limits   CBC WITH DIFFERENTIAL WITH PLATELET - Abnormal; Notable for the following components:    HGB 11.7 (*)     Lymphocyte Absolute 0.54 (*)     All other components within normal limits   SARS-COV-2/FLU A AND B/RSV BY PCR (GENEXPERT) - Abnormal; Notable for the following components:    Influenza A by PCR Positive (*)     All other components within normal limits    Narrative:     This test is intended for the qualitative detection and differentiation of SARS-CoV-2, influenza A, influenza B, and respiratory syncytial virus (RSV) viral RNA in nasopharyngeal or nares swabs from individuals suspected of respiratory viral infection consistent with COVID-19 by their healthcare provider. Signs and symptoms of respiratory viral infection due to SARS-CoV-2, influenza, and RSV can be similar.    Test performed using the Xpert Xpress SARS-CoV-2/FLU/RSV (real time RT-PCR)  assay on the GeneXpert instrument, [employer], [place] [address].   This test is being used under the Food and Drug Administration's Emergency Use Authorization.    The authorized Fact Sheet for Healthcare Providers for this assay is available upon request from the laboratory.   LACTIC ACID, PLASMA - Normal   RAINBOW DRAW LAVENDER    RAINBOW DRAW LIGHT GREEN   RAINBOW DRAW BLUE   BLOOD CULTURE   BLOOD CULTURE     EKG    Rate, intervals and axes as noted on EKG Report.  Rate: 68  Rhythm: Sinus Rhythm  Reading: Sinus rhythm no acute ischemia                  Blood work reviewed, CBC fine, CMP shows slight low potassium.  UDS positive for multiple substances.  Flu test positive.    CT BRAIN OR HEAD (CPT=70450)    Result Date: 2/27/2025  CONCLUSION:  No acute intracranial abnormality identified.   LOCATION:  [facility]   Dictated by (CST): Lazaro Savage MD on 2/27/2025 at 8:33 PM     Finalized by (CST): Lazaro Savage MD on 2/27/2025 at 8:34 PM       XR CHEST AP PORTABLE  (CPT=71045)    Result Date: 2/27/2025  CONCLUSION:  Atelectasis/consolidation in the mid to lower left lung is concerning for pneumonia.  Clinical correlation recommended along with follow-up until complete resolution.  Probable small left pleural effusion.  Mild atelectasis/scarring at the right lung base with elevation the right diaphragm.    LOCATION:  [facility]      Dictated by (CST): Lazaro Savage MD on 2/27/2025 at 7:10 PM     Finalized by (CST): Lazaro Savage MD on 2/27/2025 at 7:10 PM                 MDM          Differential diagnoses considered: Intoxication, life-threatening intracranial hemorrhage, encephalopathy, influenza, pneumonia, aspiration all considered    -In reviewing her chart, patient has had similar presentations before attributed to UTI medication issues.  -She is flu positive and additionally may have an aspiration pneumonia based on history provided to me and the chest x-ray.  Will cover with Rocephin, doxycycline and when able to take p.o. reliably Tamiflu.    -Her mental status is slowly improving throughout her ER stay.  Oxygenation has been adequate with 2 L I think is okay for the floor    Patient will be admitted primarily to the [facility] hospitalist.    Care has been discussed with the admitting physician.      I visualized the radiology studies, my  independent interpretation: No large intraconal hemorrhage noted on CT scan of brain    *Discussion of ongoing management of this patient's care included:  admitting physician    *External charts reviewed: Prior neurology consult notes reviewed, patient had similar presentations in the past attributed to multiple factors including UTI  *Additional sources of history: history provided by sister patient able provide history        Admission disposition: 2/27/2025  9:43 PM           Medical Decision Making      Disposition and Plan     Clinical Impression:  1. Influenza    2. Delirium    3. Vomiting without nausea, unspecified vomiting type    4. Pneumonia due to infectious organism, unspecified laterality, unspecified part of lung    5. Acute hypoxic respiratory failure (HCC)         Disposition:  Admit  2/27/2025  9:43 pm    Follow-up:  No follow-up provider specified.        Medications Prescribed:  Current Discharge Medication List              Supplementary Documentation:         Hospital Problems       Present on Admission  Date Reviewed: 1/22/2025            ICD-10-CM Noted POA    * (Principal) Influenza J11.1 2/27/2025 Unknown

## 2025-02-28 NOTE — PLAN OF CARE
Assumed pt care this morning at 0730.  Pt is A&Ox3, following commands.  Pt on room air, VSS.  NSR  Pt denies pain.  Pt ambulates x1 w/ walker to br. Pwick on for backup.  Pt on regular diet. Tolerating diet.   R hand  NS at 100 mL/hr.  Iso for flu. Seizure precautions for med hx.  Bed in lowest position, call light in reach.       Problem: Patient/Family Goals  Goal: Patient/Family Long Term Goal  Description: Patient's Long Term Goal: independent in adls    Interventions:  - take meds and treat flu  - See additional Care Plan goals for specific interventions  Outcome: Progressing  Goal: Patient/Family Short Term Goal  Description: Patient's Short Term Goal: discharge    Interventions:   - participate in care planning  - See additional Care Plan goals for specific interventions  Outcome: Progressing     Problem: Delirium  Goal: Minimize duration of delirium  Description: Interventions:  - Encourage use of hearing aids, eye glasses  - Promote highest level of mobility daily  - Provide frequent reorientation  - Promote wakefulness i.e. lights on, blinds open  - Promote sleep, encourage patient's normal rest cycle i.e. lights off, TV off, minimize noise and interruptions  - Encourage family to assist in orientation and promotion of home routines  Outcome: Progressing

## 2025-02-28 NOTE — ED QUICK NOTES
Orders for admission, patient is aware of plan and ready to go upstairs. Any questions, please call ED RN Belkis at extension [phone].     Patient Covid vaccination status: Fully vaccinated     COVID Test Ordered in ED: SARS-CoV-2/Flu A and B/RSV by PCR (GeneXpert)    COVID Suspicion at Admission: N/A    Running Infusions:      Mental Status/LOC at time of transport: A+OX2    Other pertinent information:   CIWA score: N/A   NIH score:  N/A

## 2025-02-28 NOTE — SPIRITUAL CARE NOTE
Spiritual Care Visit Note    Patient Name: Marylin Walker Date of Spiritual Care Visit: 23   : 1970 Primary Dx: Influenza   Referred By:  ED Rounds    Spiritual Care Taxonomy:    Intended Effects: Demonstrate caring and concern    Methods: Encourage sharing of feelings    Interventions: Acknowledge current situation;Active listening;Ask guided questions;Ask questions to bring forth feelings;Share words of hope and inspiration    Visit Type/Summary:  Marylin was awake when  arrived at her room.  She seemed to understand that I was a  but was not able to communicate clearly.  She knows a  is available , as needed.     Spiritual Care support can be requested via an Epic consult. For urgent/immediate needs, please contact the On Call  at: [facility]: ext [phone]    Ольга Arvizu MDiv.  Staff

## 2025-03-01 VITALS
OXYGEN SATURATION: 97 % | WEIGHT: 184 LBS | DIASTOLIC BLOOD PRESSURE: 77 MMHG | HEART RATE: 66 BPM | RESPIRATION RATE: 17 BRPM | SYSTOLIC BLOOD PRESSURE: 129 MMHG | BODY MASS INDEX: 33 KG/M2 | TEMPERATURE: 98 F

## 2025-03-01 LAB
ALBUMIN SERPL-MCNC: 3.9 G/DL (ref 3.2–4.8)
ALBUMIN/GLOB SERPL: 2.2 {RATIO} (ref 1–2)
ALP LIVER SERPL-CCNC: 63 U/L
ALT SERPL-CCNC: 10 U/L
ANION GAP SERPL CALC-SCNC: 8 MMOL/L (ref 0–18)
AST SERPL-CCNC: 14 U/L (ref ?–34)
BASOPHILS # BLD AUTO: 0.02 X10(3) UL (ref 0–0.2)
BASOPHILS NFR BLD AUTO: 0.6 %
BILIRUB SERPL-MCNC: <0.2 MG/DL (ref 0.3–1.2)
BUN BLD-MCNC: 10 MG/DL (ref 9–23)
CALCIUM BLD-MCNC: 8.7 MG/DL (ref 8.7–10.6)
CHLORIDE SERPL-SCNC: 113 MMOL/L (ref 98–112)
CO2 SERPL-SCNC: 22 MMOL/L (ref 21–32)
CREAT BLD-MCNC: 0.7 MG/DL
EGFRCR SERPLBLD CKD-EPI 2021: 101 ML/MIN/1.73M2 (ref 60–?)
EOSINOPHIL # BLD AUTO: 0.02 X10(3) UL (ref 0–0.7)
EOSINOPHIL NFR BLD AUTO: 0.6 %
ERYTHROCYTE [DISTWIDTH] IN BLOOD BY AUTOMATED COUNT: 14.9 %
GLOBULIN PLAS-MCNC: 1.8 G/DL (ref 2–3.5)
GLUCOSE BLD-MCNC: 126 MG/DL (ref 70–99)
GLUCOSE BLD-MCNC: 65 MG/DL (ref 70–99)
GLUCOSE BLD-MCNC: 68 MG/DL (ref 70–99)
GLUCOSE BLD-MCNC: 92 MG/DL (ref 70–99)
HCT VFR BLD AUTO: 35.9 %
HGB BLD-MCNC: 11.2 G/DL
IMM GRANULOCYTES # BLD AUTO: 0.02 X10(3) UL (ref 0–1)
IMM GRANULOCYTES NFR BLD: 0.6 %
LYMPHOCYTES # BLD AUTO: 1.36 X10(3) UL (ref 1–4)
LYMPHOCYTES NFR BLD AUTO: 38.5 %
MCH RBC QN AUTO: 29.5 PG (ref 26–34)
MCHC RBC AUTO-ENTMCNC: 31.2 G/DL (ref 31–37)
MCV RBC AUTO: 94.5 FL
MONOCYTES # BLD AUTO: 0.5 X10(3) UL (ref 0.1–1)
MONOCYTES NFR BLD AUTO: 14.2 %
NEUTROPHILS # BLD AUTO: 1.61 X10 (3) UL (ref 1.5–7.7)
NEUTROPHILS # BLD AUTO: 1.61 X10(3) UL (ref 1.5–7.7)
NEUTROPHILS NFR BLD AUTO: 45.5 %
OSMOLALITY SERPL CALC.SUM OF ELEC: 293 MOSM/KG (ref 275–295)
PLATELET # BLD AUTO: 222 10(3)UL (ref 150–450)
POTASSIUM SERPL-SCNC: 3.9 MMOL/L (ref 3.5–5.1)
POTASSIUM SERPL-SCNC: 3.9 MMOL/L (ref 3.5–5.1)
PROT SERPL-MCNC: 5.7 G/DL (ref 5.7–8.2)
RBC # BLD AUTO: 3.8 X10(6)UL
SODIUM SERPL-SCNC: 143 MMOL/L (ref 136–145)
WBC # BLD AUTO: 3.5 X10(3) UL (ref 4–11)

## 2025-03-01 PROCEDURE — 97116 GAIT TRAINING THERAPY: CPT

## 2025-03-01 PROCEDURE — 85025 COMPLETE CBC W/AUTO DIFF WBC: CPT | Performed by: INTERNAL MEDICINE

## 2025-03-01 PROCEDURE — 94640 AIRWAY INHALATION TREATMENT: CPT

## 2025-03-01 PROCEDURE — 82962 GLUCOSE BLOOD TEST: CPT

## 2025-03-01 PROCEDURE — 97165 OT EVAL LOW COMPLEX 30 MIN: CPT

## 2025-03-01 PROCEDURE — 80053 COMPREHEN METABOLIC PANEL: CPT | Performed by: INTERNAL MEDICINE

## 2025-03-01 PROCEDURE — 94760 N-INVAS EAR/PLS OXIMETRY 1: CPT

## 2025-03-01 PROCEDURE — 97530 THERAPEUTIC ACTIVITIES: CPT

## 2025-03-01 PROCEDURE — 97161 PT EVAL LOW COMPLEX 20 MIN: CPT

## 2025-03-01 PROCEDURE — 84132 ASSAY OF SERUM POTASSIUM: CPT | Performed by: INTERNAL MEDICINE

## 2025-03-01 RX ORDER — NICOTINE POLACRILEX 4 MG
15 LOZENGE BUCCAL
Status: DISCONTINUED | OUTPATIENT
Start: 2025-03-01 | End: 2025-03-01

## 2025-03-01 RX ORDER — NICOTINE POLACRILEX 4 MG
30 LOZENGE BUCCAL
Status: DISCONTINUED | OUTPATIENT
Start: 2025-03-01 | End: 2025-03-01

## 2025-03-01 RX ORDER — DEXTROSE MONOHYDRATE 25 G/50ML
50 INJECTION, SOLUTION INTRAVENOUS
Status: DISCONTINUED | OUTPATIENT
Start: 2025-03-01 | End: 2025-03-01

## 2025-03-01 RX ORDER — OSELTAMIVIR PHOSPHATE 30 MG/1
30 CAPSULE ORAL 2 TIMES DAILY
Qty: 8 CAPSULE | Refills: 0 | Status: SHIPPED | OUTPATIENT
Start: 2025-03-01 | End: 2025-03-05

## 2025-03-01 NOTE — PLAN OF CARE
NURSING DISCHARGE NOTE    Discharged Home via Cart.  Accompanied by Support staff  Belongings Taken by patient/family.  IV removed   AVS reviewed  All questions answered

## 2025-03-01 NOTE — DISCHARGE SUMMARY
General Medicine Discharge Summary     Patient ID:  Marylin Walker  54 year old  4/16/1970    Admit date: 2/27/2025    Discharge date and time: 3/1/2025    Attending Physician: Donta Perdomo DO     Consults: IP CONSULT TO HOSPITALIST  IP CONSULT TO SOCIAL WORK  IP CONSULT TO SOCIAL WORK    Primary Care Physician: Yoel Servin MD     Reason for admission: Influenza    Risk For Readmission: Low    Discharge Diagnoses: Delirium [R41.0]  Influenza [J11.1]  Pneumonia due to infectious organism, unspecified laterality, unspecified part of lung [J18.9]  Vomiting without nausea, unspecified vomiting type [R11.11]  Acute hypoxic respiratory failure (HCC) [J96.01]  See Additional Discharge Diagnoses in Hospital Course    Discharged Condition: good    Follow-up with labs/images appointments: PCP    Exam   Gen: Alert, no acute distress  Heent: Normocephalic, atraumatic, neck supple, EOMI, PERRLA  Pulm: Lungs CTAB, normal respiratory effort  CV:  Regular rate and rhythm, no murmurs/rubs/gallops  Abd: Soft, nontender, nondistended, bowel sounds present  Extremities: No peripheral edema, no clubbing, pulses intact   Skin: No rashes or lesions  Neuro: AOx3, no focal neurologic deficits, CN II-XII grossly intact  Psych: appropriate mood and affect    Hospital Course: 54 year old female with PMH of allergic rhinitis, anxiety, depression, bipolar disorder, asthma, kidney stones, fibromyalgia, GERD, gestational diabetes, hypothyroidism, migraines, OA, seizures, stroke with no residual symptoms, prior colitis was presented to the hospital with nausea/vomiting/altered mental status.  Patient does have psychiatric history, sister notes that patient becomes like this when she is off her medications.  Patient was also noted to have a fever yesterday and generalized weakness.  On arrival, patient was febrile, patient tested positive for influenza A infection and chest x-ray showed possible atelectasis/consolidation in the left  middle/lower lobe concerning for possible pneumonia.  Patient was started on Rocephin and doxycycline along with Tamiflu and admitted to the hospital for further management.  Patient was monitored overnight and had improvement in symptoms and was able to walk around well with PT/OT.  Encephalopathy did improve as well.  Procalcitonin was obtained and was low at 0.09, no leukocytosis either.  Antibiotics discontinued given the low yield for bacterial pneumonia, suspect that symptoms are all in the setting of influenza A infection given that patient has family members with similar symptoms.  She is otherwise stable for discharge at this time, sent home with prescription for Tamiflu and follow-up with PCP this week.    Operative Procedures:      Imaging: CT BRAIN OR HEAD (CPT=70450)    Result Date: 2/27/2025  CONCLUSION:  No acute intracranial abnormality identified.   LOCATION:  [facility]   Dictated by (CST): Lazaro Savage MD on 2/27/2025 at 8:33 PM     Finalized by (CST): Lazaro Savage MD on 2/27/2025 at 8:34 PM       XR CHEST AP PORTABLE  (CPT=71045)    Result Date: 2/27/2025  CONCLUSION:  Atelectasis/consolidation in the mid to lower left lung is concerning for pneumonia.  Clinical correlation recommended along with follow-up until complete resolution.  Probable small left pleural effusion.  Mild atelectasis/scarring at the right lung base with elevation the right diaphragm.    LOCATION:  [facility]      Dictated by (CST): Lazaro Savage MD on 2/27/2025 at 7:10 PM     Finalized by (CST): Lazaro Savage MD on 2/27/2025 at 7:10 PM          Disposition: home    Home Medication Changes:     Med list     Medication List        START taking these medications      oseltamivir 30 MG Caps  Commonly known as: Tamiflu  Take 1 capsule (30 mg total) by mouth 2 (two) times daily for 4 days.            CONTINUE taking these medications      * albuterol (2.5 MG/3ML) 0.083% Nebu  Commonly known as: Ventolin  Take 3 mL (2.5 mg total)  by nebulization every 6 (six) hours as needed for Wheezing.     * albuterol 108 (90 Base) MCG/ACT Aers  Commonly known as: Ventolin HFA     alum-mag hydroxide-simethicone 200-200-20 MG/5ML Susp  Commonly known as: Maalox  Take 30 mL by mouth 4 (four) times daily as needed for Indigestion.     * amLODIPine 5 MG Tabs  Commonly known as: Norvasc     * amLODIPine 2.5 MG Tabs  Commonly known as: Norvasc     aspirin 81 MG Tabs     carisoprodol 350 MG Tabs  Commonly known as: SOMA     clonazePAM 0.5 MG Tabs  Commonly known as: KlonoPIN     Estradiol 10 MCG Tabs     fluticasone propionate 50 MCG/ACT Susp  Commonly known as: Flonase  SHAKE LIQUID AND USE 2 SPRAYS IN EACH NOSTRIL DAILY     fluticasone-salmeterol 250-50 MCG/ACT Aepb  Commonly known as: Advair Diskus  INHALE 1 PUFF INTO THE LUNGS EVERY 12 HOURS     lamoTRIgine 200 MG Tabs  Commonly known as: LaMICtal  Take 1 tablet (200 mg total) by mouth 3 (three) times daily.     levETIRAcetam 500 MG Tabs  Commonly known as: Keppra  Take 1 tablet (500 mg total) by mouth 2 (two) times daily.     levocetirizine 5 MG Tabs  Commonly known as: Xyzal  TAKE 1 TABLET(5 MG) BY MOUTH EVERY EVENING     levothyroxine 125 MCG Tabs  Commonly known as: Synthroid  Take 1 tablet (125 mcg total) by mouth daily.     lithium  MG Tbcr  Commonly known as: ESKALITH     meclizine 12.5 MG Tabs  Commonly known as: Antivert  Take 1 tablet (12.5 mg total) by mouth 3 (three) times daily as needed.     mesalamine  MG Cpdr  Commonly known as: Delzicol  Take 3 capsules by mouth 2 (two) times daily before meals for 30 days.     montelukast 10 MG Tabs  Commonly known as: Singulair  TAKE 1 TABLET(10 MG) BY MOUTH EVERY NIGHT     ondansetron 8 MG Tbdp  Commonly known as: Zofran-ODT  Take 1 tablet (8 mg total) by mouth every 8 (eight) hours as needed for Nausea.     OXcarbazepine 300 MG Tabs  Commonly known as: Trileptal  TAKE 1 AND 1/2 TABLETS(450 MG) BY MOUTH TWICE DAILY     pantoprazole 40 MG  Tbec  Commonly known as: Protonix  TAKE 1 TABLET(40 MG) BY MOUTH DAILY BEFORE BREAKFAST     pregabalin 200 MG Caps  Commonly known as: Lyrica  Take 1 capsule (200 mg total) by mouth 2 (two) times daily.     QC Womens Daily Multivitamin Tabs     * QUEtiapine 100 MG Tabs  Commonly known as: SEROquel     * QUEtiapine 200 MG Tabs  Commonly known as: SEROquel     Toprol XL 50 MG Tb24  Generic drug: metoprolol succinate ER     Vios Aerosol Delivery System Misc           * This list has 6 medication(s) that are the same as other medications prescribed for you. Read the directions carefully, and ask your doctor or other care provider to review them with you.                STOP taking these medications      dicyclomine 10 MG Caps  Commonly known as: Bentyl     Na Sulfate-K Sulfate-Mg Sulf 17.5-3.13-1.6 GM/177ML Soln  Commonly known as: Suprep Bowel Prep Kit     polyethylene glycol (PEG 3350) 17 GM/SCOOP Powd  Commonly known as: GlycoLax     sertraline 100 MG Tabs  Commonly known as: Zoloft               Where to Get Your Medications        [id number] - [place], [place] - [address] [phone], [phone]  [address] [facility] [facility] [address]      Phone: [phone]   oseltamivir 30 MG Caps         FU      DC instructions:      Other Discharge Instructions:         -Ride Assist [place] - [phone] [email or web address]  -Ride [place] [phone] or [phone] [email or web address]  -First Transit [phone] [facility]/SiteCollectionDocuments/First_Transit_[email or web address]    -[place] Ride Around [facility] [phone]  -[facility] Transit System [phone]  [facility] Transportation [phone] Ext. [phone]   -[facility] Pace Dial-a-Ride Service  Reservations: [phone]  [facility] Senior Shuttle Bus Line at [phone]  -[facility] Service [phone]  -[place]  [facility] [phone].  -[facility] Transit [phone]  -[facility] [phone]   -[employer] Transportation [phone]  -[employer] GO Grandparent Transportation [phone] [email or web address]  -[employer] Transport [phone]  -Disabled on the Go [phone]  -[employer] Safety Transfer [phone]  -NEWT [facility] Wheelchair Transport [phone]   -[employer] Transport: [phone]  -Connections thro mobile [place] [phone]  -[employer] [phone]  -A-Jose Provides [facility] [place], [facility] [place], and [facility]. [phone] [email or web address]  -[employer] Transportation [facility] and surrounding communities [phone] [email or web address]          I reconciled current and discharge medications on day of discharge, discussed changes with patient and noted changes above.       Total Time Coordinating Care: 36 minutes.     Patient had opportunity to ask questions and state understanding and agreement with therapeutic plan as outlined.    DO Julianne Rasmussen [facility] and Care  Hospitalist  Contact via [employer]/Intentiva

## 2025-03-01 NOTE — PLAN OF CARE
Pt A&Ox3 Room Air  Resting in bed  Denies pain at this time  Purewick in place  Meds Given per Mar  Sz Precaution  Isolation maintained  IVF Stopped at this time pt lost IV. Right arm Noted to be 3x bigger than left, MD made aware No new Orders at this time.   Safety precaution maintained  Plan of Care Ongoing.     Problem: Delirium  Goal: Minimize duration of delirium  Description: Interventions:  - Encourage use of hearing aids, eye glasses  - Promote highest level of mobility daily  - Provide frequent reorientation  - Promote wakefulness i.e. lights on, blinds open  - Promote sleep, encourage patient's normal rest cycle i.e. lights off, TV off, minimize noise and interruptions  - Encourage family to assist in orientation and promotion of home routines  Outcome: Progressing

## 2025-03-01 NOTE — DISCHARGE INSTRUCTIONS
-[facility]ide [facility]ist [place] - [phone] [email or web address]  -Ride [place] [phone] or [phone] [email or web address]  -First Transit [phone] [facility].[place].[facility]/hfs/SiteCollectionDocuments/First_Transit_[email or web address]    -[place] Ride Around [facility] [phone]  -[facility] Transit System [phone]  -[facility] Ovation Transportation [phone] Ext. [phone]   -[facility] Pace Dial-a-Ride Service  Reservations: [phone]  -[facility] [employer] Shuttle Bus Line at [phone]  -[facility] Senior Bus Service [phone]  -[facility] [phone].  -[facility] Transit [phone]  -[facility] [phone]   -[employer] Transportation [phone]  -Go GO Grandparent Transportation [phone] [email or web address]  -[employer] Transport [phone]  -Disabled on the Go [phone]  -[place] Safety Transfer [phone]  -[facility] Wheelchair Transport [phone]   -Cardinal Transport: [phone]  -Connections [facility] mobile [place] [phone]  -[employer] [phone]  -[employer] Provides [facility] [place], [facility] [place], and [facility]. [phone] [email or web address]  -[employer] Transportation [facility] and surrounding communities [phone] [email or web address]

## 2025-03-01 NOTE — PHYSICAL THERAPY NOTE
PHYSICAL THERAPY EVALUATION - INPATIENT     Room Number: 3626/3626-A  Evaluation Date: 3/1/2025  Type of Evaluation: Initial  Physician Order: PT Eval and Treat    Presenting Problem: influenza  Co-Morbidities : sz, OA, gestational diabetes, stroke, fibromyalgia, bipolar  Reason for Therapy: Mobility Dysfunction and Discharge Planning    PHYSICAL THERAPY ASSESSMENT   Patient is a 54 year old female admitted 2/27/2025 for influenza.   Patient is currently functioning near baseline.  Pt able to progress to ambulation >household distances and ambulate up/down stairs modified indeply.  Pt with decreased standing balance resulting in use of RW for ambulation.  Recent baseline, pt ambulatory without device, however has had recent rehab in December/early January in which ambulation was with RW.    Patient will benefit from continued skilled PT Services at discharge to promote prior level of function.  Anticipate patient will return home with OP PT.    PLAN  Patient has been evaluated and presents with no skilled Physical Therapy needs at this time.  Patient discharged from Physical Therapy services.  Please re-order if a new functional limitation presents during this admission.    PT Device Recommendation: Rolling walker    GOALS  Patient was able to achieve the following goals ...    Patient was able to transfer Safely and independently   Patient able to ambulate on level surfaces Safely and independently     HOME SITUATION  Type of Home: Apartment     Stairs to Enter : 32   Railing: Yes              Lives With: Son    Drives: Yes         Prior Level of Gre[place]: completed acute rehab early January, since rehab has been ambulatory without device, denies falls, awaiting OP therapy services to commence when her schedule is aligned with her son's (also receiving OP therapy).    SUBJECTIVE  Reports initial unsteadiness    OBJECTIVE     Fall Risk: Standard fall risk    WEIGHT BEARING RESTRICTION     PAIN  ASSESSMENT  Ratin          COGNITION  A&Ox4, follows multistep commands, aware of deficits and safety awareness    RANGE OF MOTION AND STRENGTH ASSESSMENT  Upper extremity ROM and strength are within functional limits     Lower extremity ROM is within functional limits     Lower extremity strength is within functional limits     BALANCE  Static Sitting: Good  Dynamic Sitting: Good  Static Standing: Fair +  Dynamic Standing: Fair -    ADDITIONAL TESTS                                    ACTIVITY TOLERANCE                         O2 WALK       NEUROLOGICAL FINDINGS                        AM-PAC '6-Clicks' INPATIENT SHORT FORM - BASIC MOBILITY  How much difficulty does the patient currently have...  Patient Difficulty: Turning over in bed (including adjusting bedclothes, sheets and blankets)?: None   Patient Difficulty: Sitting down on and standing up from a chair with arms (e.g., wheelchair, bedside commode, etc.): None   Patient Difficulty: Moving from lying on back to sitting on the side of the bed?: None   How much help from another person does the patient currently need...   Help from Another: Moving to and from a bed to a chair (including a wheelchair)?: None   Help from Another: Need to walk in hospital room?: None   Help from Another: Climbing 3-5 steps with a railing?: None       AM-PAC Score:  Raw Score: 24   Approx Degree of Impairment: 0%   Standardized Score (AM-PAC Scale): 61.14   CMS Modifier (G-Code): CH    FUNCTIONAL ABILITY STATUS  Gait Assessment   Functional Mobility/Gait Assessment  Gait Assistance: Modified independent  Distance (ft): 250  Assistive Device: Rolling walker  Stairs: Stairs  How Many Stairs: 5  Device: 1 Rail  Assist: Modified independent    Skilled Therapy Provided     Bed Mobility:  Rolling: indep  Supine to sit: mod indep   Sit to supine: indep     Transfer Mobility:  Sit to stand: mod I   Stand to sit: mod I  Chair>bed mod I  Gait = 250ft: mod I with RW, trialed last 150ft  without device, pt with CGA increased lateral sway, one instance of RLE hip adduction, pt able to self-correct, aware of wider NANCY  Stairs x 5 with railing, mod I reciprocal pattern    Exercise/Education Provided:  Use of RW when returning home, return to OP PT services, pt in agreement    Patient End of Session: In bed;Needs met;Call light within reach;RN aware of session/findings;All patient questions and concerns addressed;Hospital anti-slip socks    Patient Evaluation Complexity Level:  History Moderate - 1 or 2 personal factors and/or co-morbidities   Examination of body systems Low -  addressing 1-2 elements   Clinical Presentation Low- Stable   Clinical Decision Making Low Complexity       PT Session Time: 30 minutes  Gait Training: 15 minutes

## 2025-03-01 NOTE — PLAN OF CARE
Assumed care at 0730  A/Ox4, RA, VSS  Tele, NSR  Denies n/v & pain   Reg diet  Pending PT/OT eval  Continent, 1 w/ walker  PIV L forearm, .9@ 100mL/hr  Updated w/ POC  All needs met at this time    Problem: Delirium  Goal: Minimize duration of delirium  Description: Interventions:  - Encourage use of hearing aids, eye glasses  - Promote highest level of mobility daily  - Provide frequent reorientation  - Promote wakefulness i.e. lights on, blinds open  - Promote sleep, encourage patient's normal rest cycle i.e. lights off, TV off, minimize noise and interruptions  - Encourage family to assist in orientation and promotion of home routines  Outcome: Progressing     Problem: Patient/Family Goals  Goal: Patient/Family Long Term Goal  Description: Patient's Long Term Goal: dc home    Interventions:  - scheduled tamiflu  PT/OT eval  - See additional Care Plan goals for specific interventions  Outcome: Progressing  Goal: Patient/Family Short Term Goal  Description: Patient's Short Term Goal:     Interventions:   -   - See additional Care Plan goals for specific interventions  Outcome: Progressing

## 2025-03-01 NOTE — OCCUPATIONAL THERAPY NOTE
OCCUPATIONAL THERAPY EVALUATION - INPATIENT    Room Number: 3626/3626-A  Evaluation Date: 3/1/2025     Type of Evaluation: Initial  Presenting Problem: Nausea, mentattion changes, influenza    Physician Order: IP Consult to Occupational Therapy  Reason for Therapy:  ADL/IADL Dysfunction and Discharge Planning    OCCUPATIONAL THERAPY ASSESSMENT   Patient is a 54 year old female admitted on 2/27/2025 with Presenting Problem: Nausea, mentattion changes, influenza. Co-Morbidities : Bipolar, asthma, fibromyalgia, migraines, OA, seizures, cva  Patient is currently functioning near baseline with  all ADL .  Prior to admission, patient's baseline is Modified independent.  Patient met all OT goals at baseline level.  Patient reports no further questions/concerns at this time.     Recent admissions:  12/18 to 12/22/24 for altered mental status -> recommended NAA, but went to acute rehab  12/22 to 1/5/25 at acute rehab ->OPPT and OT      EVALUATION SESSION:  Patient at start of session: supine    FUNCTIONAL TRANSFER ASSESSMENT  Sit to Stand: Edge of Bed  Edge of Bed: Independent  Chair: Independent    BED MOBILITY  Rolling: Independent  Supine to Sit : Independent    COGNITION  Overall Cognitive Status:  WFL - within functional limits    COGNITION ASSESSMENTS     Upper Extremity:   ROM: within functional limits   Strength: is within functional limits     EDUCATION PROVIDED  Patient Education : Role of Occupational Therapy; Plan of Care; Energy Conservation  Patient's Response to Education: Verbalized Understanding    Equipment used: rw  Therapist comments: pleasant, motivated, independent supine to sit.   Wears sunglasses, wears them after cva years ago.   Modified independent static standing balance while PT helped adjust the gown behind her.  Fine motor intact. OT educated the pt about energy conservation. 96% room air.         Patient End of Session: In bed;Needs met;Call light within reach;RN aware of session/findings;All  patient questions and concerns addressed    OCCUPATIONAL PROFILE    HOME SITUATION  Type of Home: Apartment     Lives With: Son    Toilet and Equipment: Standard height toilet                Drives: No       Prior Level of Function: independent with ADL. Pt orders groceries online. Pt now lives with her adult son who needs outpatient therapy. Pt and her son do not drive.  Pt mentioned, \"That's why I haven't started outpatient therapy.\"   Uses rw at times.         PAIN ASSESSMENT  Ratin          OBJECTIVE     Fall Risk: Standard fall risk    WEIGHT BEARING RESTRICTION       AM-PAC ‘6-Clicks’ Inpatient Daily Activity Short Form  -   Putting on and taking off regular lower body clothing?: None  -   Bathing (including washing, rinsing, drying)?: None  -   Toileting, which includes using toilet, bedpan or urinal? : None  -   Putting on and taking off regular upper body clothing?: None  -   Taking care of personal grooming such as brushing teeth?: None  -   Eating meals?: None    AM-PAC Score:  Score: 24  Approx Degree of Impairment: 0%  Standardized Score (AM-PAC Scale): 57.54    ADDITIONAL TESTS     NEUROLOGICAL FINDINGS      PLAN   Patient has been evaluated and presents with no skilled Occupational Therapy needs at this time.  Patient discharged from Occupational Therapy services.  Please re-order if a new functional limitation presents during this admission.         Patient Evaluation Complexity Level:   Occupational Profile/Medical History LOW - Brief history including review of medical or therapy records    Specific performance deficits impacting engagement in ADL/IADL LOW  1 - 3 performance deficits    Client Assessment/Performance Deficits LOW - No comorbidities nor modifications of tasks    Clinical Decision Making LOW - Analysis of occupational profile, problem-focused assessments, limited treatment options    Overall Complexity LOW     OT Session Time: 23 minutes  Self-Care Home Management:   minutes  Therapeutic Activity: 8 minutes

## 2025-03-04 LAB — BACTERIA BLD CULT: POSITIVE

## 2025-03-12 NOTE — PROGRESS NOTES
Provider Clarification    Additional information on the patient's respiratory status     Respiratory failure ruled out     This note is part of the patient's medical record.

## 2025-05-24 ENCOUNTER — APPOINTMENT (OUTPATIENT)
Dept: CT IMAGING | Facility: HOSPITAL | Age: 55
End: 2025-05-24
Attending: EMERGENCY MEDICINE
Payer: MEDICARE

## 2025-05-24 ENCOUNTER — HOSPITAL ENCOUNTER (EMERGENCY)
Facility: HOSPITAL | Age: 55
Discharge: HOME | End: 2025-05-24
Attending: EMERGENCY MEDICINE
Payer: MEDICARE

## 2025-05-24 VITALS
WEIGHT: 187.38 LBS | BODY MASS INDEX: 33 KG/M2 | SYSTOLIC BLOOD PRESSURE: 143 MMHG | OXYGEN SATURATION: 100 % | RESPIRATION RATE: 16 BRPM | DIASTOLIC BLOOD PRESSURE: 98 MMHG | HEART RATE: 66 BPM | TEMPERATURE: 97 F

## 2025-05-24 DIAGNOSIS — N39.0 URINARY TRACT INFECTION WITHOUT HEMATURIA, SITE UNSPECIFIED: Primary | ICD-10-CM

## 2025-05-24 LAB
ALBUMIN SERPL-MCNC: 4.5 G/DL (ref 3.2–4.8)
ALBUMIN/GLOB SERPL: 2 {RATIO} (ref 1–2)
ALP LIVER SERPL-CCNC: 79 U/L (ref 46–118)
ALT SERPL-CCNC: 12 U/L (ref 10–49)
ANION GAP SERPL CALC-SCNC: 8 MMOL/L (ref 0–18)
AST SERPL-CCNC: 31 U/L (ref ?–34)
BASOPHILS # BLD AUTO: 0.06 X10(3) UL (ref 0–0.2)
BASOPHILS NFR BLD AUTO: 0.6 %
BILIRUB SERPL-MCNC: 0.2 MG/DL (ref 0.3–1.2)
BILIRUB UR QL STRIP.AUTO: NEGATIVE
BUN BLD-MCNC: 15 MG/DL (ref 9–23)
CALCIUM BLD-MCNC: 9.2 MG/DL (ref 8.7–10.6)
CHLORIDE SERPL-SCNC: 110 MMOL/L (ref 98–112)
CLARITY UR REFRACT.AUTO: CLEAR
CO2 SERPL-SCNC: 21 MMOL/L (ref 21–32)
COLOR UR AUTO: COLORLESS
CREAT BLD-MCNC: 0.77 MG/DL (ref 0.55–1.02)
EGFRCR SERPLBLD CKD-EPI 2021: 90 ML/MIN/1.73M2 (ref 60–?)
EOSINOPHIL # BLD AUTO: 0.07 X10(3) UL (ref 0–0.7)
EOSINOPHIL NFR BLD AUTO: 0.7 %
ERYTHROCYTE [DISTWIDTH] IN BLOOD BY AUTOMATED COUNT: 15.2 %
GLOBULIN PLAS-MCNC: 2.3 G/DL (ref 2–3.5)
GLUCOSE BLD-MCNC: 109 MG/DL (ref 70–99)
GLUCOSE UR STRIP.AUTO-MCNC: NORMAL MG/DL
HCT VFR BLD AUTO: 39.9 % (ref 35–48)
HGB BLD-MCNC: 12.5 G/DL (ref 12–16)
IMM GRANULOCYTES # BLD AUTO: 0.04 X10(3) UL (ref 0–1)
IMM GRANULOCYTES NFR BLD: 0.4 %
KETONES UR STRIP.AUTO-MCNC: 10 MG/DL
LEUKOCYTE ESTERASE UR QL STRIP.AUTO: 75
LYMPHOCYTES # BLD AUTO: 1.59 X10(3) UL (ref 1–4)
LYMPHOCYTES NFR BLD AUTO: 15.8 %
MCH RBC QN AUTO: 28.5 PG (ref 26–34)
MCHC RBC AUTO-ENTMCNC: 31.3 G/DL (ref 31–37)
MCV RBC AUTO: 90.9 FL (ref 80–100)
MONOCYTES # BLD AUTO: 0.85 X10(3) UL (ref 0.1–1)
MONOCYTES NFR BLD AUTO: 8.4 %
NEUTROPHILS # BLD AUTO: 7.47 X10 (3) UL (ref 1.5–7.7)
NEUTROPHILS # BLD AUTO: 7.47 X10(3) UL (ref 1.5–7.7)
NEUTROPHILS NFR BLD AUTO: 74.1 %
NITRITE UR QL STRIP.AUTO: NEGATIVE
OSMOLALITY SERPL CALC.SUM OF ELEC: 289 MOSM/KG (ref 275–295)
PH UR STRIP.AUTO: 6 [PH] (ref 5–8)
PLATELET # BLD AUTO: 273 10(3)UL (ref 150–450)
POTASSIUM SERPL-SCNC: 4.9 MMOL/L (ref 3.5–5.1)
PROT SERPL-MCNC: 6.8 G/DL (ref 5.7–8.2)
PROT UR STRIP.AUTO-MCNC: NEGATIVE MG/DL
RBC # BLD AUTO: 4.39 X10(6)UL (ref 3.8–5.3)
RBC UR QL AUTO: NEGATIVE
SODIUM SERPL-SCNC: 139 MMOL/L (ref 136–145)
SP GR UR STRIP.AUTO: 1.02 (ref 1–1.03)
UROBILINOGEN UR STRIP.AUTO-MCNC: NORMAL MG/DL
WBC # BLD AUTO: 10.1 X10(3) UL (ref 4–11)

## 2025-05-24 PROCEDURE — 99285 EMERGENCY DEPT VISIT HI MDM: CPT

## 2025-05-24 PROCEDURE — 86003 ALLG SPEC IGE CRUDE XTRC EA: CPT | Performed by: EMERGENCY MEDICINE

## 2025-05-24 PROCEDURE — 87086 URINE CULTURE/COLONY COUNT: CPT | Performed by: EMERGENCY MEDICINE

## 2025-05-24 PROCEDURE — 85025 COMPLETE CBC W/AUTO DIFF WBC: CPT | Performed by: EMERGENCY MEDICINE

## 2025-05-24 PROCEDURE — 70450 CT HEAD/BRAIN W/O DYE: CPT | Performed by: EMERGENCY MEDICINE

## 2025-05-24 PROCEDURE — 96374 THER/PROPH/DIAG INJ IV PUSH: CPT

## 2025-05-24 PROCEDURE — 93010 ELECTROCARDIOGRAM REPORT: CPT

## 2025-05-24 PROCEDURE — 93005 ELECTROCARDIOGRAM TRACING: CPT

## 2025-05-24 PROCEDURE — 81001 URINALYSIS AUTO W/SCOPE: CPT | Performed by: EMERGENCY MEDICINE

## 2025-05-24 PROCEDURE — 80053 COMPREHEN METABOLIC PANEL: CPT | Performed by: EMERGENCY MEDICINE

## 2025-05-24 RX ORDER — CEPHALEXIN 500 MG/1
500 CAPSULE ORAL 3 TIMES DAILY
Qty: 15 CAPSULE | Refills: 0 | Status: SHIPPED | OUTPATIENT
Start: 2025-05-24 | End: 2025-05-29

## 2025-05-24 RX ORDER — ONDANSETRON 2 MG/ML
4 INJECTION INTRAMUSCULAR; INTRAVENOUS ONCE
Status: COMPLETED | OUTPATIENT
Start: 2025-05-24 | End: 2025-05-24

## 2025-05-25 NOTE — ED QUICK NOTES
Pt ambulated with walker from B0 to bathroom without difficulty. Pt denies nausea/pain/weakness. Pt reports she uses her walker at home (now in sister's cart).  Urine spec sent.

## 2025-05-25 NOTE — ED PROVIDER NOTES
Patient Seen in: [facility] Emergency Department        History  Chief Complaint   Patient presents with    Altered Mental Status     Stated Complaint: AMS, vomiting    Subjective:   HPI            55-year-old female past medical history as below presents with altered mental status.  Sister states that she picked her up to take her to a family function this afternoon and noticed that she was \"off\".  States that this does often happen where she behaves abnormally and has been admitted before and was attributed to noncompliance with her psychiatric meds or possibly polypharmacy.  This afternoon she then began having some episodes of nausea and vomiting.  This is also typical of her presentations in the past.  No focal weakness or numbness or tingling.  Speech clear.  She feels like she is improving and getting back to her norm although does have some residual nausea.  No fevers.  She had been in her usual state of health today prior to this.      Objective:     Past Medical History:    Abdominal pain    Acne    Allergic rhinitis    Alopecia areata    Anxiety    Asthma ([facility])    Back problem    Bipolar affective ([facility])    Calculus of kidney    Depression    Diarrhea, unspecified    Fatigue    Fibromyalgia    GERD (gastroesophageal reflux disease)    Gestational diabetes ([facility])    Heartburn    Hemorrhoids    Hypothyroidism    Migraines    Nausea    Nausea and vomiting, unspecified vomiting type    Organic hypersomnia, unspecified    AHI 2 RDI 2 REM AHI 2 SaO2 doroteo 88 %    Osteoarthritis    Pneumonia due to organism    Scoliosis    Seizure disorder ([facility])    absent seizures    Stool incontinence    Stroke ([facility])    2003+ mild R.sided weakness    Ulcerative colitis ([facility])    Visual impairment    Vomiting    Weight gain    Weight loss              Past Surgical History:   Procedure Laterality Date    Cholecystectomy  03/2010    Colonoscopy  2013    ulcerative colitis    Colonoscopy N/A 04/25/2017    Procedure:  COLONOSCOPY;  Surgeon: Hipolito Dobbins MD;  Location:  ENDOSCOPY    Colonoscopy N/A 2021    Procedure: COLONOSCOPY with biopsy,;  Surgeon: Ramsey Muhammad MD;  Location:  ENDOSCOPY    Colposcopy, cervix w/upper adjacent vagina; w/biopsy(s), cervix      Hernia surgery      Umbilical Hernia Repair    Other surgical history N/A 2016    Procedure: ESOPHAGOGASTRODUODENOSCOPY (EGD);  Surgeon: Jeff Scherer MD;  Location:  ENDOSCOPY    [place], iud  2014    Upper gi endoscopy,exam                  Social History     Socioeconomic History    Marital status:    Tobacco Use    Smoking status: Former     Current packs/day: 0.00     Types: Cigarettes     Quit date: 1993     Years since quittin.2     Passive exposure: Past    Smokeless tobacco: Never    Tobacco comments:     Ages [age], [age]   Vaping Use    Vaping status: Some Days    Substances: THC, CBD, Flavoring, medical marijuana    Devices: Disposable, Refillable tank   Substance and Sexual Activity    Alcohol use: Not Currently    Drug use: Yes     Frequency: 2.0 times per week     Types: Cannabis     Comment: Medical Cannabis    Sexual activity: Not Currently     Partners: Male     Social Drivers of Health     Food Insecurity: No Food Insecurity (2025)    NCSS - Food Insecurity     Worried About Running Out of Food in the Last Year: No     Ran Out of Food in the Last Year: No   Transportation Needs: No Transportation Needs (2025)    NCSS - Transportation     Lack of Transportation: No   Housing Stability: Not At Risk (2025)    NCSS - Housing/Utilities     Has Housing: Yes     Worried About Losing Housing: No     Unable to Get Utilities: No                                Physical Exam    ED Triage Vitals [25]   /86   Pulse (!) 49   Resp 20   Temp 96.7 °F (35.9 °C)   Temp src Temporal   SpO2 96 %   O2 Device None (Room air)       Current Vitals:   Vital Signs  BP: (!)  143/98  Pulse: 63  Resp: 15  Temp: 96.7 °F (35.9 °C)  Temp src: Temporal  MAP (mmHg): (!) 107    Oxygen Therapy  SpO2: 100 %  O2 Device: None (Room air)            Physical Exam  Constitutional:       General: is not in acute distress.     Appearance: is not ill-appearing.   HENT:      Head: Normocephalic and atraumatic.      Nose: Nose normal.      Mouth/Throat:      Mouth: Mucous membranes are moist.   Eyes:      Conjunctiva/sclera: Conjunctivae normal.      Pupils: Pupils are equal, round, and reactive to light.   Cardiovascular:      Rate and Rhythm: Normal rate and regular rhythm.   Pulmonary:      Effort: Pulmonary effort is normal. No respiratory distress.   Abdominal:      General: Abdomen is flat. There is no distension.   Musculoskeletal:      Cervical back: Neck supple.      Right lower leg: No edema.      Left lower leg: No edema.   Neurological:      Mental Status: Is alert and oriented to person, place, and time.      Cranial Nerves: No cranial nerve deficit.      Sensory: No sensory deficit.      Motor: No weakness.      Comments: FNF wnl           ED Course  Labs Reviewed   COMP METABOLIC PANEL (14) - Abnormal; Notable for the following components:       Result Value    Glucose 109 (*)     Bilirubin, Total 0.2 (*)     All other components within normal limits   URINALYSIS WITH CULTURE REFLEX - Abnormal; Notable for the following components:    Urine Color Colorless (*)     Ketones Urine 10 (*)     Leukocyte Esterase Urine 75 (*)     WBC Urine 11-20 (*)     Bacteria Urine Rare (*)     Squamous Epi. Cells Few (*)     All other components within normal limits   CBC WITH DIFFERENTIAL WITH PLATELET   G009 RED TOP   URINE CULTURE, ROUTINE                            MDM     Considered all emergent etiologies, differential includes but is not limited to: Gastroenteritis, seizure, ICH, mass, dehydration, electrolyte abnormality     I have independently visualized the radiology images, my focus/limited  interpretation: CT head negative for bleed or mass     Defer to radiologist for other/incidental findings    Lab workup unremarkable.  No leukocytosis to suggest systemic infection, electrolytes within normal limits and no signs of significant dehydration.  Vital signs stable in the emergency department, no fever or tachycardia.  Patient awake and alert with nonfocal neurologic examination and otherwise unremarkable examination.  She is feeling better on reassessment.  UA is somewhat suspicious for UTI and will treat with antibiotics.  No evidence of seizure-like activity while in the ED. discharged home and discussed red flags and return precautions advised close follow-up with PCP        Medical Decision Making      Disposition and Plan     Clinical Impression:  1. Urinary tract infection without hematuria, site unspecified         Disposition:  Discharge  5/24/2025  9:58 pm    Follow-up:  Yoel Servin MD  [address] [place] [address] [phone]    Follow up            Medications Prescribed:  Current Discharge Medication List        START taking these medications    Details   cephALEXin 500 MG Oral Cap Take 1 capsule (500 mg total) by mouth 3 (three) times daily for 5 days.  Qty: 15 capsule, Refills: 0                   Supplementary Documentation:

## 2025-05-26 LAB
ATRIAL RATE: 59 BPM
P AXIS: 36 DEGREES
P-R INTERVAL: 180 MS
Q-T INTERVAL: 428 MS
QRS DURATION: 106 MS
QTC CALCULATION (BEZET): 423 MS
R AXIS: 14 DEGREES
T AXIS: -19 DEGREES
VENTRICULAR RATE: 59 BPM

## 2025-05-30 LAB
G009-IGE RED TOP BENTGRASS: <0.1 KU/L
G009-IGE RED TOP BENTGRASS: <0.1 KU/L

## (undated) DEVICE — Device: Brand: DEFENDO AIR/WATER/SUCTION AND BIOPSY VALVE

## (undated) DEVICE — FORCEP BIOPSY RJ4 LG CAP W/ND

## (undated) DEVICE — FILTERLINE NASAL ADULT O2/CO2

## (undated) DEVICE — 1200CC GUARDIAN II: Brand: GUARDIAN

## (undated) DEVICE — ENDOSCOPY PACK - LOWER: Brand: MEDLINE INDUSTRIES, INC.

## (undated) DEVICE — 3M™ RED DOT™ MONITORING ELECTRODE WITH FOAM TAPE AND STICKY GEL, 50/BAG, 20/CASE, 72/PLT 2570: Brand: RED DOT™

## (undated) NOTE — IP AVS SNAPSHOT
[address]            (For Outpatient Use Only) Initial Admit Date: 9/29/2018   Inpt/Obs Admit Date: Inpt: 9/30/18 / Obs: 09/29/18   Discharge Date:    Hospital Acct:  [de-identified]   MRN: [de-identified]   CSN: [id number]        [id number]  FH Subscriber Name:  Prem Blevins :    Subscriber ID:  Pt Rel to Subscriber:    Hospital Account Financial Class: Medicare    2018

## (undated) NOTE — ED AVS SNAPSHOT
Darai Asher   MRN: [id number]    Department:  [facility] Emergency Department   Date of Visit:  9/17/2019           Disclosure     Insurance plans vary and the physician(s) referred by the ER may not be covered by your plan.  Please contact tell this physician (or your personal doctor if your instructions are to return to your personal doctor) about any new or lasting problems. The primary care or specialist physician will see patients referred from the [facility] Emergency Department.  Porter Shepard

## (undated) NOTE — IP AVS SNAPSHOT
[facility] [place] [address] [place], [address] ~ [phone]                Discharge Summary   5/15/2017    Jo [address]           Admission Information        Provider Department    5/15/2017 Arcenio Gongora MD [facility] [    ]    [    ]    [    ]       Butalbital-APAP-Caffeine -40 MG Tabs   Commonly known as:  FIORICET, [address] TABLET BY MOUTH EVERY 4 HOURS AS NEEDED FOR PAIN    Devi Ramsay     [    ]    [    ]    [    ]    [    ]       Natalia Fisher Take 300 mg by mouth every evening. [    ]    [    ]    [    ]    [    ]       Lithium Carbonate  MG Tbcr   Commonly known as:  LITHOBID        Take 600 mg by mouth every evening.       [    ]    [    ]    [    ]    [    ]       Francia Colonel 200 MG Ca [    ]    [    ]       QUEtiapine Fumarate 100 MG Tabs   Commonly known as:  SEROQUEL        Take 100 mg by mouth nightly.       [    ]    [    ]    [    ]    [    ]       QUEtiapine Fumarate 300 MG Tabs   Commonly known as:  SEROQUEL        Take 300 mg by Call Franco Cordova MD.    Specialties:  NEUROLOGY, Psychiatry    Why:  for test results    Contact information:    [address] [place] [address] [address] [address] [phone]        Future Appointments        Provider Department you to explore options for quitting.     - If you have concerns related to behavioral health issues or thoughts of harming yourself, contact [facility] at [phone].     - If you don’t have insurance, Eugenia Bush

## (undated) NOTE — IP AVS SNAPSHOT
Patient Demographics     Address  [facility] [address] [address] Phone  [phone] [facility]) *Preferred*  [phone] [facility]) E-mail Address   [email or web address]      Emergency Contact(s)     Name Relation Home Work Mobile     INHALE 1 PUFF INTO THE LUNGS EVERY 12 HOURS   Adryan Pfeiffer MD         albuterol sulfate (2.5 MG/3ML) 0.083% Nebu  Commonly known as:  VENTOLIN      Take 3 mL (2.5 mg total) by nebulization every 6 (six) hours as needed for Wheezing.    Adryan Pfeiffer Next dose due:  10/9       TAKE 1 TABLET(5 MG) BY MOUTH EVERY EVENING   Navin Waters MD         Levothyroxine Sodium 150 MCG Tabs  Commonly known as:  SYNTHROID  Next dose due:  10/9       Take 1 tablet (150 mcg total) by mouth before breakfast.   Imelda Pramipexole Dihydrochloride 0.125 MG Tabs  Commonly known as:  MIRAPEX  Next dose due:  10/9      Take 0.125 mg by mouth every evening. PROBIOTIC DAILY OR  Next dose due:  10/9      Take 1 capsule by mouth daily.           Promethazine HCl 25 MG Ta Order ID Medication Name Action Time Action Reason Comments    [id number] Acidophilus/Pectin (PROBIOTIC) CAPS 1 capsule 10/08/18 0859 Given      [id number] BuPROPion HCl ER (SR) [facility] SR) 12 hr tab 100 mg 10/07/18 2031 Given      [id number] BuPROPion H [id number] pregabalin (LYRICA) cap 200 mg 10/08/18 0859 Given      [id number] spironolactone (ALDACTONE) tab 100 mg 10/08/18 0859 Given      [id number] spironolactone (ALDACTONE) tab 50 mg 10/07/18 2031 Given      [id number] verapamil HCl ER (CALAN-SR) CR tab Past Medical History:   Diagnosis Date   • Acne    • ALLERGIC RHINITIS    • Anxiety state    • ASTHMA    • Asthma    • Back problem    • Bipolar affective ([facility] [address]    • Chronic rhinitis    • Colitis    • DEPRESSION     Bipolar   • Depression    • Disorder of Mold                      Mushrooms               NAUSEA AND VOMITING  Penicillin G Potass*    NAUSEA AND VOMITING  Phenothiazines          OTHER (SEE COMMENTS)    Comment:Extrapyramidal syndrome.  No phenothiazines per             neurology (documented dur Gen: drowsy, awakened from sleep, No acute distress  Pulm: CTAB, normal respiratory effort  CV: RRR, nL S1/S2, no m/r/g  Abd: soft, NT/ND, BS+, no hepatomegaly  MSK: moving all extremities, no edema  Neuro: head tilted to L. L facial droop.    Skin: scatter significant mass effect. There are no abnormal extraaxial fluid collections. There is no midline shift. There are no intraparenchymal brain abnormalities. There is nothing specific for acute infarct. There is no  mass lesion.   SINUSES:           No si - neuro crit care and NSG following  - fall precautions  - neuro checks q1hr  - BP goal SBP 90 -150  - gentle mIVFs  - holding antiplatelets/AC  - seizure precautions   - PT/OT/SLP    # Fall  - reportedly mechanical in nature  - likely that torticollis con [facility]  Report of Psychiatric Consultation    Fouzia Patience Patient Status:  Inpatient    1970 MRN [id number]   [facility] 7NE-A Attending Diana Gavin MD   Hosp Day # 4 PCP Herve Terrazas MD[RH.1]     Date of Admiss 7) Staff will continue to give her reassurance about her safety and her mother will continue to provide emotional support with her visits.      8) If she develops somnolence or her delirium and psychosis do not improve in the next 48 hrs, consider getting E Social and Developmental History:[RH.1] . Mother and her pet dog are her main support system. [RH.3]     Past Medical History:   Diagnosis Date   • Acne    • ALLERGIC RHINITIS    • Anxiety state    • ASTHMA    • Asthma    • Back problem    • Bipolar • Breast Cancer Other         dx post menopausal   • High Blood Pressure Father    • Colon Cancer Father    • High Blood Pressure Mother    • High Cholesterol Mother       reports that she quit smoking about 14 years ago.  She has a 20.00 pack-year smoking •  albuterol sulfate (VENTOLIN) (2.5 MG/3ML) 0.083% nebulizer solution 2.5 mg, 2.5 mg, Nebulization, Q6H PRN  •  carboxymethylcellulose sod PF (THERA TEARS, CELLUVISC) 1 % ophthalmic gel 1 drop, 1 drop, Both Eyes, BID PRN  •  Dicyclomine HCl (BENTYL) cap 1 •  ondansetron HCl (ZOFRAN) injection 4 mg, 4 mg, Intravenous, Q6H PRN **OR** Metoclopramide HCl (REGLAN) injection 10 mg, 10 mg, Intravenous, Q8H PRN  •  LORazepam (ATIVAN) injection 1 mg, 1 mg, Intravenous, Q15 Min PRN[RH.1]    Review of Systems   Consti D/C Summary     No notes of this type exist for this encounter.          Physical Therapy Notes (last 72 hours) (Notes from 10/5/2018  5:15 PM through 10/8/2018  5:15 PM)      Physical Therapy Note signed by Jose Burgos PTA at 10/8/2018  3:40 PM  Vers Past Surgical History[AR.1]  Past Surgical History:   Procedure Laterality Date   • CHOLECYSTECTOMY  3/10   • COLONOSCOPY  2013    ulcerative colitis   • COLONOSCOPY N/A 4/25/2017    Procedure: COLONOSCOPY;  Surgeon: Oksana Goodman MD;  Location: [facility] EN Little[AR.2]   How much help from another person does the patient currently need. .. [AR.1]   -   Moving to and from a bed to a chair (including a wheelchair)?: A Little   -   Need to walk in hospital room?: A Little   -   Climbing 3-5 steps with a railing?: Due to consistent balance deficits - Pt would benefit from short term NAA stay, as Pt remains high fall risk, and with poor safety awareness.         DISCHARGE RECOMMENDATIONS[AR.1]  PT Discharge Recommendations: Sub-acute rehabilitation(estimated LOS 7 - 1 after falling on the sidewalk. Pt was walking her dog when she fell and hit a tree. Brain CT showed R temporal SDH. History of bipolar affective disorder, seizure, torticollis, asthma, anxiety, depression.   Pt was working with outpatient PT for torticol Procedure Laterality Date   • CHOLECYSTECTOMY  3/10   • COLONOSCOPY  2013    ulcerative colitis   • COLONOSCOPY N/A 4/25/2017    Procedure: COLONOSCOPY;  Surgeon: Clari Moeller MD;  Location: [facility] ENDOSCOPY   • COLONOSCOPY N/A 4/25/2017    Performed by gaze stabilization and neck positioning during mobility. Pt was able to eliana gown as a robe, CGA level. Pt completed sit to stand with min A and PT. OT kept educating the pt to fix her gaze on fixed object to minimize onset of dizziness.  No report of diz simplification techniques;ADL training;Functional transfer training;UE strengthening/ROM; Cognitive reorientation;Patient/Family education;Patient/Family training;Equipment eval/education; Compensatory technique education  Rehab Potential : Fair  Frequency ( Filed:  10/2/2018  2:20 PM Date of Service:  10/2/2018  1:57 PM Status:  Signed    :  Jumana Muniz, SLP (SPEECH-LANGUAGE PATHOLOGIST)       SPEECH DAILY NOTE - INPATIENT    ASSESSMENT & PLAN   ASSESSMENT  Pt was seen for f/u dysphagia tx to ass FOLLOW UP  Follow Up Needed: Yes  SLP Follow-up Date: 10/02/18  Number of Visits to Meet Established Goals: 3    Session: 3    If you have any questions, please contact Brandy Stewart[[email or web address]    Electronically signed by Gloria Black Botulinum Toxin Type A Per Unit, Im Inj  05/26/11     Depo-Medrol 40mg Inj 05/19/17     Depo-Medrol 40mg Inj 09/21/16     Depo-Medrol 40mg Inj 04/15/15     INFLUENZA defer-10/08/18     Deferral:       INFLUENZA 11/15/17     INFLUENZA 11/23/15     INFLUENZ

## (undated) NOTE — IP AVS SNAPSHOT
[facility] [place] [address] [place], [address] ~ [phone]                Discharge Summary   4/25/2017    [address]           Admission Information        Provider Department    4/25/2017 Maya Cantor MD  German Garcia BuPROPion HCl ER (XL) 300 MG Tb24   Commonly known as:  WELLBUTRIN XL        Take 300 mg by mouth daily.  In addition to 150 mg      [    ]    [    ]    [    ]    [    ]       Butalbital-APAP-Caffeine -40 MG Tabs   Commonly known as:  Darcie Marcano Lather onto scalp, leave on 5 min, then rinse    Alondra Siddiqui     [    ]    [    ]    [    ]    [    ]       lamoTRIgine 200 MG Tabs   Commonly known as:  LAMICTAL        Take 300 mg by mouth every evening.       [    ]    [    ]    [    ]    [    ] ondansetron 8 MG Tbdp   Commonly known as:  ZOFRAN-ODT        DISSOLVE 1 TABLET ON THE TONGUE EVERY 8 HOURS AS NEEDED FOR NAUSEA    [place], [place]     [    ]    [    ]    [    ]    [    ]       Pantoprazole Sodium 40 MG Tbec   Commonly known as:  AZ - Resume your regular diet as tolerated unless otherwise instructed. - Start with light meals to minimize bloating.  - Do not drink alcohol today.     Medication:  - If you have questions about resuming your normal medications, please contact your Primary Recent Hematology Lab Results  (Last 3 results in the past 90 days)    WBC RBC Hemoglobin Hematocrit MCV MCH MCHC RDW Platelet MPV    (80/79/51)  7.66 (04/19/17)  4.14 (04/19/17)  12.4 (04/19/17)  39.4 (04/19/17)  95.2 -- -- -- (04/19/17)  385 (04/19/17) and ask to get set up for an insurance coverage that is in-network with [facility].         Visit Information        Department Dept Phone    4/25/2017  9:25 AM  Endoscopy [phone]         We want to hear from you       We want to hear fro

## (undated) NOTE — ED AVS SNAPSHOT
Sary Evens   MRN: [id number]    Department:  [facility] Emergency Department   Date of Visit:  10/26/2019           Disclosure     Insurance plans vary and the physician(s) referred by the ER may not be covered by your plan.  Please contac tell this physician (or your personal doctor if your instructions are to return to your personal doctor) about any new or lasting problems. The primary care or specialist physician will see patients referred from the [facility] Emergency Department.  Laya Avila

## (undated) NOTE — ED AVS SNAPSHOT
Olivia Misha   MRN: [id number]    Department:  [facility] Emergency Department   Date of Visit:  1/12/2018           Disclosure     Insurance plans vary and the physician(s) referred by the ER may not be covered by your plan.  Please contact tell this physician (or your personal doctor if your instructions are to return to your personal doctor) about any new or lasting problems. The primary care or specialist physician will see patients referred from the [facility] Emergency Department.  Blair Doss

## (undated) NOTE — ED AVS SNAPSHOT
Brittany Main   MRN: [id number]    Department:  [facility] Emergency Department   Date of Visit:  11/11/2017           Disclosure     Insurance plans vary and the physician(s) referred by the ER may not be covered by your plan.  Please contac If you have been prescribed any medication(s), please fill your prescription right away and begin taking the medication(s) as directed    If the emergency physician has read X-rays, these will be re-interpreted by a radiologist.  If there is a significant

## (undated) NOTE — LETTER
[facility] [address], [address] [facility]    Consent for Anesthesia   1.    Marci Street agree to be cared for by an anesthesiologist, who is specially trained to monitor me and give me medicine to put me to sleep or keep me comf vision, nerves, or muscles and in extremely rare instances death. 5. My doctor has explained to me other choices available to me for my care (alternatives).   6. Pregnant Patients (“epidural”):  I understand that the risks of having an epidural (medicine g

## (undated) NOTE — IP AVS SNAPSHOT
Patient Demographics     Address  [facility] [place] [address] Phone  [phone] [facility]) *Preferred*  [phone] (Work)  [phone] [facility]) E-mail Address  [address] [email or web address]      Emergency Contact(s)     Name Relation TAKE these medications       Instructions Authorizing Provider Morning Afternoon Evening As Needed   acetaminophen 500 MG Tabs  Commonly known as:  TYLENOL EXTRA STRENGTH      Take 1,000 mg by mouth every 4 (four) hours as needed.  Indications: Fever, Pain TAKE 1 TABLET BY MOUTH THREE TIMES DAILY   Jonathan Castleman, MD         levETIRAcetam 500 MG Tabs  Commonly known as:  KEPPRA  Next dose due:  11/21 @ 9pm      Take 1 tablet (500 mg total) by mouth 2 (two) times daily.    DO DANIKA Montes Next dose due:  11/22 @ 9am      Take 1 capsule by mouth daily. QUEtiapine Fumarate 300 MG Tabs  Commonly known as:  SEROQUEL  Next dose due:  11/22 @ 9pm       Take 150 mg by mouth nightly.           raNITIdine HCl 150 MG Tabs  Commonly known as: [id number] aspirin chewable tab 81 mg 11/21/19 1001 Given      [id number] cetirizine (ZYRTEC) tab 10 mg 11/21/19 1001 Given      [id number] clonazePAM (KLONOPIN) tab 0.25 mg 11/20/19 1649 Given      [id number] clonazePAM (KLONOPIN) tab 0.25 mg 11/20/19 2116 G Recent Vital Signs       Most Recent Value   Vitals  91/57 Filed at 11/21/2019 1300   Pulse  54 Filed at 11/21/2019 0700   Resp  18 Filed at 11/21/2019 1300   Temp  97.7 °F (36.5 °C) Filed at 11/21/2019 1300   SpO2  91 % Filed at 11/21/2019 1300      Edinson AMMONIA, PLASMA [[id number]] (Normal)  Resulted: 11/20/19 1705, Result status: Final result   Ordering provider:  Minerva Cantu MD  11/20/19 5998 Resulting lab:  [facility]    Specimen Information    Type Source Collected On   Blood — 11/20/19 4404 Author:  Anita Carroll MD Service:  Hospitalist Author Type:  Physician    Filed:  11/17/2019  4:47 PM Date of Service:  11/17/2019 12:20 PM Status:  Addendum    :   Anita Carroll MD (Physician)    Related Notes:  Original Note by Merary Calzada • Myalgia and myositis, unspecified    • Nausea    • Organic hypersomnia, unspecified PSG 6-29-14    AHI 2 RDI 2 REM AHI 2 SaO2 doroteo 88 %   • OTHER DISEASES     Crohn's   • Pain in joints    • Pneumonia, organism unspecified(486)    • Seizure disorder (HC Sumatriptan Succina*    HALLUCINATION    Comment:imitrex  Toradol                     Comment:Gastritis & UC     famoTIDine, 40 mg, QAM  oxcarbazepine, 300 mg, BID  clonazePAM, 1 mg, TID  levETIRAcetam, 1,000 mg, Q12H  potassium chloride, 40 mEq, Q4H  Flut  11/17/2019    CO2 18.0 11/17/2019     11/17/2019    CA 8.7 11/17/2019    ALB 3.6 11/17/2019    ALKPHO 74 11/17/2019    BILT 0.5 11/17/2019    TP 6.5 11/17/2019    AST 36 11/17/2019    ALT 24 11/17/2019    PTT 45.0 11/17/2019    T4F 1.1 11/17 (406 [facility]) NRDR ([address]) which includes the Dose Index Registry. PATIENT STATED HISTORY: (As transcribed by Technologist)  Confusion, unresponsive.     FINDINGS:  VENTRICLES/SULCI:  Ventricles and sulci are no No evidence for fracture or osseous abnormality. OTHER:             None. CONCLUSION:  No acute intracranial abnormality is seen. If there is persistent clinical concern then recommend MRI.    Dictated by: Catherine Cranker, MD on 10/26/2019 at 14:35 PROCEDURE:  XR CHEST AP PORTABLE  (CPT=71045)  TECHNIQUE:  AP chest radiograph was obtained. COMPARISON:  [facility] , XR, XR CHEST AP PORTABLE  (CPT=71045), 11/16/2019, 16:06.   INDICATIONS:  verify NGT placement  PATIENT STATED HISTORY: (As transcribed by Te -vEEG, EEG was abn  -CTH no acute findings    Elevated WBC 24.9 to 19.1, possible sepsis  -temp 99.4  -empiric zosyn  -CXR no acute process, reviewed  -blood cxs sent    # NSTEMI, trop 6.7  -cards following, apprec  -heparin gtt, asa  -echo  # hypokalemia PCP:[CY.1] Casey Alonso MD[CY. 2]    History of Present Illness: Patient is a[CY.3 44year old[CY.2] female with PMH including but not limited to asthma, anxiety, bipolar, depression, TBI who p/t [facility] ED c increasing confusion.      Pt was admitted 10/30 • Ulcerative colitis, unspecified    • Visual impairment     reading glasses   • Wears glasses    • Wheezing       Past Surgical History:   Procedure Laterality Date   • CHOLECYSTECTOMY  3/10   • COLONOSCOPY  2013    ulcerative colitis   • COLONOSCOPY N/A Lithium Carbonate ER, 900 mg, QPM  QUEtiapine Fumarate, 150 mg, Nightly  Verapamil HCl ER, 360 mg, Nightly  Fluticasone Propionate, 1 spray, Daily  mesalamine, 1,200 mg, BID AC  Cyclobenzaprine HCl, 10 mg, TID  piperacillin-tazobactam, 3.375 g, Q8H PROCEDURE:  CT BRAIN OR HEAD (91091)  COMPARISON:  [facility] , CT, CT BRAIN OR HEAD (41319), 10/30/2019, 20:30. [facility] , CT, CT BRAIN OR HEAD (14268), 10/26/2019, 14:10.   INDICATIONS:  AMS, hx TBI, psych hx, today non verbal and cant walk, last seen normal w nothing specific for acute infarct. There is no hemorrhage or mass lesion. SINUSES:           No sign of acute sinusitis. MASTOIDS:          No sign of acute inflammation. SKULL:             No evidence for fracture or osseous abnormality.  OTHER: INDICATIONS:  Level II Trauma, Altered after Fall last night  TECHNIQUE:  Noncontrast CT scanning of the cervical spine is performed from the skull base through C7. Multiplanar reconstructions are generated. Dose reduction techniques were used.  Dose info 5-10 cm for more adequate positioning. Lungs are clear without acute cardiopulmonary process. Normal pulmonary vasculature. Normal cardiomediastinal silhouette.       CONCLUSION:  1. NG tube appears within the proximal stomach/GE junction region and shou #H/o TBI and Bipolar   -psych saw previous admit - adjusted meds      #Seizure Disorder -cont AED      #Asthma- cont Singulair,inhaler       #UC- on mesalamine     #H/o CVA- on asa     Proph  -hep gtt    Outpatient records or previous hospital records revi confused but had no fevers chills nausea vomiting or diarrhea. CT brain was negative for acute process. BBC was elevated. She is been seen by neurology, cardiology, endocrinology, infectious diseases and psychiatry.   With elevated WBC she started on rosalina oxide (DESITIN) 40 % 30 g  Magic butt cream, , Topical, Daily PRN  clonazePAM (KLONOPIN) tab 0.25 mg, 0.25 mg, Oral, TID  [START ON 11/20/2019] famoTIDine (PEPCID) tab 40 mg, 40 mg, Oral, Daily  Verapamil HCl ER (CALAN-SR) CR tab 240 mg, 240 mg, Oral, Nigh Continuous  ondansetron HCl (ZOFRAN) injection 4 mg, 4 mg, Intravenous, Q6H PRN  [COMPLETED] LORazepam (ATIVAN) injection 0.5 mg, 0.5 mg, Intravenous, Once  LORazepam (ATIVAN) injection 0.5 mg, 0.5 mg, Intravenous, Q6H PRN    Or  LORazepam (ATIVAN) injecti Latex                   RASH, Dyspnea  Levaquin [Levofloxa*    PAIN    Comment:Tendinitis in multiple joints  Mold                      Mushrooms               NAUSEA AND VOMITING  Penicillin G Potass*    NAUSEA AND VOMITING  Phenothiazines          OTHER • Ulcerative colitis, unspecified    • Visual impairment     reading glasses   • Wears glasses    • Wheezing        Past Surgical History:  Past Surgical History:   Procedure Laterality Date   • CHOLECYSTECTOMY  3/10   • COLONOSCOPY  2013    ulcerative col Neck: supple, symmetrical, no thyromegaly appreciated  Lungs: Non labored on RA, no wheezing appreciated, No accessory muscle noted  Heart: Reg Rate, no edema noted in BLE  Abdomen: soft, non-tender, non-distended. No hepatomegaly appreciated.   Extrems: no sore prevention, bowel and bladder management, skin management. Physiatric medical oversight to monitor kidney function, cardiac function, pulmonary status, neurologic status, DVT prevention. ELOS 10-14 days.   Rehab potential is good    DC goal is home wit Please note that only IHP DMG and EMG patients enrolled in the Medicare ACO, [facility] ACO and [address] [place] [address] will be handled by the [address] [place], [place] [address]S [facility] Management team.  For all other patients, please follow usual protocol for discharge care transition.     Discharge Conditio #Asthma- cont Singulair,inhaler       #UC- on mesalamine     #H/o CVA- on asa    Stable for discharge to acute rehab.       Consults: IP CONSULT TO HOSPITALIST  IP CONSULT TO NEUROLOGY  IP CONSULT TO CARDIOLOGY  IP CONSULT TO HOSPITALIST  NURSING CONSULT TO Take 3 mL (2.5 mg total) by nebulization every 6 (six) hours as needed for Wheezing.      aspirin 81 MG Tabs     Dicyclomine HCl 10 MG Caps  Commonly known as:  BENTYL  TAKE 1 CAPSULE BY MOUTH EVERY 4 TO 6 HOURS AS NEEDED FOR CRAMPING     Estradiol 10 MCG T Commonly known as:  LITHOBID  Replaced by:  Lithium Carbonate 300 MG Caps     OXcarbazepine 150 MG Tabs  Commonly known as:  TRILEPTAL  Replaced by:  oxcarbazepine 300 MG/5ML Susp     Polyethylene Glycol 3350 Powd  Commonly known as:  GLYCOLAX     spironol Discharge Summary signed by Piyush Bateman DO at 11/21/2019  3:41 PM  Version 1 of 2    Author:  Piyush Bateman DO Service:  Hospitalist Author Type:  Physician    Filed:  11/21/2019  3:41 PM Date of Service:  11/20/2019 11:46 AM Status:  Signed # Elevated troponin, suspect demand ischemia   -cards following, apprec; 2/2 demand ischemia? -heparin gtt off, cont asa  -echo unremarkable  -[MS. 1]nuclear stress negative[MS. 2]     # hypokalemia  -replete per protocol, 3.5     Hypernatremia  -148 to 153 5 mL (300 mg total) by Per G Tube route 2 (two) times daily. Replaces:  OXcarbazepine 150 MG Tabs     Verapamil HCl  MG Tbcr  Commonly known as:  CALAN-SR  Take 1 tablet (240 mg total) by mouth nightly.   Replaces:  Verapamil HCl  MG Cp24 Commonly known as:  ANTIVERT  Take 1 tablet (12.5 mg total) by mouth 3 (three) times daily as needed.      mesalamine 400 MG Cpdr  Commonly known as:  DELZCOL     MULTI-VITAMIN/MINERALS Tabs     NON FORMULARY     ondansetron 8 MG Tbdp  Commonly known as:  Z Code Status: Full Code      Exam on day of discharge:      11/20/19  0900   BP: 95/45   Pulse: 71   Resp: 15   Temp:        Gen: No acute distress, alert and oriented x3. Occasional word finding difficulties.    Pulm: Lungs clear, normal respiratory effort NSTEMI. EEG on 11/18/19 noted to be abnormal with presence of slowing in delta frequency with presence of sharps as seen in post-ictal phase of seizures.   Psych consulted and pt diagnosed with acute delirium.     Therapy significant imaging (date, test, r absent seizures   • Shortness of breath    • Sleep apnea     does not use CPAP   • Sleep disturbance    • Stress    • STROKE     2003+ mild R.sided weakness   • Syncope    • Ulcerative colitis, unspecified    • Visual impairment     reading glasses   • We How much help from another person does the patient currently need. ..[BR.1]   -   Moving to and from a bed to a chair (including a wheelchair)?: A Little   -   Need to walk in hospital room?: A Little   -   Climbing 3-5 steps with a railing?: A Little[BR.3] Pertinent comorbidities and personal factors impacting therapy include acute delirium, seizures, bipolar disorder, hypokalemia, and elevated ammonia.     The pt is highly motivated and participates very well in neuromuscular training, strength training and PHYSICAL THERAPY TREATMENT NOTE - INPATIENT    Room Number: 2345/7932-R     Session:[BR.1] 1[BR.2]   Number of Visits to Meet Established Goals: 5    Presenting Problem: Altered Mental Status[BR.3]    Problem List[BR.1]  Principal Problem:    Altered ment Past Surgical History[BR.1]  Past Surgical History:   Procedure Laterality Date   • CHOLECYSTECTOMY  3/10   • COLONOSCOPY  2013    ulcerative colitis   • COLONOSCOPY N/A 4/25/2017    Performed by Lorena Varela MD at [facility] ENDOSCOPY   • Teodoro Gilbert Standardized Score (AM-PAC Scale): 43.63   CMS Modifier (G-Code): CK[BR.3]    FUNCTIONAL ABILITY STATUS  Gait Assessment[BR.1]   Gait Assistance: Dependent assistance(actual min assist)  Distance (ft): 40  Assistive Device: Rolling walker  Pattern: (shuffl to perform all functional tasks and remains well below her baseline.[BR.2]       DISCHARGE RECOMMENDATIONS[BR.1]  PT Discharge Recommendations: Acute rehabilitation[BR.3]     PLAN[BR.1]  PT Treatment Plan: Bed mobility; Energy conservation; Endurance; Patient \"gibberish,\" but then progressed to not speaking at all. Pt diagnosed with AMS and NSTEMI. EEG on 11/18/19 noted to be abnormal with presence of slowing in delta frequency with presence of sharps as seen in post-ictal phase of seizures.   Psych consulte • Pain in joints    • Pneumonia, organism unspecified(486)    • Seizure disorder (HCC)     absent seizures   • Shortness of breath    • Sleep apnea     does not use CPAP   • Sleep disturbance    • Stress    • STROKE     2003+ mild R.sided weakness   • Sync Management Techniques: Repositioning; Activity promotion[NL.2]    COGNITION  · Overall Cognitive Status:  Impaired  · Arousal/Alertness:  appropriate responses to stimuli  · Attention Span:  appears intact  · Memory:  decreased recall of recent events and d Gait Assistance: Maximum assistance(Max A per FIM 2/2 to distance, but required Min A)  Distance (ft): 100  Assistive Device: Rolling walker  Pattern: (decreased gait speed, L sided drift)  Stoop/Curb Assistance: Not tested  Comment : Above grades based up impaired aerobic capacity, and 5) Torticollis. These impairments and comorbidities manifest themselves as functional limitations in independent bed mobility, transfers, and gait.  The -[facility] '6-Clicks' Inpatient Basic Mobility Short Form was completed and Filed:  11/19/2019  5:31 PM Date of Service:  11/19/2019  2:30 PM Status:  Signed    :  Kathleen Siddiqui OT (Occupational Therapist)       OCCUPATIONAL THERAPY EVALUATION - INPATIENT     Room Number: 8440/7981-Y  Evaluation Date: 11/19/2019  Type o • Extrinsic asthma, unspecified    • Fatigue    • Feeling lonely    • Flatulence/gas pain/belching    • Gestational diabetes    • Headache disorder    • Heartburn    • Hemorrhoids    • History of depression    • History of mental disorder    • HYPOTHYROIDI intermittently (~2x per week) for community ambulation. Pt lives with her son, Radhika Foster, who is disabled. The pt would assist her son with cooking meals, laundry, and cleaning.   Pt denies any recent falls, but per medical record pt fell in October and hit -   Bathing (including washing, rinsing, drying)?: A Lot  -   Toileting, which includes using toilet, bedpan or urinal? : A Lot  -   Putting on and taking off regular upper body clothing?: A Lot  -   Taking care of personal grooming such as brushing teeth? Patient Complexity  Occupational Profile/Medical History MODERATE - Expanded review of history including review of medical or therapy record   Specific performance deficits impacting engagement in ADL/IADL MODERATE  3 - 5 performance deficits   Client Asse Author:  Teri Hedrick Service:  Rehab Author Type:  Speech and Language Pathologist    Filed:  11/19/2019  9:19 AM Date of Service:  11/19/2019  8:39 AM Status:  Signed    :  Teri Hedrick (Speech and Language Pathologist)       ADULT SWALLOW Elevated troponin    Delirium[JL.2]      Past Medical History[JL.1]  Past Medical History:   Diagnosis Date   • Abdominal pain    • Acne    • ALLERGIC RHINITIS    • Anxiety state    • Arthritis    • ASTHMA    • Asthma    • Back pain    • Back problem CT Chest + abdomen from 11/18/19 revealed:  FINDINGS:       CHEST:    LUNGS:  There is mild bibasilar dependent atelectasis versus scarring suggested. No focal pulmonary nodules or mass lesions. MEDIASTINUM:  No mass or adenopathy.     CARLY:  No mass or a 1. There is mild peripancreatic fat stranding within the retroperitoneum which is nonspecific and suspicious for possible mild pancreatitis. Please correlate clinically. No focal pancreatic lesions or pancreatic duct dilatation noted.   2. Moderate degree consistency and thin liquids without overt signs or symptoms of aspiration with 100 % accuracy over 2-3 session(s).   In Progress[JL.3]   Goal #2[JL.1] The patient/family/caregiver will demonstrate understanding and implementation of aspiration precautions Botulinum Toxin Type A Per Unit, Im Inj  09/23/15     Botulinum Toxin Type A Per Unit, Im Inj  06/17/15     Botulinum Toxin Type A Per Unit, Im Inj  03/20/15     Botulinum Toxin Type A Per Unit, Im Inj  12/19/14     Botulinum Toxin Type A Per Unit, Im Inj Kenalog Per 10mg, Intralesional Inj 03/24/16     Kenalog Per 10mg, Intralesional Inj 02/08/16     Kenalog Per 10mg, Intralesional Inj 01/07/16     Kenalog Per 10mg, Intralesional Inj 11/30/15     Kenalog Per 10mg, Intralesional Inj 10/28/15     Kenalog Pe

## (undated) NOTE — IP AVS SNAPSHOT
[address]            (For Outpatient Use Only) Initial Admit Date: 11/16/2019   Inpt/Obs Admit Date: Inpt: 11/16/19 / Obs: N/A   Discharge Date:    Pilo Luo:  [de-identified]   MRN: [de-identified]   CSN: [id number]   CEID: [id number] Group Number:  Insurance Type:    Subscriber Name:  Subscriber :    Subscriber ID:  Pt Rel to Subscriber:    Hospital Account Financial Class: Medicare    2019